# Patient Record
Sex: FEMALE | Race: WHITE | NOT HISPANIC OR LATINO | Employment: OTHER | URBAN - METROPOLITAN AREA
[De-identification: names, ages, dates, MRNs, and addresses within clinical notes are randomized per-mention and may not be internally consistent; named-entity substitution may affect disease eponyms.]

---

## 2017-02-08 ENCOUNTER — HOSPITAL ENCOUNTER (OUTPATIENT)
Dept: SLEEP MEDICINE | Facility: HOSPITAL | Age: 82
Discharge: HOME | End: 2017-02-08
Attending: INTERNAL MEDICINE | Admitting: INTERNAL MEDICINE

## 2017-08-09 ENCOUNTER — OUTSIDE FACILITY SERVICE (OUTPATIENT)
Dept: CARDIAC SURGERY | Facility: CLINIC | Age: 82
End: 2017-08-09

## 2017-08-09 PROCEDURE — OUTSIDEPOS PR OUTSIDE POS PLACEHOLDER: Performed by: THORACIC SURGERY (CARDIOTHORACIC VASCULAR SURGERY)

## 2017-09-19 ENCOUNTER — HOSPITAL ENCOUNTER (OUTPATIENT)
Dept: SLEEP MEDICINE | Facility: HOSPITAL | Age: 82
Discharge: HOME | End: 2017-09-19
Attending: INTERNAL MEDICINE | Admitting: INTERNAL MEDICINE

## 2017-09-21 ENCOUNTER — OFFICE VISIT (OUTPATIENT)
Dept: CARDIAC SURGERY | Facility: CLINIC | Age: 82
End: 2017-09-21

## 2017-09-21 VITALS
HEART RATE: 76 BPM | DIASTOLIC BLOOD PRESSURE: 78 MMHG | SYSTOLIC BLOOD PRESSURE: 122 MMHG | RESPIRATION RATE: 16 BRPM | OXYGEN SATURATION: 95 % | TEMPERATURE: 98.4 F | WEIGHT: 161.4 LBS

## 2017-09-21 DIAGNOSIS — Z95.1 S/P CABG (CORONARY ARTERY BYPASS GRAFT): Primary | ICD-10-CM

## 2017-09-21 PROCEDURE — 99024 POSTOP FOLLOW-UP VISIT: CPT | Performed by: NURSE PRACTITIONER

## 2017-09-21 RX ORDER — HYDROCODONE BITARTRATE AND ACETAMINOPHEN 7.5; 325 MG/1; MG/1
1 TABLET ORAL EVERY 8 HOURS PRN
Refills: 0 | COMMUNITY
Start: 2017-09-07

## 2017-09-21 RX ORDER — WARFARIN SODIUM 2.5 MG/1
TABLET ORAL
COMMUNITY
Start: 2017-09-20 | End: 2020-03-31 | Stop reason: DRUGHIGH

## 2017-09-21 RX ORDER — DILTIAZEM HYDROCHLORIDE 120 MG/1
CAPSULE, EXTENDED RELEASE ORAL
Refills: 5 | COMMUNITY
Start: 2017-09-06 | End: 2017-09-21 | Stop reason: ALTCHOICE

## 2017-09-21 RX ORDER — CEPHALEXIN 500 MG/1
CAPSULE ORAL
Refills: 0 | COMMUNITY
Start: 2017-09-13 | End: 2019-07-31 | Stop reason: ALTCHOICE

## 2017-09-21 RX ORDER — POTASSIUM CHLORIDE 750 MG/1
CAPSULE, EXTENDED RELEASE ORAL
Refills: 2 | COMMUNITY
Start: 2017-06-27 | End: 2020-01-15

## 2017-09-21 RX ORDER — PANTOPRAZOLE SODIUM 40 MG/1
TABLET, DELAYED RELEASE ORAL
Refills: 5 | COMMUNITY
Start: 2017-07-24

## 2017-09-21 RX ORDER — ACETAMINOPHEN 650 MG
TABLET, EXTENDED RELEASE ORAL
Refills: 0 | COMMUNITY
Start: 2017-08-25 | End: 2019-07-31 | Stop reason: ALTCHOICE

## 2017-09-21 RX ORDER — SOTALOL HYDROCHLORIDE 120 MG/1
TABLET ORAL
Refills: 2 | COMMUNITY
Start: 2017-06-19 | End: 2017-09-21 | Stop reason: ALTCHOICE

## 2017-09-21 RX ORDER — POTASSIUM CHLORIDE 20 MEQ/1
TABLET, EXTENDED RELEASE ORAL
Refills: 0 | COMMUNITY
Start: 2017-08-25 | End: 2020-07-15

## 2017-09-21 RX ORDER — TRAMADOL HYDROCHLORIDE 50 MG/1
TABLET ORAL
Refills: 0 | COMMUNITY
Start: 2017-08-25 | End: 2019-07-31 | Stop reason: ALTCHOICE

## 2017-09-21 RX ORDER — LORAZEPAM 0.5 MG/1
TABLET ORAL
Refills: 2 | COMMUNITY
Start: 2017-06-27 | End: 2019-07-31 | Stop reason: ALTCHOICE

## 2017-09-21 RX ORDER — METOPROLOL TARTRATE 50 MG/1
TABLET, FILM COATED ORAL
Refills: 5 | COMMUNITY
Start: 2017-09-07 | End: 2020-03-31 | Stop reason: SDUPTHER

## 2017-09-21 RX ORDER — AMIODARONE HYDROCHLORIDE 200 MG/1
TABLET ORAL
Refills: 0 | COMMUNITY
Start: 2017-08-25 | End: 2019-07-31 | Stop reason: ALTCHOICE

## 2017-09-21 RX ORDER — AMLODIPINE BESYLATE 5 MG/1
TABLET ORAL
Refills: 5 | COMMUNITY
Start: 2017-09-14 | End: 2019-07-31 | Stop reason: ALTCHOICE

## 2017-09-21 RX ORDER — HYDROCODONE BITARTRATE AND ACETAMINOPHEN 5; 325 MG/1; MG/1
TABLET ORAL
Refills: 0 | COMMUNITY
Start: 2017-08-25 | End: 2019-07-31 | Stop reason: ALTCHOICE

## 2017-09-21 RX ORDER — CALCIUM CARB/VITAMIN D3/VIT K1 500-500-40
TABLET,CHEWABLE ORAL
Refills: 0 | COMMUNITY
Start: 2017-08-25 | End: 2020-01-15

## 2017-09-21 RX ORDER — DILTIAZEM HYDROCHLORIDE 180 MG/1
CAPSULE, EXTENDED RELEASE ORAL
Refills: 5 | COMMUNITY
Start: 2017-07-24 | End: 2019-08-09 | Stop reason: SDUPTHER

## 2017-09-21 RX ORDER — ATORVASTATIN CALCIUM 10 MG/1
10 TABLET, FILM COATED ORAL NIGHTLY
COMMUNITY
Start: 2017-09-20

## 2017-09-21 RX ORDER — FUROSEMIDE 20 MG/1
TABLET ORAL
Refills: 5 | COMMUNITY
Start: 2017-06-27 | End: 2017-09-21 | Stop reason: ALTCHOICE

## 2017-09-21 RX ORDER — ASPIRIN 81 MG/1
TABLET, COATED ORAL
Refills: 0 | COMMUNITY
Start: 2017-08-25 | End: 2019-07-31 | Stop reason: ALTCHOICE

## 2017-09-21 RX ORDER — FUROSEMIDE 40 MG/1
TABLET ORAL
Refills: 0 | COMMUNITY
Start: 2017-08-25 | End: 2020-03-31 | Stop reason: SDUPTHER

## 2017-09-21 RX ORDER — DICYCLOMINE HYDROCHLORIDE 10 MG/1
CAPSULE ORAL
Refills: 5 | COMMUNITY
Start: 2017-07-25 | End: 2020-01-15

## 2017-09-21 RX ORDER — ONDANSETRON 4 MG/1
TABLET, FILM COATED ORAL
Refills: 1 | COMMUNITY
Start: 2017-09-06 | End: 2020-07-15

## 2017-09-21 RX ORDER — ALPRAZOLAM 0.5 MG/1
0.5 TABLET ORAL 2 TIMES DAILY PRN
Refills: 5 | COMMUNITY
Start: 2017-09-06

## 2017-09-21 NOTE — PROGRESS NOTES
9/21/2017        Subjective:      Taras Dumont MD    Chief Complaint of No chief complaint on file.           Dear Dr. Taras Dumont MD and Colleagues,    It was nice to see Syl Herrera in follow up today. She is status post CABG ×5, cryo-maze, right lower extremity vein harvest on 8/9/2017 at [facility] with Dr. Brandt.  She reports that she went to rehabilitation postoperatively and is now home and doing physical therapy in the home setting.  She has 2 more visits with home physical therapy and she is due to start rehabilitation soon.  Her daughter is concerned as her mother still has an irregular heart rate with periodic drops in the 40s even with her pacemaker.  I explained I am unable to interrogate her pacemaker but she should make a follow-up appointment with Dr. Martell to discuss her pacemaker settings and atrial fibrillation.  Preoperatively she had a urinary tract infection with postponed surgery, and postoperatively she states that she had a urinary tract infection of which she just finished her latest antibiotic.  She states that she thinks she may still have urinary tract infection as it still feels like she is not emptying her bladder completely.  I encouraged her to make a follow-up appointment with Dr. Dumont for continued urinary symptoms/dysuria in the event that she needs further imaging or a consult with urology.  She states that she is still having periodic issues with anxiety and depression and she has started taking Xanax on a regular basis.  From a surgical standpoint her sternal and leg incisions are well approximated without erythema, edema, or drainage.  Her chest tube insertion sites are still with scabs but no fluctuance is appreciated and no drainage is present.  Her sternum is stable to palpation and she denies any popping or clicking with deep inspiration or coughing    There is no problem list on file for this patient.      Past Medical History:   Diagnosis Date   • Anxiety     • Atrial fibrillation    • GERD (gastroesophageal reflux disease)    • Hypertension        Past Surgical History:   Procedure Laterality Date   • CORONARY ARTERY BYPASS GRAFT  08/09/2017    X5  Dr Brandt       Allergies   Allergen Reactions   • Celebrex [Celecoxib]    • Contrast Dye    • Nsaids    • Sulfa Antibiotics            Current Outpatient Prescriptions:   •  ALPRAZolam (XANAX) 0.5 MG tablet, TK 1/2 T PO QID PRA, Disp: , Rfl: 5  •  amiodarone (PACERONE) 200 MG tablet, TK 1 T PO BID WC, Disp: , Rfl: 0  •  amLODIPine (NORVASC) 5 MG tablet, TK 1 T PO QAM, Disp: , Rfl: 5  •  ASPIRIN LOW DOSE 81 MG EC tablet, TK 1 T PO D, Disp: , Rfl: 0  •  atorvastatin (LIPITOR) 10 MG tablet, , Disp: , Rfl:   •  CALCIUM SOFT CHEWS 500-500-40 MG-UNT-MCG chewable tablet, CSW 1 SOFT CHEW PO QHS, Disp: , Rfl: 0  •  dicyclomine (BENTYL) 10 MG capsule, TK 1 C PO BID, Disp: , Rfl: 5  •  furosemide (LASIX) 40 MG tablet, TK 1 T PO D, Disp: , Rfl: 0  •  HYDROcodone-acetaminophen (NORCO) 5-325 MG per tablet, TK 1 T PO Q 6 H PRN P, Disp: , Rfl: 0  •  magnesium oxide (MAGOX) 400 (241.3 Mg) MG tablet tablet, TK 2 TS PO QHS, Disp: , Rfl: 0  •  metoprolol tartrate (LOPRESSOR) 50 MG tablet, TK 1 T PO BID, Disp: , Rfl: 5  •  pantoprazole (PROTONIX) 40 MG EC tablet, TK 1 T PO QD, Disp: , Rfl: 5  •  potassium chloride (K-DUR,KLOR-CON) 20 MEQ CR tablet, TK 1 T PO D, Disp: , Rfl: 0  •  traMADol (ULTRAM) 50 MG tablet, TK 1 T PO TID PRN, Disp: , Rfl: 0  •  warfarin (COUMADIN) 2.5 MG tablet, , Disp: , Rfl:   •  cephalexin (KEFLEX) 500 MG capsule, TK ONE C PO  BID, Disp: , Rfl: 0  •  DILT- MG 24 hr capsule, TK 1 C PO QD, Disp: , Rfl: 5  •  HYDROcodone-acetaminophen (NORCO) 7.5-325 MG per tablet, TK 1 T PO  Q 8 H, Disp: , Rfl: 0  •  LORazepam (ATIVAN) 0.5 MG tablet, TK 1 T PO  BID, Disp: , Rfl: 2  •  ondansetron (ZOFRAN) 4 MG tablet, TK 1 T PO Q 6 H PRN N, Disp: , Rfl: 1  •  potassium chloride (MICRO-K) 10 MEQ CR capsule, TK 2 CS PO QD, Disp: ,  Rfl: 2  •  povidone-iodine (BETADINE) 10 % external solution, APPLY TOPICALLY BID, Disp: , Rfl: 0    Social History     Social History   • Marital status:      Spouse name: N/A   • Number of children: N/A   • Years of education: N/A     Occupational History   • Not on file.     Social History Main Topics   • Smoking status: Never Smoker   • Smokeless tobacco: Never Used   • Alcohol use No   • Drug use: No   • Sexual activity: Not on file     Other Topics Concern   • Not on file     Social History Narrative   • No narrative on file       No family history on file.        Vital Signs:  Weight: 161 lb 6.4 oz (73.2 kg)   There is no height or weight on file to calculate BMI.  Temp: 98.4 °F (36.9 °C)   Heart Rate: 76   BP: 122/78     Physical Exam   Constitutional: She is oriented to person, place, and time.   Cardiovascular: Exam reveals no gallop and no friction rub.    No murmur heard.  Irregular rhythm   Pulmonary/Chest: Effort normal and breath sounds normal. She has no wheezes. She has no rales.   Musculoskeletal: She exhibits no edema.   Neurological: She is alert and oriented to person, place, and time.   Skin: Skin is warm and dry. No rash noted. No erythema. No pallor.   Psychiatric: She has a normal mood and affect. Her behavior is normal. Judgment and thought content normal.        Recommendation/Plan:     Syl Herrera was seen for post operative follow up. She is doing well from a surgical standpoint. Her incisions are healing well. I have released her to resume normal daily activities without restrictions. We will see her on an as-needed basis although I encouraged her to call for follow-up appointment should she experience any symptomatology for infection.      Thank you for allowing me to participate in her care.    Sincerely,    SHILA Lambert

## 2017-10-19 ENCOUNTER — HOSPITAL ENCOUNTER (OUTPATIENT)
Dept: CARDIOLOGY | Facility: HOSPITAL | Age: 82
Discharge: HOME | End: 2017-10-19
Attending: INTERNAL MEDICINE | Admitting: INTERNAL MEDICINE

## 2017-11-18 ENCOUNTER — HOSPITAL ENCOUNTER (OUTPATIENT)
Dept: LAB | Facility: HOSPITAL | Age: 82
Discharge: HOME | End: 2017-11-18
Attending: OPHTHALMOLOGY | Admitting: OPHTHALMOLOGY

## 2017-11-18 LAB
INR PPP: 2.1 (ref 2–3)
PROTHROMBIN TIME: 21.7 SEC (ref 19.4–28.5)

## 2017-11-29 ENCOUNTER — HOSPITAL ENCOUNTER (OUTPATIENT)
Dept: CARDIOLOGY | Facility: HOSPITAL | Age: 82
Discharge: HOME | End: 2017-11-29
Attending: INTERNAL MEDICINE | Admitting: INTERNAL MEDICINE

## 2017-12-26 ENCOUNTER — HOSPITAL ENCOUNTER (OUTPATIENT)
Dept: RESPIRATORY THERAPY | Facility: HOSPITAL | Age: 82
Discharge: HOME | End: 2017-12-26
Attending: INTERNAL MEDICINE | Admitting: INTERNAL MEDICINE

## 2018-07-12 ENCOUNTER — OFFICE (AMBULATORY)
Dept: URBAN - METROPOLITAN AREA CLINIC 64 | Facility: CLINIC | Age: 83
End: 2018-07-12
Payer: COMMERCIAL

## 2018-07-12 VITALS
HEIGHT: 62 IN | DIASTOLIC BLOOD PRESSURE: 77 MMHG | HEART RATE: 67 BPM | SYSTOLIC BLOOD PRESSURE: 149 MMHG | WEIGHT: 162 LBS

## 2018-07-12 DIAGNOSIS — R13.10 DYSPHAGIA, UNSPECIFIED: ICD-10-CM

## 2018-07-12 DIAGNOSIS — R94.5 ABNORMAL RESULTS OF LIVER FUNCTION STUDIES: ICD-10-CM

## 2018-07-12 DIAGNOSIS — R10.32 LEFT LOWER QUADRANT PAIN: ICD-10-CM

## 2018-07-12 LAB
ACTIN (SMOOTH MUSCLE) ANTIBODY: 12 UNITS (ref 0–19)
ALPHA-1-ANTITRYPSIN PHENOTYP: ALPHA-1-ANTITRYPSIN, SERUM: 143 MG/DL (ref 90–200)
ALPHA-1-ANTITRYPSIN PHENOTYP: PHENOTYPE (PI): (no result)
ANTINUCLEAR ANTIBODIES, IFA: NEGATIVE
CERULOPLASMIN: 35.2 MG/DL (ref 19–39)
COMP. METABOLIC PANEL (14): A/G RATIO: 2 (ref 1.2–2.2)
COMP. METABOLIC PANEL (14): ALBUMIN: 4.3 G/DL (ref 3.5–4.7)
COMP. METABOLIC PANEL (14): ALKALINE PHOSPHATASE: 143 IU/L — HIGH (ref 39–117)
COMP. METABOLIC PANEL (14): ALT (SGPT): 26 IU/L (ref 0–32)
COMP. METABOLIC PANEL (14): AST (SGOT): 34 IU/L (ref 0–40)
COMP. METABOLIC PANEL (14): BILIRUBIN, TOTAL: 0.6 MG/DL (ref 0–1.2)
COMP. METABOLIC PANEL (14): BUN/CREATININE RATIO: 23 (ref 12–28)
COMP. METABOLIC PANEL (14): BUN: 26 MG/DL (ref 8–27)
COMP. METABOLIC PANEL (14): CALCIUM: 9.3 MG/DL (ref 8.7–10.3)
COMP. METABOLIC PANEL (14): CARBON DIOXIDE, TOTAL: 27 MMOL/L (ref 20–29)
COMP. METABOLIC PANEL (14): CHLORIDE: 100 MMOL/L (ref 96–106)
COMP. METABOLIC PANEL (14): CREATININE: 1.11 MG/DL — HIGH (ref 0.57–1)
COMP. METABOLIC PANEL (14): EGFR IF AFRICN AM: 54 ML/MIN/1.73 — LOW (ref 59–?)
COMP. METABOLIC PANEL (14): EGFR IF NONAFRICN AM: 47 ML/MIN/1.73 — LOW (ref 59–?)
COMP. METABOLIC PANEL (14): GLOBULIN, TOTAL: 2.2 G/DL (ref 1.5–4.5)
COMP. METABOLIC PANEL (14): GLUCOSE: 103 MG/DL — HIGH (ref 65–99)
COMP. METABOLIC PANEL (14): POTASSIUM: 4.7 MMOL/L (ref 3.5–5.2)
COMP. METABOLIC PANEL (14): PROTEIN, TOTAL: 6.5 G/DL (ref 6–8.5)
COMP. METABOLIC PANEL (14): SODIUM: 141 MMOL/L (ref 134–144)
FERRITIN, SERUM: 87 NG/ML (ref 15–150)
GGT: 48 IU/L (ref 0–60)
IMMUNOGLOBULINS A/G/M, QN, SER: IMMUNOGLOBULIN A, QN, SERUM: 166 MG/DL (ref 64–422)
IMMUNOGLOBULINS A/G/M, QN, SER: IMMUNOGLOBULIN G, QN, SERUM: 956 MG/DL (ref 700–1600)
IMMUNOGLOBULINS A/G/M, QN, SER: IMMUNOGLOBULIN M, QN, SERUM: 41 MG/DL (ref 26–217)
IRON AND TIBC: IRON BIND.CAP.(TIBC): 343 UG/DL (ref 250–450)
IRON AND TIBC: IRON SATURATION: 18 % (ref 15–55)
IRON AND TIBC: IRON: 61 UG/DL (ref 27–139)
IRON AND TIBC: UIBC: 282 UG/DL (ref 118–369)
MITOCHONDRIAL (M2) ANTIBODY: 29.9 UNITS — HIGH (ref 0–20)

## 2018-07-12 PROCEDURE — 99204 OFFICE O/P NEW MOD 45 MIN: CPT | Performed by: INTERNAL MEDICINE

## 2018-09-18 ENCOUNTER — OFFICE (AMBULATORY)
Dept: URBAN - METROPOLITAN AREA CLINIC 64 | Facility: CLINIC | Age: 83
End: 2018-09-18
Payer: COMMERCIAL

## 2018-09-18 VITALS
HEART RATE: 72 BPM | DIASTOLIC BLOOD PRESSURE: 80 MMHG | HEIGHT: 62 IN | SYSTOLIC BLOOD PRESSURE: 153 MMHG | WEIGHT: 172 LBS

## 2018-09-18 DIAGNOSIS — R13.10 DYSPHAGIA, UNSPECIFIED: ICD-10-CM

## 2018-09-18 DIAGNOSIS — R94.5 ABNORMAL RESULTS OF LIVER FUNCTION STUDIES: ICD-10-CM

## 2018-09-18 DIAGNOSIS — K74.3 PRIMARY BILIARY CIRRHOSIS: ICD-10-CM

## 2018-09-18 PROCEDURE — 99213 OFFICE O/P EST LOW 20 MIN: CPT | Performed by: INTERNAL MEDICINE

## 2019-01-03 ENCOUNTER — OFFICE (AMBULATORY)
Dept: URBAN - METROPOLITAN AREA CLINIC 64 | Facility: CLINIC | Age: 84
End: 2019-01-03
Payer: COMMERCIAL

## 2019-01-03 VITALS
DIASTOLIC BLOOD PRESSURE: 89 MMHG | HEART RATE: 62 BPM | HEIGHT: 62 IN | WEIGHT: 175 LBS | SYSTOLIC BLOOD PRESSURE: 142 MMHG

## 2019-01-03 DIAGNOSIS — R13.10 DYSPHAGIA, UNSPECIFIED: ICD-10-CM

## 2019-01-03 DIAGNOSIS — K74.3 PRIMARY BILIARY CIRRHOSIS: ICD-10-CM

## 2019-01-03 PROCEDURE — 99213 OFFICE O/P EST LOW 20 MIN: CPT | Performed by: INTERNAL MEDICINE

## 2019-06-26 ENCOUNTER — ANTICOAGULATION VISIT (OUTPATIENT)
Dept: CARDIOLOGY | Facility: CLINIC | Age: 84
End: 2019-06-26

## 2019-06-26 VITALS — DIASTOLIC BLOOD PRESSURE: 82 MMHG | SYSTOLIC BLOOD PRESSURE: 141 MMHG | HEART RATE: 61 BPM | WEIGHT: 176.75 LBS

## 2019-06-26 DIAGNOSIS — Z79.01 LONG TERM (CURRENT) USE OF ANTICOAGULANTS: ICD-10-CM

## 2019-06-26 DIAGNOSIS — I48.20 CHRONIC ATRIAL FIBRILLATION (HCC): ICD-10-CM

## 2019-06-26 PROBLEM — I48.91 ATRIAL FIBRILLATION: Status: ACTIVE | Noted: 2019-06-26

## 2019-06-26 LAB — INR PPP: 3 (ref 2–3)

## 2019-06-26 PROCEDURE — 85610 PROTHROMBIN TIME: CPT | Performed by: INTERNAL MEDICINE

## 2019-06-26 PROCEDURE — 36416 COLLJ CAPILLARY BLOOD SPEC: CPT | Performed by: INTERNAL MEDICINE

## 2019-07-31 ENCOUNTER — TELEPHONE (OUTPATIENT)
Dept: CARDIOLOGY | Facility: CLINIC | Age: 84
End: 2019-07-31

## 2019-07-31 ENCOUNTER — ANTICOAGULATION VISIT (OUTPATIENT)
Dept: CARDIOLOGY | Facility: CLINIC | Age: 84
End: 2019-07-31

## 2019-07-31 VITALS — HEART RATE: 60 BPM | DIASTOLIC BLOOD PRESSURE: 71 MMHG | WEIGHT: 174.75 LBS | SYSTOLIC BLOOD PRESSURE: 136 MMHG

## 2019-07-31 DIAGNOSIS — I48.20 CHRONIC ATRIAL FIBRILLATION (HCC): ICD-10-CM

## 2019-07-31 DIAGNOSIS — Z79.01 LONG TERM (CURRENT) USE OF ANTICOAGULANTS: ICD-10-CM

## 2019-07-31 LAB — INR PPP: 3.4 (ref 2–3)

## 2019-07-31 PROCEDURE — 85610 PROTHROMBIN TIME: CPT | Performed by: INTERNAL MEDICINE

## 2019-07-31 PROCEDURE — 36416 COLLJ CAPILLARY BLOOD SPEC: CPT | Performed by: INTERNAL MEDICINE

## 2019-07-31 NOTE — TELEPHONE ENCOUNTER
Dr cosby sent copy of EKG for Dr Martell to review / advise.  Pt presented with updated med list.    Copy of Ekg given to Dr fausto GODOY's

## 2019-08-09 ENCOUNTER — TRANSCRIBE ORDERS (OUTPATIENT)
Dept: CT IMAGING | Facility: HOSPITAL | Age: 84
End: 2019-08-09

## 2019-08-09 ENCOUNTER — OFFICE (AMBULATORY)
Dept: URBAN - METROPOLITAN AREA CLINIC 64 | Facility: CLINIC | Age: 84
End: 2019-08-09
Payer: COMMERCIAL

## 2019-08-09 VITALS
HEART RATE: 60 BPM | DIASTOLIC BLOOD PRESSURE: 93 MMHG | SYSTOLIC BLOOD PRESSURE: 166 MMHG | WEIGHT: 176 LBS | HEIGHT: 62 IN

## 2019-08-09 DIAGNOSIS — R13.10 DYSPHAGIA, UNSPECIFIED: ICD-10-CM

## 2019-08-09 DIAGNOSIS — K74.3 PRIMARY BILIARY CIRRHOSIS: ICD-10-CM

## 2019-08-09 DIAGNOSIS — R10.32 LEFT LOWER QUADRANT PAIN: Primary | ICD-10-CM

## 2019-08-09 DIAGNOSIS — R94.5 ABNORMAL RESULTS OF LIVER FUNCTION STUDIES: ICD-10-CM

## 2019-08-09 DIAGNOSIS — R10.32 LEFT LOWER QUADRANT PAIN: ICD-10-CM

## 2019-08-09 DIAGNOSIS — K57.90 DIVERTICULOSIS OF INTESTINE, PART UNSPECIFIED, WITHOUT PERFO: ICD-10-CM

## 2019-08-09 PROCEDURE — 99214 OFFICE O/P EST MOD 30 MIN: CPT | Performed by: NURSE PRACTITIONER

## 2019-08-09 RX ORDER — DILTIAZEM HYDROCHLORIDE 180 MG/1
180 CAPSULE, EXTENDED RELEASE ORAL DAILY
Qty: 30 CAPSULE | Refills: 0 | Status: SHIPPED | OUTPATIENT
Start: 2019-08-09 | End: 2019-08-14 | Stop reason: SDUPTHER

## 2019-08-13 ENCOUNTER — HOSPITAL ENCOUNTER (OUTPATIENT)
Facility: HOSPITAL | Age: 84
Setting detail: HOSPITAL OUTPATIENT SURGERY
End: 2019-08-13
Attending: INTERNAL MEDICINE | Admitting: INTERNAL MEDICINE

## 2019-08-14 RX ORDER — DILTIAZEM HYDROCHLORIDE 180 MG/1
180 CAPSULE, EXTENDED RELEASE ORAL DAILY
Qty: 30 CAPSULE | Refills: 0 | Status: SHIPPED | OUTPATIENT
Start: 2019-08-14 | End: 2019-08-26 | Stop reason: DRUGHIGH

## 2019-08-15 ENCOUNTER — HOSPITAL ENCOUNTER (OUTPATIENT)
Dept: CT IMAGING | Facility: HOSPITAL | Age: 84
Discharge: HOME | End: 2019-08-15
Admitting: NURSE PRACTITIONER

## 2019-08-15 ENCOUNTER — APPOINTMENT (OUTPATIENT)
Dept: CT IMAGING | Facility: HOSPITAL | Age: 84
End: 2019-08-15

## 2019-08-15 DIAGNOSIS — R10.32 LEFT LOWER QUADRANT PAIN: ICD-10-CM

## 2019-08-15 PROCEDURE — 74176 CT ABD & PELVIS W/O CONTRAST: CPT

## 2019-08-21 ENCOUNTER — CLINICAL SUPPORT NO REQUIREMENTS (OUTPATIENT)
Dept: CARDIOLOGY | Facility: CLINIC | Age: 84
End: 2019-08-21

## 2019-08-21 DIAGNOSIS — Z95.0 PACEMAKER: ICD-10-CM

## 2019-08-21 DIAGNOSIS — R00.1 BRADYCARDIA: Primary | ICD-10-CM

## 2019-08-21 PROCEDURE — 93296 REM INTERROG EVL PM/IDS: CPT | Performed by: INTERNAL MEDICINE

## 2019-08-21 PROCEDURE — 93294 REM INTERROG EVL PM/LDLS PM: CPT | Performed by: INTERNAL MEDICINE

## 2019-08-22 ENCOUNTER — ANTICOAGULATION VISIT (OUTPATIENT)
Dept: CARDIOLOGY | Facility: CLINIC | Age: 84
End: 2019-08-22

## 2019-08-22 VITALS — SYSTOLIC BLOOD PRESSURE: 133 MMHG | DIASTOLIC BLOOD PRESSURE: 79 MMHG | WEIGHT: 174.75 LBS | HEART RATE: 62 BPM

## 2019-08-22 DIAGNOSIS — Z79.01 LONG TERM (CURRENT) USE OF ANTICOAGULANTS: ICD-10-CM

## 2019-08-22 DIAGNOSIS — I48.20 CHRONIC ATRIAL FIBRILLATION (HCC): ICD-10-CM

## 2019-08-22 LAB — INR PPP: 3.1 (ref 2–3)

## 2019-08-22 PROCEDURE — 85610 PROTHROMBIN TIME: CPT | Performed by: INTERNAL MEDICINE

## 2019-08-22 PROCEDURE — 36416 COLLJ CAPILLARY BLOOD SPEC: CPT | Performed by: INTERNAL MEDICINE

## 2019-08-26 ENCOUNTER — TELEPHONE (OUTPATIENT)
Dept: CARDIOLOGY | Facility: CLINIC | Age: 84
End: 2019-08-26

## 2019-08-26 RX ORDER — DILTIAZEM HYDROCHLORIDE 120 MG/1
120 CAPSULE, COATED, EXTENDED RELEASE ORAL DAILY
Qty: 90 CAPSULE | Refills: 3 | Status: SHIPPED | OUTPATIENT
Start: 2019-08-26 | End: 2020-01-15

## 2019-08-26 RX ORDER — DILTIAZEM HYDROCHLORIDE 120 MG/1
120 CAPSULE, COATED, EXTENDED RELEASE ORAL DAILY
COMMUNITY
End: 2019-08-26 | Stop reason: SDUPTHER

## 2019-08-26 NOTE — TELEPHONE ENCOUNTER
WRONG RX CALLED IN,  DILTIAZEM 180MG    INSTEAD OF 120MG? PT ASKING WHY?  [phone]  CALL AFTER 1 TODAY

## 2019-09-26 ENCOUNTER — ANTICOAGULATION VISIT (OUTPATIENT)
Dept: CARDIOLOGY | Facility: CLINIC | Age: 84
End: 2019-09-26

## 2019-09-26 VITALS — HEART RATE: 64 BPM | WEIGHT: 169 LBS | DIASTOLIC BLOOD PRESSURE: 83 MMHG | SYSTOLIC BLOOD PRESSURE: 153 MMHG

## 2019-09-26 DIAGNOSIS — I48.20 CHRONIC ATRIAL FIBRILLATION (HCC): ICD-10-CM

## 2019-09-26 DIAGNOSIS — Z79.01 LONG TERM (CURRENT) USE OF ANTICOAGULANTS: ICD-10-CM

## 2019-09-26 LAB — INR PPP: 3 (ref 0.9–1.1)

## 2019-09-26 PROCEDURE — 36416 COLLJ CAPILLARY BLOOD SPEC: CPT | Performed by: INTERNAL MEDICINE

## 2019-09-26 PROCEDURE — 85610 PROTHROMBIN TIME: CPT | Performed by: INTERNAL MEDICINE

## 2019-10-30 ENCOUNTER — ANTICOAGULATION VISIT (OUTPATIENT)
Dept: CARDIOLOGY | Facility: CLINIC | Age: 84
End: 2019-10-30

## 2019-10-30 VITALS — HEART RATE: 62 BPM | DIASTOLIC BLOOD PRESSURE: 77 MMHG | SYSTOLIC BLOOD PRESSURE: 145 MMHG | WEIGHT: 172.25 LBS

## 2019-10-30 DIAGNOSIS — Z79.01 LONG TERM (CURRENT) USE OF ANTICOAGULANTS: ICD-10-CM

## 2019-10-30 DIAGNOSIS — I48.91 ATRIAL FIBRILLATION, UNSPECIFIED TYPE (HCC): ICD-10-CM

## 2019-10-30 LAB — INR PPP: 3.9 (ref 0.9–1.1)

## 2019-10-30 PROCEDURE — 85610 PROTHROMBIN TIME: CPT | Performed by: INTERNAL MEDICINE

## 2019-10-30 PROCEDURE — 36416 COLLJ CAPILLARY BLOOD SPEC: CPT | Performed by: INTERNAL MEDICINE

## 2019-11-13 ENCOUNTER — ANTICOAGULATION VISIT (OUTPATIENT)
Dept: CARDIOLOGY | Facility: CLINIC | Age: 84
End: 2019-11-13

## 2019-11-13 VITALS — WEIGHT: 169.5 LBS | SYSTOLIC BLOOD PRESSURE: 148 MMHG | HEART RATE: 62 BPM | DIASTOLIC BLOOD PRESSURE: 90 MMHG

## 2019-11-13 DIAGNOSIS — I48.91 ATRIAL FIBRILLATION, UNSPECIFIED TYPE (HCC): ICD-10-CM

## 2019-11-13 DIAGNOSIS — Z79.01 LONG TERM (CURRENT) USE OF ANTICOAGULANTS: ICD-10-CM

## 2019-11-13 LAB — INR PPP: 2.9 (ref 0.9–1.1)

## 2019-11-13 PROCEDURE — 36416 COLLJ CAPILLARY BLOOD SPEC: CPT | Performed by: INTERNAL MEDICINE

## 2019-11-13 PROCEDURE — 85610 PROTHROMBIN TIME: CPT | Performed by: INTERNAL MEDICINE

## 2019-11-20 ENCOUNTER — CLINICAL SUPPORT NO REQUIREMENTS (OUTPATIENT)
Dept: CARDIOLOGY | Facility: CLINIC | Age: 84
End: 2019-11-20

## 2019-11-20 DIAGNOSIS — Z95.0 PACEMAKER: Primary | ICD-10-CM

## 2019-11-20 DIAGNOSIS — I48.91 ATRIAL FIBRILLATION, UNSPECIFIED TYPE (HCC): ICD-10-CM

## 2019-11-20 PROCEDURE — 93296 REM INTERROG EVL PM/IDS: CPT | Performed by: INTERNAL MEDICINE

## 2019-11-20 PROCEDURE — 93294 REM INTERROG EVL PM/LDLS PM: CPT | Performed by: INTERNAL MEDICINE

## 2019-12-11 ENCOUNTER — ANTICOAGULATION VISIT (OUTPATIENT)
Dept: CARDIOLOGY | Facility: CLINIC | Age: 84
End: 2019-12-11

## 2019-12-11 VITALS — DIASTOLIC BLOOD PRESSURE: 71 MMHG | HEART RATE: 62 BPM | WEIGHT: 168.75 LBS | SYSTOLIC BLOOD PRESSURE: 117 MMHG

## 2019-12-11 DIAGNOSIS — Z79.01 LONG TERM (CURRENT) USE OF ANTICOAGULANTS: ICD-10-CM

## 2019-12-11 DIAGNOSIS — I48.91 ATRIAL FIBRILLATION, UNSPECIFIED TYPE (HCC): ICD-10-CM

## 2019-12-11 LAB — INR PPP: 3.6 (ref 0.9–1.1)

## 2019-12-11 PROCEDURE — 36416 COLLJ CAPILLARY BLOOD SPEC: CPT | Performed by: INTERNAL MEDICINE

## 2019-12-11 PROCEDURE — 85610 PROTHROMBIN TIME: CPT | Performed by: INTERNAL MEDICINE

## 2019-12-17 PROBLEM — I25.810 ATHEROSCLEROSIS OF CORONARY ARTERY BYPASS GRAFT: Status: ACTIVE | Noted: 2017-09-27

## 2019-12-17 PROBLEM — I10 HYPERTENSION: Status: ACTIVE | Noted: 2019-12-17

## 2019-12-17 RX ORDER — CLOBETASOL PROPIONATE 0.5 MG/G
EMULSION TOPICAL
Refills: 2 | COMMUNITY
Start: 2019-11-05 | End: 2020-07-15

## 2019-12-17 RX ORDER — DILTIAZEM HYDROCHLORIDE 120 MG/1
CAPSULE, EXTENDED RELEASE ORAL
Refills: 3 | COMMUNITY
Start: 2019-11-17 | End: 2020-08-06

## 2019-12-17 RX ORDER — SPIRONOLACTONE 25 MG/1
25 TABLET ORAL
Refills: 3 | COMMUNITY
Start: 2019-11-17 | End: 2020-05-11

## 2019-12-19 ENCOUNTER — ANTICOAGULATION VISIT (OUTPATIENT)
Dept: CARDIOLOGY | Facility: CLINIC | Age: 84
End: 2019-12-19

## 2019-12-19 VITALS — SYSTOLIC BLOOD PRESSURE: 161 MMHG | DIASTOLIC BLOOD PRESSURE: 93 MMHG | WEIGHT: 169 LBS | HEART RATE: 67 BPM

## 2019-12-19 DIAGNOSIS — Z79.01 LONG TERM (CURRENT) USE OF ANTICOAGULANTS: ICD-10-CM

## 2019-12-19 DIAGNOSIS — I48.91 ATRIAL FIBRILLATION, UNSPECIFIED TYPE (HCC): ICD-10-CM

## 2019-12-19 LAB — INR PPP: 1.9 (ref 0.9–1.1)

## 2019-12-19 PROCEDURE — 85610 PROTHROMBIN TIME: CPT | Performed by: INTERNAL MEDICINE

## 2019-12-19 PROCEDURE — 36416 COLLJ CAPILLARY BLOOD SPEC: CPT | Performed by: INTERNAL MEDICINE

## 2020-01-15 ENCOUNTER — ANTICOAGULATION VISIT (OUTPATIENT)
Dept: CARDIOLOGY | Facility: CLINIC | Age: 85
End: 2020-01-15

## 2020-01-15 ENCOUNTER — CLINICAL SUPPORT NO REQUIREMENTS (OUTPATIENT)
Dept: CARDIOLOGY | Facility: CLINIC | Age: 85
End: 2020-01-15

## 2020-01-15 ENCOUNTER — OFFICE VISIT (OUTPATIENT)
Dept: CARDIOLOGY | Facility: CLINIC | Age: 85
End: 2020-01-15

## 2020-01-15 VITALS — DIASTOLIC BLOOD PRESSURE: 88 MMHG | WEIGHT: 167 LBS | HEART RATE: 63 BPM | SYSTOLIC BLOOD PRESSURE: 160 MMHG

## 2020-01-15 VITALS
WEIGHT: 167.75 LBS | SYSTOLIC BLOOD PRESSURE: 160 MMHG | HEIGHT: 61 IN | DIASTOLIC BLOOD PRESSURE: 88 MMHG | BODY MASS INDEX: 31.67 KG/M2 | HEART RATE: 63 BPM

## 2020-01-15 DIAGNOSIS — I25.708 CORONARY ARTERY DISEASE OF BYPASS GRAFT OF NATIVE HEART WITH STABLE ANGINA PECTORIS (HCC): ICD-10-CM

## 2020-01-15 DIAGNOSIS — Z95.0 PRESENCE OF CARDIAC PACEMAKER: ICD-10-CM

## 2020-01-15 DIAGNOSIS — Z79.01 LONG TERM (CURRENT) USE OF ANTICOAGULANTS: ICD-10-CM

## 2020-01-15 DIAGNOSIS — E78.5 DYSLIPIDEMIA: ICD-10-CM

## 2020-01-15 DIAGNOSIS — I48.19 OTHER PERSISTENT ATRIAL FIBRILLATION (HCC): Primary | ICD-10-CM

## 2020-01-15 DIAGNOSIS — I10 ESSENTIAL HYPERTENSION: ICD-10-CM

## 2020-01-15 DIAGNOSIS — I48.91 ATRIAL FIBRILLATION, UNSPECIFIED TYPE (HCC): ICD-10-CM

## 2020-01-15 DIAGNOSIS — I48.91 ATRIAL FIBRILLATION, UNSPECIFIED TYPE (HCC): Primary | ICD-10-CM

## 2020-01-15 LAB — INR PPP: 3.8 (ref 0.9–1.1)

## 2020-01-15 PROCEDURE — 93000 ELECTROCARDIOGRAM COMPLETE: CPT | Performed by: INTERNAL MEDICINE

## 2020-01-15 PROCEDURE — 99214 OFFICE O/P EST MOD 30 MIN: CPT | Performed by: INTERNAL MEDICINE

## 2020-01-15 PROCEDURE — 36416 COLLJ CAPILLARY BLOOD SPEC: CPT | Performed by: INTERNAL MEDICINE

## 2020-01-15 PROCEDURE — 93280 PM DEVICE PROGR EVAL DUAL: CPT | Performed by: INTERNAL MEDICINE

## 2020-01-15 PROCEDURE — 85610 PROTHROMBIN TIME: CPT | Performed by: INTERNAL MEDICINE

## 2020-01-15 NOTE — PROGRESS NOTES
"    Subjective:     Encounter Date:01/15/2020      Patient ID: Syl Herrera is a 84 y.o. female.    Chief Complaint:  History of Present Illness 84-year-old white female with history of coronary status post carotid bypass surgery history of hypertension hyperlipidemia history of atrial fibrillation with tachybradycardia syndrome status post pacemaker placement presents to my office for follow-up.  Patient is currently stable on exam with no chest pain but has some shortness of breath with exertion.  No complaint of any PND orthopnea.  No palpitation dizziness syncope or swelling of the feet.  Patient has been taking her medicines regularly.  She does not smoke.  She is trying to exercise regularly.  Her pacemaker is working very well.  She is also on warfarin for anticoagulation for atrial fibrillation and is managing it to keep the INR between 2.0-3.0    The following portions of the patient's history were reviewed and updated as appropriate: allergies, current medications, past family history, past medical history, past social history, past surgical history and problem list.  Past Medical History:   Diagnosis Date   • Anxiety    • Arthritis    • Atrial fibrillation (CMS/HCC)    • Coronary artery disease    • Dyslipidemia    • GERD (gastroesophageal reflux disease)    • History of bone density study 04/2015   • Hypertension      Past Surgical History:   Procedure Laterality Date   • BLADDER REPAIR  1990   • BREAST LUMPECTOMY  1974    BENIGN   • CARDIAC CATHETERIZATION  05/25/2011   • CARDIOVASCULAR STRESS TEST  05/04/2015   • CHOLECYSTECTOMY  1990   • CORONARY ARTERY BYPASS GRAFT  08/09/2017    X5  Dr Brandt   • HYSTERECTOMY  1990   • OTHER SURGICAL HISTORY  06/2017    BLOOD CLOT REMOVED FROM LEFT EAR   • PACEMAKER IMPLANTATION  04/18/2016    DUAL CHAMBER [facility]     /88   Pulse 63   Ht 154.9 cm (61\")   Wt 76.1 kg (167 lb 12 oz)   BMI 31.70 kg/m²   Family History   Problem Relation Age of Onset   • " Hypertension Mother    • Heart disease Sister         PACEMAKER       Current Outpatient Medications:   •  ALPRAZolam (XANAX) 0.5 MG tablet, TK 1/2 T PO QID PRA, Disp: , Rfl: 5  •  atorvastatin (LIPITOR) 10 MG tablet, , Disp: , Rfl:   •  CLOBETASOL PROPIONATE E 0.05 % emollient cream, SHELL THIN LAYER EXT AA BID, Disp: , Rfl: 2  •  dilTIAZem (TIAZAC) 120 MG 24 hr capsule, TK 1 C PO D, Disp: , Rfl: 3  •  furosemide (LASIX) 40 MG tablet, TK 1 T PO D, Disp: , Rfl: 0  •  HYDROcodone-acetaminophen (NORCO) 7.5-325 MG per tablet, TK 1 T PO  Q 8 H, Disp: , Rfl: 0  •  magnesium oxide (MAGOX) 400 (241.3 Mg) MG tablet tablet, TK 2 TS PO QHS, Disp: , Rfl: 0  •  metoprolol tartrate (LOPRESSOR) 50 MG tablet, TK 1 T PO BID, Disp: , Rfl: 5  •  Multiple Vitamins-Minerals (VITEYES AREDS FORMULA) capsule, VITEYES AREDS FORMULA CAPS, Disp: , Rfl:   •  ondansetron (ZOFRAN) 4 MG tablet, TK 1 T PO Q 6 H PRN N, Disp: , Rfl: 1  •  pantoprazole (PROTONIX) 40 MG EC tablet, TK 1 T PO QD, Disp: , Rfl: 5  •  potassium chloride (K-DUR,KLOR-CON) 20 MEQ CR tablet, TK 1 T PO D, Disp: , Rfl: 0  •  spironolactone (ALDACTONE) 25 MG tablet, Take 25 mg by mouth., Disp: , Rfl: 3  •  warfarin (COUMADIN) 2.5 MG tablet, , Disp: , Rfl:   Allergies   Allergen Reactions   • Doxycycline Unknown - High Severity   • Celebrex [Celecoxib]    • Contrast Dye    • Iodinated Diagnostic Agents Unknown - High Severity   • Nsaids    • Sulfa Antibiotics      Social History     Socioeconomic History   • Marital status:      Spouse name: Not on file   • Number of children: Not on file   • Years of education: Not on file   • Highest education level: Not on file   Tobacco Use   • Smoking status: Never Smoker   • Smokeless tobacco: Never Used   Substance and Sexual Activity   • Alcohol use: No   • Drug use: No     Review of Systems   Constitution: Negative for fever and malaise/fatigue.   HENT: Negative for ear pain and nosebleeds.    Eyes: Negative for blurred vision and  double vision.   Cardiovascular: Positive for dyspnea on exertion. Negative for chest pain, leg swelling and palpitations.   Respiratory: Negative for cough and shortness of breath.    Skin: Negative for rash.   Musculoskeletal: Negative for joint pain.   Gastrointestinal: Positive for nausea. Negative for abdominal pain and vomiting.   Neurological: Negative for focal weakness, headaches, light-headedness and numbness.   Psychiatric/Behavioral: Negative for depression. The patient is not nervous/anxious.    All other systems reviewed and are negative.             Objective:     Physical Exam   Constitutional: She appears well-developed and well-nourished.   HENT:   Head: Normocephalic and atraumatic.   Eyes: Pupils are equal, round, and reactive to light. Conjunctivae and EOM are normal. No scleral icterus.   Neck: Normal range of motion. Neck supple. No JVD present. Carotid bruit is not present.   Cardiovascular: Normal rate, S1 normal, S2 normal and intact distal pulses. An irregularly irregular rhythm present. PMI is not displaced.   Murmur heard.  Pulmonary/Chest: Effort normal and breath sounds normal. She has no wheezes. She has no rales.   Abdominal: Soft. Bowel sounds are normal.   Musculoskeletal: Normal range of motion.   Neurological: She is alert. She has normal strength.   No focal deficits   Skin: Skin is warm and dry. No rash noted.   Psychiatric: She has a normal mood and affect.       ECG 12 Lead  Date/Time: 1/15/2020 10:36 AM  Performed by: Ivan Martell MD  Authorized by: Ivan Martell MD   Comments: Atrial pacing with ventricular sensing  Abnormal EKG  No new changes from previous EKG            Lab Review:       Assessment:          Diagnosis Plan   1. Other persistent atrial fibrillation     2. 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 Essential hypertension     4. Dyslipidemia     5. Presence of cardiac pacemaker            Plan:        Patient has history of coronary status post carotid bypass surgery x3 vessels with a LIMA to LAD and saphenous graft to the marginal branch into the RCA  Patient has normal LV function and is currently stable on medications  Patient blood pressure and heart rate are stable  Patient's lipid levels are followed by the primary care doctor and she is on a statin  Patient has atrial fibrillation is on anticoagulation  Patient had tachybradycardia syndrome and status post pacemaker placement.  Patient's pacemaker is working very well.  Continue current medicines and follow her in 6 months

## 2020-02-01 ENCOUNTER — APPOINTMENT (OUTPATIENT)
Dept: GENERAL RADIOLOGY | Facility: HOSPITAL | Age: 85
End: 2020-02-01

## 2020-02-01 ENCOUNTER — HOSPITAL ENCOUNTER (EMERGENCY)
Facility: HOSPITAL | Age: 85
Discharge: HOME | End: 2020-02-01
Attending: EMERGENCY MEDICINE | Admitting: EMERGENCY MEDICINE

## 2020-02-01 VITALS
RESPIRATION RATE: 14 BRPM | TEMPERATURE: 97.9 F | HEART RATE: 61 BPM | OXYGEN SATURATION: 99 % | HEIGHT: 62 IN | BODY MASS INDEX: 30.26 KG/M2 | WEIGHT: 164.46 LBS | DIASTOLIC BLOOD PRESSURE: 66 MMHG | SYSTOLIC BLOOD PRESSURE: 126 MMHG

## 2020-02-01 DIAGNOSIS — N39.0 URINARY TRACT INFECTION WITHOUT HEMATURIA, SITE UNSPECIFIED: ICD-10-CM

## 2020-02-01 DIAGNOSIS — R11.0 NAUSEA: ICD-10-CM

## 2020-02-01 DIAGNOSIS — M79.10 MYALGIA: Primary | ICD-10-CM

## 2020-02-01 LAB
ANION GAP SERPL CALCULATED.3IONS-SCNC: 11 MMOL/L (ref 5–15)
BACTERIA UR QL AUTO: ABNORMAL /HPF
BASOPHILS # BLD AUTO: 0 10*3/MM3 (ref 0–0.2)
BASOPHILS NFR BLD AUTO: 0.6 % (ref 0–1.5)
BILIRUB UR QL STRIP: NEGATIVE
BUN BLD-MCNC: 18 MG/DL (ref 8–23)
BUN/CREAT SERPL: 17.1 (ref 7–25)
CALCIUM SPEC-SCNC: 9 MG/DL (ref 8.6–10.5)
CHLORIDE SERPL-SCNC: 98 MMOL/L (ref 98–107)
CLARITY UR: ABNORMAL
CO2 SERPL-SCNC: 25 MMOL/L (ref 22–29)
COLOR UR: YELLOW
CREAT BLD-MCNC: 1.05 MG/DL (ref 0.57–1)
DEPRECATED RDW RBC AUTO: 45.1 FL (ref 37–54)
EOSINOPHIL # BLD AUTO: 0.1 10*3/MM3 (ref 0–0.4)
EOSINOPHIL NFR BLD AUTO: 1 % (ref 0.3–6.2)
ERYTHROCYTE [DISTWIDTH] IN BLOOD BY AUTOMATED COUNT: 14.5 % (ref 12.3–15.4)
FLUAV SUBTYP SPEC NAA+PROBE: NOT DETECTED
FLUBV RNA ISLT QL NAA+PROBE: NOT DETECTED
GFR SERPL CREATININE-BSD FRML MDRD: 50 ML/MIN/1.73
GLUCOSE BLD-MCNC: 114 MG/DL (ref 65–99)
GLUCOSE UR STRIP-MCNC: NEGATIVE MG/DL
HCT VFR BLD AUTO: 40 % (ref 34–46.6)
HGB BLD-MCNC: 13.5 G/DL (ref 12–15.9)
HGB UR QL STRIP.AUTO: NEGATIVE
HYALINE CASTS UR QL AUTO: ABNORMAL /LPF
KETONES UR QL STRIP: NEGATIVE
LEUKOCYTE ESTERASE UR QL STRIP.AUTO: ABNORMAL
LYMPHOCYTES # BLD AUTO: 1.4 10*3/MM3 (ref 0.7–3.1)
LYMPHOCYTES NFR BLD AUTO: 25.2 % (ref 19.6–45.3)
MCH RBC QN AUTO: 29.6 PG (ref 26.6–33)
MCHC RBC AUTO-ENTMCNC: 33.7 G/DL (ref 31.5–35.7)
MCV RBC AUTO: 87.9 FL (ref 79–97)
MONOCYTES # BLD AUTO: 0.8 10*3/MM3 (ref 0.1–0.9)
MONOCYTES NFR BLD AUTO: 15 % (ref 5–12)
NEUTROPHILS # BLD AUTO: 3.3 10*3/MM3 (ref 1.7–7)
NEUTROPHILS NFR BLD AUTO: 58.2 % (ref 42.7–76)
NITRITE UR QL STRIP: NEGATIVE
NRBC BLD AUTO-RTO: 0.1 /100 WBC (ref 0–0.2)
PH UR STRIP.AUTO: 7 [PH] (ref 5–8)
PLATELET # BLD AUTO: 156 10*3/MM3 (ref 140–450)
PMV BLD AUTO: 9.4 FL (ref 6–12)
POTASSIUM BLD-SCNC: 4.4 MMOL/L (ref 3.5–5.2)
PROT UR QL STRIP: ABNORMAL
RBC # BLD AUTO: 4.55 10*6/MM3 (ref 3.77–5.28)
RBC # UR: ABNORMAL /HPF
REF LAB TEST METHOD: ABNORMAL
SODIUM BLD-SCNC: 134 MMOL/L (ref 136–145)
SP GR UR STRIP: 1.02 (ref 1–1.03)
SQUAMOUS #/AREA URNS HPF: ABNORMAL /HPF
UROBILINOGEN UR QL STRIP: ABNORMAL
WBC NRBC COR # BLD: 5.6 10*3/MM3 (ref 3.4–10.8)
WBC UR QL AUTO: ABNORMAL /HPF

## 2020-02-01 PROCEDURE — 87086 URINE CULTURE/COLONY COUNT: CPT | Performed by: EMERGENCY MEDICINE

## 2020-02-01 PROCEDURE — 99284 EMERGENCY DEPT VISIT MOD MDM: CPT

## 2020-02-01 PROCEDURE — 87186 SC STD MICRODIL/AGAR DIL: CPT | Performed by: EMERGENCY MEDICINE

## 2020-02-01 PROCEDURE — 87088 URINE BACTERIA CULTURE: CPT | Performed by: EMERGENCY MEDICINE

## 2020-02-01 PROCEDURE — 81001 URINALYSIS AUTO W/SCOPE: CPT | Performed by: EMERGENCY MEDICINE

## 2020-02-01 PROCEDURE — 85025 COMPLETE CBC W/AUTO DIFF WBC: CPT | Performed by: EMERGENCY MEDICINE

## 2020-02-01 PROCEDURE — 80048 BASIC METABOLIC PNL TOTAL CA: CPT | Performed by: EMERGENCY MEDICINE

## 2020-02-01 PROCEDURE — 87502 INFLUENZA DNA AMP PROBE: CPT | Performed by: EMERGENCY MEDICINE

## 2020-02-01 PROCEDURE — 71045 X-RAY EXAM CHEST 1 VIEW: CPT

## 2020-02-01 RX ORDER — CEFDINIR 300 MG/1
300 CAPSULE ORAL 2 TIMES DAILY
Qty: 10 CAPSULE | Refills: 0 | Status: SHIPPED | OUTPATIENT
Start: 2020-02-01 | End: 2020-07-15

## 2020-02-01 RX ORDER — SODIUM CHLORIDE 0.9 % (FLUSH) 0.9 %
10 SYRINGE (ML) INJECTION AS NEEDED
Status: DISCONTINUED | OUTPATIENT
Start: 2020-02-01 | End: 2020-02-01 | Stop reason: HOSPADM

## 2020-02-01 NOTE — ED PROVIDER NOTES
Subjective   Patient is an 84 female with 2-day history of headache achiness and nausea.  She states her headache and achiness are moderate but constant.  She has had no vomiting diarrhea fever or dysuria.          Review of Systems  For earache sore throat fever chest pain shortness of breath vomiting diarrhea dysuria or other associated complaints  Past Medical History:   Diagnosis Date   • Anxiety    • Arthritis    • Atrial fibrillation (CMS/HCC)    • Coronary artery disease    • Dyslipidemia    • GERD (gastroesophageal reflux disease)    • History of bone density study 04/2015   • Hypertension        Allergies   Allergen Reactions   • Doxycycline Other (See Comments)     Chest pain   • Celebrex [Celecoxib] Other (See Comments)     Chest pain   • Contrast Dye Itching   • Iodinated Diagnostic Agents Itching   • Nsaids Other (See Comments)     convulsion   • Sulfa Antibiotics Nausea Only       Past Surgical History:   Procedure Laterality Date   • BLADDER REPAIR  1990   • BREAST LUMPECTOMY  1974    BENIGN   • CARDIAC CATHETERIZATION  05/25/2011   • CARDIOVASCULAR STRESS TEST  05/04/2015   • CHOLECYSTECTOMY  1990   • CORONARY ARTERY BYPASS GRAFT  08/09/2017    X5  Dr Brandt   • HYSTERECTOMY  1990   • OTHER SURGICAL HISTORY  06/2017    BLOOD CLOT REMOVED FROM LEFT EAR   • PACEMAKER IMPLANTATION  04/18/2016    DUAL CHAMBER [facility]       Family History   Problem Relation Age of Onset   • Hypertension Mother    • Heart disease Sister         PACEMAKER       Social History     Socioeconomic History   • Marital status:      Spouse name: Not on file   • Number of children: Not on file   • Years of education: Not on file   • Highest education level: Not on file   Tobacco Use   • Smoking status: Never Smoker   • Smokeless tobacco: Never Used   Substance and Sexual Activity   • Alcohol use: No   • Drug use: No           Objective   Physical Exam  HEENT exam shows TMs to be clear.  Oropharynx comers but sclerae  nonicteric.  Neck has no adenopathy JVD or bruits.  Lungs are clear.  Heart has regular rate rhythm without murmur rub or gallop.  Chest is nontender.  Abdomen is soft nontender.  Extremity exam is no cyanosis or edema.  Procedures           ED Course            Results for orders placed or performed during the hospital encounter of 02/01/20   Influenza Antigen, Rapid - Swab, Nasopharynx   Result Value Ref Range    Influenza A PCR Not Detected Not Detected    Influenza B PCR Not Detected Not Detected   Basic Metabolic Panel   Result Value Ref Range    Glucose 114 (H) 65 - 99 mg/dL    BUN 18 8 - 23 mg/dL    Creatinine 1.05 (H) 0.57 - 1.00 mg/dL    Sodium 134 (L) 136 - 145 mmol/L    Potassium 4.4 3.5 - 5.2 mmol/L    Chloride 98 98 - 107 mmol/L    CO2 25.0 22.0 - 29.0 mmol/L    Calcium 9.0 8.6 - 10.5 mg/dL    eGFR Non African Amer 50 (L) >60 mL/min/1.73    BUN/Creatinine Ratio 17.1 7.0 - 25.0    Anion Gap 11.0 5.0 - 15.0 mmol/L   Urinalysis With Microscopic If Indicated (No Culture) - Urine, Clean Catch   Result Value Ref Range    Color, UA Yellow Yellow, Straw    Appearance, UA Slightly Cloudy (A) Clear    pH, UA 7.0 5.0 - 8.0    Specific Gravity, UA 1.017 1.005 - 1.030    Glucose, UA Negative Negative    Ketones, UA Negative Negative    Bilirubin, UA Negative Negative    Blood, UA Negative Negative    Protein, UA Trace (A) Negative    Leuk Esterase, UA Large (3+) (A) Negative    Nitrite, UA Negative Negative    Urobilinogen, UA 1.0 E.U./dL 0.2 - 1.0 E.U./dL   CBC Auto Differential   Result Value Ref Range    WBC 5.60 3.40 - 10.80 10*3/mm3    RBC 4.55 3.77 - 5.28 10*6/mm3    Hemoglobin 13.5 12.0 - 15.9 g/dL    Hematocrit 40.0 34.0 - 46.6 %    MCV 87.9 79.0 - 97.0 fL    MCH 29.6 26.6 - 33.0 pg    MCHC 33.7 31.5 - 35.7 g/dL    RDW 14.5 12.3 - 15.4 %    RDW-SD 45.1 37.0 - 54.0 fl    MPV 9.4 6.0 - 12.0 fL    Platelets 156 140 - 450 10*3/mm3    Neutrophil % 58.2 42.7 - 76.0 %    Lymphocyte % 25.2 19.6 - 45.3 %    Monocyte  % 15.0 (H) 5.0 - 12.0 %    Eosinophil % 1.0 0.3 - 6.2 %    Basophil % 0.6 0.0 - 1.5 %    Neutrophils, Absolute 3.30 1.70 - 7.00 10*3/mm3    Lymphocytes, Absolute 1.40 0.70 - 3.10 10*3/mm3    Monocytes, Absolute 0.80 0.10 - 0.90 10*3/mm3    Eosinophils, Absolute 0.10 0.00 - 0.40 10*3/mm3    Basophils, Absolute 0.00 0.00 - 0.20 10*3/mm3    nRBC 0.1 0.0 - 0.2 /100 WBC   Urinalysis, Microscopic Only - Urine, Clean Catch   Result Value Ref Range    RBC, UA 6-12 (A) None Seen /HPF    WBC, UA Too Numerous to Count (A) None Seen /HPF    Bacteria, UA 3+ (A) None Seen /HPF    Squamous Epithelial Cells, UA 0-2 None Seen, 0-2 /HPF    Hyaline Casts, UA 0-2 None Seen /LPF    Methodology Automated Microscopy      Xr Chest 1 View    Result Date: 2/1/2020  Stable cardiomegaly, hiatal hernia and stable pacemaker without acute cardiopulmonary abnormality.  Electronically Signed By-Marbin Bauman On:2/1/2020 11:42 AM This report was finalized on [id number] by  Marbin Bauman, .                                          MDM  Number of Diagnoses or Management Options  Diagnosis management comments: She has findings consistent with UTI.  There is no evidence of pneumonia or other infectious process.  Metabolic panel is normal.  Influenza was negative.  Patient will be discharged and will be placed on Omnicef.  She will follow with MD for recheck if not improving over the next several days.    Risk of Complications, Morbidity, and/or Mortality  Presenting problems: moderate  Diagnostic procedures: moderate  Management options: moderate    Patient Progress  Patient progress: stable      Final diagnoses:   Myalgia   Nausea   Urinary tract infection without hematuria, site unspecified            Ehsan Castano MD  02/01/20 [id number]

## 2020-02-03 LAB — BACTERIA SPEC AEROBE CULT: ABNORMAL

## 2020-02-03 NOTE — PROGRESS NOTES
2/2 urine cx = >100k E.coli; cefdinir (susceptible) given on ED discharge.  No further ED follow-up needed.    Nathan Patel, PharmD

## 2020-02-04 ENCOUNTER — ANTICOAGULATION VISIT (OUTPATIENT)
Dept: CARDIOLOGY | Facility: CLINIC | Age: 85
End: 2020-02-04

## 2020-02-04 VITALS
DIASTOLIC BLOOD PRESSURE: 70 MMHG | HEART RATE: 62 BPM | WEIGHT: 164 LBS | BODY MASS INDEX: 30 KG/M2 | SYSTOLIC BLOOD PRESSURE: 134 MMHG

## 2020-02-04 DIAGNOSIS — I48.19 OTHER PERSISTENT ATRIAL FIBRILLATION (HCC): ICD-10-CM

## 2020-02-04 DIAGNOSIS — Z79.01 LONG TERM (CURRENT) USE OF ANTICOAGULANTS: ICD-10-CM

## 2020-02-04 LAB — INR PPP: 3.2 (ref 0.9–1.1)

## 2020-02-04 PROCEDURE — 36416 COLLJ CAPILLARY BLOOD SPEC: CPT | Performed by: INTERNAL MEDICINE

## 2020-02-04 PROCEDURE — 85610 PROTHROMBIN TIME: CPT | Performed by: INTERNAL MEDICINE

## 2020-02-11 ENCOUNTER — TELEPHONE (OUTPATIENT)
Dept: CARDIOLOGY | Facility: CLINIC | Age: 85
End: 2020-02-11

## 2020-02-11 NOTE — TELEPHONE ENCOUNTER
Rosa called in stating patient is on steroids and cipro and they were told to have her INR checked after 3 days. Wants appt.

## 2020-02-14 ENCOUNTER — ANTICOAGULATION VISIT (OUTPATIENT)
Dept: CARDIOLOGY | Facility: CLINIC | Age: 85
End: 2020-02-14

## 2020-02-14 VITALS
RESPIRATION RATE: 16 BRPM | SYSTOLIC BLOOD PRESSURE: 110 MMHG | DIASTOLIC BLOOD PRESSURE: 68 MMHG | BODY MASS INDEX: 29.63 KG/M2 | WEIGHT: 162 LBS

## 2020-02-14 DIAGNOSIS — I48.19 OTHER PERSISTENT ATRIAL FIBRILLATION (HCC): ICD-10-CM

## 2020-02-14 DIAGNOSIS — Z79.01 LONG TERM (CURRENT) USE OF ANTICOAGULANTS: ICD-10-CM

## 2020-02-14 LAB — INR PPP: 5.6 (ref 0.9–1.1)

## 2020-02-14 PROCEDURE — 85610 PROTHROMBIN TIME: CPT | Performed by: INTERNAL MEDICINE

## 2020-02-14 PROCEDURE — 36416 COLLJ CAPILLARY BLOOD SPEC: CPT | Performed by: INTERNAL MEDICINE

## 2020-02-17 ENCOUNTER — ANTICOAGULATION VISIT (OUTPATIENT)
Dept: CARDIOLOGY | Facility: CLINIC | Age: 85
End: 2020-02-17

## 2020-02-17 VITALS
HEART RATE: 71 BPM | BODY MASS INDEX: 29.9 KG/M2 | DIASTOLIC BLOOD PRESSURE: 75 MMHG | SYSTOLIC BLOOD PRESSURE: 115 MMHG | WEIGHT: 163.5 LBS

## 2020-02-17 DIAGNOSIS — I48.19 OTHER PERSISTENT ATRIAL FIBRILLATION (HCC): ICD-10-CM

## 2020-02-17 DIAGNOSIS — Z79.01 LONG TERM (CURRENT) USE OF ANTICOAGULANTS: ICD-10-CM

## 2020-02-17 LAB — INR PPP: 1.5 (ref 0.9–1.1)

## 2020-02-17 PROCEDURE — 36416 COLLJ CAPILLARY BLOOD SPEC: CPT | Performed by: INTERNAL MEDICINE

## 2020-02-17 PROCEDURE — 85610 PROTHROMBIN TIME: CPT | Performed by: INTERNAL MEDICINE

## 2020-02-25 ENCOUNTER — ANTICOAGULATION VISIT (OUTPATIENT)
Dept: CARDIOLOGY | Facility: CLINIC | Age: 85
End: 2020-02-25

## 2020-02-25 VITALS
HEART RATE: 64 BPM | SYSTOLIC BLOOD PRESSURE: 130 MMHG | WEIGHT: 167 LBS | BODY MASS INDEX: 30.54 KG/M2 | DIASTOLIC BLOOD PRESSURE: 68 MMHG

## 2020-02-25 DIAGNOSIS — Z79.01 LONG TERM (CURRENT) USE OF ANTICOAGULANTS: ICD-10-CM

## 2020-02-25 DIAGNOSIS — I48.19 OTHER PERSISTENT ATRIAL FIBRILLATION (HCC): ICD-10-CM

## 2020-02-25 LAB — INR PPP: 2.8 (ref 0.9–1.1)

## 2020-02-25 PROCEDURE — 85610 PROTHROMBIN TIME: CPT | Performed by: INTERNAL MEDICINE

## 2020-02-25 PROCEDURE — 36416 COLLJ CAPILLARY BLOOD SPEC: CPT | Performed by: INTERNAL MEDICINE

## 2020-03-31 ENCOUNTER — TELEPHONE (OUTPATIENT)
Dept: CARDIOLOGY | Facility: CLINIC | Age: 85
End: 2020-03-31

## 2020-03-31 ENCOUNTER — ANTICOAGULATION VISIT (OUTPATIENT)
Dept: CARDIOLOGY | Facility: CLINIC | Age: 85
End: 2020-03-31

## 2020-03-31 VITALS
SYSTOLIC BLOOD PRESSURE: 128 MMHG | DIASTOLIC BLOOD PRESSURE: 61 MMHG | HEART RATE: 63 BPM | WEIGHT: 163 LBS | BODY MASS INDEX: 29.81 KG/M2

## 2020-03-31 DIAGNOSIS — Z79.01 LONG TERM (CURRENT) USE OF ANTICOAGULANTS: ICD-10-CM

## 2020-03-31 DIAGNOSIS — I48.19 OTHER PERSISTENT ATRIAL FIBRILLATION (HCC): ICD-10-CM

## 2020-03-31 LAB — INR PPP: 2.2 (ref 0.9–1.1)

## 2020-03-31 PROCEDURE — 36416 COLLJ CAPILLARY BLOOD SPEC: CPT | Performed by: INTERNAL MEDICINE

## 2020-03-31 PROCEDURE — 85610 PROTHROMBIN TIME: CPT | Performed by: INTERNAL MEDICINE

## 2020-03-31 RX ORDER — WARFARIN SODIUM 4 MG/1
TABLET ORAL
Qty: 30 TABLET | Refills: 3 | Status: SHIPPED | OUTPATIENT
Start: 2020-03-31 | End: 2020-03-31

## 2020-03-31 RX ORDER — METOPROLOL TARTRATE 50 MG/1
TABLET, FILM COATED ORAL
Qty: 180 TABLET | Refills: 1 | Status: SHIPPED | OUTPATIENT
Start: 2020-03-31 | End: 2020-05-14

## 2020-03-31 RX ORDER — WARFARIN SODIUM 4 MG/1
TABLET ORAL
Qty: 90 TABLET | Refills: 0 | Status: SHIPPED | OUTPATIENT
Start: 2020-03-31 | End: 2020-06-29

## 2020-03-31 RX ORDER — FUROSEMIDE 40 MG/1
40 TABLET ORAL DAILY
Qty: 30 TABLET | Refills: 3 | Status: SHIPPED | OUTPATIENT
Start: 2020-03-31 | End: 2020-03-31

## 2020-03-31 RX ORDER — FUROSEMIDE 40 MG/1
40 TABLET ORAL DAILY
Qty: 90 TABLET | Refills: 1 | Status: SHIPPED | OUTPATIENT
Start: 2020-03-31 | End: 2020-07-15

## 2020-03-31 RX ORDER — METOPROLOL TARTRATE 50 MG/1
50 TABLET, FILM COATED ORAL 2 TIMES DAILY
Qty: 60 TABLET | Refills: 3 | Status: SHIPPED | OUTPATIENT
Start: 2020-03-31 | End: 2020-03-31

## 2020-03-31 NOTE — TELEPHONE ENCOUNTER
Pt needs refills for Metoprolol Tart 50, Warfarin and Furosemide sent in to [facility] [address]. Will send in Rx. cjRN

## 2020-04-16 ENCOUNTER — CLINICAL SUPPORT NO REQUIREMENTS (OUTPATIENT)
Dept: CARDIOLOGY | Facility: CLINIC | Age: 85
End: 2020-04-16

## 2020-04-16 DIAGNOSIS — R00.1 BRADYCARDIA, SINUS: Primary | ICD-10-CM

## 2020-04-16 DIAGNOSIS — Z95.0 PRESENCE OF CARDIAC PACEMAKER: ICD-10-CM

## 2020-04-16 PROCEDURE — 93294 REM INTERROG EVL PM/LDLS PM: CPT | Performed by: INTERNAL MEDICINE

## 2020-04-16 PROCEDURE — 93296 REM INTERROG EVL PM/IDS: CPT | Performed by: INTERNAL MEDICINE

## 2020-05-06 ENCOUNTER — ANTICOAGULATION VISIT (OUTPATIENT)
Dept: CARDIOLOGY | Facility: CLINIC | Age: 85
End: 2020-05-06

## 2020-05-06 VITALS
WEIGHT: 167 LBS | BODY MASS INDEX: 30.54 KG/M2 | SYSTOLIC BLOOD PRESSURE: 124 MMHG | HEART RATE: 64 BPM | DIASTOLIC BLOOD PRESSURE: 67 MMHG

## 2020-05-06 DIAGNOSIS — I48.19 OTHER PERSISTENT ATRIAL FIBRILLATION (HCC): ICD-10-CM

## 2020-05-06 DIAGNOSIS — Z79.01 LONG TERM (CURRENT) USE OF ANTICOAGULANTS: ICD-10-CM

## 2020-05-06 LAB — INR PPP: 4.6 (ref 0.9–1.1)

## 2020-05-06 PROCEDURE — 36416 COLLJ CAPILLARY BLOOD SPEC: CPT | Performed by: INTERNAL MEDICINE

## 2020-05-06 PROCEDURE — 85610 PROTHROMBIN TIME: CPT | Performed by: INTERNAL MEDICINE

## 2020-05-11 RX ORDER — SPIRONOLACTONE 25 MG/1
TABLET ORAL
Qty: 45 TABLET | Refills: 3 | Status: SHIPPED | OUTPATIENT
Start: 2020-05-11 | End: 2021-07-06

## 2020-05-14 ENCOUNTER — TELEPHONE (OUTPATIENT)
Dept: CARDIOLOGY | Facility: CLINIC | Age: 85
End: 2020-05-14

## 2020-05-14 RX ORDER — METOPROLOL TARTRATE 100 MG/1
100 TABLET ORAL 2 TIMES DAILY
COMMUNITY
End: 2020-05-14 | Stop reason: SDUPTHER

## 2020-05-14 RX ORDER — METOPROLOL TARTRATE 100 MG/1
100 TABLET ORAL 2 TIMES DAILY
Qty: 180 TABLET | Refills: 1 | Status: SHIPPED | OUTPATIENT
Start: 2020-05-14 | End: 2020-11-02 | Stop reason: SDUPTHER

## 2020-05-14 NOTE — TELEPHONE ENCOUNTER
Pt called said her metoprolol rx was different this time, she has been taking Metoprolol 100mg bid for a long time and we recently sent in 50mg bid.        Medlist says 50mg,  Pt says her bp has been good, wants 100mg bid.   Called pharm I was told pt's PCP Gigi has been prescribing the 100mg.   DC'd the 50mg, sending in refill of 100mg     Completed

## 2020-06-03 ENCOUNTER — OFFICE (AMBULATORY)
Dept: URBAN - METROPOLITAN AREA CLINIC 64 | Facility: CLINIC | Age: 85
End: 2020-06-03
Payer: COMMERCIAL

## 2020-06-03 VITALS
DIASTOLIC BLOOD PRESSURE: 73 MMHG | HEIGHT: 62 IN | HEART RATE: 65 BPM | WEIGHT: 169 LBS | SYSTOLIC BLOOD PRESSURE: 158 MMHG

## 2020-06-03 DIAGNOSIS — Z79.01 LONG TERM (CURRENT) USE OF ANTICOAGULANTS: ICD-10-CM

## 2020-06-03 DIAGNOSIS — K74.3 PRIMARY BILIARY CIRRHOSIS: ICD-10-CM

## 2020-06-03 DIAGNOSIS — R13.10 DYSPHAGIA, UNSPECIFIED: ICD-10-CM

## 2020-06-03 DIAGNOSIS — I48.20 CHRONIC ATRIAL FIBRILLATION, UNSPECIFIED: ICD-10-CM

## 2020-06-03 PROCEDURE — 99214 OFFICE O/P EST MOD 30 MIN: CPT | Performed by: INTERNAL MEDICINE

## 2020-06-04 ENCOUNTER — TELEPHONE (OUTPATIENT)
Dept: CARDIOLOGY | Facility: CLINIC | Age: 85
End: 2020-06-04

## 2020-06-04 NOTE — TELEPHONE ENCOUNTER
Dr. Quinton Tapia  Phone [phone] Fax [phone]  EGD 6/30/2020  Hold Coumadin 5 days prior  LOV 1/15/2020    Placed in red folder.

## 2020-06-09 ENCOUNTER — ANTICOAGULATION VISIT (OUTPATIENT)
Dept: CARDIOLOGY | Facility: CLINIC | Age: 85
End: 2020-06-09

## 2020-06-09 VITALS
SYSTOLIC BLOOD PRESSURE: 158 MMHG | BODY MASS INDEX: 30.73 KG/M2 | DIASTOLIC BLOOD PRESSURE: 86 MMHG | WEIGHT: 168 LBS | HEART RATE: 62 BPM

## 2020-06-09 DIAGNOSIS — I48.19 OTHER PERSISTENT ATRIAL FIBRILLATION (HCC): ICD-10-CM

## 2020-06-09 DIAGNOSIS — Z79.01 LONG TERM (CURRENT) USE OF ANTICOAGULANTS: ICD-10-CM

## 2020-06-09 LAB — INR PPP: 2.1 (ref 0.9–1.1)

## 2020-06-09 PROCEDURE — 36416 COLLJ CAPILLARY BLOOD SPEC: CPT | Performed by: INTERNAL MEDICINE

## 2020-06-09 PROCEDURE — 85610 PROTHROMBIN TIME: CPT | Performed by: INTERNAL MEDICINE

## 2020-06-27 ENCOUNTER — LAB (OUTPATIENT)
Dept: LAB | Facility: HOSPITAL | Age: 85
End: 2020-06-27

## 2020-06-27 ENCOUNTER — TRANSCRIBE ORDERS (OUTPATIENT)
Dept: ADMINISTRATIVE | Facility: HOSPITAL | Age: 85
End: 2020-06-27

## 2020-06-27 DIAGNOSIS — E87.5 HYPERKALEMIA: ICD-10-CM

## 2020-06-27 DIAGNOSIS — E87.5 HYPERKALEMIA: Primary | ICD-10-CM

## 2020-06-27 LAB
ANION GAP SERPL CALCULATED.3IONS-SCNC: 9.5 MMOL/L (ref 5–15)
BUN BLD-MCNC: 20 MG/DL (ref 8–23)
BUN/CREAT SERPL: 17.9 (ref 7–25)
CALCIUM SPEC-SCNC: 9.1 MG/DL (ref 8.6–10.5)
CHLORIDE SERPL-SCNC: 95 MMOL/L (ref 98–107)
CO2 SERPL-SCNC: 26.5 MMOL/L (ref 22–29)
CREAT BLD-MCNC: 1.12 MG/DL (ref 0.57–1)
GFR SERPL CREATININE-BSD FRML MDRD: 46 ML/MIN/1.73
GLUCOSE BLD-MCNC: 87 MG/DL (ref 65–99)
POTASSIUM BLD-SCNC: 4.8 MMOL/L (ref 3.5–5.2)
SODIUM BLD-SCNC: 131 MMOL/L (ref 136–145)

## 2020-06-27 PROCEDURE — 36415 COLL VENOUS BLD VENIPUNCTURE: CPT

## 2020-06-27 PROCEDURE — 80048 BASIC METABOLIC PNL TOTAL CA: CPT

## 2020-06-27 PROCEDURE — U0002 COVID-19 LAB TEST NON-CDC: HCPCS

## 2020-06-27 PROCEDURE — U0004 COV-19 TEST NON-CDC HGH THRU: HCPCS

## 2020-06-27 PROCEDURE — C9803 HOPD COVID-19 SPEC COLLECT: HCPCS

## 2020-06-29 ENCOUNTER — ANESTHESIA EVENT (OUTPATIENT)
Dept: GASTROENTEROLOGY | Facility: HOSPITAL | Age: 85
End: 2020-06-29

## 2020-06-29 LAB
REF LAB TEST METHOD: NORMAL
SARS-COV-2 RNA RESP QL NAA+PROBE: NOT DETECTED

## 2020-06-29 RX ORDER — WARFARIN SODIUM 4 MG/1
TABLET ORAL
Qty: 90 TABLET | Refills: 0 | Status: SHIPPED | OUTPATIENT
Start: 2020-06-29 | End: 2021-04-24

## 2020-06-30 ENCOUNTER — ANESTHESIA (OUTPATIENT)
Dept: GASTROENTEROLOGY | Facility: HOSPITAL | Age: 85
End: 2020-06-30

## 2020-06-30 ENCOUNTER — HOSPITAL ENCOUNTER (OUTPATIENT)
Facility: HOSPITAL | Age: 85
Setting detail: HOSPITAL OUTPATIENT SURGERY
Discharge: HOME | End: 2020-06-30
Attending: INTERNAL MEDICINE | Admitting: INTERNAL MEDICINE

## 2020-06-30 ENCOUNTER — ON CAMPUS - OUTPATIENT (AMBULATORY)
Dept: URBAN - METROPOLITAN AREA HOSPITAL 85 | Facility: HOSPITAL | Age: 85
End: 2020-06-30
Payer: COMMERCIAL

## 2020-06-30 VITALS
WEIGHT: 168.87 LBS | SYSTOLIC BLOOD PRESSURE: 117 MMHG | DIASTOLIC BLOOD PRESSURE: 62 MMHG | BODY MASS INDEX: 31.08 KG/M2 | OXYGEN SATURATION: 96 % | HEIGHT: 62 IN | RESPIRATION RATE: 20 BRPM | HEART RATE: 64 BPM | TEMPERATURE: 97.9 F

## 2020-06-30 DIAGNOSIS — K22.4 DYSKINESIA OF ESOPHAGUS: ICD-10-CM

## 2020-06-30 DIAGNOSIS — K25.9 GASTRIC ULCER, UNSPECIFIED AS ACUTE OR CHRONIC, WITHOUT HEMO: ICD-10-CM

## 2020-06-30 DIAGNOSIS — K31.89 OTHER DISEASES OF STOMACH AND DUODENUM: ICD-10-CM

## 2020-06-30 DIAGNOSIS — R13.10 DYSPHAGIA: ICD-10-CM

## 2020-06-30 DIAGNOSIS — R13.10 DYSPHAGIA, UNSPECIFIED: ICD-10-CM

## 2020-06-30 DIAGNOSIS — K74.3 PRIMARY BILIARY CHOLANGITIS (HCC): ICD-10-CM

## 2020-06-30 DIAGNOSIS — K74.3 PRIMARY BILIARY CIRRHOSIS: ICD-10-CM

## 2020-06-30 DIAGNOSIS — K44.9 DIAPHRAGMATIC HERNIA WITHOUT OBSTRUCTION OR GANGRENE: ICD-10-CM

## 2020-06-30 LAB
INR PPP: 1.38 (ref 2–3)
PROTHROMBIN TIME: 13.7 SECONDS (ref 19.4–28.5)

## 2020-06-30 PROCEDURE — 88342 IMHCHEM/IMCYTCHM 1ST ANTB: CPT | Performed by: INTERNAL MEDICINE

## 2020-06-30 PROCEDURE — 25010000002 PROPOFOL 10 MG/ML EMULSION: Performed by: ANESTHESIOLOGY

## 2020-06-30 PROCEDURE — C1726 CATH, BAL DIL, NON-VASCULAR: HCPCS | Performed by: INTERNAL MEDICINE

## 2020-06-30 PROCEDURE — 85610 PROTHROMBIN TIME: CPT | Performed by: INTERNAL MEDICINE

## 2020-06-30 PROCEDURE — 43239 EGD BIOPSY SINGLE/MULTIPLE: CPT | Mod: 59 | Performed by: INTERNAL MEDICINE

## 2020-06-30 PROCEDURE — 43249 ESOPH EGD DILATION <30 MM: CPT | Performed by: INTERNAL MEDICINE

## 2020-06-30 PROCEDURE — 88305 TISSUE EXAM BY PATHOLOGIST: CPT | Performed by: INTERNAL MEDICINE

## 2020-06-30 RX ORDER — SODIUM CHLORIDE 0.9 % (FLUSH) 0.9 %
10 SYRINGE (ML) INJECTION AS NEEDED
Status: DISCONTINUED | OUTPATIENT
Start: 2020-06-30 | End: 2020-06-30 | Stop reason: HOSPADM

## 2020-06-30 RX ORDER — SODIUM CHLORIDE 9 MG/ML
9 INJECTION, SOLUTION INTRAVENOUS CONTINUOUS PRN
Status: DISCONTINUED | OUTPATIENT
Start: 2020-06-30 | End: 2020-06-30 | Stop reason: HOSPADM

## 2020-06-30 RX ORDER — LIDOCAINE HYDROCHLORIDE 10 MG/ML
INJECTION, SOLUTION EPIDURAL; INFILTRATION; INTRACAUDAL; PERINEURAL AS NEEDED
Status: DISCONTINUED | OUTPATIENT
Start: 2020-06-30 | End: 2020-06-30 | Stop reason: SURG

## 2020-06-30 RX ORDER — ONDANSETRON 2 MG/ML
4 INJECTION INTRAMUSCULAR; INTRAVENOUS ONCE AS NEEDED
Status: DISCONTINUED | OUTPATIENT
Start: 2020-06-30 | End: 2020-06-30 | Stop reason: HOSPADM

## 2020-06-30 RX ORDER — SODIUM CHLORIDE 0.9 % (FLUSH) 0.9 %
3 SYRINGE (ML) INJECTION EVERY 12 HOURS SCHEDULED
Status: DISCONTINUED | OUTPATIENT
Start: 2020-06-30 | End: 2020-06-30 | Stop reason: HOSPADM

## 2020-06-30 RX ORDER — SODIUM CHLORIDE 0.9 % (FLUSH) 0.9 %
10 SYRINGE (ML) INJECTION EVERY 12 HOURS SCHEDULED
Status: DISCONTINUED | OUTPATIENT
Start: 2020-06-30 | End: 2020-06-30 | Stop reason: HOSPADM

## 2020-06-30 RX ORDER — ONDANSETRON 4 MG/1
4 TABLET, FILM COATED ORAL EVERY 6 HOURS PRN
Status: DISCONTINUED | OUTPATIENT
Start: 2020-06-30 | End: 2020-06-30 | Stop reason: HOSPADM

## 2020-06-30 RX ORDER — ONDANSETRON 2 MG/ML
4 INJECTION INTRAMUSCULAR; INTRAVENOUS EVERY 6 HOURS PRN
Status: DISCONTINUED | OUTPATIENT
Start: 2020-06-30 | End: 2020-06-30 | Stop reason: HOSPADM

## 2020-06-30 RX ORDER — PROPOFOL 10 MG/ML
VIAL (ML) INTRAVENOUS AS NEEDED
Status: DISCONTINUED | OUTPATIENT
Start: 2020-06-30 | End: 2020-06-30 | Stop reason: SURG

## 2020-06-30 RX ADMIN — PROPOFOL 70 MG: 10 INJECTION, EMULSION INTRAVENOUS at 10:30

## 2020-06-30 RX ADMIN — SODIUM CHLORIDE 9 ML/HR: 900 INJECTION, SOLUTION INTRAVENOUS at 09:31

## 2020-06-30 RX ADMIN — LIDOCAINE HYDROCHLORIDE 30 MG: 10 INJECTION, SOLUTION EPIDURAL; INFILTRATION; INTRACAUDAL; PERINEURAL at 10:30

## 2020-06-30 RX ADMIN — PROPOFOL 20 MG: 10 INJECTION, EMULSION INTRAVENOUS at 10:34

## 2020-06-30 RX ADMIN — PROPOFOL 20 MG: 10 INJECTION, EMULSION INTRAVENOUS at 10:32

## 2020-06-30 NOTE — ANESTHESIA POSTPROCEDURE EVALUATION
Patient: Syl Herrera    Procedure Summary     Date:  06/30/20 Room / Location:  [facility] ENDOSCOPY 1 / [facility] ENDOSCOPY    Anesthesia Start:  1026 Anesthesia Stop:  1040    Procedure:  ESOPHAGOGASTRODUODENOSCOPY with biopsy x 1 area and dilatation (15-18mm balloon) up to 18mm (N/A ) Diagnosis:       Dysphagia      Primary biliary cholangitis (CMS/HCC)      (Dysphagia [R13.10])      (Primary biliary cholangitis (CMS/HCC) [K74.3])    Surgeon:  Quinton Tapia MD Provider:  Jay Thomas MD    Anesthesia Type:  MAC ASA Status:  3          Anesthesia Type: MAC    Vitals  Vitals Value Taken Time   /61 6/30/2020 10:52 AM   Temp     Pulse 63 6/30/2020 10:52 AM   Resp 21 6/30/2020 10:52 AM   SpO2 96 % 6/30/2020 10:52 AM           Post Anesthesia Care and Evaluation    Patient location during evaluation: PACU  Patient participation: complete - patient participated  Level of consciousness: awake  Pain scale: See nurse's notes for pain score.  Pain management: adequate  Airway patency: patent  Anesthetic complications: No anesthetic complications  PONV Status: none  Cardiovascular status: acceptable  Respiratory status: acceptable  Hydration status: acceptable    Comments: Patient seen and examined postoperatively; vital signs stable; SpO2 greater than or equal to 90%; cardiopulmonary status stable; nausea/vomiting adequately controlled; pain adequately controlled; no apparent anesthesia complications; patient discharged from anesthesia care when discharge criteria were met

## 2020-07-01 LAB
LAB AP CASE REPORT: NORMAL
PATH REPORT.FINAL DX SPEC: NORMAL
PATH REPORT.GROSS SPEC: NORMAL

## 2020-07-15 ENCOUNTER — OFFICE VISIT (OUTPATIENT)
Dept: CARDIOLOGY | Facility: CLINIC | Age: 85
End: 2020-07-15

## 2020-07-15 ENCOUNTER — CLINICAL SUPPORT NO REQUIREMENTS (OUTPATIENT)
Dept: CARDIOLOGY | Facility: CLINIC | Age: 85
End: 2020-07-15

## 2020-07-15 ENCOUNTER — ANTICOAGULATION VISIT (OUTPATIENT)
Dept: CARDIOLOGY | Facility: CLINIC | Age: 85
End: 2020-07-15

## 2020-07-15 VITALS
HEART RATE: 62 BPM | WEIGHT: 168 LBS | SYSTOLIC BLOOD PRESSURE: 145 MMHG | DIASTOLIC BLOOD PRESSURE: 78 MMHG | BODY MASS INDEX: 30.73 KG/M2

## 2020-07-15 VITALS
DIASTOLIC BLOOD PRESSURE: 78 MMHG | BODY MASS INDEX: 30.91 KG/M2 | HEART RATE: 62 BPM | SYSTOLIC BLOOD PRESSURE: 145 MMHG | HEIGHT: 62 IN | OXYGEN SATURATION: 96 % | WEIGHT: 168 LBS

## 2020-07-15 DIAGNOSIS — I10 ESSENTIAL HYPERTENSION: ICD-10-CM

## 2020-07-15 DIAGNOSIS — R00.1 BRADYCARDIA, SINUS: ICD-10-CM

## 2020-07-15 DIAGNOSIS — I48.19 OTHER PERSISTENT ATRIAL FIBRILLATION (HCC): Primary | ICD-10-CM

## 2020-07-15 DIAGNOSIS — Z79.01 LONG TERM (CURRENT) USE OF ANTICOAGULANTS: ICD-10-CM

## 2020-07-15 DIAGNOSIS — Z95.0 PRESENCE OF CARDIAC PACEMAKER: ICD-10-CM

## 2020-07-15 DIAGNOSIS — I48.19 OTHER PERSISTENT ATRIAL FIBRILLATION (HCC): ICD-10-CM

## 2020-07-15 DIAGNOSIS — E78.00 PURE HYPERCHOLESTEROLEMIA: ICD-10-CM

## 2020-07-15 DIAGNOSIS — I25.810 CORONARY ARTERY DISEASE INVOLVING CORONARY BYPASS GRAFT OF NATIVE HEART WITHOUT ANGINA PECTORIS: Primary | ICD-10-CM

## 2020-07-15 LAB — INR PPP: 2.2 (ref 0.9–1.1)

## 2020-07-15 PROCEDURE — 36416 COLLJ CAPILLARY BLOOD SPEC: CPT | Performed by: INTERNAL MEDICINE

## 2020-07-15 PROCEDURE — 85610 PROTHROMBIN TIME: CPT | Performed by: INTERNAL MEDICINE

## 2020-07-15 PROCEDURE — 93280 PM DEVICE PROGR EVAL DUAL: CPT | Performed by: INTERNAL MEDICINE

## 2020-07-15 PROCEDURE — 99214 OFFICE O/P EST MOD 30 MIN: CPT | Performed by: INTERNAL MEDICINE

## 2020-07-15 RX ORDER — PHENOL 1.4 %
600 AEROSOL, SPRAY (ML) MUCOUS MEMBRANE DAILY
COMMUNITY
End: 2021-04-18

## 2020-07-15 NOTE — PROGRESS NOTES
Subjective:     Encounter Date:07/15/2020      Patient ID: Syl Herrera is a 85 y.o. female.    Chief Complaint:  History of Present Illness 85-year-old white female with history of coronary status post coronary artery bypass surgery history of persistent atrial fibrillation with tachybradycardia syndrome status post pacemaker placement hypertension hyperlipidemia presents to my office for follow-up.  Patient is currently stable with absence of chest pain but some shortness of breath with exertion.  No complains any PND orthopnea.  No palpitation dizziness syncope.  She has occasional swelling of the feet but she is taking her medicines regularly.  She does not smoke.  She is ambulating well    The following portions of the patient's history were reviewed and updated as appropriate: allergies, current medications, past family history, past medical history, past social history, past surgical history and problem list.  Past Medical History:   Diagnosis Date   • Anxiety    • Arthritis    • Atrial fibrillation (CMS/HCC)    • Coronary artery disease    • Dyslipidemia    • GERD (gastroesophageal reflux disease)    • History of bone density study 04/2015   • Hypertension    • Sleep apnea    • Wears dentures      Past Surgical History:   Procedure Laterality Date   • BLADDER REPAIR  1990   • BREAST LUMPECTOMY  1974    BENIGN   • CARDIAC CATHETERIZATION  05/25/2011   • CARDIOVASCULAR STRESS TEST  05/04/2015   • CHOLECYSTECTOMY  1990   • CORONARY ARTERY BYPASS GRAFT  08/09/2017    X5  Dr Brandt   • ENDOSCOPY N/A 6/30/2020    Procedure: ESOPHAGOGASTRODUODENOSCOPY with biopsy x 1 area and dilatation (15-18mm balloon) up to 18mm;  Surgeon: Quinton Tapia MD;  Location: [facility];  Service: Gastroenterology;  Laterality: N/A;  post op: gastritis, large hiatal hernia, esophageal dysmotility   • HYSTERECTOMY  1990   • JOINT REPLACEMENT Right     knee    • OTHER SURGICAL HISTORY  06/2017    BLOOD CLOT REMOVED FROM LEFT  "EAR   • PACEMAKER IMPLANTATION  04/18/2016    DUAL CHAMBER ST ALESSIO     /78 (BP Location: Left arm, Patient Position: Sitting)   Pulse 62   Ht 157.5 cm (62\")   Wt 76.2 kg (168 lb)   SpO2 96%   BMI 30.73 kg/m²   Family History   Problem Relation Age of Onset   • Hypertension Mother    • Heart disease Sister         PACEMAKER       Current Outpatient Medications:   •  ALPRAZolam (XANAX) 0.5 MG tablet, Take 0.25 mg by mouth 3 (Three) Times a Day As Needed., Disp: , Rfl: 5  •  atorvastatin (LIPITOR) 10 MG tablet, , Disp: , Rfl:   •  calcium carbonate (OS-CHRIS) 600 MG tablet, Take 600 mg by mouth Daily., Disp: , Rfl:   •  dilTIAZem (TIAZAC) 120 MG 24 hr capsule, TK 1 C PO D, Disp: , Rfl: 3  •  diphenhydrAMINE HCl (BENADRYL ALLERGY PO), Take  by mouth As Needed., Disp: , Rfl:   •  HYDROcodone-acetaminophen (NORCO) 7.5-325 MG per tablet, TK 1 T PO  Q 8 H, Disp: , Rfl: 0  •  magnesium oxide (MAGOX) 400 (241.3 Mg) MG tablet tablet, Take 400 mg by mouth Daily., Disp: , Rfl: 0  •  metoprolol tartrate (LOPRESSOR) 100 MG tablet, Take 1 tablet by mouth 2 (Two) Times a Day., Disp: 180 tablet, Rfl: 1  •  Multiple Vitamins-Minerals (VITEYES AREDS FORMULA) capsule, VITEYES AREDS FORMULA CAPS, Disp: , Rfl:   •  pantoprazole (PROTONIX) 40 MG EC tablet, TK 1 T PO QD, Disp: , Rfl: 5  •  Phenylephrine-DM-GG (TUSSIN CF COUGH & COLD PO), Take  by mouth As Needed., Disp: , Rfl:   •  spironolactone (ALDACTONE) 25 MG tablet, TAKE ONE-HALF TABLET BY MOUTH DAILY (Patient taking differently: Take 12.5 mg by mouth Daily.), Disp: 45 tablet, Rfl: 3  •  warfarin (COUMADIN) 4 MG tablet, TAKE 1 TABLET BY MOUTH DAILY EXCEPT 1/2 TABLET ON WEDNESDAY OR AS DIRECTED, Disp: 90 tablet, Rfl: 0  Allergies   Allergen Reactions   • Doxycycline Other (See Comments)     Chest pain   • Celebrex [Celecoxib] Other (See Comments)     Chest pain   • Contrast Dye Itching   • Iodinated Diagnostic Agents Itching   • Nsaids Other (See Comments)     convulsion   • " Other Itching     Pt states some steroids make her itch and hallucinate- she does not know the names    • Sulfa Antibiotics Nausea Only     Social History     Socioeconomic History   • Marital status:      Spouse name: Not on file   • Number of children: Not on file   • Years of education: Not on file   • Highest education level: Not on file   Tobacco Use   • Smoking status: Never Smoker   • Smokeless tobacco: Never Used   Substance and Sexual Activity   • Alcohol use: No   • Drug use: No   • Sexual activity: Defer     Review of Systems   Constitution: Negative for fever and malaise/fatigue.   HENT: Negative for ear pain and nosebleeds.    Eyes: Negative for blurred vision and double vision.   Cardiovascular: Positive for leg swelling. Negative for chest pain, dyspnea on exertion and palpitations.   Respiratory: Positive for shortness of breath. Negative for cough.    Skin: Negative for rash.   Musculoskeletal: Negative for joint pain.   Gastrointestinal: Positive for nausea. Negative for abdominal pain and vomiting.   Neurological: Negative for dizziness, focal weakness, headaches and light-headedness.   Psychiatric/Behavioral: Negative for depression. The patient is not nervous/anxious.    All other systems reviewed and are negative.             Objective:     Physical Exam   Constitutional: She appears well-developed and well-nourished.   HENT:   Head: Normocephalic and atraumatic.   Eyes: Pupils are equal, round, and reactive to light. Conjunctivae and EOM are normal. No scleral icterus.   Neck: Normal range of motion. Neck supple. No JVD present. Carotid bruit is not present.   Cardiovascular: Normal rate, regular rhythm, S1 normal, S2 normal and intact distal pulses. PMI is not displaced.   Murmur heard.  Pulmonary/Chest: Effort normal and breath sounds normal. She has no wheezes. She has no rales.   Abdominal: Soft. Bowel sounds are normal.   Musculoskeletal: Normal range of motion.   Neurological: She  is alert. She has normal strength.   No focal deficits   Skin: Skin is warm and dry. No rash noted.   Psychiatric: She has a normal mood and affect.     Procedures    Lab Review:       Assessment:          Diagnosis Plan   1. Coronary artery disease involving coronary bypass graft of native heart without angina pectoris     2. Other persistent atrial fibrillation (CMS/HCC)     3. Essential hypertension     4. Pure hypercholesterolemia     5. Presence of cardiac pacemaker     6. Bradycardia, sinus            Plan:     Patient has history of coronary artery status post carotid bypass surgery x4 vessels with a LIMA to LAD and saphenous graft to the dynamize marginal branch and RCA  Patient has normal LV function  Patient has atrial fibrillation and is currently on warfarin keeps INR between 2.0-3.0  Patient also has tachybradycardia syndrome and is status post pacemaker placement  Patient's pacemaker is working very well  Patient blood pressure and heart rate stable  Patient's lipid levels are well within normal limits and she is on a statin  Continue current medicines and follow her in 6

## 2020-07-21 ENCOUNTER — OFFICE (AMBULATORY)
Dept: URBAN - METROPOLITAN AREA CLINIC 64 | Facility: CLINIC | Age: 85
End: 2020-07-21
Payer: COMMERCIAL

## 2020-07-21 VITALS
HEIGHT: 62 IN | HEART RATE: 67 BPM | SYSTOLIC BLOOD PRESSURE: 138 MMHG | WEIGHT: 168 LBS | DIASTOLIC BLOOD PRESSURE: 72 MMHG

## 2020-07-21 DIAGNOSIS — R11.0 NAUSEA: ICD-10-CM

## 2020-07-21 DIAGNOSIS — K74.3 PRIMARY BILIARY CIRRHOSIS: ICD-10-CM

## 2020-07-21 DIAGNOSIS — R10.32 LEFT LOWER QUADRANT PAIN: ICD-10-CM

## 2020-07-21 DIAGNOSIS — R13.10 DYSPHAGIA, UNSPECIFIED: ICD-10-CM

## 2020-07-21 PROCEDURE — 99214 OFFICE O/P EST MOD 30 MIN: CPT | Performed by: INTERNAL MEDICINE

## 2020-07-21 RX ORDER — PANTOPRAZOLE SODIUM 20 MG/1
20 TABLET, DELAYED RELEASE ORAL
Qty: 90 | Refills: 3 | Status: COMPLETED
Start: 2020-07-21 | End: 2021-02-02

## 2020-07-21 RX ORDER — PEPPERMINT OIL 90 MG
180 CAPSULE, DELAYED, AND EXTENDED RELEASE ORAL
Qty: 48 | Refills: 1 | Status: COMPLETED
Start: 2020-07-21 | End: 2022-05-04

## 2020-08-06 RX ORDER — DILTIAZEM HYDROCHLORIDE 120 MG/1
CAPSULE, EXTENDED RELEASE ORAL
Qty: 90 CAPSULE | Refills: 1 | Status: SHIPPED | OUTPATIENT
Start: 2020-08-06 | End: 2021-02-02 | Stop reason: SDUPTHER

## 2020-08-18 ENCOUNTER — ANTICOAGULATION VISIT (OUTPATIENT)
Dept: CARDIOLOGY | Facility: CLINIC | Age: 85
End: 2020-08-18

## 2020-08-18 VITALS
HEART RATE: 61 BPM | BODY MASS INDEX: 30.54 KG/M2 | SYSTOLIC BLOOD PRESSURE: 142 MMHG | DIASTOLIC BLOOD PRESSURE: 80 MMHG | WEIGHT: 167 LBS

## 2020-08-18 DIAGNOSIS — I48.19 OTHER PERSISTENT ATRIAL FIBRILLATION (HCC): ICD-10-CM

## 2020-08-18 DIAGNOSIS — Z79.01 LONG TERM (CURRENT) USE OF ANTICOAGULANTS: ICD-10-CM

## 2020-08-18 LAB — INR PPP: 2.8 (ref 0.9–1.1)

## 2020-08-18 PROCEDURE — 85610 PROTHROMBIN TIME: CPT | Performed by: INTERNAL MEDICINE

## 2020-08-18 PROCEDURE — 36416 COLLJ CAPILLARY BLOOD SPEC: CPT | Performed by: INTERNAL MEDICINE

## 2020-09-21 ENCOUNTER — ANTICOAGULATION VISIT (OUTPATIENT)
Dept: CARDIOLOGY | Facility: CLINIC | Age: 85
End: 2020-09-21

## 2020-09-21 VITALS
SYSTOLIC BLOOD PRESSURE: 109 MMHG | HEART RATE: 61 BPM | DIASTOLIC BLOOD PRESSURE: 59 MMHG | WEIGHT: 168 LBS | BODY MASS INDEX: 30.73 KG/M2

## 2020-09-21 DIAGNOSIS — I48.19 OTHER PERSISTENT ATRIAL FIBRILLATION (HCC): ICD-10-CM

## 2020-09-21 DIAGNOSIS — Z79.01 LONG TERM (CURRENT) USE OF ANTICOAGULANTS: ICD-10-CM

## 2020-09-21 LAB — INR PPP: 2.6 (ref 0.9–1.1)

## 2020-09-21 PROCEDURE — 85610 PROTHROMBIN TIME: CPT | Performed by: INTERNAL MEDICINE

## 2020-09-21 PROCEDURE — 36416 COLLJ CAPILLARY BLOOD SPEC: CPT | Performed by: INTERNAL MEDICINE

## 2020-10-26 ENCOUNTER — ANTICOAGULATION VISIT (OUTPATIENT)
Dept: CARDIOLOGY | Facility: CLINIC | Age: 85
End: 2020-10-26

## 2020-10-26 VITALS
HEART RATE: 59 BPM | SYSTOLIC BLOOD PRESSURE: 148 MMHG | BODY MASS INDEX: 30.91 KG/M2 | DIASTOLIC BLOOD PRESSURE: 80 MMHG | WEIGHT: 169 LBS

## 2020-10-26 DIAGNOSIS — Z79.01 LONG TERM (CURRENT) USE OF ANTICOAGULANTS: ICD-10-CM

## 2020-10-26 DIAGNOSIS — I48.91 ATRIAL FIBRILLATION, UNSPECIFIED TYPE (HCC): ICD-10-CM

## 2020-10-26 LAB — INR PPP: 2.8 (ref 0.9–1.1)

## 2020-10-26 PROCEDURE — 36416 COLLJ CAPILLARY BLOOD SPEC: CPT | Performed by: INTERNAL MEDICINE

## 2020-10-26 PROCEDURE — 85610 PROTHROMBIN TIME: CPT | Performed by: INTERNAL MEDICINE

## 2020-11-02 RX ORDER — METOPROLOL TARTRATE 100 MG/1
100 TABLET ORAL 2 TIMES DAILY
Qty: 180 TABLET | Refills: 1 | Status: ON HOLD | OUTPATIENT
Start: 2020-11-02 | End: 2021-04-30

## 2020-11-30 ENCOUNTER — ANTICOAGULATION VISIT (OUTPATIENT)
Dept: CARDIOLOGY | Facility: CLINIC | Age: 85
End: 2020-11-30

## 2020-11-30 VITALS
DIASTOLIC BLOOD PRESSURE: 82 MMHG | WEIGHT: 168 LBS | BODY MASS INDEX: 30.73 KG/M2 | TEMPERATURE: 97.1 F | HEART RATE: 64 BPM | SYSTOLIC BLOOD PRESSURE: 168 MMHG

## 2020-11-30 DIAGNOSIS — Z79.01 LONG TERM (CURRENT) USE OF ANTICOAGULANTS: ICD-10-CM

## 2020-11-30 DIAGNOSIS — I48.91 ATRIAL FIBRILLATION, UNSPECIFIED TYPE (HCC): ICD-10-CM

## 2020-11-30 LAB — INR PPP: 2.4 (ref 0.9–1.1)

## 2020-11-30 PROCEDURE — 85610 PROTHROMBIN TIME: CPT | Performed by: INTERNAL MEDICINE

## 2020-11-30 PROCEDURE — 36416 COLLJ CAPILLARY BLOOD SPEC: CPT | Performed by: INTERNAL MEDICINE

## 2020-12-28 ENCOUNTER — ANTICOAGULATION VISIT (OUTPATIENT)
Dept: CARDIOLOGY | Facility: CLINIC | Age: 85
End: 2020-12-28

## 2020-12-28 VITALS — HEART RATE: 63 BPM | DIASTOLIC BLOOD PRESSURE: 72 MMHG | TEMPERATURE: 96.9 F | SYSTOLIC BLOOD PRESSURE: 147 MMHG

## 2020-12-28 DIAGNOSIS — Z79.01 LONG TERM (CURRENT) USE OF ANTICOAGULANTS: ICD-10-CM

## 2020-12-28 DIAGNOSIS — I48.91 ATRIAL FIBRILLATION, UNSPECIFIED TYPE (HCC): ICD-10-CM

## 2020-12-28 LAB — INR PPP: 2.9 (ref 0.9–1.1)

## 2020-12-28 PROCEDURE — 85610 PROTHROMBIN TIME: CPT | Performed by: INTERNAL MEDICINE

## 2020-12-28 PROCEDURE — 36416 COLLJ CAPILLARY BLOOD SPEC: CPT | Performed by: INTERNAL MEDICINE

## 2021-01-25 ENCOUNTER — ANTICOAGULATION VISIT (OUTPATIENT)
Dept: CARDIOLOGY | Facility: CLINIC | Age: 86
End: 2021-01-25

## 2021-01-25 VITALS
SYSTOLIC BLOOD PRESSURE: 155 MMHG | BODY MASS INDEX: 31.28 KG/M2 | TEMPERATURE: 97.3 F | HEART RATE: 61 BPM | DIASTOLIC BLOOD PRESSURE: 72 MMHG | WEIGHT: 171 LBS

## 2021-01-25 DIAGNOSIS — I48.91 ATRIAL FIBRILLATION, UNSPECIFIED TYPE (HCC): ICD-10-CM

## 2021-01-25 DIAGNOSIS — Z79.01 LONG TERM (CURRENT) USE OF ANTICOAGULANTS: ICD-10-CM

## 2021-01-25 LAB — INR PPP: 2.6 (ref 0.9–1.1)

## 2021-01-25 PROCEDURE — 36416 COLLJ CAPILLARY BLOOD SPEC: CPT | Performed by: INTERNAL MEDICINE

## 2021-01-25 PROCEDURE — 85610 PROTHROMBIN TIME: CPT | Performed by: INTERNAL MEDICINE

## 2021-02-02 ENCOUNTER — OFFICE (AMBULATORY)
Dept: URBAN - METROPOLITAN AREA CLINIC 64 | Facility: CLINIC | Age: 86
End: 2021-02-02

## 2021-02-02 VITALS
WEIGHT: 173 LBS | HEART RATE: 60 BPM | HEIGHT: 62 IN | DIASTOLIC BLOOD PRESSURE: 90 MMHG | SYSTOLIC BLOOD PRESSURE: 156 MMHG

## 2021-02-02 DIAGNOSIS — K74.3 PRIMARY BILIARY CIRRHOSIS: ICD-10-CM

## 2021-02-02 DIAGNOSIS — R11.0 NAUSEA: ICD-10-CM

## 2021-02-02 DIAGNOSIS — R13.10 DYSPHAGIA, UNSPECIFIED: ICD-10-CM

## 2021-02-02 PROCEDURE — 99214 OFFICE O/P EST MOD 30 MIN: CPT | Performed by: INTERNAL MEDICINE

## 2021-02-02 RX ORDER — DILTIAZEM HYDROCHLORIDE 120 MG/1
120 CAPSULE, EXTENDED RELEASE ORAL DAILY
Qty: 90 CAPSULE | Refills: 1 | Status: SHIPPED | OUTPATIENT
Start: 2021-02-02 | End: 2021-08-02

## 2021-02-02 RX ORDER — FAMOTIDINE 40 MG/1
TABLET, FILM COATED ORAL
Qty: 60 | Refills: 10 | Status: ACTIVE
Start: 2021-02-02

## 2021-03-01 ENCOUNTER — ANTICOAGULATION VISIT (OUTPATIENT)
Dept: CARDIOLOGY | Facility: CLINIC | Age: 86
End: 2021-03-01

## 2021-03-01 VITALS
WEIGHT: 171 LBS | HEART RATE: 62 BPM | SYSTOLIC BLOOD PRESSURE: 167 MMHG | DIASTOLIC BLOOD PRESSURE: 76 MMHG | BODY MASS INDEX: 31.28 KG/M2

## 2021-03-01 DIAGNOSIS — Z79.01 LONG TERM (CURRENT) USE OF ANTICOAGULANTS: ICD-10-CM

## 2021-03-01 DIAGNOSIS — I48.91 ATRIAL FIBRILLATION, UNSPECIFIED TYPE (HCC): ICD-10-CM

## 2021-03-01 LAB — INR PPP: 3.6 (ref 0.9–1.1)

## 2021-03-01 PROCEDURE — 36416 COLLJ CAPILLARY BLOOD SPEC: CPT | Performed by: INTERNAL MEDICINE

## 2021-03-01 PROCEDURE — 85610 PROTHROMBIN TIME: CPT | Performed by: INTERNAL MEDICINE

## 2021-03-15 ENCOUNTER — ANTICOAGULATION VISIT (OUTPATIENT)
Dept: CARDIOLOGY | Facility: CLINIC | Age: 86
End: 2021-03-15

## 2021-03-15 VITALS
HEART RATE: 61 BPM | TEMPERATURE: 97.5 F | DIASTOLIC BLOOD PRESSURE: 73 MMHG | SYSTOLIC BLOOD PRESSURE: 151 MMHG | WEIGHT: 172 LBS | BODY MASS INDEX: 31.46 KG/M2

## 2021-03-15 DIAGNOSIS — Z79.01 LONG TERM (CURRENT) USE OF ANTICOAGULANTS: ICD-10-CM

## 2021-03-15 DIAGNOSIS — I48.91 ATRIAL FIBRILLATION, UNSPECIFIED TYPE (HCC): ICD-10-CM

## 2021-03-15 LAB — INR PPP: 1.9 (ref 0.9–1.1)

## 2021-03-15 PROCEDURE — 85610 PROTHROMBIN TIME: CPT | Performed by: INTERNAL MEDICINE

## 2021-03-15 PROCEDURE — 36416 COLLJ CAPILLARY BLOOD SPEC: CPT | Performed by: INTERNAL MEDICINE

## 2021-04-15 ENCOUNTER — ANTICOAGULATION VISIT (OUTPATIENT)
Dept: CARDIOLOGY | Facility: CLINIC | Age: 86
End: 2021-04-15

## 2021-04-15 VITALS
WEIGHT: 177 LBS | DIASTOLIC BLOOD PRESSURE: 68 MMHG | SYSTOLIC BLOOD PRESSURE: 143 MMHG | HEART RATE: 63 BPM | BODY MASS INDEX: 32.37 KG/M2

## 2021-04-15 DIAGNOSIS — Z79.01 LONG TERM (CURRENT) USE OF ANTICOAGULANTS: Primary | ICD-10-CM

## 2021-04-15 DIAGNOSIS — I48.91 ATRIAL FIBRILLATION, UNSPECIFIED TYPE (HCC): ICD-10-CM

## 2021-04-15 LAB — INR PPP: 1.4 (ref 0.9–1.1)

## 2021-04-15 PROCEDURE — 85610 PROTHROMBIN TIME: CPT | Performed by: INTERNAL MEDICINE

## 2021-04-15 PROCEDURE — 36416 COLLJ CAPILLARY BLOOD SPEC: CPT | Performed by: INTERNAL MEDICINE

## 2021-04-18 ENCOUNTER — APPOINTMENT (OUTPATIENT)
Dept: GENERAL RADIOLOGY | Facility: HOSPITAL | Age: 86
End: 2021-04-18

## 2021-04-18 ENCOUNTER — HOSPITAL ENCOUNTER (INPATIENT)
Facility: HOSPITAL | Age: 86
LOS: 2 days | Discharge: HOME | End: 2021-04-22
Attending: HOSPITALIST | Admitting: HOSPITALIST

## 2021-04-18 DIAGNOSIS — I20.8 ANGINA AT REST (HCC): ICD-10-CM

## 2021-04-18 DIAGNOSIS — R07.9 CHEST PAIN, UNSPECIFIED TYPE: Primary | ICD-10-CM

## 2021-04-18 DIAGNOSIS — R94.39 ABNORMAL NUCLEAR STRESS TEST: ICD-10-CM

## 2021-04-18 PROBLEM — N18.30 CKD (CHRONIC KIDNEY DISEASE) STAGE 3, GFR 30-59 ML/MIN: Chronic | Status: ACTIVE | Noted: 2021-04-18

## 2021-04-18 PROBLEM — F41.8 ANXIETY ASSOCIATED WITH DEPRESSION: Chronic | Status: ACTIVE | Noted: 2021-04-18

## 2021-04-18 PROBLEM — K21.9 GERD WITHOUT ESOPHAGITIS: Chronic | Status: ACTIVE | Noted: 2021-04-18

## 2021-04-18 PROBLEM — G89.29 CHRONIC PAIN: Status: ACTIVE | Noted: 2021-04-18

## 2021-04-18 PROBLEM — J30.2 SEASONAL ALLERGIES: Chronic | Status: ACTIVE | Noted: 2021-04-18

## 2021-04-18 PROBLEM — E66.9 OBESITY (BMI 30-39.9): Chronic | Status: ACTIVE | Noted: 2021-04-18

## 2021-04-18 LAB
ALBUMIN SERPL-MCNC: 4.2 G/DL (ref 3.5–5.2)
ALBUMIN/GLOB SERPL: 1.4 G/DL
ALP SERPL-CCNC: 126 U/L (ref 39–117)
ALT SERPL W P-5'-P-CCNC: 21 U/L (ref 1–33)
ANION GAP SERPL CALCULATED.3IONS-SCNC: 13 MMOL/L (ref 5–15)
AST SERPL-CCNC: 30 U/L (ref 1–32)
BASOPHILS # BLD AUTO: 0 10*3/MM3 (ref 0–0.2)
BASOPHILS NFR BLD AUTO: 0.3 % (ref 0–1.5)
BILIRUB SERPL-MCNC: 1.4 MG/DL (ref 0–1.2)
BUN SERPL-MCNC: 17 MG/DL (ref 8–23)
BUN/CREAT SERPL: 16.7 (ref 7–25)
CALCIUM SPEC-SCNC: 8.8 MG/DL (ref 8.6–10.5)
CHLORIDE SERPL-SCNC: 98 MMOL/L (ref 98–107)
CO2 SERPL-SCNC: 24 MMOL/L (ref 22–29)
CREAT SERPL-MCNC: 1.02 MG/DL (ref 0.57–1)
DEPRECATED RDW RBC AUTO: 45.5 FL (ref 37–54)
EOSINOPHIL # BLD AUTO: 0.1 10*3/MM3 (ref 0–0.4)
EOSINOPHIL NFR BLD AUTO: 2.5 % (ref 0.3–6.2)
ERYTHROCYTE [DISTWIDTH] IN BLOOD BY AUTOMATED COUNT: 14.5 % (ref 12.3–15.4)
GFR SERPL CREATININE-BSD FRML MDRD: 52 ML/MIN/1.73
GLOBULIN UR ELPH-MCNC: 3 GM/DL
GLUCOSE SERPL-MCNC: 118 MG/DL (ref 65–99)
HCT VFR BLD AUTO: 39.4 % (ref 34–46.6)
HGB BLD-MCNC: 13.3 G/DL (ref 12–15.9)
HOLD SPECIMEN: NORMAL
INR PPP: 1.5 (ref 2–3)
LYMPHOCYTES # BLD AUTO: 1.5 10*3/MM3 (ref 0.7–3.1)
LYMPHOCYTES NFR BLD AUTO: 26.7 % (ref 19.6–45.3)
MCH RBC QN AUTO: 30.1 PG (ref 26.6–33)
MCHC RBC AUTO-ENTMCNC: 33.6 G/DL (ref 31.5–35.7)
MCV RBC AUTO: 89.4 FL (ref 79–97)
MONOCYTES # BLD AUTO: 0.4 10*3/MM3 (ref 0.1–0.9)
MONOCYTES NFR BLD AUTO: 7.8 % (ref 5–12)
NEUTROPHILS NFR BLD AUTO: 3.6 10*3/MM3 (ref 1.7–7)
NEUTROPHILS NFR BLD AUTO: 62.7 % (ref 42.7–76)
NRBC BLD AUTO-RTO: 0.1 /100 WBC (ref 0–0.2)
NT-PROBNP SERPL-MCNC: 1593 PG/ML (ref 0–1800)
PLATELET # BLD AUTO: 142 10*3/MM3 (ref 140–450)
PMV BLD AUTO: 9.2 FL (ref 6–12)
POTASSIUM SERPL-SCNC: 4.3 MMOL/L (ref 3.5–5.2)
POTASSIUM SERPL-SCNC: 4.4 MMOL/L (ref 3.5–5.2)
PROT SERPL-MCNC: 7.2 G/DL (ref 6–8.5)
PROTHROMBIN TIME: 16.2 SECONDS (ref 19.4–28.5)
RBC # BLD AUTO: 4.41 10*6/MM3 (ref 3.77–5.28)
SODIUM SERPL-SCNC: 135 MMOL/L (ref 136–145)
TROPONIN T SERPL-MCNC: <0.01 NG/ML (ref 0–0.03)
TROPONIN T SERPL-MCNC: <0.01 NG/ML (ref 0–0.03)
WBC # BLD AUTO: 5.7 10*3/MM3 (ref 3.4–10.8)

## 2021-04-18 PROCEDURE — 83880 ASSAY OF NATRIURETIC PEPTIDE: CPT | Performed by: NURSE PRACTITIONER

## 2021-04-18 PROCEDURE — G0378 HOSPITAL OBSERVATION PER HR: HCPCS

## 2021-04-18 PROCEDURE — 80053 COMPREHEN METABOLIC PANEL: CPT | Performed by: NURSE PRACTITIONER

## 2021-04-18 PROCEDURE — 84132 ASSAY OF SERUM POTASSIUM: CPT | Performed by: PHYSICIAN ASSISTANT

## 2021-04-18 PROCEDURE — 71045 X-RAY EXAM CHEST 1 VIEW: CPT

## 2021-04-18 PROCEDURE — 85610 PROTHROMBIN TIME: CPT | Performed by: NURSE PRACTITIONER

## 2021-04-18 PROCEDURE — U0004 COV-19 TEST NON-CDC HGH THRU: HCPCS | Performed by: HOSPITALIST

## 2021-04-18 PROCEDURE — 93005 ELECTROCARDIOGRAM TRACING: CPT | Performed by: HOSPITALIST

## 2021-04-18 PROCEDURE — 84484 ASSAY OF TROPONIN QUANT: CPT | Performed by: NURSE PRACTITIONER

## 2021-04-18 PROCEDURE — 93005 ELECTROCARDIOGRAM TRACING: CPT

## 2021-04-18 PROCEDURE — 85025 COMPLETE CBC W/AUTO DIFF WBC: CPT | Performed by: NURSE PRACTITIONER

## 2021-04-18 PROCEDURE — 99284 EMERGENCY DEPT VISIT MOD MDM: CPT

## 2021-04-18 PROCEDURE — U0005 INFEC AGEN DETEC AMPLI PROBE: HCPCS | Performed by: HOSPITALIST

## 2021-04-18 PROCEDURE — 84484 ASSAY OF TROPONIN QUANT: CPT | Performed by: PHYSICIAN ASSISTANT

## 2021-04-18 PROCEDURE — 99219 PR INITIAL OBSERVATION CARE/DAY 50 MINUTES: CPT | Performed by: PHYSICIAN ASSISTANT

## 2021-04-18 RX ORDER — SODIUM CHLORIDE 0.9 % (FLUSH) 0.9 %
10 SYRINGE (ML) INJECTION AS NEEDED
Status: DISCONTINUED | OUTPATIENT
Start: 2021-04-18 | End: 2021-04-22 | Stop reason: HOSPADM

## 2021-04-18 RX ORDER — ONDANSETRON 2 MG/ML
4 INJECTION INTRAMUSCULAR; INTRAVENOUS EVERY 6 HOURS PRN
Status: DISCONTINUED | OUTPATIENT
Start: 2021-04-18 | End: 2021-04-22 | Stop reason: HOSPADM

## 2021-04-18 RX ORDER — PANTOPRAZOLE SODIUM 40 MG/1
40 TABLET, DELAYED RELEASE ORAL
Status: DISCONTINUED | OUTPATIENT
Start: 2021-04-19 | End: 2021-04-22 | Stop reason: HOSPADM

## 2021-04-18 RX ORDER — MAGNESIUM SULFATE HEPTAHYDRATE 40 MG/ML
2 INJECTION, SOLUTION INTRAVENOUS AS NEEDED
Status: DISCONTINUED | OUTPATIENT
Start: 2021-04-18 | End: 2021-04-22 | Stop reason: HOSPADM

## 2021-04-18 RX ORDER — WARFARIN SODIUM 2 MG/1
2 TABLET ORAL
Status: DISCONTINUED | OUTPATIENT
Start: 2021-04-19 | End: 2021-04-19

## 2021-04-18 RX ORDER — CETIRIZINE HYDROCHLORIDE 10 MG/1
10 TABLET ORAL DAILY
Status: DISCONTINUED | OUTPATIENT
Start: 2021-04-19 | End: 2021-04-22 | Stop reason: HOSPADM

## 2021-04-18 RX ORDER — MAGNESIUM SULFATE HEPTAHYDRATE 40 MG/ML
4 INJECTION, SOLUTION INTRAVENOUS AS NEEDED
Status: DISCONTINUED | OUTPATIENT
Start: 2021-04-18 | End: 2021-04-22 | Stop reason: HOSPADM

## 2021-04-18 RX ORDER — ACETAMINOPHEN 325 MG/1
650 TABLET ORAL EVERY 4 HOURS PRN
Status: DISCONTINUED | OUTPATIENT
Start: 2021-04-18 | End: 2021-04-22 | Stop reason: HOSPADM

## 2021-04-18 RX ORDER — CARBOXYMETHYLCELLULOSE SODIUM 5 MG/ML
1 SOLUTION/ DROPS OPHTHALMIC 3 TIMES DAILY PRN
COMMUNITY

## 2021-04-18 RX ORDER — POTASSIUM CHLORIDE 20 MEQ/1
40 TABLET, EXTENDED RELEASE ORAL AS NEEDED
Status: DISCONTINUED | OUTPATIENT
Start: 2021-04-18 | End: 2021-04-22 | Stop reason: HOSPADM

## 2021-04-18 RX ORDER — ACETAMINOPHEN 160 MG/5ML
650 SOLUTION ORAL EVERY 4 HOURS PRN
Status: DISCONTINUED | OUTPATIENT
Start: 2021-04-18 | End: 2021-04-22 | Stop reason: HOSPADM

## 2021-04-18 RX ORDER — CALCIUM GLUCONATE 20 MG/ML
2 INJECTION, SOLUTION INTRAVENOUS AS NEEDED
Status: DISCONTINUED | OUTPATIENT
Start: 2021-04-18 | End: 2021-04-22 | Stop reason: HOSPADM

## 2021-04-18 RX ORDER — CETIRIZINE HYDROCHLORIDE 10 MG/1
10 TABLET ORAL DAILY
COMMUNITY
End: 2021-05-01 | Stop reason: HOSPADM

## 2021-04-18 RX ORDER — WARFARIN SODIUM 6 MG/1
6 TABLET ORAL
Status: COMPLETED | OUTPATIENT
Start: 2021-04-18 | End: 2021-04-18

## 2021-04-18 RX ORDER — ONDANSETRON 4 MG/1
4 TABLET, FILM COATED ORAL DAILY PRN
COMMUNITY

## 2021-04-18 RX ORDER — CALCIUM GLUCONATE 20 MG/ML
1 INJECTION, SOLUTION INTRAVENOUS AS NEEDED
Status: DISCONTINUED | OUTPATIENT
Start: 2021-04-18 | End: 2021-04-22 | Stop reason: HOSPADM

## 2021-04-18 RX ORDER — ACETAMINOPHEN 650 MG/1
650 SUPPOSITORY RECTAL EVERY 4 HOURS PRN
Status: DISCONTINUED | OUTPATIENT
Start: 2021-04-18 | End: 2021-04-22 | Stop reason: HOSPADM

## 2021-04-18 RX ORDER — ONDANSETRON 4 MG/1
4 TABLET, FILM COATED ORAL EVERY 6 HOURS PRN
Status: DISCONTINUED | OUTPATIENT
Start: 2021-04-18 | End: 2021-04-22 | Stop reason: HOSPADM

## 2021-04-18 RX ORDER — SODIUM CHLORIDE 0.9 % (FLUSH) 0.9 %
10 SYRINGE (ML) INJECTION EVERY 12 HOURS SCHEDULED
Status: DISCONTINUED | OUTPATIENT
Start: 2021-04-18 | End: 2021-04-22 | Stop reason: HOSPADM

## 2021-04-18 RX ORDER — ALPRAZOLAM 0.5 MG/1
0.5 TABLET ORAL 2 TIMES DAILY PRN
Status: DISCONTINUED | OUTPATIENT
Start: 2021-04-18 | End: 2021-04-22 | Stop reason: HOSPADM

## 2021-04-18 RX ORDER — ATORVASTATIN CALCIUM 10 MG/1
10 TABLET, FILM COATED ORAL NIGHTLY
Status: DISCONTINUED | OUTPATIENT
Start: 2021-04-18 | End: 2021-04-22 | Stop reason: HOSPADM

## 2021-04-18 RX ORDER — CHOLECALCIFEROL (VITAMIN D3) 125 MCG
5 CAPSULE ORAL NIGHTLY PRN
Status: DISCONTINUED | OUTPATIENT
Start: 2021-04-18 | End: 2021-04-22 | Stop reason: HOSPADM

## 2021-04-18 RX ORDER — SPIRONOLACTONE 25 MG/1
12.5 TABLET ORAL EVERY EVENING
Status: DISCONTINUED | OUTPATIENT
Start: 2021-04-18 | End: 2021-04-22

## 2021-04-18 RX ORDER — DILTIAZEM HYDROCHLORIDE 120 MG/1
120 CAPSULE, COATED, EXTENDED RELEASE ORAL
Refills: 1 | Status: DISCONTINUED | OUTPATIENT
Start: 2021-04-19 | End: 2021-04-22 | Stop reason: HOSPADM

## 2021-04-18 RX ORDER — WARFARIN SODIUM 4 MG/1
4 TABLET ORAL
Status: DISCONTINUED | OUTPATIENT
Start: 2021-04-20 | End: 2021-04-19

## 2021-04-18 RX ORDER — WARFARIN SODIUM 2 MG/1
2 TABLET ORAL 3 TIMES WEEKLY
COMMUNITY
End: 2021-04-24

## 2021-04-18 RX ORDER — POTASSIUM CHLORIDE 1.5 G/1.77G
40 POWDER, FOR SOLUTION ORAL AS NEEDED
Status: DISCONTINUED | OUTPATIENT
Start: 2021-04-18 | End: 2021-04-22 | Stop reason: HOSPADM

## 2021-04-18 RX ORDER — MAGNESIUM OXIDE 400 MG/1
400 TABLET ORAL DAILY
COMMUNITY

## 2021-04-18 RX ORDER — HYDROCODONE BITARTRATE AND ACETAMINOPHEN 7.5; 325 MG/1; MG/1
1 TABLET ORAL EVERY 8 HOURS PRN
Status: DISCONTINUED | OUTPATIENT
Start: 2021-04-18 | End: 2021-04-22 | Stop reason: HOSPADM

## 2021-04-18 RX ORDER — NITROGLYCERIN 0.4 MG/1
0.4 TABLET SUBLINGUAL
Status: DISCONTINUED | OUTPATIENT
Start: 2021-04-18 | End: 2021-04-22 | Stop reason: HOSPADM

## 2021-04-18 RX ADMIN — HYDROCODONE BITARTRATE AND ACETAMINOPHEN 1 TABLET: 7.5; 325 TABLET ORAL at 20:29

## 2021-04-18 RX ADMIN — Medication 10 ML: at 20:26

## 2021-04-18 RX ADMIN — WARFARIN 6 MG: 6 TABLET ORAL at 20:25

## 2021-04-18 RX ADMIN — ATORVASTATIN CALCIUM 10 MG: 10 TABLET, FILM COATED ORAL at 20:26

## 2021-04-18 RX ADMIN — SPIRONOLACTONE 12.5 MG: 25 TABLET ORAL at 20:26

## 2021-04-18 NOTE — H&P
[facility]      Patient Name: Syl Herrera  : 1935  MRN: [id number]  Primary Care Physician: Nava Yu MD  Date of admission: 2021    Patient Care Team:  Nava Yu MD as PCP - Ivan Durand MD as Consulting Physician (Cardiology)          Subjective   History Present Illness     Chief Complaint:   Chief Complaint   Patient presents with   • Chest Pain         Ms. Herrera is a 85 y.o. female past medical history of seasonal allergies, A. fib, obesity, hypertension, GERD, hyperlipidemia, CAD and CKD who presents to [facility] complaining of chest pressure.  Patient reports she was woken from sleep at approximately 0400 hrs. on 2021 with a pressure across the anterior portion of her chest.  Pain remained intermittent throughout the day until approximately 1300 hrs. when it became constant and worsening rated at 6/10 at its worst without obvious provoking or palliative factors.  Some mild dyspnea as well as palpitations have also been reported.  She denies any diaphoresis, nausea or vomiting, fever, syncope or near syncope.  Patient does not smoke or drink alcohol and confirms compliance with all of her outpatient medical therapies.    In the ED patient did have lab significant for troponin of less than 0.010, proBNP: 1593.0, creatinine: 1.02 with a BUN of 17 and a GFR 52, remainder of CMP and CBC was generally unremarkable.  INR: 1.50, PT: 16.2.  Chest x-ray shows cardiomegaly with possible hiatal hernia.  EKG shows an atrial paced rhythm with 1 PVC but no obvious acute ST changes and a QTC of 459 ms.    History of Present Illness    Review of Systems   Constitutional: Negative.   HENT: Negative.    Eyes: Negative.    Cardiovascular: Positive for chest pain and palpitations. Negative for dyspnea on exertion, irregular heartbeat, leg swelling, near-syncope and syncope.   Respiratory: Positive for shortness of breath. Negative  for cough and sputum production.    Endocrine: Negative.    Skin: Negative.    Musculoskeletal: Negative.    Gastrointestinal: Negative.    Genitourinary: Negative.    Neurological: Negative.    Psychiatric/Behavioral: Negative.            Personal History     Past Medical History:   Past Medical History:   Diagnosis Date   • Anxiety    • Arthritis    • Atrial fibrillation (CMS/HCC)    • Coronary artery disease    • Dyslipidemia    • GERD (gastroesophageal reflux disease)    • History of bone density study 04/2015   • Hypertension    • Sleep apnea    • Wears dentures        Surgical History:      Past Surgical History:   Procedure Laterality Date   • BLADDER REPAIR  1990   • BREAST LUMPECTOMY  1974    BENIGN   • CARDIAC CATHETERIZATION  05/25/2011   • CARDIOVASCULAR STRESS TEST  05/04/2015   • CHOLECYSTECTOMY  1990   • CORONARY ARTERY BYPASS GRAFT  08/09/2017    X5  Dr Brandt   • ENDOSCOPY N/A 6/30/2020    Procedure: ESOPHAGOGASTRODUODENOSCOPY with biopsy x 1 area and dilatation (15-18mm balloon) up to 18mm;  Surgeon: Quinton Tapia MD;  Location: [facility] ENDOSCOPY;  Service: Gastroenterology;  Laterality: N/A;  post op: gastritis, large hiatal hernia, esophageal dysmotility   • HYSTERECTOMY  1990   • JOINT REPLACEMENT Right     knee    • OTHER SURGICAL HISTORY  06/2017    BLOOD CLOT REMOVED FROM LEFT EAR   • PACEMAKER IMPLANTATION  04/18/2016    DUAL CHAMBER ST ALESSIO           Family History: family history includes Heart disease in her sister; Hypertension in her mother. Otherwise pertinent FHx was reviewed and unremarkable.     Social History:  reports that she has never smoked. She has never used smokeless tobacco. She reports that she does not drink alcohol and does not use drugs.      Medications:  Prior to Admission medications    Medication Sig Start Date End Date Taking? Authorizing Provider   ALPRAZolam (XANAX) 0.5 MG tablet Take 0.5 mg by mouth 2 (Two) Times a Day As Needed. 9/6/17  Yes Provider,  MD Dayne   atorvastatin (LIPITOR) 10 MG tablet Take 10 mg by mouth Every Night. 9/20/17  Yes Dayne Mensah MD   carboxymethylcellulose (REFRESH PLUS) 0.5 % solution Administer 1 drop to both eyes 3 (Three) Times a Day As Needed for Dry Eyes.   Yes Dayne Mensah MD   cetirizine (zyrTEC) 10 MG tablet Take 10 mg by mouth Daily.   Yes Dayne Mensah MD   dilTIAZem (TIAZAC) 120 MG 24 hr capsule Take 1 capsule by mouth Daily. 2/2/21  Yes Ivan Martell MD   diphenhydrAMINE HCl (BENADRYL ALLERGY PO) Take  by mouth As Needed.   Yes Dayne Mensah MD   HYDROcodone-acetaminophen (NORCO) 7.5-325 MG per tablet Take 1 tablet by mouth Every 8 (Eight) Hours As Needed. 9/7/17  Yes Dayne Mensah MD   magnesium oxide (MAG-OX) 400 MG tablet Take 400 mg by mouth Daily.   Yes Dayne Mensah MD   metoprolol tartrate (LOPRESSOR) 100 MG tablet Take 1 tablet by mouth 2 (Two) Times a Day. 11/2/20  Yes Ivan Martell MD   Multiple Vitamins-Minerals (VITEYES AREDS FORMULA) capsule Take 1 capsule by mouth 2 (two) times a day. 3/31/14  Yes Dayne Mensah MD   ondansetron (ZOFRAN) 4 MG tablet Take 4 mg by mouth Daily As Needed for Nausea or Vomiting.   Yes Dayne Mensah MD   pantoprazole (PROTONIX) 40 MG EC tablet TK 1 T PO QD 7/24/17  Yes Dayne Mensah MD   Phenylephrine-DM-GG (TUSSIN CF COUGH & COLD PO) Take  by mouth As Needed.   Yes Dayne Mensah MD   spironolactone (ALDACTONE) 25 MG tablet TAKE ONE-HALF TABLET BY MOUTH DAILY  Patient taking differently: Take 12.5 mg by mouth Every Evening. 5/11/20  Yes Ivan Martell MD   warfarin (COUMADIN) 2 MG tablet Take 2 mg by mouth 3 (Three) Times a Week. Mon, Wed, Fri   Yes Dayne Mensah MD   warfarin (COUMADIN) 4 MG tablet TAKE 1 TABLET BY MOUTH DAILY EXCEPT 1/2 TABLET ON WEDNESDAY OR AS DIRECTED  Patient taking differently: Take 4 mg by mouth 4 (Four) Times a Week. Tues, Thur, Sat, Sun 6/29/20  Yes Jayshree,  MD Ivan   calcium carbonate (OS-CHRIS) 600 MG tablet Take 600 mg by mouth Daily.  4/18/21  ProviderDayne MD   magnesium oxide (MAGOX) 400 (241.3 Mg) MG tablet tablet Take 400 mg by mouth Daily. 8/25/17 4/18/21  ProviderDayne MD       Allergies:    Allergies   Allergen Reactions   • Doxycycline Other (See Comments)     Chest pain   • Celebrex [Celecoxib] Other (See Comments)     Chest pain   • Contrast Dye Itching   • Iodinated Diagnostic Agents Itching   • Nsaids Other (See Comments)     convulsion   • Other Itching     Pt states some steroids make her itch and hallucinate- she does not know the names    • Sulfa Antibiotics Nausea Only       Objective   Objective     Vital Signs  Temp:  [97.7 °F (36.5 °C)] 97.7 °F (36.5 °C)  Heart Rate:  [63-73] 63  Resp:  [16] 16  BP: (168-178)/() 178/84  SpO2:  [96 %-97 %] 97 %  on   ;   Device (Oxygen Therapy): room air  Body mass index is 33.95 kg/m².    Physical Exam  Vitals reviewed.   Constitutional:       General: She is not in acute distress.     Appearance: Normal appearance. She is obese. She is not ill-appearing or toxic-appearing.   HENT:      Head: Normocephalic and atraumatic.      Right Ear: External ear normal.      Left Ear: External ear normal.      Nose: Nose normal.      Mouth/Throat:      Mouth: Mucous membranes are moist.   Eyes:      Extraocular Movements: Extraocular movements intact.   Cardiovascular:      Rate and Rhythm: Normal rate. Rhythm irregular.      Pulses: Normal pulses.      Heart sounds: Normal heart sounds.   Pulmonary:      Effort: Pulmonary effort is normal. No respiratory distress.      Breath sounds: Normal breath sounds.   Abdominal:      General: Bowel sounds are normal. There is no distension.      Tenderness: There is no abdominal tenderness.   Musculoskeletal:         General: Normal range of motion.      Cervical back: Normal range of motion.      Right lower leg: No edema.      Left lower leg: No edema.    Skin:     General: Skin is warm and dry.      Capillary Refill: Capillary refill takes less than 2 seconds.   Neurological:      General: No focal deficit present.      Mental Status: She is alert and oriented to person, place, and time.   Psychiatric:         Mood and Affect: Mood normal.         Behavior: Behavior normal.         Thought Content: Thought content normal.         Judgment: Judgment normal.           Results Review:  I have personally reviewed most recent cardiac tracings, lab results and radiology images and interpretations and agree with findings, most notably: Troponin, proBNP, CBC, CMP, INR/PT, chest x-ray and EKG.    Results from last 7 days   Lab Units 04/18/21  1655   WBC 10*3/mm3 5.70   HEMOGLOBIN g/dL 13.3   HEMATOCRIT % 39.4   PLATELETS 10*3/mm3 142   INR  1.50*     Results from last 7 days   Lab Units 04/18/21  1655   SODIUM mmol/L 135*   POTASSIUM mmol/L 4.4   CHLORIDE mmol/L 98   CO2 mmol/L 24.0   BUN mg/dL 17   CREATININE mg/dL 1.02*   GLUCOSE mg/dL 118*   CALCIUM mg/dL 8.8   ALT (SGPT) U/L 21   AST (SGOT) U/L 30   TROPONIN T ng/mL <0.010   PROBNP pg/mL 1,593.0     Estimated Creatinine Clearance: 40.4 mL/min (A) (by C-G formula based on SCr of 1.02 mg/dL (H)).  Brief Urine Lab Results     None          Microbiology Results (last 10 days)     ** No results found for the last 240 hours. **          ECG/EMG Results (most recent)     Procedure Component Value Units Date/Time    ECG 12 Lead [[id number]] Collected: 04/18/21 1636     Updated: 04/18/21 1639     QT Interval 430 ms     Narrative:      HEART RATE= 68  bpm  RR Interval= 877  ms  AL Interval= 220  ms  P Horizontal Axis= -59  deg  P Front Axis=   deg  QRSD Interval= 94  ms  QT Interval= 430  ms  QRS Axis= -15  deg  T Wave Axis= 86  deg  - ABNORMAL ECG -  Atrial-paced complexes  Multiple ventricular premature complexes  Prolonged AL interval  Electronically Signed By:   Date and Time of Study: 2021-04-18 16:36:42                  XR  Chest 1 View    Result Date: 4/18/2021  1.Cardiomegaly 2.Hiatal hernia suggested.  Electronically Signed By-Arie Miller MD On:4/18/2021 5:08 PM This report was finalized on [id number] by  Arie Miller MD.        Estimated Creatinine Clearance: 40.4 mL/min (A) (by C-G formula based on SCr of 1.02 mg/dL (H)).    Assessment/Plan   Assessment/Plan       Active Hospital Problems    Diagnosis  POA   • **Chest pain [R07.9]  Yes     Priority: High   • CKD (chronic kidney disease) stage 3, GFR 30-59 ml/min (CMS/HCC) [N18.30]  Yes     Priority: Medium   • Hypertension [I10]  Yes     Priority: Medium   • Atrial fibrillation (CMS/HCC) [I48.91] [I48.91]  Yes     Priority: Medium   • Presence of cardiac pacemaker [Z95.0]  Yes     Priority: Medium   • Coronary artery disease [I25.10]  Yes     Priority: Medium   • Anxiety associated with depression [F41.8]  Unknown     Priority: Low   • Obesity (BMI 30-39.9) [E66.9]  Yes     Priority: Low   • Seasonal allergies [J30.2]  Yes     Priority: Low   • GERD without esophagitis [K21.9]  Yes     Priority: Low   • Dyslipidemia [E78.5]  Yes     Priority: Low      Resolved Hospital Problems   No resolved problems to display.     Chest pain  -Troponin: Less than 0.010, trend  -Chest x-ray shows cardiomegaly with possible hiatal hernia.    -EKG shows an atrial paced rhythm with 1 PVC but no obvious acute ST changes and a QTC of 459 ms.  -Hold metoprolol temporarily secondary to stress test  -Check lipid panel  -N.p.o. after midnight  -Stress test ordered    CAD  -Continue statin, and warfarin warfarin and cardiac monitoring  -Hold beta-blocker temporarily secondary to planned stress test    Atrial fibrillation status post pacemaker placement  -EKG shows an atrial paced rhythm with 1 PVC but no obvious acute ST changes and a QTC of 459 ms.  -Continue diltiazem, metoprolol and warfarin with pharmacy to dose    Hypertension  -poorly controlled with a blood pressure on admission of 168/110  -  Continue diltiazem  -Hold metoprolol temporarily secondary to stress test  - Monitor while admitted    CKD  - Creatinine: 1.02, BUN: 17, eGFR: 52  - Avoid nephrotoxic medication and IV dye unless urgently needed  - Monitor BMP and I's and O's    Hyperlipidemia  -Check lipid panel  -Continue statin    Seasonal allergies  -Zyrtec    GERD  -PPI    Anxiety  -Xanax (inspect verified)    Chronic pain  -Norco (inspect verified)    Obesity (BMI: 33.95)  -Encourage diet lifestyle modifications            VTE Prophylaxis -warfarin with pharmacy to dose  Mechanical Order History:     None      Pharmalogical Order History:     None          CODE STATUS: Full  There are no questions and answers to display.         I discussed the patient's findings and my recommendations with patient and nursing staff.      Signature:Electronically signed by Leon Hodge PA-C, 04/18/21, 8:41 PM EDT.    [facility] Hospitalist Team

## 2021-04-18 NOTE — PROGRESS NOTES
"Pharmacy dosing service  Anticoagulant  Warfarin     Subjective:    Syl Herrera is a 85 y.o.female being continued on warfarin for atrial fibrillation.    INR Goal: 2 - 3  Home medication?: Yes, warfarin 2 mg PO on Mon/Wed/Fri and warfarin 4 mg PO on Sun/Tu/Th/Sat (last night's dose, ? mg)  Bridge Therapy Present?:  No  Interacting Medications Evaluation (New/Present/Discontinued): none  Additional Contributing Factors: none      Assessment/Plan:    INR subtherapeutic upon admission. Will give patient 6 mg mini load instead of normally scheduled 4 mg today. Plan to resume home regimen tomorrow.     Continue to monitor and adjust based on INR.         Date 4/18           INR 1.5           Dose 5 mg               Objective:  [Ht: 154.9 cm (61\"); Wt: 80.6 kg (177 lb 11.1 oz); BMI: Body mass index is 33.57 kg/m².]    Lab Results   Component Value Date    ALBUMIN 4.20 04/18/2021     Lab Results   Component Value Date    INR 1.50 (L) 04/18/2021    INR 1.40 (A) 04/15/2021    INR 1.90 (A) 03/15/2021    PROTIME 16.2 (L) 04/18/2021    PROTIME 13.7 (L) 06/30/2020    PROTIME 16.3 (L) 02/11/2018     Lab Results   Component Value Date    HGB 13.3 04/18/2021    HGB 13.5 02/01/2020    HGB 12.8 02/10/2018     Lab Results   Component Value Date    HCT 39.4 04/18/2021    HCT 40.0 02/01/2020    HCT 39.8 02/10/2018       Hellen Max, PharmD  04/18/21 19:20 EDT     "

## 2021-04-18 NOTE — ED PROVIDER NOTES
"Subjective   Patient is an 85-year-old female with history of coronary artery disease, pacemaker, presents emergency department the complaint of chest discomfort onset 4 AM.  She reports this woke her from her sleep, has been intermittent in nature throughout the day, she reports that she is mostly been resting today, but when she would get up and walk the pain seemed to return.  At time of exam patient is pain-free.  She denies any shortness of breath.  No fever or chills.  No normal leg pain or swelling.  No episodes of dizziness, lightheadedness, diaphoresis or syncope          Review of Systems   Constitutional: Positive for fatigue. Negative for chills, diaphoresis and fever.   Respiratory: Negative for cough, chest tightness and shortness of breath.    Cardiovascular: Positive for chest pain (\"Discomfort\"). Negative for palpitations and leg swelling.   Gastrointestinal: Negative for abdominal pain, diarrhea, nausea and vomiting.   Musculoskeletal: Negative for back pain and neck pain.   Skin: Negative for color change, rash and wound.   Neurological: Negative for dizziness, syncope and light-headedness.       Past Medical History:   Diagnosis Date   • Anxiety    • Arthritis    • Atrial fibrillation (CMS/HCC)    • Coronary artery disease    • Dyslipidemia    • GERD (gastroesophageal reflux disease)    • History of bone density study 04/2015   • Hypertension    • Sleep apnea    • Wears dentures        Allergies   Allergen Reactions   • Doxycycline Other (See Comments)     Chest pain   • Celebrex [Celecoxib] Other (See Comments)     Chest pain   • Contrast Dye Itching   • Iodinated Diagnostic Agents Itching   • Nsaids Other (See Comments)     convulsion   • Other Itching     Pt states some steroids make her itch and hallucinate- she does not know the names   She said she is allergic to all arthritis medicine   • Sulfa Antibiotics Nausea Only       Past Surgical History:   Procedure Laterality Date   • BLADDER " REPAIR  1990   • BREAST LUMPECTOMY  1974    BENIGN   • CARDIAC CATHETERIZATION  05/25/2011   • CARDIOVASCULAR STRESS TEST  05/04/2015   • CHOLECYSTECTOMY  1990   • CORONARY ARTERY BYPASS GRAFT  08/09/2017    X5  Dr Brandt   • ENDOSCOPY N/A 6/30/2020    Procedure: ESOPHAGOGASTRODUODENOSCOPY with biopsy x 1 area and dilatation (15-18mm balloon) up to 18mm;  Surgeon: Quinton Tapia MD;  Location: [facility] ENDOSCOPY;  Service: Gastroenterology;  Laterality: N/A;  post op: gastritis, large hiatal hernia, esophageal dysmotility   • HYSTERECTOMY  1990   • JOINT REPLACEMENT Right     knee    • OTHER SURGICAL HISTORY  06/2017    BLOOD CLOT REMOVED FROM LEFT EAR   • PACEMAKER IMPLANTATION  04/18/2016    DUAL CHAMBER ST [facility]ESSIO       Family History   Problem Relation Age of Onset   • Hypertension Mother    • Heart disease Sister         PACEMAKER       Social History     Socioeconomic History   • Marital status:      Spouse name: Not on file   • Number of children: Not on file   • Years of education: Not on file   • Highest education level: Not on file   Tobacco Use   • Smoking status: Never Smoker   • Smokeless tobacco: Never Used   Substance and Sexual Activity   • Alcohol use: No   • Drug use: No   • Sexual activity: Defer           Objective   Physical Exam  Vitals and nursing note reviewed.   Constitutional:       General: She is not in acute distress.     Appearance: She is well-developed. She is not ill-appearing, toxic-appearing or diaphoretic.   HENT:      Head: Normocephalic and atraumatic.   Eyes:      Extraocular Movements: Extraocular movements intact.      Pupils: Pupils are equal, round, and reactive to light.   Cardiovascular:      Rate and Rhythm: Normal rate and regular rhythm.      Pulses:           Radial pulses are 2+ on the right side and 2+ on the left side.        Posterior tibial pulses are 2+ on the right side and 2+ on the left side.      Heart sounds: Normal heart sounds. No murmur heard.   No  "friction rub. No gallop.    Pulmonary:      Effort: Pulmonary effort is normal.      Breath sounds: Normal breath sounds.   Abdominal:      General: Bowel sounds are normal.      Palpations: Abdomen is soft.   Musculoskeletal:      Cervical back: Normal range of motion and neck supple.      Right lower leg: No tenderness. No edema.      Left lower leg: No tenderness. No edema.   Skin:     General: Skin is warm and dry.      Capillary Refill: Capillary refill takes less than 2 seconds.   Neurological:      General: No focal deficit present.      Mental Status: She is alert and oriented to person, place, and time.   Psychiatric:         Mood and Affect: Mood normal.         Behavior: Behavior normal.         Procedures           ED Course  /90 (BP Location: Left arm, Patient Position: Lying)   Pulse 64   Temp 97.3 °F (36.3 °C) (Oral)   Resp 16   Ht 154.9 cm (61\")   Wt 80.6 kg (177 lb 11.1 oz)   SpO2 99%   BMI 33.57 kg/m²   Labs Reviewed   COMPREHENSIVE METABOLIC PANEL - Abnormal; Notable for the following components:       Result Value    Glucose 118 (*)     Creatinine 1.02 (*)     Sodium 135 (*)     Alkaline Phosphatase 126 (*)     Total Bilirubin 1.4 (*)     eGFR Non  Amer 52 (*)     All other components within normal limits    Narrative:     GFR Normal >60  Chronic Kidney Disease <60  Kidney Failure <15     PROTIME-INR - Abnormal; Notable for the following components:    Protime 16.2 (*)     INR 1.50 (*)     All other components within normal limits   BNP (IN-HOUSE) - Normal    Narrative:     Among patients with dyspnea, NT-proBNP is highly sensitive for the detection of acute congestive heart failure. In addition NT-proBNP of <300 pg/ml effectively rules out acute congestive heart failure with 99% negative predictive value.    Results may be falsely decreased if patient taking Biotin.     TROPONIN (IN-HOUSE) - Normal    Narrative:     Troponin T Reference Range:  <= 0.03 ng/mL-   Negative for " AMI  >0.03 ng/mL-     Abnormal for myocardial necrosis.  Clinicians would have to utilize clinical acumen, EKG, Troponin and serial changes to determine if it is an Acute Myocardial Infarction or myocardial injury due to an underlying chronic condition.       Results may be falsely decreased if patient taking Biotin.     CBC WITH AUTO DIFFERENTIAL - Normal   POTASSIUM - Normal   TROPONIN (IN-HOUSE) - Normal    Narrative:     Troponin T Reference Range:  <= 0.03 ng/mL-   Negative for AMI  >0.03 ng/mL-     Abnormal for myocardial necrosis.  Clinicians would have to utilize clinical acumen, EKG, Troponin and serial changes to determine if it is an Acute Myocardial Infarction or myocardial injury due to an underlying chronic condition.       Results may be falsely decreased if patient taking Biotin.     COVID PRE-OP / PRE-PROCEDURE SCREENING ORDER (NO ISOLATION)    Narrative:     The following orders were created for panel order COVID PRE-OP / PRE-PROCEDURE SCREENING ORDER (NO ISOLATION) - Swab, Nasopharynx.  Procedure                               Abnormality         Status                     ---------                               -----------         ------                     COVID-19,APTIMA PANTHER,...[[id number]]                      In process                   Please view results for these tests on the individual orders.   COVID-19,APTIMA PANTHER,GERRY IN-HOUSE,NP/OP SWAB IN UTM/VTM/SALINE TRANSPORT MEDIA,24 HR TAT   PROTIME-INR   LIPID PANEL   BASIC METABOLIC PANEL   MAGNESIUM   CBC WITH AUTO DIFFERENTIAL   CBC AND DIFFERENTIAL    Narrative:     The following orders were created for panel order CBC & Differential.  Procedure                               Abnormality         Status                     ---------                               -----------         ------                     CBC Auto Differential[[id number]]        Normal              Final result                 Please view results for these tests on  the individual orders.   EXTRA TUBES    Narrative:     The following orders were created for panel order Extra Tubes.  Procedure                               Abnormality         Status                     ---------                               -----------         ------                     Gold Top - SST[[id number]]                                   Final result                 Please view results for these tests on the individual orders.   GOLD TOP - SST   CBC AND DIFFERENTIAL    Narrative:     The following orders were created for panel order CBC & Differential.  Procedure                               Abnormality         Status                     ---------                               -----------         ------                     CBC Auto Differential[[id number]]                                                         Please view results for these tests on the individual orders.     Medications   sodium chloride 0.9 % flush 10 mL (has no administration in time range)   ALPRAZolam (XANAX) tablet 0.5 mg (has no administration in time range)   atorvastatin (LIPITOR) tablet 10 mg (10 mg Oral Given 4/18/21 2026)   cetirizine (zyrTEC) tablet 10 mg (has no administration in time range)   dilTIAZem CD (CARDIZEM CD) 24 hr capsule 120 mg (has no administration in time range)   HYDROcodone-acetaminophen (NORCO) 7.5-325 MG per tablet 1 tablet (1 tablet Oral Given 4/18/21 2029)   pantoprazole (PROTONIX) EC tablet 40 mg (has no administration in time range)   spironolactone (ALDACTONE) tablet 12.5 mg (12.5 mg Oral Given 4/18/21 2026)   Pharmacy to dose warfarin (has no administration in time range)   nitroglycerin (NITROSTAT) SL tablet 0.4 mg (has no administration in time range)   sodium chloride 0.9 % flush 10 mL (10 mL Intravenous Given 4/18/21 2026)   sodium chloride 0.9 % flush 10 mL (has no administration in time range)   ondansetron (ZOFRAN) tablet 4 mg (has no administration in time range)     Or   ondansetron  (ZOFRAN) injection 4 mg (has no administration in time range)   melatonin tablet 5 mg (has no administration in time range)   potassium chloride (K-DUR,KLOR-CON) CR tablet 40 mEq (has no administration in time range)   potassium chloride (KLOR-CON) packet 40 mEq (has no administration in time range)   Magnesium Sulfate 2 gram Bolus, followed by 8 gram infusion (total Mg dose 10 grams)- Mg less than or equal to 1mg/dL (has no administration in time range)     Or   Magnesium Sulfate 2 gram / 50mL Infusion (GIVE X 3 BAGS TO EQUAL 6GM TOTAL DOSE) - Mg 1.1 - 1.5 mg/dl (has no administration in time range)     Or   Magnesium Sulfate 4 gram infusion- Mg 1.6-1.9 mg/dL (has no administration in time range)   potassium & sodium phosphates (PHOS-NAK) 280-160-250 MG packet - for Phosphorus less than 1.25 mg/dL (has no administration in time range)     Or   potassium & sodium phosphates (PHOS-NAK) 280-160-250 MG packet - for Phosphorus 1.25 - 2.5 mg/dL (has no administration in time range)   calcium gluconate 1g/50ml 0.675% NaCl IV SOLN (has no administration in time range)     And   calcium gluconate 2-0.675 GM/100ML NACL IVPB (has no administration in time range)   acetaminophen (TYLENOL) tablet 650 mg (has no administration in time range)     Or   acetaminophen (TYLENOL) 160 MG/5ML solution 650 mg (has no administration in time range)     Or   acetaminophen (TYLENOL) suppository 650 mg (has no administration in time range)   warfarin (COUMADIN) tablet 2 mg (has no administration in time range)   warfarin (COUMADIN) tablet 4 mg (has no administration in time range)   warfarin (COUMADIN) tablet 6 mg (6 mg Oral Given 4/18/21 2025)     XR Chest 1 View    Result Date: 4/18/2021  1.Cardiomegaly 2.Hiatal hernia suggested.  Electronically Signed By-Arie Miller MD On:4/18/2021 5:08 PM This report was finalized on [id number] by  Arie Miller MD.      ED Course as of Apr 19 0020   Sun Apr 18, 2021   2246 Spoke with Dr. Lorenzo,  patient to be admitted.    [LB]      ED Course User Index  [LB] Karla Obregon, APRN      Appropriate PPE was worn during the duration of the care for this patient while in the emergency department per [facility] guidelines     EKG independently viewed by me, Interpreted by ED adenike haskins.    Rate: 68  Rhythm:a paced                                  MDM  Number of Diagnoses or Management Options  Chest pain, unspecified type  Diagnosis management comments: ° Differentials: Angina, STEMI, NSTEMI, congestive heart failure   This list is not all inclusive and does not constitute the entireity of considered causes.     ° Labs reviewed by me and significant for the following: Abnormal Labs Reviewed  COMPREHENSIVE METABOLIC PANEL - Abnormal; Notable for the following components:     Glucose                       118 (*)                Creatinine                    1.02 (*)               Sodium                        135 (*)                Alkaline Phosphatase          126 (*)                Total Bilirubin               1.4 (*)                eGFR Non  Amer         52 (*)              All other components within normal limits         Narrative: GFR Normal >60                  Chronic Kidney Disease <60                  Kidney Failure <15                    PROTIME-INR - Abnormal; Notable for the following components:     Protime                       16.2 (*)               INR                           1.50 (*)            All other components within normal limits      ° Imaging, Interpreted per radiologist, independently viewed by myself: XR Chest 1 View    Result Date: 4/18/2021  1.Cardiomegaly 2.Hiatal hernia suggested.  Electronically Signed By-Arie Miller MD On:4/18/2021 5:08 PM This report was finalized on [id number] by  Arie Miller MD.        ° Patient was brought back to the emergency department room for evaluation and  placed on appropriate monitoring.   IV was established, blood  work obtained.  Vital signs have  been reviewed. Patient is afebrile.  She underwent the above exam and work-up.  Patient's remained pain-free while here in the ED, but given her significant cardiac history I feel she would benefit from further evaluation.  She will be admitted the hospitalist service for further evaluation and management.    ° Plan and Disposition: I discussed with the patient their test results, work-up here in the emergency department, and need for admission and further evaluation.  Patient is agreeable to the plan of care.  Opportunity was provided for questions at the bedside, all questions and concerns were addressed.                         Amount and/or Complexity of Data Reviewed  Clinical lab tests: reviewed  Tests in the radiology section of CPT®: reviewed  Tests in the medicine section of CPT®: reviewed    Patient Progress  Patient progress: stable      Final diagnoses:   Chest pain, unspecified type       ED Disposition  ED Disposition     ED Disposition Condition Comment    Decision to Admit  Level of Care: Telemetry [5]   Diagnosis: Chest pain, unspecified type [[id number]]   Admitting Physician: DAVINA BRUSH [[id number]]   Attending Physician: DAVINA BRUSH [[id number]]            No follow-up provider specified.       Medication List      No changes were made to your prescriptions during this visit.          Karla Obregon, APRN  04/19/21 0020

## 2021-04-19 ENCOUNTER — APPOINTMENT (OUTPATIENT)
Dept: NUCLEAR MEDICINE | Facility: HOSPITAL | Age: 86
End: 2021-04-19

## 2021-04-19 LAB
ANION GAP SERPL CALCULATED.3IONS-SCNC: 11 MMOL/L (ref 5–15)
BASOPHILS # BLD AUTO: 0 10*3/MM3 (ref 0–0.2)
BASOPHILS NFR BLD AUTO: 0.3 % (ref 0–1.5)
BH CV NUCLEAR PRIOR STUDY: 3
BH CV REST NUCLEAR ISOTOPE DOSE: 7 MCI
BH CV STRESS BP STAGE 1: NORMAL
BH CV STRESS BP STAGE 2: NORMAL
BH CV STRESS COMMENTS STAGE 1: NORMAL
BH CV STRESS COMMENTS STAGE 2: NORMAL
BH CV STRESS DOSE REGADENOSON STAGE 1: 0.4
BH CV STRESS DURATION MIN STAGE 1: 0
BH CV STRESS DURATION MIN STAGE 2: 4
BH CV STRESS DURATION SEC STAGE 1: 10
BH CV STRESS DURATION SEC STAGE 2: 0
BH CV STRESS HR STAGE 1: 70
BH CV STRESS HR STAGE 2: 72
BH CV STRESS NUCLEAR ISOTOPE DOSE: 21.9 MCI
BH CV STRESS PROTOCOL 1: NORMAL
BH CV STRESS RECOVERY BP: NORMAL MMHG
BH CV STRESS RECOVERY HR: 67 BPM
BH CV STRESS STAGE 1: 1
BH CV STRESS STAGE 2: 2
BUN SERPL-MCNC: 13 MG/DL (ref 8–23)
BUN/CREAT SERPL: 13.8 (ref 7–25)
CALCIUM SPEC-SCNC: 9.4 MG/DL (ref 8.6–10.5)
CHLORIDE SERPL-SCNC: 99 MMOL/L (ref 98–107)
CHOLEST SERPL-MCNC: 88 MG/DL (ref 0–200)
CO2 SERPL-SCNC: 26 MMOL/L (ref 22–29)
CREAT SERPL-MCNC: 0.94 MG/DL (ref 0.57–1)
DEPRECATED RDW RBC AUTO: 45.1 FL (ref 37–54)
EOSINOPHIL # BLD AUTO: 0.1 10*3/MM3 (ref 0–0.4)
EOSINOPHIL NFR BLD AUTO: 2.3 % (ref 0.3–6.2)
ERYTHROCYTE [DISTWIDTH] IN BLOOD BY AUTOMATED COUNT: 14.3 % (ref 12.3–15.4)
GFR SERPL CREATININE-BSD FRML MDRD: 57 ML/MIN/1.73
GLUCOSE SERPL-MCNC: 97 MG/DL (ref 65–99)
HCT VFR BLD AUTO: 37.4 % (ref 34–46.6)
HDLC SERPL-MCNC: 46 MG/DL (ref 40–60)
HGB BLD-MCNC: 12.3 G/DL (ref 12–15.9)
INR PPP: 1.67 (ref 2–3)
LDLC SERPL CALC-MCNC: 29 MG/DL (ref 0–100)
LDLC/HDLC SERPL: 0.67 {RATIO}
LV EF NUC BP: 66 %
LYMPHOCYTES # BLD AUTO: 1.9 10*3/MM3 (ref 0.7–3.1)
LYMPHOCYTES NFR BLD AUTO: 31.3 % (ref 19.6–45.3)
MAGNESIUM SERPL-MCNC: 2 MG/DL (ref 1.6–2.4)
MAXIMAL PREDICTED HEART RATE: 137 BPM
MCH RBC QN AUTO: 29.7 PG (ref 26.6–33)
MCHC RBC AUTO-ENTMCNC: 32.8 G/DL (ref 31.5–35.7)
MCV RBC AUTO: 90.6 FL (ref 79–97)
MONOCYTES # BLD AUTO: 0.5 10*3/MM3 (ref 0.1–0.9)
MONOCYTES NFR BLD AUTO: 8 % (ref 5–12)
NEUTROPHILS NFR BLD AUTO: 3.6 10*3/MM3 (ref 1.7–7)
NEUTROPHILS NFR BLD AUTO: 58.1 % (ref 42.7–76)
NRBC BLD AUTO-RTO: 0.1 /100 WBC (ref 0–0.2)
PERCENT MAX PREDICTED HR: 54.01 %
PLATELET # BLD AUTO: 130 10*3/MM3 (ref 140–450)
PMV BLD AUTO: 9.6 FL (ref 6–12)
POTASSIUM SERPL-SCNC: 4 MMOL/L (ref 3.5–5.2)
PROTHROMBIN TIME: 17.9 SECONDS (ref 19.4–28.5)
RBC # BLD AUTO: 4.13 10*6/MM3 (ref 3.77–5.28)
SARS-COV-2 ORF1AB RESP QL NAA+PROBE: NOT DETECTED
SODIUM SERPL-SCNC: 136 MMOL/L (ref 136–145)
STRESS BASELINE BP: NORMAL MMHG
STRESS BASELINE HR: 70 BPM
STRESS PERCENT HR: 64 %
STRESS POST PEAK BP: NORMAL MMHG
STRESS POST PEAK HR: 74 BPM
STRESS TARGET HR: 116 BPM
TRIGL SERPL-MCNC: 55 MG/DL (ref 0–150)
VLDLC SERPL-MCNC: 13 MG/DL (ref 5–40)
WBC # BLD AUTO: 6.2 10*3/MM3 (ref 3.4–10.8)

## 2021-04-19 PROCEDURE — G0378 HOSPITAL OBSERVATION PER HR: HCPCS

## 2021-04-19 PROCEDURE — 78452 HT MUSCLE IMAGE SPECT MULT: CPT | Performed by: INTERNAL MEDICINE

## 2021-04-19 PROCEDURE — 99222 1ST HOSP IP/OBS MODERATE 55: CPT | Performed by: INTERNAL MEDICINE

## 2021-04-19 PROCEDURE — 93018 CV STRESS TEST I&R ONLY: CPT | Performed by: INTERNAL MEDICINE

## 2021-04-19 PROCEDURE — 93016 CV STRESS TEST SUPVJ ONLY: CPT | Performed by: INTERNAL MEDICINE

## 2021-04-19 PROCEDURE — 85610 PROTHROMBIN TIME: CPT | Performed by: PHYSICIAN ASSISTANT

## 2021-04-19 PROCEDURE — 85025 COMPLETE CBC W/AUTO DIFF WBC: CPT | Performed by: PHYSICIAN ASSISTANT

## 2021-04-19 PROCEDURE — 80061 LIPID PANEL: CPT | Performed by: PHYSICIAN ASSISTANT

## 2021-04-19 PROCEDURE — 93017 CV STRESS TEST TRACING ONLY: CPT

## 2021-04-19 PROCEDURE — 80048 BASIC METABOLIC PNL TOTAL CA: CPT | Performed by: PHYSICIAN ASSISTANT

## 2021-04-19 PROCEDURE — A9500 TC99M SESTAMIBI: HCPCS | Performed by: HOSPITALIST

## 2021-04-19 PROCEDURE — 83735 ASSAY OF MAGNESIUM: CPT | Performed by: PHYSICIAN ASSISTANT

## 2021-04-19 PROCEDURE — 25010000002 REGADENOSON 0.4 MG/5ML SOLUTION: Performed by: HOSPITALIST

## 2021-04-19 PROCEDURE — 0 TECHNETIUM SESTAMIBI: Performed by: HOSPITALIST

## 2021-04-19 PROCEDURE — 99225 PR SBSQ OBSERVATION CARE/DAY 25 MINUTES: CPT | Performed by: HOSPITALIST

## 2021-04-19 PROCEDURE — 78452 HT MUSCLE IMAGE SPECT MULT: CPT

## 2021-04-19 RX ADMIN — WARFARIN 2 MG: 2 TABLET ORAL at 17:29

## 2021-04-19 RX ADMIN — DILTIAZEM HYDROCHLORIDE 120 MG: 120 CAPSULE, EXTENDED RELEASE ORAL at 10:26

## 2021-04-19 RX ADMIN — Medication 10 ML: at 10:27

## 2021-04-19 RX ADMIN — PANTOPRAZOLE SODIUM 40 MG: 40 TABLET, DELAYED RELEASE ORAL at 10:26

## 2021-04-19 RX ADMIN — TECHNETIUM TC 99M SESTAMIBI 1 DOSE: 1 INJECTION INTRAVENOUS at 09:20

## 2021-04-19 RX ADMIN — Medication 10 ML: at 22:50

## 2021-04-19 RX ADMIN — HYDROCODONE BITARTRATE AND ACETAMINOPHEN 1 TABLET: 7.5; 325 TABLET ORAL at 22:50

## 2021-04-19 RX ADMIN — ALPRAZOLAM 0.5 MG: 0.5 TABLET ORAL at 03:41

## 2021-04-19 RX ADMIN — REGADENOSON 0.4 MG: 0.08 INJECTION, SOLUTION INTRAVENOUS at 09:20

## 2021-04-19 RX ADMIN — TECHNETIUM TC 99M SESTAMIBI 1 DOSE: 1 INJECTION INTRAVENOUS at 08:20

## 2021-04-19 RX ADMIN — SPIRONOLACTONE 12.5 MG: 25 TABLET ORAL at 17:29

## 2021-04-19 RX ADMIN — ATORVASTATIN CALCIUM 10 MG: 10 TABLET, FILM COATED ORAL at 22:50

## 2021-04-19 RX ADMIN — ALPRAZOLAM 0.5 MG: 0.5 TABLET ORAL at 22:50

## 2021-04-19 NOTE — PROGRESS NOTES
[facility] Daily Progress Note      Hospitalist Team  LOS 0 days      Patient Care Team:  Nava Yu MD as PCP - Ivan Durand MD as Consulting Physician (Cardiology)    Patient Location: 228/1      Subjective   Subjective   Denies for any chest pain, no nausea or vomiting.  Chief Complaint / Subjective  Chief Complaint   Patient presents with   • Chest Pain         Brief Synopsis of Hospital Course/HPI    Ms. Herrera is a 85 y.o. female past medical history of seasonal allergies, A. fib, obesity, hypertension, GERD, hyperlipidemia, CAD and CKD who presents to [facility] complaining of chest pressure.  Patient reports she was woken from sleep at approximately 0400 hrs. on 04/18/2021 with a pressure across the anterior portion of her chest.  Pain remained intermittent throughout the day until approximately 1300 hrs. when it became constant and worsening rated at 6/10 at its worst without obvious provoking or palliative factors.  Some mild dyspnea as well as palpitations have also been reported.  She denies any diaphoresis, nausea or vomiting, fever, syncope or near syncope.  Patient does not smoke or drink alcohol and confirms compliance with all of her outpatient medical therapies.     In the ED patient did have lab significant for troponin of less than 0.010, proBNP: 1593.0, creatinine: 1.02 with a BUN of 17 and a GFR 52, remainder of CMP and CBC was generally unremarkable.  INR: 1.50, PT: 16.2.  Chest x-ray shows cardiomegaly with possible hiatal hernia.  EKG shows an atrial paced rhythm with 1 PVC but no obvious acute ST changes and a QTC of 459 ms.      Date::          ROS      Objective   Objective      Vital Signs  Temp:  [97.3 °F (36.3 °C)-98.6 °F (37 °C)] 98.6 °F (37 °C)  Heart Rate:  [61-64] 63  Resp:  [16-18] 18  BP: (136-162)/(74-90) 136/76  Oxygen Therapy  SpO2: 97 %  Pulse Oximetry Type: Intermittent  Device (Oxygen Therapy): room  "air  Flowsheet Rows      First Filed Value   Admission Height  154.9 cm (61\") Documented at 04/18/2021 1622   Admission Weight  81.5 kg (179 lb 10.8 oz) Documented at 04/18/2021 1622        Intake & Output (last 3 days)       04/16 0701 - 04/17 0700 04/17 0701 - 04/18 0700 04/18 0701 - 04/19 0700 04/19 0701 - 04/20 0700    P.O.    600    Total Intake(mL/kg)    600 (7.4)    Net    +600            Urine Unmeasured Occurrence    3 x        Lines, Drains & Airways    Active LDAs     None                  Physical Exam:    Physical Exam  Vitals and nursing note reviewed.   Constitutional:       General: She is not in acute distress.     Appearance: Normal appearance. She is well-developed. She is not ill-appearing, toxic-appearing or diaphoretic.   HENT:      Head: Normocephalic and atraumatic.      Right Ear: Ear canal and external ear normal.      Left Ear: Ear canal and external ear normal.      Nose: Nose normal. No congestion or rhinorrhea.      Mouth/Throat:      Mouth: Mucous membranes are moist.      Pharynx: No oropharyngeal exudate.   Eyes:      General: No scleral icterus.        Right eye: No discharge.         Left eye: No discharge.      Extraocular Movements: Extraocular movements intact.      Conjunctiva/sclera: Conjunctivae normal.      Pupils: Pupils are equal, round, and reactive to light.   Neck:      Thyroid: No thyromegaly.      Vascular: No carotid bruit or JVD.      Trachea: No tracheal deviation.   Cardiovascular:      Rate and Rhythm: Normal rate and regular rhythm.      Pulses: Normal pulses.      Heart sounds: Normal heart sounds. No murmur heard.   No friction rub. No gallop.    Pulmonary:      Effort: Pulmonary effort is normal. No respiratory distress.      Breath sounds: Normal breath sounds. No stridor. No wheezing, rhonchi or rales.   Chest:      Chest wall: No tenderness.   Abdominal:      General: Bowel sounds are normal. There is no distension.      Palpations: Abdomen is soft. There " is no mass.      Tenderness: There is no abdominal tenderness. There is no guarding or rebound.      Hernia: No hernia is present.   Musculoskeletal:         General: No swelling, tenderness, deformity or signs of injury. Normal range of motion.      Cervical back: Normal range of motion and neck supple. No rigidity. No muscular tenderness.      Right lower leg: No edema.      Left lower leg: No edema.   Lymphadenopathy:      Cervical: No cervical adenopathy.   Skin:     General: Skin is warm and dry.      Coloration: Skin is not jaundiced or pale.      Findings: No bruising, erythema or rash.   Neurological:      General: No focal deficit present.      Mental Status: She is alert and oriented to person, place, and time. Mental status is at baseline.      Cranial Nerves: No cranial nerve deficit.      Sensory: No sensory deficit.      Motor: No weakness or abnormal muscle tone.      Coordination: Coordination normal.   Psychiatric:         Mood and Affect: Mood normal.         Behavior: Behavior normal.         Thought Content: Thought content normal.         Judgment: Judgment normal.               Procedures:              Results Review:     I reviewed the patient's new clinical results.      Lab Results (last 24 hours)     Procedure Component Value Units Date/Time    COVID PRE-OP / PRE-PROCEDURE SCREENING ORDER (NO ISOLATION) - Swab, Nasopharynx [[id number]]  (Normal) Collected: 04/18/21 1757    Specimen: Swab from Nasopharynx Updated: 04/19/21 9909    Narrative:      The following orders were created for panel order COVID PRE-OP / PRE-PROCEDURE SCREENING ORDER (NO ISOLATION) - Swab, Nasopharynx.  Procedure                               Abnormality         Status                     ---------                               -----------         ------                     COVID-19,APTIMA PANTHER,...[[id number]]  Normal              Final result                 Please view results for these tests on the individual  orders.    COVID-19,APTIMA PANTHER,[facility] IN-HOUSE, NP/OP SWAB IN UTM/VTM/SALINE TRANSPORT MEDIA,24 HR TAT - Swab, Nasopharynx [[id number]]  (Normal) Collected: 04/18/21 1757    Specimen: Swab from Nasopharynx Updated: 04/19/21 1439     COVID19 Not Detected    Narrative:      Fact sheet for providers: https://www.fda.gov/media/851279/download     Fact sheet for patients: https://www.fda.gov/media/185906/download    Test performed by RT PCR.    Basic Metabolic Panel [[id number]]  (Abnormal) Collected: 04/19/21 0345    Specimen: Blood Updated: 04/19/21 0442     Glucose 97 mg/dL      BUN 13 mg/dL      Creatinine 0.94 mg/dL      Sodium 136 mmol/L      Potassium 4.0 mmol/L      Chloride 99 mmol/L      CO2 26.0 mmol/L      Calcium 9.4 mg/dL      eGFR Non African Amer 57 mL/min/1.73      BUN/Creatinine Ratio 13.8     Anion Gap 11.0 mmol/L     Narrative:      GFR Normal >60  Chronic Kidney Disease <60  Kidney Failure <15      Lipid Panel [[id number]] Collected: 04/19/21 0345    Specimen: Blood Updated: 04/19/21 0442     Total Cholesterol 88 mg/dL      Triglycerides 55 mg/dL      HDL Cholesterol 46 mg/dL      LDL Cholesterol  29 mg/dL      VLDL Cholesterol 13 mg/dL      LDL/HDL Ratio 0.67    Narrative:      Cholesterol Reference Ranges  (U.S. Department of Health and Human Services ATP III Classifications)    Desirable          <200 mg/dL  Borderline High    200-239 mg/dL  High Risk          >240 mg/dL      Triglyceride Reference Ranges  (U.S. Department of Health and Human Services ATP III Classifications)    Normal           <150 mg/dL  Borderline High  150-199 mg/dL  High             200-499 mg/dL  Very High        >500 mg/dL    HDL Reference Ranges  (U.S. Department of Health and Human Services ATP III Classifcations)    Low     <40 mg/dl (major risk factor for CHD)  High    >60 mg/dl ('negative' risk factor for CHD)        LDL Reference Ranges  (U.S. Department of Health and Human Services ATP III Classifcations)    Optimal           <100 mg/dL  Near Optimal     100-129 mg/dL  Borderline High  130-159 mg/dL  High             160-189 mg/dL  Very High        >189 mg/dL    Magnesium [[id number]]  (Normal) Collected: 04/19/21 0345    Specimen: Blood Updated: 04/19/21 0442     Magnesium 2.0 mg/dL     Protime-INR [[id number]]  (Abnormal) Collected: 04/19/21 0345    Specimen: Blood Updated: 04/19/21 0414     Protime 17.9 Seconds      INR 1.67    CBC & Differential [[id number]]  (Abnormal) Collected: 04/19/21 0345    Specimen: Blood Updated: 04/19/21 0405    Narrative:      The following orders were created for panel order CBC & Differential.  Procedure                               Abnormality         Status                     ---------                               -----------         ------                     CBC Auto Differential[[id number]]        Abnormal            Final result                 Please view results for these tests on the individual orders.    CBC Auto Differential [[id number]]  (Abnormal) Collected: 04/19/21 0345    Specimen: Blood Updated: 04/19/21 0405     WBC 6.20 10*3/mm3      RBC 4.13 10*6/mm3      Hemoglobin 12.3 g/dL      Hematocrit 37.4 %      MCV 90.6 fL      MCH 29.7 pg      MCHC 32.8 g/dL      RDW 14.3 %      RDW-SD 45.1 fl      MPV 9.6 fL      Platelets 130 10*3/mm3      Neutrophil % 58.1 %      Lymphocyte % 31.3 %      Monocyte % 8.0 %      Eosinophil % 2.3 %      Basophil % 0.3 %      Neutrophils, Absolute 3.60 10*3/mm3      Lymphocytes, Absolute 1.90 10*3/mm3      Monocytes, Absolute 0.50 10*3/mm3      Eosinophils, Absolute 0.10 10*3/mm3      Basophils, Absolute 0.00 10*3/mm3      nRBC 0.1 /100 WBC         No results found for: HGBA1C  Results from last 7 days   Lab Units 04/19/21  0345 04/18/21  1655 04/15/21  1401   INR  1.67* 1.50* 1.40*           No results found for: LIPASE  Lab Results   Component Value Date    CHOL 88 04/19/2021    TRIG 55 04/19/2021    HDL 46 04/19/2021    LDL 29 04/19/2021       Lab  Results   Lab Value Date/Time    FINALDX  06/30/2020 1035     Stomach, biopsy:    Reactive gastropathy with congestion and focal minimal superficial erosion    Negative for Helicobacter pylori on immunohistochemistry stain (control reacts appropriately)       RAY/sms          Microbiology Results (last 10 days)     Procedure Component Value - Date/Time    COVID PRE-OP / PRE-PROCEDURE SCREENING ORDER (NO ISOLATION) - Swab, Nasopharynx [[id number]]  (Normal) Collected: 04/18/21 1757    Lab Status: Final result Specimen: Swab from Nasopharynx Updated: 04/19/21 1439    Narrative:      The following orders were created for panel order COVID PRE-OP / PRE-PROCEDURE SCREENING ORDER (NO ISOLATION) - Swab, Nasopharynx.  Procedure                               Abnormality         Status                     ---------                               -----------         ------                     COVID-19,APTIMA PANTHER,...[[id number]]  Normal              Final result                 Please view results for these tests on the individual orders.    COVID-19,APTIMA PANTHER,[facility] IN-[facility]SE, NP/OP SWAB IN UTM/VTM/SALINE TRANSPORT MEDIA,24 HR TAT - Swab, Nasopharynx [[id number]]  (Normal) Collected: 04/18/21 1757    Lab Status: Final result Specimen: Swab from Nasopharynx Updated: 04/19/21 1439     COVID19 Not Detected    Narrative:      Fact sheet for providers: https://www.fda.gov/media/803049/download     Fact sheet for patients: https://www.fda.gov/media/134779/download    Test performed by RT PCR.          ECG/EMG Results (most recent)     Procedure Component Value Units Date/Time    ECG 12 Lead [[id number]] Collected: 04/18/21 1636     Updated: 04/18/21 1639     QT Interval 430 ms     Narrative:      HEART RATE= 68  bpm  RR Interval= 877  ms  VT Interval= 220  ms  P Horizontal Axis= -59  deg  P Front Axis=   deg  QRSD Interval= 94  ms  QT Interval= 430  ms  QRS Axis= -15  deg  T Wave Axis= 86  deg  - ABNORMAL ECG -  Atrial-paced  complexes  Multiple ventricular premature complexes  Prolonged MT interval  Electronically Signed By:   Date and Time of Study: 2021-04-18 16:36:42                  XR Chest 1 View    Result Date: 4/18/2021  1.Cardiomegaly 2.Hiatal hernia suggested.  Electronically Signed By-Arie Miller MD On:4/18/2021 5:08 PM This report was finalized on [id number] by  Arie Miller MD.          Xrays, labs reviewed personally by physician.    Medication Review:   I have reviewed the patient's current medication list      Scheduled Meds  atorvastatin, 10 mg, Oral, Nightly  cetirizine, 10 mg, Oral, Daily  dilTIAZem CD, 120 mg, Oral, Q24H  pantoprazole, 40 mg, Oral, QAM AC  sodium chloride, 10 mL, Intravenous, Q12H  spironolactone, 12.5 mg, Oral, Q PM  warfarin, 2 mg, Oral, Once per day on Mon Wed Fri  [START ON 4/20/2021] warfarin, 4 mg, Oral, Once per day on Sun Tue Thu Sat        Meds Infusions  Pharmacy to dose warfarin,         Meds PRN  •  acetaminophen **OR** acetaminophen **OR** acetaminophen  •  ALPRAZolam  •  Calcium Gluconate-NaCl **AND** calcium gluconate **AND** Calcium, Ionized  •  HYDROcodone-acetaminophen  •  magnesium sulfate **OR** magnesium sulfate **OR** magnesium sulfate  •  melatonin  •  nitroglycerin  •  ondansetron **OR** ondansetron  •  Pharmacy to dose warfarin  •  potassium & sodium phosphates **OR** potassium & sodium phosphates  •  potassium chloride  •  potassium chloride  •  [COMPLETED] Insert peripheral IV **AND** sodium chloride  •  sodium chloride        Assessment/Plan   Assessment/Plan     Active Hospital Problems    Diagnosis  POA   • **Chest pain [R07.9]  Yes   • Anxiety associated with depression [F41.8]  Unknown   • Obesity (BMI 30-39.9) [E66.9]  Yes   • Seasonal allergies [J30.2]  Yes   • GERD without esophagitis [K21.9]  Yes   • CKD (chronic kidney disease) stage 3, GFR 30-59 ml/min (CMS/HCC) [N18.30]  Yes   • Chronic pain [G89.29]  Yes   • Hypertension [I10]  Yes   • Atrial fibrillation  (CMS/HCC) [I48.91] [I48.91]  Yes   • Presence of cardiac pacemaker [Z95.0]  Yes   • Dyslipidemia [E78.5]  Yes   • Coronary artery disease [I25.10]  Yes      Resolved Hospital Problems   No resolved problems to display.       MEDICAL DECISION MAKING COMPLEXITY BY PROBLEM:     Chest pain   -Troponin: Less than 0.010, trend  -Chest x-ray shows cardiomegaly with possible hiatal hernia.    -EKG shows an atrial paced rhythm with 1 PVC but no obvious acute ST changes and a QTC of 459 ms.  -Hold metoprolol temporarily secondary to stress test  -Check lipid panel  -N.p.o. after midnight  -Stress test abnormal ... cardiology service consulted.      CAD  -Continue statin, and warfarin warfarin and cardiac monitoring  -Hold beta-blocker temporarily secondary to planned stress test     Atrial fibrillation status post pacemaker placement  -EKG shows an atrial paced rhythm with 1 PVC but no obvious acute ST changes and a QTC of 459 ms.  -Continue diltiazem, metoprolol and warfarin with pharmacy to dose     Hypertension  -poorly controlled with a blood pressure on admission of 168/110  - Continue diltiazem  -Hold metoprolol temporarily secondary to stress test  - Monitor while admitted     CKD  - Creatinine: 1.02, BUN: 17, eGFR: 52  - Avoid nephrotoxic medication and IV dye unless urgently needed  - Monitor BMP and I's and O's     Hyperlipidemia  -Check lipid panel  -Continue statin     Seasonal allergies  -Zyrtec     GERD  -PPI     Anxiety  -Xanax (inspect verified)     Chronic pain  -Norco (inspect verified)     Obesity (BMI: 33.95)  -Encourage diet lifestyle modifications    VTE Prophylaxis -   Mechanical Order History:     None      Pharmalogical Order History:      Ordered     Dose Route Frequency Stop    04/18/21 1928  warfarin (COUMADIN) tablet 4 mg     Question:  Target INR  Answer:  2 - 3    4 mg PO Once per day on Sun Tue Thu Sat --    04/18/21 1928  warfarin (COUMADIN) tablet 2 mg     Question:  Target INR  Answer:  2 -  3    2 mg PO Once per day on Mon Wed Fri --    04/18/21 1927  warfarin (COUMADIN) tablet 6 mg     Question:  Target INR  Answer:  2 - 3    6 mg PO Once (Warfarin) 04/18/21 2025 04/18/21 1907  Pharmacy to dose warfarin     Question:  Target INR  Answer:  2 - 3    -- XX Continuous PRN --    04/18/21 1907  Pharmacy to dose warfarin  Status:  Discontinued     Question:  Target INR  Answer:  2 - 3    -- XX Continuous PRN 04/18/21 1910                  Code Status -   Code Status and Medical Interventions:   Ordered at: 04/18/21 1907     Code Status:    CPR     Medical Interventions (Level of Support Prior to Arrest):    Full       This patient has been examined wearing appropriate Personal Protective Equipment and discussed with hospital infection control department. 04/19/21        Discharge Planning          Electronically signed by Benjamin Galindo MD, 04/19/21, 16:35 EDT.  [facility] Hospitalist Team

## 2021-04-19 NOTE — CONSULTS
Referring Provider: Hospitalist  Reason for Consultation: Chest pain and abnormal Myoview    Patient Care Team:  Nava Yu MD as PCP - General  Ivan Martell MD as Consulting Physician (Cardiology)    Chief complaint chest pain   Subjective .     History of present illness:  Syl Herrera is a 85 y.o. female with history of coronary artery disease status post coronary artery bypass surgery history of atrial fibrillation hypertension hyperlipidemia and sleep apnea presented to the hospital with complaints of chest pain and shortness of breath of increased severity for the last 2 days.  Chest pain is mostly substernal without any radiation but associate shortness of breath.  No complains any PND orthopnea.  No palpitation dizziness syncope or swelling of the feet.  Patient has been taking all the medicines regularly.  Patient does not smoke.  She is trying to exercise.  She was then admitted to the hospital ruled out for MI by get enzymes.  She had a stress partially which showed significant ischemia and hence a cardiology consult was called.       Review of Systems   Constitutional: Negative for fever and malaise/fatigue.   HENT: Negative for ear pain and nosebleeds.    Eyes: Negative for blurred vision and double vision.   Cardiovascular: Positive for chest pain. Negative for dyspnea on exertion and palpitations.   Respiratory: Positive for shortness of breath. Negative for cough.    Skin: Negative for rash.   Musculoskeletal: Negative for joint pain.   Gastrointestinal: Negative for abdominal pain, nausea and vomiting.   Neurological: Negative for focal weakness and headaches.   Psychiatric/Behavioral: Negative for depression. The patient is not nervous/anxious.    All other systems reviewed and are negative.      History  Past Medical History:   Diagnosis Date   • Anxiety    • Arthritis    • Atrial fibrillation (CMS/HCC)    • Coronary artery disease    • Dyslipidemia    • GERD (gastroesophageal reflux  disease)    • History of bone density study 04/2015   • Hypertension    • Sleep apnea    • Wears dentures      Past Surgical History:   Procedure Laterality Date   • BLADDER REPAIR  1990   • BREAST LUMPECTOMY  1974    BENIGN   • CARDIAC CATHETERIZATION  05/25/2011   • CARDIOVASCULAR STRESS TEST  05/04/2015   • CHOLECYSTECTOMY  1990   • CORONARY ARTERY BYPASS GRAFT  08/09/2017    X5  Dr Brandt   • ENDOSCOPY N/A 6/30/2020    Procedure: ESOPHAGOGASTRODUODENOSCOPY with biopsy x 1 area and dilatation (15-18mm balloon) up to 18mm;  Surgeon: Quinton Tapia MD;  Location: [facility] ENDOSCOPY;  Service: Gastroenterology;  Laterality: N/A;  post op: gastritis, large hiatal hernia, esophageal dysmotility   • HYSTERECTOMY  1990   • JOINT REPLACEMENT Right     knee    • OTHER SURGICAL HISTORY  06/2017    BLOOD CLOT REMOVED FROM LEFT EAR   • PACEMAKER IMPLANTATION  04/18/2016    DUAL CHAMBER ST ALESSIO       Family History   Problem Relation Age of Onset   • Hypertension Mother    • Heart disease Sister         PACEMAKER       Social History     Tobacco Use   • Smoking status: Never Smoker   • Smokeless tobacco: Never Used   Substance Use Topics   • Alcohol use: No   • Drug use: No        Medications Prior to Admission   Medication Sig Dispense Refill Last Dose   • ALPRAZolam (XANAX) 0.5 MG tablet Take 0.5 mg by mouth 2 (Two) Times a Day As Needed.  5    • atorvastatin (LIPITOR) 10 MG tablet Take 10 mg by mouth Every Night.   4/17/2021 at Unknown time   • carboxymethylcellulose (REFRESH PLUS) 0.5 % solution Administer 1 drop to both eyes 3 (Three) Times a Day As Needed for Dry Eyes.      • cetirizine (zyrTEC) 10 MG tablet Take 10 mg by mouth Daily.      • dilTIAZem (TIAZAC) 120 MG 24 hr capsule Take 1 capsule by mouth Daily. 90 capsule 1 4/18/2021 at 0800   • diphenhydrAMINE HCl (BENADRYL ALLERGY PO) Take  by mouth As Needed.      • HYDROcodone-acetaminophen (NORCO) 7.5-325 MG per tablet Take 1 tablet by mouth Every 8 (Eight) Hours As  Needed.  0    • magnesium oxide (MAG-OX) 400 MG tablet Take 400 mg by mouth Daily.   4/17/2021 at Unknown time   • metoprolol tartrate (LOPRESSOR) 100 MG tablet Take 1 tablet by mouth 2 (Two) Times a Day. 180 tablet 1 4/18/2021 at 0800   • Multiple Vitamins-Minerals (VITEYES AREDS FORMULA) capsule Take 1 capsule by mouth 2 (two) times a day.   4/18/2021 at 0800   • ondansetron (ZOFRAN) 4 MG tablet Take 4 mg by mouth Daily As Needed for Nausea or Vomiting.      • pantoprazole (PROTONIX) 40 MG EC tablet TK 1 T PO QD  5 4/18/2021 at 0800   • Phenylephrine-DM-GG (TUSSIN CF COUGH & COLD PO) Take  by mouth As Needed.      • spironolactone (ALDACTONE) 25 MG tablet TAKE ONE-HALF TABLET BY MOUTH DAILY (Patient taking differently: Take 12.5 mg by mouth Every Evening.) 45 tablet 3 4/17/2021 at Unknown time   • warfarin (COUMADIN) 2 MG tablet Take 2 mg by mouth 3 (Three) Times a Week. Mon, Wed, Fri   Past Week at Unknown time   • warfarin (COUMADIN) 4 MG tablet TAKE 1 TABLET BY MOUTH DAILY EXCEPT 1/2 TABLET ON WEDNESDAY OR AS DIRECTED (Patient taking differently: Take 4 mg by mouth 4 (Four) Times a Week. Tues, Thur, Sat, Sun) 90 tablet 0 4/17/2021 at Unknown time         Doxycycline, Celebrex [celecoxib], Contrast dye, Iodinated diagnostic agents, Nsaids, Other, and Sulfa antibiotics    Scheduled Meds:atorvastatin, 10 mg, Oral, Nightly  cetirizine, 10 mg, Oral, Daily  dilTIAZem CD, 120 mg, Oral, Q24H  pantoprazole, 40 mg, Oral, QAM AC  sodium chloride, 10 mL, Intravenous, Q12H  spironolactone, 12.5 mg, Oral, Q PM  warfarin, 2 mg, Oral, Once per day on Mon Wed Fri  [START ON 4/20/2021] warfarin, 4 mg, Oral, Once per day on Sun Tue Thu Sat      Continuous Infusions:Pharmacy to dose warfarin,       PRN Meds:.•  acetaminophen **OR** acetaminophen **OR** acetaminophen  •  ALPRAZolam  •  Calcium Gluconate-NaCl **AND** calcium gluconate **AND** Calcium, Ionized  •  HYDROcodone-acetaminophen  •  magnesium sulfate **OR** magnesium  "sulfate **OR** magnesium sulfate  •  melatonin  •  nitroglycerin  •  ondansetron **OR** ondansetron  •  Pharmacy to dose warfarin  •  potassium & sodium phosphates **OR** potassium & sodium phosphates  •  potassium chloride  •  potassium chloride  •  [COMPLETED] Insert peripheral IV **AND** sodium chloride  •  sodium chloride    Objective     VITAL SIGNS  Vitals:    04/18/21 1740 04/18/21 1910 04/19/21 0335 04/19/21 1300   BP:  162/90 147/74 136/76   BP Location:  Left arm Right arm Right arm   Patient Position:  Lying Lying Sitting   Pulse: 63 64 61 63   Resp:  16 17 18   Temp:  97.3 °F (36.3 °C) 97.9 °F (36.6 °C) 98.6 °F (37 °C)   TempSrc:  Oral Oral Oral   SpO2: 97% 99% 96% 97%   Weight:  80.6 kg (177 lb 11.1 oz) 80.9 kg (178 lb 5.6 oz)    Height:  154.9 cm (61\")         Flowsheet Rows      First Filed Value   Admission Height  154.9 cm (61\") Documented at 04/18/2021 1622   Admission Weight  81.5 kg (179 lb 10.8 oz) Documented at 04/18/2021 1622           TELEMETRY: Sinus rhythm with nonspecific ST segment abnormality    Physical Exam:  Constitutional:       Appearance: Well-developed.   Eyes:      General: No scleral icterus.     Conjunctiva/sclera: Conjunctivae normal.      Pupils: Pupils are equal, round, and reactive to light.   HENT:      Head: Normocephalic and atraumatic.   Neck:      Vascular: No carotid bruit or JVD.   Pulmonary:      Effort: Pulmonary effort is normal.      Breath sounds: Normal breath sounds. No wheezing. No rales.   Cardiovascular:      Normal rate. Regular rhythm.      Murmurs: There is a systolic murmur.   Pulses:     Intact distal pulses.   Abdominal:      General: Bowel sounds are normal.      Palpations: Abdomen is soft.   Musculoskeletal: Normal range of motion.      Cervical back: Normal range of motion and neck supple. Skin:     General: Skin is warm and dry.      Findings: No rash.   Neurological:      Mental Status: Alert.      Comments: No focal deficits          Results " Review:   I reviewed the patient's new clinical results.  Lab Results (last 24 hours)     Procedure Component Value Units Date/Time    COVID PRE-OP / PRE-PROCEDURE SCREENING ORDER (NO ISOLATION) - Swab, Nasopharynx [[id number]]  (Normal) Collected: 04/18/21 1757    Specimen: Swab from Nasopharynx Updated: 04/19/21 1439    Narrative:      The following orders were created for panel order COVID PRE-OP / PRE-PROCEDURE SCREENING ORDER (NO ISOLATION) - Swab, Nasopharynx.  Procedure                               Abnormality         Status                     ---------                               -----------         ------                     COVID-19,APTIMA PANTHER,...[[id number]]  Normal              Final result                 Please view results for these tests on the individual orders.    COVID-19,APTIMA PANTHER,[facility] IN-HOUSE, NP/OP SWAB IN UTM/VTM/[facility]INE TRANSPORT MEDIA,24 HR TAT - Swab, Nasopharynx [[id number]]  (Normal) Collected: 04/18/21 1757    Specimen: Swab from Nasopharynx Updated: 04/19/21 1439     COVID19 Not Detected    Narrative:      Fact sheet for providers: https://www.fda.gov/media/818574/download     Fact sheet for patients: https://www.fda.gov/media/090498/download    Test performed by RT PCR.    Basic Metabolic Panel [[id number]]  (Abnormal) Collected: 04/19/21 0345    Specimen: Blood Updated: 04/19/21 0442     Glucose 97 mg/dL      BUN 13 mg/dL      Creatinine 0.94 mg/dL      Sodium 136 mmol/L      Potassium 4.0 mmol/L      Chloride 99 mmol/L      CO2 26.0 mmol/L      Calcium 9.4 mg/dL      eGFR Non African Amer 57 mL/min/1.73      BUN/Creatinine Ratio 13.8     Anion Gap 11.0 mmol/L     Narrative:      GFR Normal >60  Chronic Kidney Disease <60  Kidney Failure <15      Lipid Panel [[id number]] Collected: 04/19/21 0345    Specimen: Blood Updated: 04/19/21 0442     Total Cholesterol 88 mg/dL      Triglycerides 55 mg/dL      HDL Cholesterol 46 mg/dL      LDL Cholesterol  29 mg/dL      VLDL Cholesterol  13 mg/dL      LDL/HDL Ratio 0.67    Narrative:      Cholesterol Reference Ranges  (U.S. Department of Health and Human Services ATP III Classifications)    Desirable          <200 mg/dL  Borderline High    200-239 mg/dL  High Risk          >240 mg/dL      Triglyceride Reference Ranges  (U.S. Department of Health and Human Services ATP III Classifications)    Normal           <150 mg/dL  Borderline High  150-199 mg/dL  High             200-499 mg/dL  Very High        >500 mg/dL    HDL Reference Ranges  (U.S. Department of Health and Human Services ATP III Classifcations)    Low     <40 mg/dl (major risk factor for CHD)  High    >60 mg/dl ('negative' risk factor for CHD)        LDL Reference Ranges  (U.S. Department of Health and Human Services ATP III Classifcations)    Optimal          <100 mg/dL  Near Optimal     100-129 mg/dL  Borderline High  130-159 mg/dL  High             160-189 mg/dL  Very High        >189 mg/dL    Magnesium [[id number]]  (Normal) Collected: 04/19/21 0345    Specimen: Blood Updated: 04/19/21 0442     Magnesium 2.0 mg/dL     Protime-INR [[id number]]  (Abnormal) Collected: 04/19/21 0345    Specimen: Blood Updated: 04/19/21 0414     Protime 17.9 Seconds      INR 1.67    CBC & Differential [[id number]]  (Abnormal) Collected: 04/19/21 0345    Specimen: Blood Updated: 04/19/21 0405    Narrative:      The following orders were created for panel order CBC & Differential.  Procedure                               Abnormality         Status                     ---------                               -----------         ------                     CBC Auto Differential[[id number]]        Abnormal            Final result                 Please view results for these tests on the individual orders.    CBC Auto Differential [[id number]]  (Abnormal) Collected: 04/19/21 0345    Specimen: Blood Updated: 04/19/21 0405     WBC 6.20 10*3/mm3      RBC 4.13 10*6/mm3      Hemoglobin 12.3 g/dL      Hematocrit 37.4 %       MCV 90.6 fL      MCH 29.7 pg      MCHC 32.8 g/dL      RDW 14.3 %      RDW-SD 45.1 fl      MPV 9.6 fL      Platelets 130 10*3/mm3      Neutrophil % 58.1 %      Lymphocyte % 31.3 %      Monocyte % 8.0 %      Eosinophil % 2.3 %      Basophil % 0.3 %      Neutrophils, Absolute 3.60 10*3/mm3      Lymphocytes, Absolute 1.90 10*3/mm3      Monocytes, Absolute 0.50 10*3/mm3      Eosinophils, Absolute 0.10 10*3/mm3      Basophils, Absolute 0.00 10*3/mm3      nRBC 0.1 /100 WBC           Imaging Results (Last 24 Hours)     ** No results found for the last 24 hours. **          EKG      I personally viewed and interpreted the patient's EKG/Telemetry data:    ECHOCARDIOGRAM:      STRESS MYOVIEW:    CARDIAC CATHETERIZATION:    OTHER:         Assessment/Plan     Principal Problem:    Chest pain  Active Problems:    Atrial fibrillation (CMS/[facility]) [I48.91]    Coronary artery disease    Dyslipidemia    Hypertension    Presence of cardiac pacemaker    Anxiety associated with depression    Obesity (BMI 30-39.9)    Seasonal allergies    GERD without esophagitis    CKD (chronic kidney disease) stage 3, GFR 30-59 ml/min (CMS/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  We will hold her Coumadin for cardiac catheterization.    I discussed the patients findings and my recommendations with patient and nurse    Ivan Martell MD  04/19/21  18:44 EDT

## 2021-04-19 NOTE — PROGRESS NOTES
"Pharmacy dosing service  Anticoagulant  Warfarin     Subjective:    Syl Herrera is a 85 y.o.female being continued on warfarin for atrial fibrillation.    INR Goal: 2 - 3  Home medication?:  Yes, warfarin 4 mg daily except 2 mg on Monday, Wednesday, and Friday   Bridge Therapy Present?:  No  Interacting Medications Evaluation (New/Present/Discontinued): spironolactone (home med; may decrease INR)  Additional Contributing Factors: none      Assessment/Plan:    INR subtherapeutic on admission and bolus dose of 6 mg given yesterday. Will resume home regimen today (2 mg)    Continue to monitor and adjust based on INR.         Date 4/18 4/19          INR 1.5 1.67          Dose 6 mg 2 mg              Objective:  [Ht: 154.9 cm (61\"); Wt: 80.9 kg (178 lb 5.6 oz); BMI: Body mass index is 33.7 kg/m².]    Lab Results   Component Value Date    ALBUMIN 4.20 04/18/2021     Lab Results   Component Value Date    INR 1.67 (L) 04/19/2021    INR 1.50 (L) 04/18/2021    INR 1.40 (A) 04/15/2021    PROTIME 17.9 (L) 04/19/2021    PROTIME 16.2 (L) 04/18/2021    PROTIME 13.7 (L) 06/30/2020     Lab Results   Component Value Date    HGB 12.3 04/19/2021    HGB 13.3 04/18/2021    HGB 13.5 02/01/2020     Lab Results   Component Value Date    HCT 37.4 04/19/2021    HCT 39.4 04/18/2021    HCT 40.0 02/01/2020       Brandy Hernandez, Deborah  04/19/21 07:53 EDT       "

## 2021-04-19 NOTE — CASE MANAGEMENT/SOCIAL WORK
Discharge Planning Assessment   Daniele     Patient Name: Syl Herrera  MRN: [id number]  Today's Date: 4/19/2021    Admit Date: 4/18/2021    Discharge Needs Assessment     Row Name 04/19/21 1725       Living Environment    Lives With  child(angela), adult    Name(s) of Who Lives With Patient  At least one of three daughters stay with pt every night. Janny present in room.    Current Living Arrangements  home/apartment/condo    Primary Care Provided by  self    Provides Primary Care For  no one    Family Caregiver if Needed  child(angela), adult    Family Caregiver Names  daughters    Able to Return to Prior Arrangements  yes       Transition Planning    Patient/Family Anticipates Transition to  home with family    Patient/Family Anticipated Services at Transition  none    Transportation Anticipated  family or friend will provide       Discharge Needs Assessment    Readmission Within the Last 30 Days  no previous admission in last 30 days    Equipment Currently Used at Home  walker, rolling    Concerns to be Addressed  denies needs/concerns at this time    Anticipated Changes Related to Illness  none    Equipment Needed After Discharge  none    Discharge Coordination/Progress  DC Plan: Return home w/daughter.        Discharge Plan     Row Name 04/19/21 7508       Plan    Plan  DC Plan: Return home w/daughter.    Plan Comments  Pharmacy [address]        Continued Care and Services - Admitted Since 4/18/2021    Coordination has not been started for this encounter.       Expected Discharge Date and Time     Expected Discharge Date Expected Discharge Time    Apr 19, 2021         Demographic Summary     Row Name 04/19/21 172       General Information    Admission Type  observation    Arrived From  emergency department    Required Notices Provided  Observation Status Notice    Referral Source  admission list    Reason for Consult  discharge planning    Preferred Language  English     Used During This  Interaction  no    General Information Comments  Spoke with pt in room.        Functional Status     Row Name 04/19/21 1724       Functional Status    Usual Activity Tolerance  good    Current Activity Tolerance  moderate       Functional Status, IADL    Medications  independent    Meal Preparation  independent    Housekeeping  independent    Laundry  independent    Shopping  independent       Mental Status    General Appearance WDL  WDL       Mental Status Summary    Recent Changes in Mental Status/Cognitive Functioning  no changes        Met with patient in room wearing PPE: mask, goggles.      Maintained distance greater than six feet and spent less than 15 minutes in the room.        Patient Forms     Row Name 04/19/21 1729       Patient Forms    Patient Observation Letter  Delivered 4/18/21 registration            Katty Torres RN

## 2021-04-19 NOTE — PLAN OF CARE
Goal Outcome Evaluation:  Plan of Care Reviewed With: patient  Progress: improving     Patient rested well during the night.  No complaints of chest pain or shortness of air.   Awaiting stress myoview today.

## 2021-04-20 PROBLEM — I20.8 ANGINA AT REST: Status: ACTIVE | Noted: 2021-04-18

## 2021-04-20 PROBLEM — R94.39 ABNORMAL NUCLEAR STRESS TEST: Status: ACTIVE | Noted: 2021-04-18

## 2021-04-20 PROBLEM — I20.89 ANGINA AT REST: Status: ACTIVE | Noted: 2021-04-18

## 2021-04-20 LAB
ANION GAP SERPL CALCULATED.3IONS-SCNC: 10 MMOL/L (ref 5–15)
ANION GAP SERPL CALCULATED.3IONS-SCNC: 9 MMOL/L (ref 5–15)
BASOPHILS # BLD AUTO: 0 10*3/MM3 (ref 0–0.2)
BASOPHILS # BLD AUTO: 0 10*3/MM3 (ref 0–0.2)
BASOPHILS NFR BLD AUTO: 0.3 % (ref 0–1.5)
BASOPHILS NFR BLD AUTO: 0.3 % (ref 0–1.5)
BUN SERPL-MCNC: 13 MG/DL (ref 8–23)
BUN SERPL-MCNC: 13 MG/DL (ref 8–23)
BUN/CREAT SERPL: 13.3 (ref 7–25)
BUN/CREAT SERPL: 14.1 (ref 7–25)
CALCIUM SPEC-SCNC: 9.1 MG/DL (ref 8.6–10.5)
CALCIUM SPEC-SCNC: 9.4 MG/DL (ref 8.6–10.5)
CHLORIDE SERPL-SCNC: 97 MMOL/L (ref 98–107)
CHLORIDE SERPL-SCNC: 98 MMOL/L (ref 98–107)
CO2 SERPL-SCNC: 27 MMOL/L (ref 22–29)
CO2 SERPL-SCNC: 28 MMOL/L (ref 22–29)
CREAT SERPL-MCNC: 0.92 MG/DL (ref 0.57–1)
CREAT SERPL-MCNC: 0.98 MG/DL (ref 0.57–1)
DEPRECATED RDW RBC AUTO: 44.6 FL (ref 37–54)
DEPRECATED RDW RBC AUTO: 44.6 FL (ref 37–54)
EOSINOPHIL # BLD AUTO: 0.1 10*3/MM3 (ref 0–0.4)
EOSINOPHIL # BLD AUTO: 0.2 10*3/MM3 (ref 0–0.4)
EOSINOPHIL NFR BLD AUTO: 1.3 % (ref 0.3–6.2)
EOSINOPHIL NFR BLD AUTO: 2.3 % (ref 0.3–6.2)
ERYTHROCYTE [DISTWIDTH] IN BLOOD BY AUTOMATED COUNT: 14.2 % (ref 12.3–15.4)
ERYTHROCYTE [DISTWIDTH] IN BLOOD BY AUTOMATED COUNT: 14.4 % (ref 12.3–15.4)
GFR SERPL CREATININE-BSD FRML MDRD: 54 ML/MIN/1.73
GFR SERPL CREATININE-BSD FRML MDRD: 58 ML/MIN/1.73
GLUCOSE SERPL-MCNC: 145 MG/DL (ref 65–99)
GLUCOSE SERPL-MCNC: 93 MG/DL (ref 65–99)
HCT VFR BLD AUTO: 36.7 % (ref 34–46.6)
HCT VFR BLD AUTO: 39.1 % (ref 34–46.6)
HGB BLD-MCNC: 12.5 G/DL (ref 12–15.9)
HGB BLD-MCNC: 13 G/DL (ref 12–15.9)
INR PPP: 2.06 (ref 2–3)
LYMPHOCYTES # BLD AUTO: 1.6 10*3/MM3 (ref 0.7–3.1)
LYMPHOCYTES # BLD AUTO: 1.9 10*3/MM3 (ref 0.7–3.1)
LYMPHOCYTES NFR BLD AUTO: 25.2 % (ref 19.6–45.3)
LYMPHOCYTES NFR BLD AUTO: 27.1 % (ref 19.6–45.3)
MAGNESIUM SERPL-MCNC: 1.9 MG/DL (ref 1.6–2.4)
MCH RBC QN AUTO: 30.2 PG (ref 26.6–33)
MCH RBC QN AUTO: 30.5 PG (ref 26.6–33)
MCHC RBC AUTO-ENTMCNC: 33.2 G/DL (ref 31.5–35.7)
MCHC RBC AUTO-ENTMCNC: 34.1 G/DL (ref 31.5–35.7)
MCV RBC AUTO: 89.3 FL (ref 79–97)
MCV RBC AUTO: 90.9 FL (ref 79–97)
MONOCYTES # BLD AUTO: 0.6 10*3/MM3 (ref 0.1–0.9)
MONOCYTES # BLD AUTO: 0.6 10*3/MM3 (ref 0.1–0.9)
MONOCYTES NFR BLD AUTO: 9 % (ref 5–12)
MONOCYTES NFR BLD AUTO: 9.3 % (ref 5–12)
NEUTROPHILS NFR BLD AUTO: 4.1 10*3/MM3 (ref 1.7–7)
NEUTROPHILS NFR BLD AUTO: 4.3 10*3/MM3 (ref 1.7–7)
NEUTROPHILS NFR BLD AUTO: 61.3 % (ref 42.7–76)
NEUTROPHILS NFR BLD AUTO: 63.9 % (ref 42.7–76)
NRBC BLD AUTO-RTO: 0 /100 WBC (ref 0–0.2)
NRBC BLD AUTO-RTO: 0 /100 WBC (ref 0–0.2)
PLATELET # BLD AUTO: 128 10*3/MM3 (ref 140–450)
PLATELET # BLD AUTO: 135 10*3/MM3 (ref 140–450)
PMV BLD AUTO: 9.4 FL (ref 6–12)
PMV BLD AUTO: 9.7 FL (ref 6–12)
POTASSIUM SERPL-SCNC: 3.6 MMOL/L (ref 3.5–5.2)
POTASSIUM SERPL-SCNC: 3.6 MMOL/L (ref 3.5–5.2)
PROTHROMBIN TIME: 21.9 SECONDS (ref 19.4–28.5)
QT INTERVAL: 430 MS
RBC # BLD AUTO: 4.11 10*6/MM3 (ref 3.77–5.28)
RBC # BLD AUTO: 4.31 10*6/MM3 (ref 3.77–5.28)
SODIUM SERPL-SCNC: 134 MMOL/L (ref 136–145)
SODIUM SERPL-SCNC: 135 MMOL/L (ref 136–145)
WBC # BLD AUTO: 6.5 10*3/MM3 (ref 3.4–10.8)
WBC # BLD AUTO: 7 10*3/MM3 (ref 3.4–10.8)

## 2021-04-20 PROCEDURE — 99233 SBSQ HOSP IP/OBS HIGH 50: CPT | Performed by: INTERNAL MEDICINE

## 2021-04-20 PROCEDURE — 85610 PROTHROMBIN TIME: CPT | Performed by: PHYSICIAN ASSISTANT

## 2021-04-20 PROCEDURE — 80048 BASIC METABOLIC PNL TOTAL CA: CPT | Performed by: INTERNAL MEDICINE

## 2021-04-20 PROCEDURE — 99232 SBSQ HOSP IP/OBS MODERATE 35: CPT | Performed by: HOSPITALIST

## 2021-04-20 PROCEDURE — 83735 ASSAY OF MAGNESIUM: CPT | Performed by: PHYSICIAN ASSISTANT

## 2021-04-20 PROCEDURE — 85025 COMPLETE CBC W/AUTO DIFF WBC: CPT | Performed by: PHYSICIAN ASSISTANT

## 2021-04-20 PROCEDURE — 80048 BASIC METABOLIC PNL TOTAL CA: CPT | Performed by: PHYSICIAN ASSISTANT

## 2021-04-20 PROCEDURE — 85025 COMPLETE CBC W/AUTO DIFF WBC: CPT | Performed by: INTERNAL MEDICINE

## 2021-04-20 RX ORDER — SODIUM CHLORIDE 9 MG/ML
1-3 INJECTION, SOLUTION INTRAVENOUS CONTINUOUS
Status: DISCONTINUED | OUTPATIENT
Start: 2021-04-20 | End: 2021-04-22 | Stop reason: HOSPADM

## 2021-04-20 RX ORDER — SODIUM CHLORIDE 9 MG/ML
75 INJECTION, SOLUTION INTRAVENOUS CONTINUOUS
Status: DISCONTINUED | OUTPATIENT
Start: 2021-04-20 | End: 2021-04-20

## 2021-04-20 RX ORDER — MIDAZOLAM HYDROCHLORIDE 1 MG/ML
1 INJECTION INTRAMUSCULAR; INTRAVENOUS ONCE
Status: CANCELLED | OUTPATIENT
Start: 2021-04-20 | End: 2021-04-20

## 2021-04-20 RX ORDER — FENTANYL CITRATE 50 UG/ML
25 INJECTION, SOLUTION INTRAMUSCULAR; INTRAVENOUS ONCE
Status: CANCELLED | OUTPATIENT
Start: 2021-04-20 | End: 2021-04-20

## 2021-04-20 RX ORDER — SODIUM CHLORIDE 9 MG/ML
75 INJECTION, SOLUTION INTRAVENOUS CONTINUOUS
Status: DISPENSED | OUTPATIENT
Start: 2021-04-21 | End: 2021-04-21

## 2021-04-20 RX ADMIN — ACETAMINOPHEN 650 MG: 325 TABLET ORAL at 06:08

## 2021-04-20 RX ADMIN — SODIUM CHLORIDE 75 ML/HR: 9 INJECTION, SOLUTION INTRAVENOUS at 23:30

## 2021-04-20 RX ADMIN — ATORVASTATIN CALCIUM 10 MG: 10 TABLET, FILM COATED ORAL at 20:31

## 2021-04-20 RX ADMIN — Medication 10 ML: at 08:57

## 2021-04-20 RX ADMIN — SPIRONOLACTONE 12.5 MG: 25 TABLET ORAL at 17:23

## 2021-04-20 RX ADMIN — DILTIAZEM HYDROCHLORIDE 120 MG: 120 CAPSULE, EXTENDED RELEASE ORAL at 09:36

## 2021-04-20 RX ADMIN — HYDROCODONE BITARTRATE AND ACETAMINOPHEN 1 TABLET: 7.5; 325 TABLET ORAL at 20:31

## 2021-04-20 RX ADMIN — Medication 10 ML: at 20:31

## 2021-04-20 RX ADMIN — ALPRAZOLAM 0.5 MG: 0.5 TABLET ORAL at 18:24

## 2021-04-20 NOTE — NURSING NOTE
Dr. Charles spoke with me this morning re: the nephrology consult. He stated to give NS 75ml/hr when going for heart cath, not before.

## 2021-04-20 NOTE — CONSULTS
Referring Provider: Benjamin Galindo MD   Reason for Consultation: Renny    Subjective     Chief complaint   Chief Complaint   Patient presents with   • Chest Pain       History of present illness:        84 Y/O with PMH of HTN, CAD S/P CABG , A-Fib and hyperlipidemia who presented to the hospital complaining of chest pain or shortness of air.  She denies any orthopnea or PND.  She denies dysuria urgency or frequency.  Patient was seen by cardiology and her last stress test was positive so there is a plan for cardiac cath at tomorrow.  We were comfortable management of kidney dysfunction and prevent contrast-induced nephropathy    Past Medical History:   Diagnosis Date   • Anxiety    • Arthritis    • Atrial fibrillation (CMS/HCC)    • Coronary artery disease    • Dyslipidemia    • GERD (gastroesophageal reflux disease)    • History of bone density study 04/2015   • Hypertension    • Sleep apnea    • Wears dentures      Past Surgical History:   Procedure Laterality Date   • BLADDER REPAIR  1990   • BREAST LUMPECTOMY  1974    BENIGN   • CARDIAC CATHETERIZATION  05/25/2011   • CARDIOVASCULAR STRESS TEST  05/04/2015   • CHOLECYSTECTOMY  1990   • CORONARY ARTERY BYPASS GRAFT  08/09/2017    X5  Dr Brandt   • ENDOSCOPY N/A 6/30/2020    Procedure: ESOPHAGOGASTRODUODENOSCOPY with biopsy x 1 area and dilatation (15-18mm balloon) up to 18mm;  Surgeon: Quinton Tapia MD;  Location: [facility] ENDOSCOPY;  Service: Gastroenterology;  Laterality: N/A;  post op: gastritis, large hiatal hernia, esophageal dysmotility   • HYSTERECTOMY  1990   • JOINT REPLACEMENT Right     knee    • OTHER SURGICAL HISTORY  06/2017    BLOOD CLOT REMOVED FROM LEFT EAR   • PACEMAKER IMPLANTATION  04/18/2016    DUAL CHAMBER ST [facility]     Family History   Problem Relation Age of Onset   • Hypertension Mother    • Heart disease Sister         PACEMAKER     Social History     Tobacco Use   • Smoking status: Never Smoker   • Smokeless tobacco: Never Used    Substance Use Topics   • Alcohol use: No   • Drug use: No     Medications Prior to Admission   Medication Sig Dispense Refill Last Dose   • ALPRAZolam (XANAX) 0.5 MG tablet Take 0.5 mg by mouth 2 (Two) Times a Day As Needed.  5    • atorvastatin (LIPITOR) 10 MG tablet Take 10 mg by mouth Every Night.   4/17/2021 at Unknown time   • carboxymethylcellulose (REFRESH PLUS) 0.5 % solution Administer 1 drop to both eyes 3 (Three) Times a Day As Needed for Dry Eyes.      • cetirizine (zyrTEC) 10 MG tablet Take 10 mg by mouth Daily.      • dilTIAZem (TIAZAC) 120 MG 24 hr capsule Take 1 capsule by mouth Daily. 90 capsule 1 4/18/2021 at 0800   • diphenhydrAMINE HCl (BENADRYL ALLERGY PO) Take  by mouth As Needed.      • HYDROcodone-acetaminophen (NORCO) 7.5-325 MG per tablet Take 1 tablet by mouth Every 8 (Eight) Hours As Needed.  0    • magnesium oxide (MAG-OX) 400 MG tablet Take 400 mg by mouth Daily.   4/17/2021 at Unknown time   • metoprolol tartrate (LOPRESSOR) 100 MG tablet Take 1 tablet by mouth 2 (Two) Times a Day. 180 tablet 1 4/18/2021 at 0800   • Multiple Vitamins-Minerals (VITEYES AREDS FORMULA) capsule Take 1 capsule by mouth 2 (two) times a day.   4/18/2021 at 0800   • ondansetron (ZOFRAN) 4 MG tablet Take 4 mg by mouth Daily As Needed for Nausea or Vomiting.      • pantoprazole (PROTONIX) 40 MG EC tablet TK 1 T PO QD  5 4/18/2021 at 0800   • Phenylephrine-DM-GG (TUSSIN CF COUGH & COLD PO) Take  by mouth As Needed.      • spironolactone (ALDACTONE) 25 MG tablet TAKE ONE-HALF TABLET BY MOUTH DAILY (Patient taking differently: Take 12.5 mg by mouth Every Evening.) 45 tablet 3 4/17/2021 at Unknown time   • warfarin (COUMADIN) 2 MG tablet Take 2 mg by mouth 3 (Three) Times a Week. Mon, Wed, Fri   Past Week at Unknown time   • warfarin (COUMADIN) 4 MG tablet TAKE 1 TABLET BY MOUTH DAILY EXCEPT 1/2 TABLET ON WEDNESDAY OR AS DIRECTED (Patient taking differently: Take 4 mg by mouth 4 (Four) Times a Week. Lavon De Jesus,  "Sat, Sun) 90 tablet 0 4/17/2021 at Unknown time     Allergies:  Doxycycline, Celebrex [celecoxib], Contrast dye, Iodinated diagnostic agents, Nsaids, Other, and Sulfa antibiotics    Review of Systems  Pertinent items are noted in HPI.    Objective     Vital Signs  Temp:  [97.7 °F (36.5 °C)-98 °F (36.7 °C)] 97.8 °F (36.6 °C)  Heart Rate:  [65-91] 87  Resp:  [16-18] 18  BP: (125-149)/(80-88) 125/80    Flowsheet Rows      First Filed Value   Admission Height  154.9 cm (61\") Documented at 04/18/2021 1622   Admission Weight  81.5 kg (179 lb 10.8 oz) Documented at 04/18/2021 1622           I/O this shift:  In: 360 [P.O.:360]  Out: -   I/O last 3 completed shifts:  In: 960 [P.O.:960]  Out: -     Intake/Output Summary (Last 24 hours) at 4/20/2021 1403  Last data filed at 4/20/2021 1243  Gross per 24 hour   Intake 720 ml   Output --   Net 720 ml       Physical Exam:     General Appearance:    Alert, cooperative, in no acute distress   Head:    Normocephalic, without obvious abnormality, atraumatic   Eyes:            Lids and lashes normal, conjunctivae and sclerae normal, no   icterus, no pallor, corneas clear, PERRLA   Ears:    Ears appear intact with no abnormalities noted   Throat:   No oral lesions, no thrush, oral mucosa moist   Neck:   No adenopathy, supple, trachea midline, no thyromegaly, no     carotid bruit, no JVD   Back:     No kyphosis present, no scoliosis present, no skin lesions,       erythema or scars, no tenderness to percussion or                   palpation,   range of motion normal   Lungs:     Clear to auscultation,respirations regular, even and                   unlabored    Heart:    Regular rhythm and normal rate, normal S1 and S2, no            murmur, no gallop, no rub, no click   Breast Exam:    Deferred   Abdomen:     Normal bowel sounds, no masses, no organomegaly, soft        non-tender, non-distended, no guarding, no rebound                 tenderness   Genitalia:    Deferred   Extremities:   " Moves all extremities well, no edema, no cyanosis, no              redness   Pulses:   Pulses palpable and equal bilaterally   Skin:   No bleeding, bruising or rash   Lymph nodes:   No palpable adenopathy   Neurologic:   Cranial nerves 2 - 12 grossly intact, sensation intact, DTR        present and equal bilaterally       Results Review:  Results from last 7 days   Lab Units 04/20/21  0342 04/19/21 0345 04/18/21  1924 04/18/21  1655   SODIUM mmol/L 135* 136  --  135*   POTASSIUM mmol/L 3.6 4.0 4.3 4.4   CHLORIDE mmol/L 98 99  --  98   CO2 mmol/L 28.0 26.0  --  24.0   BUN mg/dL 13 13  --  17   CREATININE mg/dL 0.98 0.94  --  1.02*   CALCIUM mg/dL 9.4 9.4  --  8.8   BILIRUBIN mg/dL  --   --   --  1.4*   ALK PHOS U/L  --   --   --  126*   ALT (SGPT) U/L  --   --   --  21   AST (SGOT) U/L  --   --   --  30   GLUCOSE mg/dL 93 97  --  118*       Estimated Creatinine Clearance: 42.1 mL/min (by C-G formula based on SCr of 0.98 mg/dL).    Results from last 7 days   Lab Units 04/20/21  0342 04/19/21  0345   MAGNESIUM mg/dL 1.9 2.0       Results from last 7 days   Lab Units 04/20/21  0342 04/19/21  0345 04/18/21  1655   WBC 10*3/mm3 7.00 6.20 5.70   HEMOGLOBIN g/dL 12.5 12.3 13.3   PLATELETS 10*3/mm3 128* 130* 142       Results from last 7 days   Lab Units 04/20/21  0343 04/19/21  0345 04/18/21  1655 04/15/21  1401   INR  2.06 1.67* 1.50* 1.40*       Active Medications  atorvastatin, 10 mg, Oral, Nightly  cetirizine, 10 mg, Oral, Daily  dilTIAZem CD, 120 mg, Oral, Q24H  pantoprazole, 40 mg, Oral, QAM AC  sodium chloride, 10 mL, Intravenous, Q12H  spironolactone, 12.5 mg, Oral, Q PM      Pharmacy to dose warfarin,   sodium chloride, 75 mL/hr        Assessment/Plan       Chest pain    Atrial fibrillation (CMS/HCC) [I48.91]    Coronary artery disease    Dyslipidemia    Hypertension    Presence of cardiac pacemaker    Anxiety associated with depression    Obesity (BMI 30-39.9)    Seasonal allergies    GERD without esophagitis     CKD (chronic kidney disease) stage 3, GFR 30-59 ml/min (CMS/[facility])    Chronic pain        - CKD III III: Risk of contrast-induced vomiting is low.  Agree with proceeding with cardiac catheter.  Plan to start IVF at AM     -Chest pain: Positive stress test.  Plan for the catheter tomorrow  -History of cardiac disease  -Hypertension: Blood pressure much better since admission.  We'll continue to monitor  - A-Fib  -Dyslipidemia              David Charles MD  04/20/21  14:03 EDT

## 2021-04-20 NOTE — PLAN OF CARE
Goal Outcome Evaluation:     Progress: improving   Pt has been resting comfortably overnight. Pt had abnormal stress test yesterday and will need heart cath. Warfarin on hold for heart cath. Will continue to monitor...

## 2021-04-20 NOTE — PROGRESS NOTES
"Pharmacy dosing service  Anticoagulant  Warfarin     Subjective:    Syl Herrera is a 85 y.o.female being continued on warfarin for atrial fibrillation.    INR Goal: 2 - 3  Home medication?:  Yes, warfarin 4 mg daily except 2 mg on Monday, Wednesday, and Friday   Bridge Therapy Present?:  No  Interacting Medications Evaluation (New/Present/Discontinued): spironolactone (home med; may decrease INR)  Additional Contributing Factors: none      Assessment/Plan:    Warfarin d/c'd by Dr. Martell late yesterday evening in anticipation of upcoming cath. Confirmed with RN, cath scheduled for tomorrow AM. Will monitor for warfarin resume after cath.         Date 4/18 4/19 4/20         INR 1.5 1.67 2.06         Dose 6 mg 2 mg hold             Objective:  [Ht: 154.9 cm (61\"); Wt: 81.5 kg (179 lb 10.8 oz); BMI: Body mass index is 33.95 kg/m².]    Lab Results   Component Value Date    ALBUMIN 4.20 04/18/2021     Lab Results   Component Value Date    INR 2.06 04/20/2021    INR 1.67 (L) 04/19/2021    INR 1.50 (L) 04/18/2021    PROTIME 21.9 04/20/2021    PROTIME 17.9 (L) 04/19/2021    PROTIME 16.2 (L) 04/18/2021     Lab Results   Component Value Date    HGB 12.5 04/20/2021    HGB 12.3 04/19/2021    HGB 13.3 04/18/2021     Lab Results   Component Value Date    HCT 36.7 04/20/2021    HCT 37.4 04/19/2021    HCT 39.4 04/18/2021       Brandy Hernandez, PharmD  04/20/21 13:15 EDT         "

## 2021-04-20 NOTE — PROGRESS NOTES
[facility] Medicine Services Daily Progress Note      Hospitalist Team  LOS 0 days      Patient Care Team:  Nava Yu MD as PCP - Ivan Durand MD as Consulting Physician (Cardiology)    Patient Location: 228/1      Subjective   Subjective   Denies for any chest pain, no nausea or vomiting. No chest pain.  Chief Complaint / Subjective  Chief Complaint   Patient presents with   • Chest Pain         Brief Synopsis of Hospital Course/HPI    Ms. Herrera is a 85 y.o. female past medical history of seasonal allergies, A. fib, obesity, hypertension, GERD, hyperlipidemia, CAD and CKD who presents to [facility] complaining of chest pressure.  Patient reports she was woken from sleep at approximately 0400 hrs. on 04/18/2021 with a pressure across the anterior portion of her chest.  Pain remained intermittent throughout the day until approximately 1300 hrs. when it became constant and worsening rated at 6/10 at its worst without obvious provoking or palliative factors.  Some mild dyspnea as well as palpitations have also been reported.  She denies any diaphoresis, nausea or vomiting, fever, syncope or near syncope.  Patient does not smoke or drink alcohol and confirms compliance with all of her outpatient medical therapies.     In the ED patient did have lab significant for troponin of less than 0.010, proBNP: 1593.0, creatinine: 1.02 with a BUN of 17 and a GFR 52, remainder of CMP and CBC was generally unremarkable.  INR: 1.50, PT: 16.2.  Chest x-ray shows cardiomegaly with possible hiatal hernia.  EKG shows an atrial paced rhythm with 1 PVC but no obvious acute ST changes and a QTC of 459 ms.      Date::          ROS      Objective   Objective      Vital Signs  Temp:  [97.7 °F (36.5 °C)-98.6 °F (37 °C)] 98 °F (36.7 °C)  Heart Rate:  [63-77] 65  Resp:  [16-18] 18  BP: (133-149)/(76-88) 133/88  Oxygen Therapy  SpO2: 97 %  Pulse Oximetry Type: Intermittent  Device (Oxygen  "Therapy): room air  Flowsheet Rows      First Filed Value   Admission Height  154.9 cm (61\") Documented at 04/18/2021 1622   Admission Weight  81.5 kg (179 lb 10.8 oz) Documented at 04/18/2021 1622        Intake & Output (last 3 days)       04/17 0701 - 04/18 0700 04/18 0701 - 04/19 0700 04/19 0701 - 04/20 0700 04/20 0701 - 04/21 0700    P.O.   960     Total Intake(mL/kg)   960 (11.8)     Net   +960             Urine Unmeasured Occurrence   3 x 1 x        Lines, Drains & Airways    Active LDAs     None                  Physical Exam:    Physical Exam  Vitals and nursing note reviewed.   Constitutional:       General: She is not in acute distress.     Appearance: Normal appearance. She is well-developed. She is not ill-appearing, toxic-appearing or diaphoretic.   HENT:      Head: Normocephalic and atraumatic.      Right Ear: Ear canal and external ear normal.      Left Ear: Ear canal and external ear normal.      Nose: Nose normal. No congestion or rhinorrhea.      Mouth/Throat:      Mouth: Mucous membranes are moist.      Pharynx: No oropharyngeal exudate.   Eyes:      General: No scleral icterus.        Right eye: No discharge.         Left eye: No discharge.      Extraocular Movements: Extraocular movements intact.      Conjunctiva/sclera: Conjunctivae normal.      Pupils: Pupils are equal, round, and reactive to light.   Neck:      Thyroid: No thyromegaly.      Vascular: No carotid bruit or JVD.      Trachea: No tracheal deviation.   Cardiovascular:      Rate and Rhythm: Normal rate and regular rhythm.      Pulses: Normal pulses.      Heart sounds: Normal heart sounds. No murmur heard.   No friction rub. No gallop.    Pulmonary:      Effort: Pulmonary effort is normal. No respiratory distress.      Breath sounds: Normal breath sounds. No stridor. No wheezing, rhonchi or rales.   Chest:      Chest wall: No tenderness.   Abdominal:      General: Bowel sounds are normal. There is no distension.      Palpations: " Abdomen is soft. There is no mass.      Tenderness: There is no abdominal tenderness. There is no guarding or rebound.      Hernia: No hernia is present.   Musculoskeletal:         General: No swelling, tenderness, deformity or signs of injury. Normal range of motion.      Cervical back: Normal range of motion and neck supple. No rigidity. No muscular tenderness.      Right lower leg: No edema.      Left lower leg: No edema.   Lymphadenopathy:      Cervical: No cervical adenopathy.   Skin:     General: Skin is warm and dry.      Coloration: Skin is not jaundiced or pale.      Findings: No bruising, erythema or rash.   Neurological:      General: No focal deficit present.      Mental Status: She is alert and oriented to person, place, and time. Mental status is at baseline.      Cranial Nerves: No cranial nerve deficit.      Sensory: No sensory deficit.      Motor: No weakness or abnormal muscle tone.      Coordination: Coordination normal.   Psychiatric:         Mood and Affect: Mood normal.         Behavior: Behavior normal.         Thought Content: Thought content normal.         Judgment: Judgment normal.               Procedures:              Results Review:     I reviewed the patient's new clinical results.      Lab Results (last 24 hours)     Procedure Component Value Units Date/Time    Basic Metabolic Panel [[id number]]  (Abnormal) Collected: 04/20/21 0342    Specimen: Blood Updated: 04/20/21 0512     Glucose 93 mg/dL      BUN 13 mg/dL      Creatinine 0.98 mg/dL      Sodium 135 mmol/L      Potassium 3.6 mmol/L      Chloride 98 mmol/L      CO2 28.0 mmol/L      Calcium 9.4 mg/dL      eGFR Non African Amer 54 mL/min/1.73      BUN/Creatinine Ratio 13.3     Anion Gap 9.0 mmol/L     Narrative:      GFR Normal >60  Chronic Kidney Disease <60  Kidney Failure <15      Magnesium [[id number]]  (Normal) Collected: 04/20/21 0342    Specimen: Blood Updated: 04/20/21 0512     Magnesium 1.9 mg/dL     Protime-INR [[id number]]   (Normal) Collected: 04/20/21 0343    Specimen: Blood Updated: 04/20/21 0506     Protime 21.9 Seconds      INR 2.06    CBC & Differential [[id number]]  (Abnormal) Collected: 04/20/21 0342    Specimen: Blood Updated: 04/20/21 0450    Narrative:      The following orders were created for panel order CBC & Differential.  Procedure                               Abnormality         Status                     ---------                               -----------         ------                     CBC Auto Differential[[id number]]        Abnormal            Final result                 Please view results for these tests on the individual orders.    CBC Auto Differential [[id number]]  (Abnormal) Collected: 04/20/21 0342    Specimen: Blood Updated: 04/20/21 0450     WBC 7.00 10*3/mm3      RBC 4.11 10*6/mm3      Hemoglobin 12.5 g/dL      Hematocrit 36.7 %      MCV 89.3 fL      MCH 30.5 pg      MCHC 34.1 g/dL      RDW 14.2 %      RDW-SD 44.6 fl      MPV 9.4 fL      Platelets 128 10*3/mm3      Neutrophil % 61.3 %      Lymphocyte % 27.1 %      Monocyte % 9.0 %      Eosinophil % 2.3 %      Basophil % 0.3 %      Neutrophils, Absolute 4.30 10*3/mm3      Lymphocytes, Absolute 1.90 10*3/mm3      Monocytes, Absolute 0.60 10*3/mm3      Eosinophils, Absolute 0.20 10*3/mm3      Basophils, Absolute 0.00 10*3/mm3      nRBC 0.0 /100 WBC     COVID PRE-OP / PRE-PROCEDURE SCREENING ORDER (NO ISOLATION) - Swab, Nasopharynx [[id number]]  (Normal) Collected: 04/18/21 1757    Specimen: Swab from Nasopharynx Updated: 04/19/21 1439    Narrative:      The following orders were created for panel order COVID PRE-OP / PRE-PROCEDURE SCREENING ORDER (NO ISOLATION) - Swab, Nasopharynx.  Procedure                               Abnormality         Status                     ---------                               -----------         ------                     COVID-19,APTIMA PANTHER,...[[id number]]  Normal              Final result                 Please view  results for these tests on the individual orders.    COVID-19,APTIMA PANTHER,[facility] IN-HOUSE, NP/OP SWAB IN UTM/VTM/SALINE TRANSPORT MEDIA,24 HR TAT - Swab, Nasopharynx [[id number]]  (Normal) Collected: 04/18/21 1757    Specimen: Swab from Nasopharynx Updated: 04/19/21 1439     COVID19 Not Detected    Narrative:      Fact sheet for providers: https://www.fda.gov/media/341596/download     Fact sheet for patients: https://www.fda.gov/media/135270/download    Test performed by RT PCR.        No results found for: HGBA1C  Results from last 7 days   Lab Units 04/20/21  0343 04/19/21  0345 04/18/21  1655   INR  2.06 1.67* 1.50*           No results found for: LIPASE  Lab Results   Component Value Date    CHOL 88 04/19/2021    TRIG 55 04/19/2021    HDL 46 04/19/2021    LDL 29 04/19/2021       Lab Results   Lab Value Date/Time    FINALDX  06/30/2020 1035     Stomach, biopsy:    Reactive gastropathy with congestion and focal minimal superficial erosion    Negative for Helicobacter pylori on immunohistochemistry stain (control reacts appropriately)       RAY/sms          Microbiology Results (last 10 days)     Procedure Component Value - Date/Time    COVID PRE-OP / PRE-PROCEDURE SCREENING ORDER (NO ISOLATION) - Swab, Nasopharynx [[id number]]  (Normal) Collected: 04/18/21 1757    Lab Status: Final result Specimen: Swab from Nasopharynx Updated: 04/19/21 1439    Narrative:      The following orders were created for panel order COVID PRE-OP / PRE-PROCEDURE SCREENING ORDER (NO ISOLATION) - Swab, Nasopharynx.  Procedure                               Abnormality         Status                     ---------                               -----------         ------                     COVID-19,APTIMA PANTHER,...[[id number]]  Normal              Final result                 Please view results for these tests on the individual orders.    COVID-19,APTIMA PANTHER,[facility] IN-HOUSE, NP/OP SWAB IN UTM/VTM/SALINE TRANSPORT MEDIA,24 HR TAT - Swab,  Nasopharynx [[id number]]  (Normal) Collected: 04/18/21 1757    Lab Status: Final result Specimen: Swab from Nasopharynx Updated: 04/19/21 1439     COVID19 Not Detected    Narrative:      Fact sheet for providers: https://www.fda.gov/media/030571/download     Fact sheet for patients: https://www.fda.gov/media/090304/download    Test performed by RT PCR.          ECG/EMG Results (most recent)     Procedure Component Value Units Date/Time    ECG 12 Lead [[id number]] Collected: 04/18/21 1636     Updated: 04/18/21 1639     QT Interval 430 ms     Narrative:      HEART RATE= 68  bpm  RR Interval= 877  ms  DC Interval= 220  ms  P Horizontal Axis= -59  deg  P Front Axis=   deg  QRSD Interval= 94  ms  QT Interval= 430  ms  QRS Axis= -15  deg  T Wave Axis= 86  deg  - ABNORMAL ECG -  Atrial-paced complexes  Multiple ventricular premature complexes  Prolonged DC interval  Electronically Signed By:   Date and Time of Study: 2021-04-18 16:36:42                  XR Chest 1 View    Result Date: 4/18/2021  1.Cardiomegaly 2.Hiatal hernia suggested.  Electronically Signed By-Arie Miller MD On:4/18/2021 5:08 PM This report was finalized on [id number] by  Arie Miller MD.          Xrays, labs reviewed personally by physician.    Medication Review:   I have reviewed the patient's current medication list      Scheduled Meds  atorvastatin, 10 mg, Oral, Nightly  cetirizine, 10 mg, Oral, Daily  dilTIAZem CD, 120 mg, Oral, Q24H  pantoprazole, 40 mg, Oral, QAM AC  sodium chloride, 10 mL, Intravenous, Q12H  spironolactone, 12.5 mg, Oral, Q PM        Meds Infusions  Pharmacy to dose warfarin,         Meds PRN  •  acetaminophen **OR** acetaminophen **OR** acetaminophen  •  ALPRAZolam  •  Calcium Gluconate-NaCl **AND** calcium gluconate **AND** Calcium, Ionized  •  HYDROcodone-acetaminophen  •  magnesium sulfate **OR** magnesium sulfate **OR** magnesium sulfate  •  melatonin  •  nitroglycerin  •  ondansetron **OR** ondansetron  •  Pharmacy to  dose warfarin  •  potassium & sodium phosphates **OR** potassium & sodium phosphates  •  potassium chloride  •  potassium chloride  •  [COMPLETED] Insert peripheral IV **AND** sodium chloride  •  sodium chloride        Assessment/Plan   Assessment/Plan     Active Hospital Problems    Diagnosis  POA   • **Chest pain [R07.9]  Yes   • Anxiety associated with depression [F41.8]  Unknown   • Obesity (BMI 30-39.9) [E66.9]  Yes   • Seasonal allergies [J30.2]  Yes   • GERD without esophagitis [K21.9]  Yes   • CKD (chronic kidney disease) stage 3, GFR 30-59 ml/min (CMS/HCC) [N18.30]  Yes   • Chronic pain [G89.29]  Yes   • Hypertension [I10]  Yes   • Atrial fibrillation (CMS/HCC) [I48.91] [I48.91]  Yes   • Presence of cardiac pacemaker [Z95.0]  Yes   • Dyslipidemia [E78.5]  Yes   • Coronary artery disease [I25.10]  Yes      Resolved Hospital Problems   No resolved problems to display.       MEDICAL DECISION MAKING COMPLEXITY BY PROBLEM:     Chest pain   -Troponin: Less than 0.010, trend  -Chest x-ray shows cardiomegaly with possible hiatal hernia.    -EKG shows an atrial paced rhythm with 1 PVC but no obvious acute ST changes and a QTC of 459 ms.  -Hold metoprolol temporarily secondary to stress test  - Lipid profile reviewed ... appear normal.  -Stress test abnormal ... cardiology service consulted.      CAD  -Continue statin, and warfarin warfarin and cardiac monitoring  -Hold beta-blocker temporarily secondary to planned stress test     Atrial fibrillation status post pacemaker placement  -EKG shows an atrial paced rhythm with 1 PVC but no obvious acute ST changes and a QTC of 459 ms.  -Continue diltiazem, metoprolol and warfarin with pharmacy to dose ..... INR therapeutic ... coumadin on hold in anticipation to cardiac cath.     Hypertension  -poorly controlled with a blood pressure on admission of 168/110  - Continue diltiazem  -Hold metoprolol temporarily secondary to stress test  - Monitor while admitted     CKD  -  Creatinine: 1.02, BUN: 17, eGFR: 52  - Avoid nephrotoxic medication and IV dye unless urgently needed  - Monitor BMP and I's and O's  - Consult Nephrology service.      Hyperlipidemia  -Check lipid panel  -Continue statin     Seasonal allergies  -Zyrtec     GERD  -PPI     Anxiety  -Xanax (inspect verified)     Chronic pain  -Norco (inspect verified)     Obesity (BMI: 33.95)  -Encourage diet lifestyle modifications    VTE Prophylaxis -   Mechanical Order History:     None      Pharmalogical Order History:      Ordered     Dose Route Frequency Stop    04/18/21 1928  warfarin (COUMADIN) tablet 4 mg     Question:  Target INR  Answer:  2 - 3    4 mg PO Once per day on Sun Tue Thu Sat --    04/18/21 1928  warfarin (COUMADIN) tablet 2 mg     Question:  Target INR  Answer:  2 - 3    2 mg PO Once per day on Mon Wed Fri --    04/18/21 1927  warfarin (COUMADIN) tablet 6 mg     Question:  Target INR  Answer:  2 - 3    6 mg PO Once (Warfarin) 04/18/21 2025 04/18/21 1907  Pharmacy to dose warfarin     Question:  Target INR  Answer:  2 - 3    -- XX Continuous PRN --    04/18/21 1907  Pharmacy to dose warfarin  Status:  Discontinued     Question:  Target INR  Answer:  2 - 3    -- XX Continuous PRN 04/18/21 1910                  Code Status -   Code Status and Medical Interventions:   Ordered at: 04/18/21 1907     Code Status:    CPR     Medical Interventions (Level of Support Prior to Arrest):    Full       This patient has been examined wearing appropriate Personal Protective Equipment and discussed with hospital infection control department. 04/20/21        Discharge Planning          Electronically signed by Benjamin Galindo MD, 04/20/21, 09:31 EDT.  [facility] Hospitalist Team

## 2021-04-20 NOTE — PLAN OF CARE
Goal Outcome Evaluation:  Plan of Care Reviewed With: patient, family     Outcome Summary: tPt up in chair with visiting family. Having heart cath tomorrow at 8am. No complaints of pain. NPO after mindnight. will continue to monitor

## 2021-04-20 NOTE — PROGRESS NOTES
Referring Provider: Hospitalist    Reason for follow-up: Chest pain and abnormal Myoview     Patient Care Team:  Nava Yu MD as PCP - General  Ivan Martell MD as Consulting Physician (Cardiology)    Subjective .  Well without any symptoms    Objective  Being in bed comfortably     Review of Systems   Constitutional: Negative for fever and malaise/fatigue.   HENT: Negative for ear pain and nosebleeds.    Eyes: Negative for blurred vision and double vision.   Cardiovascular: Negative for chest pain, dyspnea on exertion and palpitations.   Respiratory: Negative for cough and shortness of breath.    Skin: Negative for rash.   Musculoskeletal: Negative for joint pain.   Gastrointestinal: Negative for abdominal pain, nausea and vomiting.   Neurological: Negative for focal weakness and headaches.   Psychiatric/Behavioral: Negative for depression. The patient is not nervous/anxious.    All other systems reviewed and are negative.      Doxycycline, Celebrex [celecoxib], Contrast dye, Iodinated diagnostic agents, Nsaids, Other, and Sulfa antibiotics    Scheduled Meds:atorvastatin, 10 mg, Oral, Nightly  cetirizine, 10 mg, Oral, Daily  dilTIAZem CD, 120 mg, Oral, Q24H  pantoprazole, 40 mg, Oral, QAM AC  sodium chloride, 10 mL, Intravenous, Q12H  spironolactone, 12.5 mg, Oral, Q PM      Continuous Infusions:Pharmacy to dose warfarin,   sodium chloride, 75 mL/hr      PRN Meds:.•  acetaminophen **OR** acetaminophen **OR** acetaminophen  •  ALPRAZolam  •  Calcium Gluconate-NaCl **AND** calcium gluconate **AND** Calcium, Ionized  •  HYDROcodone-acetaminophen  •  magnesium sulfate **OR** magnesium sulfate **OR** magnesium sulfate  •  melatonin  •  nitroglycerin  •  ondansetron **OR** ondansetron  •  Pharmacy to dose warfarin  •  potassium & sodium phosphates **OR** potassium & sodium phosphates  •  potassium chloride  •  potassium chloride  •  [COMPLETED] Insert peripheral IV **AND** sodium chloride  •  sodium  "chloride        VITAL SIGNS  Vitals:    04/19/21 1300 04/19/21 1900 04/20/21 0329 04/20/21 0936   BP: 136/76 149/88 133/88 143/84   BP Location: Right arm Left arm Right arm    Patient Position: Sitting Sitting Lying    Pulse: 63 77 65 91   Resp: 18 16 18    Temp: 98.6 °F (37 °C) 97.7 °F (36.5 °C) 98 °F (36.7 °C)    TempSrc: Oral Oral Oral    SpO2: 97%  97%    Weight:   81.5 kg (179 lb 10.8 oz)    Height:           Flowsheet Rows      First Filed Value   Admission Height  154.9 cm (61\") Documented at 04/18/2021 1622   Admission Weight  81.5 kg (179 lb 10.8 oz) Documented at 04/18/2021 1622           TELEMETRY: Atrial fibrillation with controlled ventricular response    Physical Exam:  Constitutional:       Appearance: Well-developed.   Eyes:      General: No scleral icterus.     Conjunctiva/sclera: Conjunctivae normal.      Pupils: Pupils are equal, round, and reactive to light.   HENT:      Head: Normocephalic and atraumatic.   Neck:      Vascular: No carotid bruit or JVD.   Pulmonary:      Effort: Pulmonary effort is normal.      Breath sounds: Normal breath sounds. No wheezing. No rales.   Cardiovascular:      Normal rate. Irregularly irregular rhythm.      Murmurs: There is a systolic murmur.   Pulses:     Intact distal pulses.   Abdominal:      General: Bowel sounds are normal.      Palpations: Abdomen is soft.   Musculoskeletal: Normal range of motion.      Cervical back: Normal range of motion and neck supple. Skin:     General: Skin is warm and dry.      Findings: No rash.   Neurological:      Mental Status: Alert.      Comments: No focal deficits          Results Review:   I reviewed the patient's new clinical results.  Lab Results (last 24 hours)     Procedure Component Value Units Date/Time    Basic Metabolic Panel [[id number]]  (Abnormal) Collected: 04/20/21 0342    Specimen: Blood Updated: 04/20/21 0512     Glucose 93 mg/dL      BUN 13 mg/dL      Creatinine 0.98 mg/dL      Sodium 135 mmol/L      " Potassium 3.6 mmol/L      Chloride 98 mmol/L      CO2 28.0 mmol/L      Calcium 9.4 mg/dL      eGFR Non African Amer 54 mL/min/1.73      BUN/Creatinine Ratio 13.3     Anion Gap 9.0 mmol/L     Narrative:      GFR Normal >60  Chronic Kidney Disease <60  Kidney Failure <15      Magnesium [[id number]]  (Normal) Collected: 04/20/21 0342    Specimen: Blood Updated: 04/20/21 0512     Magnesium 1.9 mg/dL     Protime-INR [[id number]]  (Normal) Collected: 04/20/21 0343    Specimen: Blood Updated: 04/20/21 0506     Protime 21.9 Seconds      INR 2.06    CBC & Differential [[id number]]  (Abnormal) Collected: 04/20/21 0342    Specimen: Blood Updated: 04/20/21 0450    Narrative:      The following orders were created for panel order CBC & Differential.  Procedure                               Abnormality         Status                     ---------                               -----------         ------                     CBC Auto Differential[[id number]]        Abnormal            Final result                 Please view results for these tests on the individual orders.    CBC Auto Differential [[id number]]  (Abnormal) Collected: 04/20/21 0342    Specimen: Blood Updated: 04/20/21 0450     WBC 7.00 10*3/mm3      RBC 4.11 10*6/mm3      Hemoglobin 12.5 g/dL      Hematocrit 36.7 %      MCV 89.3 fL      MCH 30.5 pg      MCHC 34.1 g/dL      RDW 14.2 %      RDW-SD 44.6 fl      MPV 9.4 fL      Platelets 128 10*3/mm3      Neutrophil % 61.3 %      Lymphocyte % 27.1 %      Monocyte % 9.0 %      Eosinophil % 2.3 %      Basophil % 0.3 %      Neutrophils, Absolute 4.30 10*3/mm3      Lymphocytes, Absolute 1.90 10*3/mm3      Monocytes, Absolute 0.60 10*3/mm3      Eosinophils, Absolute 0.20 10*3/mm3      Basophils, Absolute 0.00 10*3/mm3      nRBC 0.0 /100 WBC           Imaging Results (Last 24 Hours)     ** No results found for the last 24 hours. **          EKG      I personally viewed and interpreted the patient's EKG/Telemetry  data:    ECHOCARDIOGRAM:    STRESS MYOVIEW:    CARDIAC CATHETERIZATION:    OTHER:         Assessment/Plan     Principal Problem:    Chest pain  Active Problems:    Atrial fibrillation (CMS/[facility]) [I48.91]    Coronary artery disease    Dyslipidemia    Hypertension    Presence of cardiac pacemaker    Anxiety associated with depression    Obesity (BMI 30-39.9)    Seasonal allergies    GERD without esophagitis    CKD (chronic kidney disease) stage 3, GFR 30-59 ml/min (CMS/HCC)    Chronic pain  Coronary artery disease status post coronary artery bypass surgery    Patient presented with chest pain and is ruled out for MI by EKG and enzymes  Patient underwent a stress Myoview which is abnormal with ischemia  Patient will have a cardiac catheterization performed.  I discussed with her about procedure risks and benefits  Patient has history of atrial fibrillation was on Coumadin and hence it will be held for the cardiac catheterization  Blood pressure and heart rate are stable  Patient also had a pacemaker for tachybradycardia syndrome and is working very well  Patient's lipid levels are followed by the primary care doctor.    I discussed the patients findings and my recommendations with patient and nurse    Ivan Martell MD  04/20/21  12:26 EDT

## 2021-04-21 ENCOUNTER — APPOINTMENT (OUTPATIENT)
Dept: CARDIOLOGY | Facility: HOSPITAL | Age: 86
End: 2021-04-21

## 2021-04-21 LAB
ANION GAP SERPL CALCULATED.3IONS-SCNC: 11 MMOL/L (ref 5–15)
BASOPHILS # BLD AUTO: 0 10*3/MM3 (ref 0–0.2)
BASOPHILS NFR BLD AUTO: 0.8 % (ref 0–1.5)
BH CV ECHO MEAS - ACS: 1.8 CM
BH CV ECHO MEAS - AO MAX PG (FULL): 8.9 MMHG
BH CV ECHO MEAS - AO MAX PG: 11.7 MMHG
BH CV ECHO MEAS - AO MEAN PG (FULL): 4.2 MMHG
BH CV ECHO MEAS - AO MEAN PG: 5.3 MMHG
BH CV ECHO MEAS - AO ROOT AREA (BSA CORRECTED): 1.8
BH CV ECHO MEAS - AO ROOT AREA: 8 CM^2
BH CV ECHO MEAS - AO ROOT DIAM: 3.2 CM
BH CV ECHO MEAS - AO V2 MAX: 171 CM/SEC
BH CV ECHO MEAS - AO V2 MEAN: 103.8 CM/SEC
BH CV ECHO MEAS - AO V2 VTI: 31.6 CM
BH CV ECHO MEAS - ASC AORTA: 3.6 CM
BH CV ECHO MEAS - AVA(I,A): 1.5 CM^2
BH CV ECHO MEAS - AVA(I,D): 1.5 CM^2
BH CV ECHO MEAS - AVA(V,A): 1.5 CM^2
BH CV ECHO MEAS - AVA(V,D): 1.5 CM^2
BH CV ECHO MEAS - BSA(HAYCOCK): 1.8 M^2
BH CV ECHO MEAS - BSA: 1.8 M^2
BH CV ECHO MEAS - BZI_BMI: 31.9 KILOGRAMS/M^2
BH CV ECHO MEAS - BZI_METRIC_HEIGHT: 154.9 CM
BH CV ECHO MEAS - BZI_METRIC_WEIGHT: 76.7 KG
BH CV ECHO MEAS - EDV(CUBED): 79.7 ML
BH CV ECHO MEAS - EDV(MOD-SP4): 81.4 ML
BH CV ECHO MEAS - EDV(TEICH): 83.2 ML
BH CV ECHO MEAS - EF(CUBED): 67.5 %
BH CV ECHO MEAS - EF(MOD-SP4): 76.8 %
BH CV ECHO MEAS - EF(TEICH): 59.4 %
BH CV ECHO MEAS - ESV(CUBED): 25.9 ML
BH CV ECHO MEAS - ESV(MOD-SP4): 18.9 ML
BH CV ECHO MEAS - ESV(TEICH): 33.8 ML
BH CV ECHO MEAS - FS: 31.3 %
BH CV ECHO MEAS - IVS/LVPW: 0.93
BH CV ECHO MEAS - IVSD: 1.1 CM
BH CV ECHO MEAS - LA DIMENSION(2D): 4.1 CM
BH CV ECHO MEAS - LV DIASTOLIC VOL/BSA (35-75): 46.3 ML/M^2
BH CV ECHO MEAS - LV MASS(C)D: 183.5 GRAMS
BH CV ECHO MEAS - LV MASS(C)DI: 104.3 GRAMS/M^2
BH CV ECHO MEAS - LV MAX PG: 2.8 MMHG
BH CV ECHO MEAS - LV MEAN PG: 1.2 MMHG
BH CV ECHO MEAS - LV SYSTOLIC VOL/BSA (12-30): 10.8 ML/M^2
BH CV ECHO MEAS - LV V1 MAX: 82.9 CM/SEC
BH CV ECHO MEAS - LV V1 MEAN: 49.1 CM/SEC
BH CV ECHO MEAS - LV V1 VTI: 15.9 CM
BH CV ECHO MEAS - LVIDD: 4.3 CM
BH CV ECHO MEAS - LVIDS: 3 CM
BH CV ECHO MEAS - LVOT AREA: 3 CM^2
BH CV ECHO MEAS - LVOT DIAM: 2 CM
BH CV ECHO MEAS - LVPWD: 1.2 CM
BH CV ECHO MEAS - MV A MAX VEL: 60.5 CM/SEC
BH CV ECHO MEAS - MV DEC SLOPE: 1319 CM/SEC^2
BH CV ECHO MEAS - MV DEC TIME: 0.08 SEC
BH CV ECHO MEAS - MV E MAX VEL: 102 CM/SEC
BH CV ECHO MEAS - MV E/A: 1.7
BH CV ECHO MEAS - MV MAX PG: 5.9 MMHG
BH CV ECHO MEAS - MV MEAN PG: 2.8 MMHG
BH CV ECHO MEAS - MV V2 MAX: 121.1 CM/SEC
BH CV ECHO MEAS - MV V2 MEAN: 79.9 CM/SEC
BH CV ECHO MEAS - MV V2 VTI: 20.5 CM
BH CV ECHO MEAS - MVA(VTI): 2.4 CM^2
BH CV ECHO MEAS - PA ACC TIME: 0.06 SEC
BH CV ECHO MEAS - PA MAX PG (FULL): 0.73 MMHG
BH CV ECHO MEAS - PA MAX PG: 3.6 MMHG
BH CV ECHO MEAS - PA MEAN PG (FULL): 0.1 MMHG
BH CV ECHO MEAS - PA MEAN PG: 1.5 MMHG
BH CV ECHO MEAS - PA PR(ACCEL): 49.9 MMHG
BH CV ECHO MEAS - PA V2 MAX: 94.7 CM/SEC
BH CV ECHO MEAS - PA V2 MEAN: 57.2 CM/SEC
BH CV ECHO MEAS - PA V2 VTI: 15.6 CM
BH CV ECHO MEAS - PVA(I,A): 4.6 CM^2
BH CV ECHO MEAS - PVA(I,D): 4.6 CM^2
BH CV ECHO MEAS - PVA(V,A): 4.2 CM^2
BH CV ECHO MEAS - PVA(V,D): 4.2 CM^2
BH CV ECHO MEAS - QP/QS: 1.5
BH CV ECHO MEAS - RAP SYSTOLE: 3 MMHG
BH CV ECHO MEAS - RV MAX PG: 2.9 MMHG
BH CV ECHO MEAS - RV MEAN PG: 1.4 MMHG
BH CV ECHO MEAS - RV V1 MAX: 84.5 CM/SEC
BH CV ECHO MEAS - RV V1 MEAN: 55.1 CM/SEC
BH CV ECHO MEAS - RV V1 VTI: 15 CM
BH CV ECHO MEAS - RVDD: 2.2 CM
BH CV ECHO MEAS - RVOT AREA: 4.7 CM^2
BH CV ECHO MEAS - RVOT DIAM: 2.5 CM
BH CV ECHO MEAS - RVSP: 35.6 MMHG
BH CV ECHO MEAS - SI(AO): 143 ML/M^2
BH CV ECHO MEAS - SI(CUBED): 30.6 ML/M^2
BH CV ECHO MEAS - SI(LVOT): 27.4 ML/M^2
BH CV ECHO MEAS - SI(MOD-SP4): 35.5 ML/M^2
BH CV ECHO MEAS - SI(TEICH): 28.1 ML/M^2
BH CV ECHO MEAS - SV(AO): 251.5 ML
BH CV ECHO MEAS - SV(CUBED): 53.8 ML
BH CV ECHO MEAS - SV(LVOT): 48.1 ML
BH CV ECHO MEAS - SV(MOD-SP4): 62.5 ML
BH CV ECHO MEAS - SV(RVOT): 71.1 ML
BH CV ECHO MEAS - SV(TEICH): 49.4 ML
BH CV ECHO MEAS - TR MAX VEL: 285.5 CM/SEC
BH CV XLRA MEAS - DIST GSV CALF DIST LEFT: 0.13 CM
BH CV XLRA MEAS - DIST GSV CALF DIST RIGHT: 0.28 CM
BH CV XLRA MEAS - DIST GSV THIGH DIST LEFT: 0.22 CM
BH CV XLRA MEAS - MID GSV CALF LEFT: 0.12 CM
BH CV XLRA MEAS - MID GSV THIGH  LEFT: 0.24 CM
BH CV XLRA MEAS - PROX GSV CALF DIST LEFT: 0.21 CM
BH CV XLRA MEAS - PROX GSV THIGH  LEFT: 0.38 CM
BH CV XLRA MEAS - PROX GSV THIGH  RIGHT: 0.3 CM
BH CV XLRA MEAS LEFT CCA RATIO VEL: 72.7 CM/SEC
BH CV XLRA MEAS LEFT DIST CCA EDV: 15.5 CM/SEC
BH CV XLRA MEAS LEFT DIST CCA PSV: 63.4 CM/SEC
BH CV XLRA MEAS LEFT DIST ICA EDV: -14.9 CM/SEC
BH CV XLRA MEAS LEFT DIST ICA PSV: -64 CM/SEC
BH CV XLRA MEAS LEFT ICA RATIO VEL: 89.5 CM/SEC
BH CV XLRA MEAS LEFT ICA/CCA RATIO: 1.2
BH CV XLRA MEAS LEFT PROX CCA EDV: 12.4 CM/SEC
BH CV XLRA MEAS LEFT PROX CCA PSV: 72.7 CM/SEC
BH CV XLRA MEAS LEFT PROX ECA PSV: -149 CM/SEC
BH CV XLRA MEAS LEFT PROX ICA EDV: 15.5 CM/SEC
BH CV XLRA MEAS LEFT PROX ICA PSV: 89.5 CM/SEC
BH CV XLRA MEAS LEFT PROX SCLA PSV: 178 CM/SEC
BH CV XLRA MEAS LEFT VERTEBRAL A EDV: 10.6 CM/SEC
BH CV XLRA MEAS LEFT VERTEBRAL A PSV: 39.8 CM/SEC
BH CV XLRA MEAS RIGHT CCA RATIO VEL: 67.7 CM/SEC
BH CV XLRA MEAS RIGHT DIST CCA EDV: 11.8 CM/SEC
BH CV XLRA MEAS RIGHT DIST CCA PSV: 67.7 CM/SEC
BH CV XLRA MEAS RIGHT DIST ICA EDV: -13.7 CM/SEC
BH CV XLRA MEAS RIGHT DIST ICA PSV: -71.3 CM/SEC
BH CV XLRA MEAS RIGHT ICA RATIO VEL: -71.3 CM/SEC
BH CV XLRA MEAS RIGHT ICA/CCA RATIO: -1.1
BH CV XLRA MEAS RIGHT PROX CCA EDV: -7.4 CM/SEC
BH CV XLRA MEAS RIGHT PROX CCA PSV: -64.1 CM/SEC
BH CV XLRA MEAS RIGHT PROX ECA PSV: -121 CM/SEC
BH CV XLRA MEAS RIGHT PROX ICA EDV: -10.6 CM/SEC
BH CV XLRA MEAS RIGHT PROX ICA PSV: -64.3 CM/SEC
BH CV XLRA MEAS RIGHT PROX SCLA PSV: 111 CM/SEC
BH CV XLRA MEAS RIGHT VERTEBRAL A EDV: 8.6 CM/SEC
BH CV XLRA MEAS RIGHT VERTEBRAL A PSV: 43.5 CM/SEC
BUN SERPL-MCNC: 9 MG/DL (ref 8–23)
BUN/CREAT SERPL: 12 (ref 7–25)
CALCIUM SPEC-SCNC: 9.1 MG/DL (ref 8.6–10.5)
CHLORIDE SERPL-SCNC: 100 MMOL/L (ref 98–107)
CO2 SERPL-SCNC: 26 MMOL/L (ref 22–29)
CREAT SERPL-MCNC: 0.75 MG/DL (ref 0.57–1)
DEPRECATED RDW RBC AUTO: 44.2 FL (ref 37–54)
EOSINOPHIL # BLD AUTO: 0.2 10*3/MM3 (ref 0–0.4)
EOSINOPHIL NFR BLD AUTO: 2.6 % (ref 0.3–6.2)
ERYTHROCYTE [DISTWIDTH] IN BLOOD BY AUTOMATED COUNT: 14.2 % (ref 12.3–15.4)
GFR SERPL CREATININE-BSD FRML MDRD: 74 ML/MIN/1.73
GLUCOSE SERPL-MCNC: 91 MG/DL (ref 65–99)
HCT VFR BLD AUTO: 38.4 % (ref 34–46.6)
HGB BLD-MCNC: 13.1 G/DL (ref 12–15.9)
INR PPP: 1.73 (ref 2–3)
LEFT ARM BP: NORMAL MMHG
LYMPHOCYTES # BLD AUTO: 2 10*3/MM3 (ref 0.7–3.1)
LYMPHOCYTES NFR BLD AUTO: 33.4 % (ref 19.6–45.3)
MAGNESIUM SERPL-MCNC: 1.9 MG/DL (ref 1.6–2.4)
MCH RBC QN AUTO: 30.4 PG (ref 26.6–33)
MCHC RBC AUTO-ENTMCNC: 34.2 G/DL (ref 31.5–35.7)
MCV RBC AUTO: 88.9 FL (ref 79–97)
MONOCYTES # BLD AUTO: 0.5 10*3/MM3 (ref 0.1–0.9)
MONOCYTES NFR BLD AUTO: 8.7 % (ref 5–12)
NEUTROPHILS NFR BLD AUTO: 3.2 10*3/MM3 (ref 1.7–7)
NEUTROPHILS NFR BLD AUTO: 54.5 % (ref 42.7–76)
NRBC BLD AUTO-RTO: 0.2 /100 WBC (ref 0–0.2)
PLATELET # BLD AUTO: 138 10*3/MM3 (ref 140–450)
PMV BLD AUTO: 9.6 FL (ref 6–12)
POTASSIUM SERPL-SCNC: 3.6 MMOL/L (ref 3.5–5.2)
PROTHROMBIN TIME: 18.5 SECONDS (ref 19.4–28.5)
RBC # BLD AUTO: 4.32 10*6/MM3 (ref 3.77–5.28)
RIGHT ARM BP: NORMAL MMHG
SODIUM SERPL-SCNC: 137 MMOL/L (ref 136–145)
WBC # BLD AUTO: 5.9 10*3/MM3 (ref 3.4–10.8)

## 2021-04-21 PROCEDURE — 99152 MOD SED SAME PHYS/QHP 5/>YRS: CPT | Performed by: INTERNAL MEDICINE

## 2021-04-21 PROCEDURE — 99233 SBSQ HOSP IP/OBS HIGH 50: CPT | Performed by: INTERNAL MEDICINE

## 2021-04-21 PROCEDURE — C1894 INTRO/SHEATH, NON-LASER: HCPCS | Performed by: INTERNAL MEDICINE

## 2021-04-21 PROCEDURE — 25010000002 MIDAZOLAM PER 1 MG: Performed by: INTERNAL MEDICINE

## 2021-04-21 PROCEDURE — 85025 COMPLETE CBC W/AUTO DIFF WBC: CPT | Performed by: PHYSICIAN ASSISTANT

## 2021-04-21 PROCEDURE — 25010000002 DIPHENHYDRAMINE PER 50 MG: Performed by: INTERNAL MEDICINE

## 2021-04-21 PROCEDURE — 99222 1ST HOSP IP/OBS MODERATE 55: CPT | Performed by: THORACIC SURGERY (CARDIOTHORACIC VASCULAR SURGERY)

## 2021-04-21 PROCEDURE — 93306 TTE W/DOPPLER COMPLETE: CPT | Performed by: INTERNAL MEDICINE

## 2021-04-21 PROCEDURE — 93306 TTE W/DOPPLER COMPLETE: CPT

## 2021-04-21 PROCEDURE — 4A023N8 MEASUREMENT OF CARDIAC SAMPLING AND PRESSURE, BILATERAL, PERCUTANEOUS APPROACH: ICD-10-PCS | Performed by: INTERNAL MEDICINE

## 2021-04-21 PROCEDURE — 85610 PROTHROMBIN TIME: CPT | Performed by: PHYSICIAN ASSISTANT

## 2021-04-21 PROCEDURE — 93459 L HRT ART/GRFT ANGIO: CPT | Performed by: INTERNAL MEDICINE

## 2021-04-21 PROCEDURE — 83735 ASSAY OF MAGNESIUM: CPT | Performed by: PHYSICIAN ASSISTANT

## 2021-04-21 PROCEDURE — 0 IOPAMIDOL PER 1 ML: Performed by: INTERNAL MEDICINE

## 2021-04-21 PROCEDURE — 25010000002 FENTANYL CITRATE (PF) 100 MCG/2ML SOLUTION: Performed by: INTERNAL MEDICINE

## 2021-04-21 PROCEDURE — 99232 SBSQ HOSP IP/OBS MODERATE 35: CPT | Performed by: HOSPITALIST

## 2021-04-21 PROCEDURE — 93880 EXTRACRANIAL BILAT STUDY: CPT

## 2021-04-21 PROCEDURE — 99153 MOD SED SAME PHYS/QHP EA: CPT | Performed by: INTERNAL MEDICINE

## 2021-04-21 PROCEDURE — 93970 EXTREMITY STUDY: CPT

## 2021-04-21 PROCEDURE — B2151ZZ FLUOROSCOPY OF LEFT HEART USING LOW OSMOLAR CONTRAST: ICD-10-PCS | Performed by: INTERNAL MEDICINE

## 2021-04-21 PROCEDURE — B2111ZZ FLUOROSCOPY OF MULTIPLE CORONARY ARTERIES USING LOW OSMOLAR CONTRAST: ICD-10-PCS | Performed by: INTERNAL MEDICINE

## 2021-04-21 PROCEDURE — 80048 BASIC METABOLIC PNL TOTAL CA: CPT | Performed by: PHYSICIAN ASSISTANT

## 2021-04-21 PROCEDURE — C1769 GUIDE WIRE: HCPCS | Performed by: INTERNAL MEDICINE

## 2021-04-21 PROCEDURE — 25010000002 METHYLPREDNISOLONE PER 125 MG: Performed by: INTERNAL MEDICINE

## 2021-04-21 RX ORDER — ACETAMINOPHEN 325 MG/1
650 TABLET ORAL EVERY 4 HOURS PRN
Status: DISCONTINUED | OUTPATIENT
Start: 2021-04-21 | End: 2021-04-22 | Stop reason: HOSPADM

## 2021-04-21 RX ORDER — SODIUM CHLORIDE 9 MG/ML
250 INJECTION, SOLUTION INTRAVENOUS ONCE AS NEEDED
Status: DISCONTINUED | OUTPATIENT
Start: 2021-04-21 | End: 2021-04-22 | Stop reason: HOSPADM

## 2021-04-21 RX ORDER — FENTANYL CITRATE 50 UG/ML
INJECTION, SOLUTION INTRAMUSCULAR; INTRAVENOUS AS NEEDED
Status: DISCONTINUED | OUTPATIENT
Start: 2021-04-21 | End: 2021-04-21 | Stop reason: HOSPADM

## 2021-04-21 RX ORDER — SODIUM CHLORIDE 9 MG/ML
75 INJECTION, SOLUTION INTRAVENOUS CONTINUOUS
Status: DISCONTINUED | OUTPATIENT
Start: 2021-04-21 | End: 2021-04-22

## 2021-04-21 RX ORDER — LIDOCAINE HYDROCHLORIDE 20 MG/ML
INJECTION, SOLUTION INFILTRATION; PERINEURAL AS NEEDED
Status: DISCONTINUED | OUTPATIENT
Start: 2021-04-21 | End: 2021-04-21 | Stop reason: HOSPADM

## 2021-04-21 RX ORDER — FENTANYL CITRATE 50 UG/ML
25 INJECTION, SOLUTION INTRAMUSCULAR; INTRAVENOUS ONCE
Status: COMPLETED | OUTPATIENT
Start: 2021-04-21 | End: 2021-04-21

## 2021-04-21 RX ORDER — METHYLPREDNISOLONE SODIUM SUCCINATE 125 MG/2ML
INJECTION, POWDER, LYOPHILIZED, FOR SOLUTION INTRAMUSCULAR; INTRAVENOUS
Status: DISPENSED
Start: 2021-04-21 | End: 2021-04-21

## 2021-04-21 RX ORDER — MIDAZOLAM HYDROCHLORIDE 1 MG/ML
INJECTION INTRAMUSCULAR; INTRAVENOUS AS NEEDED
Status: DISCONTINUED | OUTPATIENT
Start: 2021-04-21 | End: 2021-04-21 | Stop reason: HOSPADM

## 2021-04-21 RX ORDER — NITROGLYCERIN 20 MG/100ML
10-50 INJECTION INTRAVENOUS
Status: DISCONTINUED | OUTPATIENT
Start: 2021-04-21 | End: 2021-04-22

## 2021-04-21 RX ORDER — DIPHENHYDRAMINE HYDROCHLORIDE 50 MG/ML
INJECTION INTRAMUSCULAR; INTRAVENOUS
Status: DISPENSED
Start: 2021-04-21 | End: 2021-04-21

## 2021-04-21 RX ORDER — SODIUM CHLORIDE 9 MG/ML
INJECTION, SOLUTION INTRAVENOUS CONTINUOUS PRN
Status: COMPLETED | OUTPATIENT
Start: 2021-04-21 | End: 2021-04-21

## 2021-04-21 RX ORDER — METHYLPREDNISOLONE SODIUM SUCCINATE 125 MG/2ML
125 INJECTION, POWDER, LYOPHILIZED, FOR SOLUTION INTRAMUSCULAR; INTRAVENOUS ONCE
Status: COMPLETED | OUTPATIENT
Start: 2021-04-21 | End: 2021-04-21

## 2021-04-21 RX ORDER — DIPHENHYDRAMINE HYDROCHLORIDE 50 MG/ML
50 INJECTION INTRAMUSCULAR; INTRAVENOUS ONCE
Status: COMPLETED | OUTPATIENT
Start: 2021-04-21 | End: 2021-04-21

## 2021-04-21 RX ADMIN — FENTANYL CITRATE 25 MCG: 50 INJECTION, SOLUTION INTRAMUSCULAR; INTRAVENOUS at 09:45

## 2021-04-21 RX ADMIN — Medication 10 ML: at 21:18

## 2021-04-21 RX ADMIN — METHYLPREDNISOLONE SODIUM SUCCINATE 125 MG: 125 INJECTION, POWDER, FOR SOLUTION INTRAMUSCULAR; INTRAVENOUS at 08:12

## 2021-04-21 RX ADMIN — CETIRIZINE HYDROCHLORIDE 10 MG: 10 TABLET, FILM COATED ORAL at 07:52

## 2021-04-21 RX ADMIN — Medication 10 ML: at 07:52

## 2021-04-21 RX ADMIN — DILTIAZEM HYDROCHLORIDE 120 MG: 120 CAPSULE, EXTENDED RELEASE ORAL at 07:52

## 2021-04-21 RX ADMIN — SPIRONOLACTONE 12.5 MG: 25 TABLET ORAL at 16:20

## 2021-04-21 RX ADMIN — HYDROCODONE BITARTRATE AND ACETAMINOPHEN 1 TABLET: 7.5; 325 TABLET ORAL at 22:05

## 2021-04-21 RX ADMIN — HYDROCODONE BITARTRATE AND ACETAMINOPHEN 1 TABLET: 7.5; 325 TABLET ORAL at 14:16

## 2021-04-21 RX ADMIN — ALPRAZOLAM 0.5 MG: 0.5 TABLET ORAL at 21:26

## 2021-04-21 RX ADMIN — HYDROCODONE BITARTRATE AND ACETAMINOPHEN 1 TABLET: 7.5; 325 TABLET ORAL at 03:32

## 2021-04-21 RX ADMIN — ATORVASTATIN CALCIUM 10 MG: 10 TABLET, FILM COATED ORAL at 21:18

## 2021-04-21 RX ADMIN — SODIUM CHLORIDE 75 ML/HR: 9 INJECTION, SOLUTION INTRAVENOUS at 16:20

## 2021-04-21 RX ADMIN — DIPHENHYDRAMINE HYDROCHLORIDE 50 MG: 50 INJECTION, SOLUTION INTRAMUSCULAR; INTRAVENOUS at 08:11

## 2021-04-21 NOTE — CONSULTS
Patient Care Team:  Nava Yu MD as PCP - Ivan Durand MD as Consulting Physician (Cardiology)  Referring Provider:  Dr. Martlel  Reason for consultation:  CAD, s/p CABG    Chief complaint:  Chest pressure & fullness    Subjective     History of Present Illness:  84 y/o woman presented to [facility] ED with c/o chest pressure & fullness that awoke her from sleeping.  She stayed at home until it became constant.  In hindsight, she reports intermittent pressure & fullness for a couple weeks.  Associated symptoms included mild SOA, palpitations, & fatigue.  Rest had been alleviating factor but that was no longer working.  She ruled out for MI.  In Aug 2017, she underwent CABG x5 and maze procedure by Dr. Brandt.  PMHx includes:  Atrial fibrillation, chronic anticoagulation with warfarin, anxiety, HLD, HTN, esophageal stricture--s/p dilatation, and ENRIQUE.  Dr. Martell was consulted after high risk stress test.  Cardiac cath today revealed severe CAD, 3 of 4 grafts occluded and normal LV function (60%).  Dr. Brandt was asked to evaluate her for re-do CABG.    Review of Systems   Constitutional: Positive for fatigue. Negative for fever.   HENT: Negative for postnasal drip and rhinorrhea.    Respiratory: Positive for shortness of breath. Negative for cough.    Cardiovascular: Positive for chest pain and palpitations. Negative for leg swelling.   Gastrointestinal: Negative for abdominal pain.   Musculoskeletal: Positive for arthralgias.   Skin: Negative for rash and wound.   Allergic/Immunologic: Negative for immunocompromised state.   Neurological: Negative for dizziness, seizures, speech difficulty, weakness, light-headedness, numbness and headaches.   Hematological: Bruises/bleeds easily.   Psychiatric/Behavioral: Positive for sleep disturbance. The patient is not nervous/anxious.         Past Medical History:   Diagnosis Date    Anxiety     Arthritis     Atrial fibrillation (CMS/HCC)     Coronary artery disease      Dyslipidemia     GERD (gastroesophageal reflux disease)     History of bone density study 04/2015    Hypertension     Sleep apnea     Wears dentures      Past Surgical History:   Procedure Laterality Date    BLADDER REPAIR  1990    BREAST LUMPECTOMY  1974    BENIGN    CARDIAC CATHETERIZATION  05/25/2011    CARDIOVASCULAR STRESS TEST  05/04/2015    CHOLECYSTECTOMY  1990    CORONARY ARTERY BYPASS GRAFT  08/09/2017    X5  Dr Brandt    ENDOSCOPY N/A 6/30/2020    Procedure: ESOPHAGOGASTRODUODENOSCOPY with biopsy x 1 area and dilatation (15-18mm balloon) up to 18mm;  Surgeon: Quinton Tapia MD;  Location: [facility] ENDOSCOPY;  Service: Gastroenterology;  Laterality: N/A;  post op: gastritis, large hiatal hernia, esophageal dysmotility    HYSTERECTOMY  1990    JOINT REPLACEMENT Right     knee     OTHER SURGICAL HISTORY  06/2017    BLOOD CLOT REMOVED FROM LEFT EAR    PACEMAKER IMPLANTATION  04/18/2016    DUAL CHAMBER [facility]     Family History   Problem Relation Age of Onset    Hypertension Mother     Heart disease Sister         PACEMAKER     Social History     Tobacco Use    Smoking status: Never Smoker    Smokeless tobacco: Never Used   Substance Use Topics    Alcohol use: No    Drug use: No     Medications Prior to Admission   Medication Sig Dispense Refill Last Dose    ALPRAZolam (XANAX) 0.5 MG tablet Take 0.5 mg by mouth 2 (Two) Times a Day As Needed.  5     atorvastatin (LIPITOR) 10 MG tablet Take 10 mg by mouth Every Night.   4/17/2021 at Unknown time    carboxymethylcellulose (REFRESH PLUS) 0.5 % solution Administer 1 drop to both eyes 3 (Three) Times a Day As Needed for Dry Eyes.       cetirizine (zyrTEC) 10 MG tablet Take 10 mg by mouth Daily.       dilTIAZem (TIAZAC) 120 MG 24 hr capsule Take 1 capsule by mouth Daily. 90 capsule 1 4/18/2021 at 0800    diphenhydrAMINE HCl (BENADRYL ALLERGY PO) Take  by mouth As Needed.       HYDROcodone-acetaminophen (NORCO) 7.5-325 MG per tablet Take 1 tablet by mouth Every 8  "(Eight) Hours As Needed.  0     magnesium oxide (MAG-OX) 400 MG tablet Take 400 mg by mouth Daily.   4/17/2021 at Unknown time    metoprolol tartrate (LOPRESSOR) 100 MG tablet Take 1 tablet by mouth 2 (Two) Times a Day. 180 tablet 1 4/18/2021 at 0800    Multiple Vitamins-Minerals (VITEYES AREDS FORMULA) capsule Take 1 capsule by mouth 2 (two) times a day.   4/18/2021 at 0800    ondansetron (ZOFRAN) 4 MG tablet Take 4 mg by mouth Daily As Needed for Nausea or Vomiting.       pantoprazole (PROTONIX) 40 MG EC tablet TK 1 T PO QD  5 4/18/2021 at 0800    Phenylephrine-DM-GG (TUSSIN CF COUGH & COLD PO) Take  by mouth As Needed.       spironolactone (ALDACTONE) 25 MG tablet TAKE ONE-HALF TABLET BY MOUTH DAILY (Patient taking differently: Take 12.5 mg by mouth Every Evening.) 45 tablet 3 4/17/2021 at Unknown time    warfarin (COUMADIN) 2 MG tablet Take 2 mg by mouth 3 (Three) Times a Week. Mon, Wed, Fri   Past Week at Unknown time    warfarin (COUMADIN) 4 MG tablet TAKE 1 TABLET BY MOUTH DAILY EXCEPT 1/2 TABLET ON WEDNESDAY OR AS DIRECTED (Patient taking differently: Take 4 mg by mouth 4 (Four) Times a Week. Tues, Thur, Sat, Sun) 90 tablet 0 4/17/2021 at Unknown time     atorvastatin, 10 mg, Oral, Nightly  cetirizine, 10 mg, Oral, Daily  dilTIAZem CD, 120 mg, Oral, Q24H  diphenhydrAMINE, , ,   methylPREDNISolone sodium succinate, , ,   pantoprazole, 40 mg, Oral, QAM AC  sodium chloride, 10 mL, Intravenous, Q12H  spironolactone, 12.5 mg, Oral, Q PM      Allergies:  Doxycycline, Celebrex [celecoxib], Contrast dye, Iodinated diagnostic agents, Nsaids, Other, and Sulfa antibiotics    Objective      Vital Signs  Temp:  [98.1 °F (36.7 °C)-98.2 °F (36.8 °C)] 98.1 °F (36.7 °C)  Heart Rate:  [] 94  Resp:  [16-18] 16  BP: (104-185)/() 144/80    Flowsheet Rows        First Filed Value   Admission Height  154.9 cm (61\") Documented at 04/18/2021 1622   Admission Weight  81.5 kg (179 lb 10.8 oz) Documented at 04/18/2021 1622 " "         154.9 cm (61\")    Physical Exam  Vitals and nursing note reviewed.   Constitutional:       General: She is awake.      Appearance: Normal appearance. She is well-developed and well-groomed. She is obese.      Comments: Seen in [facility] with Dr. Brandt and nurse   HENT:      Head: Normocephalic and atraumatic.      Nose: Nose normal.      Mouth/Throat:      Lips: Pink.      Mouth: Mucous membranes are moist.      Pharynx: Uvula midline.   Eyes:      General: Lids are normal. No scleral icterus.     Extraocular Movements: Extraocular movements intact.      Conjunctiva/sclera: Conjunctivae normal.      Pupils: Pupils are equal, round, and reactive to light.   Neck:      Thyroid: No thyroid mass or thyromegaly.      Vascular: Normal carotid pulses. No carotid bruit, hepatojugular reflux or JVD.      Trachea: Trachea normal.   Cardiovascular:      Rate and Rhythm: Normal rate and regular rhythm.      Pulses:           Carotid pulses are 1+ on the right side and 1+ on the left side.       Radial pulses are 1+ on the right side and 1+ on the left side.        Femoral pulses are 1+ on the right side and 1+ on the left side.       Popliteal pulses are 1+ on the right side and 1+ on the left side.        Dorsalis pedis pulses are 1+ on the right side and 1+ on the left side.        Posterior tibial pulses are 1+ on the right side and 1+ on the left side.      Heart sounds: Normal heart sounds. No murmur heard.        Comments: Right femoral cath site  Pulmonary:      Effort: Pulmonary effort is normal.      Breath sounds: Normal breath sounds.   Abdominal:      General: Abdomen is protuberant. Bowel sounds are normal. There is no distension or abdominal bruit.      Palpations: Abdomen is soft.      Tenderness: There is no abdominal tenderness.   Musculoskeletal:      Cervical back: Neck supple.   Lymphadenopathy:      Cervical: No cervical adenopathy.      Upper Body:      Right upper body: No supraclavicular adenopathy. "      Left upper body: No supraclavicular adenopathy.   Skin:     General: Skin is warm and dry.      Capillary Refill: Capillary refill takes less than 2 seconds.      Findings: No erythema or rash.      Nails: There is no clubbing.             Comments: Sternotomy & SVHS well healed.   Neurological:      Mental Status: She is alert and oriented to person, place, and time.      GCS: GCS eye subscore is 4. GCS verbal subscore is 5. GCS motor subscore is 6.   Psychiatric:         Attention and Perception: Attention and perception normal.         Mood and Affect: Mood and affect normal.         Speech: Speech normal.         Behavior: Behavior normal. Behavior is cooperative.         Thought Content: Thought content normal.         Cognition and Memory: Cognition and memory normal.         Judgment: Judgment normal.         Results Review:   Lab Results (last 24 hours)       Procedure Component Value Units Date/Time    Basic Metabolic Panel [[id number]]  (Normal) Collected: 04/21/21 0521    Specimen: Blood Updated: 04/21/21 0711     Glucose 91 mg/dL      BUN 9 mg/dL      Creatinine 0.75 mg/dL      Sodium 137 mmol/L      Potassium 3.6 mmol/L      Chloride 100 mmol/L      CO2 26.0 mmol/L      Calcium 9.1 mg/dL      eGFR Non African Amer 74 mL/min/1.73      BUN/Creatinine Ratio 12.0     Anion Gap 11.0 mmol/L     Narrative:      GFR Normal >60  Chronic Kidney Disease <60  Kidney Failure <15      Magnesium [[id number]]  (Normal) Collected: 04/21/21 0521    Specimen: Blood Updated: 04/21/21 0711     Magnesium 1.9 mg/dL     Protime-INR [[id number]]  (Abnormal) Collected: 04/21/21 0521    Specimen: Blood Updated: 04/21/21 0700     Protime 18.5 Seconds      INR 1.73    CBC & Differential [[id number]]  (Abnormal) Collected: 04/21/21 0521    Specimen: Blood Updated: 04/21/21 0646    Narrative:      The following orders were created for panel order CBC & Differential.  Procedure                               Abnormality         Status                      ---------                               -----------         ------                     CBC Auto Differential[[id number]]        Abnormal            Final result                 Please view results for these tests on the individual orders.    CBC Auto Differential [[id number]]  (Abnormal) Collected: 04/21/21 0521    Specimen: Blood Updated: 04/21/21 0646     WBC 5.90 10*3/mm3      RBC 4.32 10*6/mm3      Hemoglobin 13.1 g/dL      Hematocrit 38.4 %      MCV 88.9 fL      MCH 30.4 pg      MCHC 34.2 g/dL      RDW 14.2 %      RDW-SD 44.2 fl      MPV 9.6 fL      Platelets 138 10*3/mm3      Neutrophil % 54.5 %      Lymphocyte % 33.4 %      Monocyte % 8.7 %      Eosinophil % 2.6 %      Basophil % 0.8 %      Neutrophils, Absolute 3.20 10*3/mm3      Lymphocytes, Absolute 2.00 10*3/mm3      Monocytes, Absolute 0.50 10*3/mm3      Eosinophils, Absolute 0.20 10*3/mm3      Basophils, Absolute 0.00 10*3/mm3      nRBC 0.2 /100 WBC                 Assessment/Plan       Chest pain    Atrial fibrillation (CMS/HCC) [I48.91]    Coronary artery disease    Dyslipidemia    Hypertension    Presence of cardiac pacemaker    Anxiety associated with depression    Obesity (BMI 30-39.9)    Seasonal allergies    GERD without esophagitis    CKD (chronic kidney disease) stage 3, GFR 30-59 ml/min (CMS/HCC)    Chronic pain    Angina at rest (CMS/HCC)    Abnormal nuclear stress test      Assessment & Plan    Progressive CAD, s/p CABG x5 (2017)  Hx atrial fib, s/p Maze procedure with ORACIO ligation (2017)  Chronic anticoagulation d/t atrial fib--warfarin  HTN  HLD  S/P PPM, 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 Brandt reviewed films and op note with Dr. Martell.  Check vein study to see if she has usable conduit for reop CABG.  Carotid duplex ordered as well.  Full recs to follow when vascular studies are returned.  Dr. Brandt did recommend that she did not need warfarin d/t being in sinus rhythm  and having left atrial appendage ligated at time of CABG.    Thank you for allowing us to participate in the care of this patient.      Zaida BENNETTPatria SatrosalindaDaysi, APRN  04/21/21  13:41 EDT    **all problems new to this examiner  **EKG and CXR independently reviewed and interpreted    Addendum  Patient was seen and examined by me, I reviewed studies myself and discussed the findings with cardiology and the patient.  I agree with above note.  Patient is a status CABG and Maze procedure by me in the past and she comes back with chest pain, dyspnea and recurrent disease.  I think she is an acceptable risk for a surgical intervention however my concern is the quality of vein available and whether the right internal mammary artery could reach a low left lateral wall target.  She is in sinus rhythm and I will favor discontinuation of the Coumadin.  Will obtain preoperative studies and a vein mapping to address conduit availability.  If the conduit aches of good quality dense reoperative CABG will be my recommendation.  But if the quality of the vein is poor because there is no vein available, then I will favor multiple vessel PCI.  Michael Brandt MD

## 2021-04-21 NOTE — PROGRESS NOTES
NEPHROLOGY PROGRESS NOTE    PATIENT IDENTIFICATION:   Name:  Syl Herrera      MRN:  [id number]     85 y.o.  female             Reason for visit: CKD     SUBJECTIVE:   Seen and examined.  Feeling better.  No shortness of air or chest pain.  OBJECTIVE:  Vitals:    04/20/21 1243 04/20/21 1723 04/20/21 2110 04/21/21 0319   BP: 125/80 134/79 104/69 133/78   BP Location:   Left arm Left arm   Patient Position:   Lying Lying   Pulse: 87 70 91 99   Resp: 18  18 16   Temp: 97.8 °F (36.6 °C)  98.2 °F (36.8 °C) 98.1 °F (36.7 °C)   TempSrc: Oral  Oral Oral   SpO2: 97%  92% 94%   Weight:    77.1 kg (169 lb 14.4 oz)   Height:               Body mass index is 32.1 kg/m².    Intake/Output Summary (Last 24 hours) at 4/21/2021 0752  Last data filed at 4/20/2021 1243  Gross per 24 hour   Intake 360 ml   Output --   Net 360 ml         Exam:  GEN:  No distress, appears stated age  EYES:   Anicteric sclera  ENT:    External ears/nose normal, MM are moist  NECK:  No adenopathy, JVP none  LUNGS: Normal chest on inspection; not labored  CV:  Normal S1S2, without murmur  ABD:  Non-tender, non-distended, no hepatosplenomegaly, +BS  EXT:  No edema; no cyanosis; clubbing    Scheduled meds:  atorvastatin, 10 mg, Oral, Nightly  cetirizine, 10 mg, Oral, Daily  dilTIAZem CD, 120 mg, Oral, Q24H  pantoprazole, 40 mg, Oral, QAM AC  sodium chloride, 10 mL, Intravenous, Q12H  spironolactone, 12.5 mg, Oral, Q PM      IV meds:                      Pharmacy to dose warfarin,   sodium chloride, 1-3 mL/kg/hr, Last Rate: 3 mL/kg/hr (04/21/21 0537)  sodium chloride, 75 mL/hr, Last Rate: 75 mL/hr (04/20/21 2330)        Data Review:    Results from last 7 days   Lab Units 04/21/21  0521 04/20/21  1518 04/20/21  0342 04/18/21  1924 04/18/21  1655   SODIUM mmol/L 137 134* 135*   < > 135*   POTASSIUM mmol/L 3.6 3.6 3.6  --  4.4   CHLORIDE mmol/L 100 97* 98   < > 98   CO2 mmol/L 26.0 27.0 28.0   < > 24.0   BUN mg/dL 9 13 13   < > 17   CREATININE mg/dL 0.75 0.92  0.98   < > 1.02*   CALCIUM mg/dL 9.1 9.1 9.4   < > 8.8   BILIRUBIN mg/dL  --   --   --   --  1.4*   ALK PHOS U/L  --   --   --   --  126*   ALT (SGPT) U/L  --   --   --   --  21   AST (SGOT) U/L  --   --   --   --  30   GLUCOSE mg/dL 91 145* 93   < > 118*    < > = values in this interval not displayed.       Estimated Creatinine Clearance: 50 mL/min (by C-G formula based on SCr of 0.75 mg/dL).    Results from last 7 days   Lab Units 04/21/21  0521 04/20/21  0342 04/19/21  0345   MAGNESIUM mg/dL 1.9 1.9 2.0       Results from last 7 days   Lab Units 04/21/21  0521 04/20/21  1518 04/20/21  0342 04/19/21  0345 04/18/21  1655   WBC 10*3/mm3 5.90 6.50 7.00 6.20 5.70   HEMOGLOBIN g/dL 13.1 13.0 12.5 12.3 13.3   PLATELETS 10*3/mm3 138* 135* 128* 130* 142       Results from last 7 days   Lab Units 04/21/21  0521 04/20/21  0343 04/19/21  0345 04/18/21  1655 04/15/21  1401   INR  1.73* 2.06 1.67* 1.50* 1.40*             ASSESSMENT:     Chest pain    Atrial fibrillation (CMS/HCC) [I48.91]    Coronary artery disease    Dyslipidemia    Hypertension    Presence of cardiac pacemaker    Anxiety associated with depression    Obesity (BMI 30-39.9)    Seasonal allergies    GERD without esophagitis    CKD (chronic kidney disease) stage 3, GFR 30-59 ml/min (CMS/HCC)    Chronic pain    Angina at rest (CMS/HCC)    Abnormal nuclear stress test        - CKD III III: Risk of contrast-induced vomiting is low.  Agree with proceeding with cardiac catheter.    Start IV fluids today before her cardiac cath     -Chest pain: Positive stress test.  Plan for the catheter today  -History of coronary artery disease  -Hypertension: Blood pressure much better since admission.  We'll continue to monitor  - A-Fib  -Dyslipidemia      David Charles MD  4/21/2021    07:52 EDT

## 2021-04-21 NOTE — PROGRESS NOTES
[facility] Medicine Services Daily Progress Note      Hospitalist Team  LOS 1 days      Patient Care Team:  Nava Yu MD as PCP - General  Ivan Martell MD as Consulting Physician (Cardiology)    Patient Location: 2202/1      Subjective   Subjective   Patient denies for any chest pain, no nausea or vomiting, no chest pain..  Chief Complaint / Subjective  Chief Complaint   Patient presents with   • Chest Pain         Brief Synopsis of Hospital Course/HPI    Ms. Herrera is a 85 y.o. female past medical history of seasonal allergies, A. fib, obesity, hypertension, GERD, hyperlipidemia, CAD and CKD who presents to [facility] complaining of chest pressure.  Patient reports she was woken from sleep at approximately 0400 hrs. on 04/18/2021 with a pressure across the anterior portion of her chest.  Pain remained intermittent throughout the day until approximately 1300 hrs. when it became constant and worsening rated at 6/10 at its worst without obvious provoking or palliative factors.  Some mild dyspnea as well as palpitations have also been reported.  She denies any diaphoresis, nausea or vomiting, fever, syncope or near syncope.  Patient does not smoke or drink alcohol and confirms compliance with all of her outpatient medical therapies.     In the ED patient did have lab significant for troponin of less than 0.010, proBNP: 1593.0, creatinine: 1.02 with a BUN of 17 and a GFR 52, remainder of CMP and CBC was generally unremarkable.  INR: 1.50, PT: 16.2.  Chest x-ray shows cardiomegaly with possible hiatal hernia.  EKG shows an atrial paced rhythm with 1 PVC but no obvious acute ST changes and a QTC of 459 ms.      Date::          ROS      Objective   Objective      Vital Signs  Temp:  [97.6 °F (36.4 °C)-98.2 °F (36.8 °C)] 97.6 °F (36.4 °C)  Heart Rate:  [] 89  Resp:  [16-18] 16  BP: (104-185)/() 135/66  Oxygen Therapy  SpO2: 97 %  Pulse Oximetry Type:  "Intermittent  Device (Oxygen Therapy): room air  Flowsheet Rows      First Filed Value   Admission Height  154.9 cm (61\") Documented at 04/18/2021 1622   Admission Weight  81.5 kg (179 lb 10.8 oz) Documented at 04/18/2021 1622        Intake & Output (last 3 days)       04/18 0701 - 04/19 0700 04/19 0701 - 04/20 0700 04/20 0701 - 04/21 0700 04/21 0701 - 04/22 0700    P.O.  960 360     Total Intake(mL/kg)  960 (11.8) 360 (4.7)     Net  +960 +360             Urine Unmeasured Occurrence  3 x 3 x 1 x        Lines, Drains & Airways    Active LDAs     None                  Physical Exam:    Physical Exam  Vitals and nursing note reviewed.   Constitutional:       General: She is not in acute distress.     Appearance: Normal appearance. She is well-developed. She is not ill-appearing, toxic-appearing or diaphoretic.   HENT:      Head: Normocephalic and atraumatic.      Right Ear: Ear canal and external ear normal.      Left Ear: Ear canal and external ear normal.      Nose: Nose normal. No congestion or rhinorrhea.      Mouth/Throat:      Mouth: Mucous membranes are moist.      Pharynx: No oropharyngeal exudate.   Eyes:      General: No scleral icterus.        Right eye: No discharge.         Left eye: No discharge.      Extraocular Movements: Extraocular movements intact.      Conjunctiva/sclera: Conjunctivae normal.      Pupils: Pupils are equal, round, and reactive to light.   Neck:      Thyroid: No thyromegaly.      Vascular: No carotid bruit or JVD.      Trachea: No tracheal deviation.   Cardiovascular:      Rate and Rhythm: Normal rate and regular rhythm.      Pulses: Normal pulses.      Heart sounds: Normal heart sounds. No murmur heard.   No friction rub. No gallop.    Pulmonary:      Effort: Pulmonary effort is normal. No respiratory distress.      Breath sounds: Normal breath sounds. No stridor. No wheezing, rhonchi or rales.   Chest:      Chest wall: No tenderness.   Abdominal:      General: Bowel sounds are " normal. There is no distension.      Palpations: Abdomen is soft. There is no mass.      Tenderness: There is no abdominal tenderness. There is no guarding or rebound.      Hernia: No hernia is present.   Musculoskeletal:         General: No swelling, tenderness, deformity or signs of injury. Normal range of motion.      Cervical back: Normal range of motion and neck supple. No rigidity. No muscular tenderness.      Right lower leg: No edema.      Left lower leg: No edema.   Lymphadenopathy:      Cervical: No cervical adenopathy.   Skin:     General: Skin is warm and dry.      Coloration: Skin is not jaundiced or pale.      Findings: No bruising, erythema or rash.   Neurological:      General: No focal deficit present.      Mental Status: She is alert and oriented to person, place, and time. Mental status is at baseline.      Cranial Nerves: No cranial nerve deficit.      Sensory: No sensory deficit.      Motor: No weakness or abnormal muscle tone.      Coordination: Coordination normal.   Psychiatric:         Mood and Affect: Mood normal.         Behavior: Behavior normal.         Thought Content: Thought content normal.         Judgment: Judgment normal.               Procedures:              Results Review:     I reviewed the patient's new clinical results.      Lab Results (last 24 hours)     Procedure Component Value Units Date/Time    Basic Metabolic Panel [[id number]]  (Normal) Collected: 04/21/21 0521    Specimen: Blood Updated: 04/21/21 0711     Glucose 91 mg/dL      BUN 9 mg/dL      Creatinine 0.75 mg/dL      Sodium 137 mmol/L      Potassium 3.6 mmol/L      Chloride 100 mmol/L      CO2 26.0 mmol/L      Calcium 9.1 mg/dL      eGFR Non African Amer 74 mL/min/1.73      BUN/Creatinine Ratio 12.0     Anion Gap 11.0 mmol/L     Narrative:      GFR Normal >60  Chronic Kidney Disease <60  Kidney Failure <15      Magnesium [[id number]]  (Normal) Collected: 04/21/21 0521    Specimen: Blood Updated: 04/21/21 0711      Magnesium 1.9 mg/dL     Protime-INR [[id number]]  (Abnormal) Collected: 04/21/21 0521    Specimen: Blood Updated: 04/21/21 0700     Protime 18.5 Seconds      INR 1.73    CBC & Differential [[id number]]  (Abnormal) Collected: 04/21/21 0521    Specimen: Blood Updated: 04/21/21 0646    Narrative:      The following orders were created for panel order CBC & Differential.  Procedure                               Abnormality         Status                     ---------                               -----------         ------                     CBC Auto Differential[[id number]]        Abnormal            Final result                 Please view results for these tests on the individual orders.    CBC Auto Differential [[id number]]  (Abnormal) Collected: 04/21/21 0521    Specimen: Blood Updated: 04/21/21 0646     WBC 5.90 10*3/mm3      RBC 4.32 10*6/mm3      Hemoglobin 13.1 g/dL      Hematocrit 38.4 %      MCV 88.9 fL      MCH 30.4 pg      MCHC 34.2 g/dL      RDW 14.2 %      RDW-SD 44.2 fl      MPV 9.6 fL      Platelets 138 10*3/mm3      Neutrophil % 54.5 %      Lymphocyte % 33.4 %      Monocyte % 8.7 %      Eosinophil % 2.6 %      Basophil % 0.8 %      Neutrophils, Absolute 3.20 10*3/mm3      Lymphocytes, Absolute 2.00 10*3/mm3      Monocytes, Absolute 0.50 10*3/mm3      Eosinophils, Absolute 0.20 10*3/mm3      Basophils, Absolute 0.00 10*3/mm3      nRBC 0.2 /100 WBC         No results found for: HGBA1C  Results from last 7 days   Lab Units 04/21/21  0521 04/20/21  0343 04/19/21  0345   INR  1.73* 2.06 1.67*           No results found for: LIPASE  Lab Results   Component Value Date    CHOL 88 04/19/2021    TRIG 55 04/19/2021    HDL 46 04/19/2021    LDL 29 04/19/2021       Lab Results   Lab Value Date/Time    FINALDX  06/30/2020 1035     Stomach, biopsy:    Reactive gastropathy with congestion and focal minimal superficial erosion    Negative for Helicobacter pylori on immunohistochemistry stain (control reacts appropriately)        RAY/sms          Microbiology Results (last 10 days)     Procedure Component Value - Date/Time    COVID PRE-OP / PRE-PROCEDURE SCREENING ORDER (NO ISOLATION) - Swab, Nasopharynx [[id number]]  (Normal) Collected: 04/18/21 1757    Lab Status: Final result Specimen: Swab from Nasopharynx Updated: 04/19/21 1439    Narrative:      The following orders were created for panel order COVID PRE-OP / PRE-PROCEDURE SCREENING ORDER (NO ISOLATION) - Swab, Nasopharynx.  Procedure                               Abnormality         Status                     ---------                               -----------         ------                     COVID-19,APTIMA PANTHER,...[[id number]]  Normal              Final result                 Please view results for these tests on the individual orders.    COVID-19,APTIMA PANTHER,[facility] IN-HOUSE, NP/OP SWAB IN UTM/VTM/SALINE TRANSPORT MEDIA,24 HR TAT - Swab, Nasopharynx [[id number]]  (Normal) Collected: 04/18/21 1757    Lab Status: Final result Specimen: Swab from Nasopharynx Updated: 04/19/21 1439     COVID19 Not Detected    Narrative:      Fact sheet for providers: https://www.fda.gov/media/197255/download     Fact sheet for patients: https://www.fda.gov/media/981196/download    Test performed by RT PCR.          ECG/EMG Results (most recent)     Procedure Component Value Units Date/Time    ECG 12 Lead [[id number]] Collected: 04/18/21 1636     Updated: 04/20/21 1736     QT Interval 430 ms     Narrative:      HEART RATE= 68  bpm  RR Interval= 877  ms  NM Interval= 220  ms  P Horizontal Axis= -59  deg  P Front Axis=   deg  QRSD Interval= 94  ms  QT Interval= 430  ms  QRS Axis= -15  deg  T Wave Axis= 86  deg  - ABNORMAL ECG -  Atrial-paced complexes  Multiple ventricular premature complexes  Prolonged NM interval  When compared with ECG of 10-Feb-2018 21:27:37,  Electronically Signed By: Jorge Phillips (BENNY) 20-Apr-2021 17:35:49  Date and Time of Study: 2021-04-18 16:36:42    Adult Transthoracic  Echo Complete W/ Cont if Necessary Per Protocol [[id number]] Collected: 04/21/21 1108     Updated: 04/21/21 1211     BSA 1.8 m^2      RVIDd 2.2 cm      IVSd 1.1 cm      LVIDd 4.3 cm      LVIDs 3.0 cm      LVPWd 1.2 cm      IVS/LVPW 0.93     FS 31.3 %      EDV(Teich) 83.2 ml      ESV(Teich) 33.8 ml      EF(Teich) 59.4 %      EDV(cubed) 79.7 ml      ESV(cubed) 25.9 ml      EF(cubed) 67.5 %      LV mass(C)d 183.5 grams      LV mass(C)dI 104.3 grams/m^2      SV(Teich) 49.4 ml      SI(Teich) 28.1 ml/m^2      SV(cubed) 53.8 ml      SI(cubed) 30.6 ml/m^2      Ao root diam 3.2 cm      Ao root area 8.0 cm^2      ACS 1.8 cm      asc Aorta Diam 3.6 cm      LVOT diam 2.0 cm      LVOT area 3.0 cm^2      RVOT diam 2.5 cm      RVOT area 4.7 cm^2      EDV(MOD-sp4) 81.4 ml      ESV(MOD-sp4) 18.9 ml      EF(MOD-sp4) 76.8 %      SV(MOD-sp4) 62.5 ml      SI(MOD-sp4) 35.5 ml/m^2      Ao root area (BSA corrected) 1.8     LV Blue Vol (BSA corrected) 46.3 ml/m^2      LV Sys Vol (BSA corrected) 10.8 ml/m^2      MV E max jonatan 102.0 cm/sec      MV A max jonatan 60.5 cm/sec      MV E/A 1.7     MV V2 max 121.1 cm/sec      MV max PG 5.9 mmHg      MV V2 mean 79.9 cm/sec      MV mean PG 2.8 mmHg      MV V2 VTI 20.5 cm      MVA(VTI) 2.4 cm^2      MV dec slope 1,319 cm/sec^2      MV dec time 0.08 sec      Ao pk jonatan 171.0 cm/sec      Ao max PG 11.7 mmHg      Ao max PG (full) 8.9 mmHg      Ao V2 mean 103.8 cm/sec      Ao mean PG 5.3 mmHg      Ao mean PG (full) 4.2 mmHg      Ao V2 VTI 31.6 cm      JESUS MANUEL(I,A) 1.5 cm^2      EJSUS MANUEL(I,D) 1.5 cm^2      JESUS MANUEL(V,A) 1.5 cm^2      JESUS MANUEL(V,D) 1.5 cm^2      LV V1 max PG 2.8 mmHg      LV V1 mean PG 1.2 mmHg      LV V1 max 82.9 cm/sec      LV V1 mean 49.1 cm/sec      LV V1 VTI 15.9 cm      SV(Ao) 251.5 ml      SI(Ao) 143.0 ml/m^2      SV(LVOT) 48.1 ml      SV(RVOT) 71.1 ml      SI(LVOT) 27.4 ml/m^2      PA V2 max 94.7 cm/sec      PA max PG 3.6 mmHg      PA max PG (full) 0.73 mmHg      PA V2 mean 57.2 cm/sec      PA mean PG 1.5  mmHg      PA mean PG (full) 0.1 mmHg      PA V2 VTI 15.6 cm      PVA(I,A) 4.6 cm^2       CV ECHO BOONE - PVA(I,D) 4.6 cm^2      BH CV ECHO BOONE - PVA(V,A) 4.2 cm^2       CV ECHO BOONE - PVA(V,D) 4.2 cm^2      PA acc time 0.06 sec      RV V1 max PG 2.9 mmHg      RV V1 mean PG 1.4 mmHg      RV V1 max 84.5 cm/sec      RV V1 mean 55.1 cm/sec      RV V1 VTI 15.0 cm      TR max jonatan 285.5 cm/sec      RVSP(TR) 35.6 mmHg      RAP systole 3.0 mmHg      PA pr(Accel) 49.9 mmHg      Qp/Qs 1.5      CV ECHO BOONE - BZI_BMI 31.9 kilograms/m^2       CV ECHO BOONE - BSA(HAYCOCK) 1.8 m^2       CV ECHO BOONE - BZI_METRIC_WEIGHT 76.7 kg       CV ECHO BOONE - BZI_METRIC_HEIGHT 154.9 cm      EF(MOD-bp) 77.0 %      LA dimension(2D) 4.1 cm           Results for orders placed during the hospital encounter of 04/18/21    Duplex Carotid Ultrasound CAR    Interpretation Summary  · Proximal right internal carotid artery plaque without significant stenosis.  · Proximal left internal carotid artery plaque without significant stenosis.          XR Chest 1 View    Result Date: 4/18/2021  1.Cardiomegaly 2.Hiatal hernia suggested.  Electronically Signed By-Arie Miller MD On:4/18/2021 5:08 PM This report was finalized on [id number] by  Arie Miller MD.          Xrays, labs reviewed personally by physician.    Medication Review:   I have reviewed the patient's current medication list      Scheduled Meds  atorvastatin, 10 mg, Oral, Nightly  cetirizine, 10 mg, Oral, Daily  dilTIAZem CD, 120 mg, Oral, Q24H  diphenhydrAMINE, , ,   methylPREDNISolone sodium succinate, , ,   pantoprazole, 40 mg, Oral, QAM AC  sodium chloride, 10 mL, Intravenous, Q12H  spironolactone, 12.5 mg, Oral, Q PM        Meds Infusions  nitroglycerin, 10-50 mcg/min  sodium chloride, 1-3 mL/kg/hr, Last Rate: 3 mL/kg/hr (04/21/21 6537)  sodium chloride, 75 mL/hr, Last Rate: 75 mL/hr (04/21/21 3120)        Meds PRN  •  acetaminophen **OR** acetaminophen **OR** acetaminophen  •   acetaminophen  •  ALPRAZolam  •  atropine  •  Calcium Gluconate-NaCl **AND** calcium gluconate **AND** Calcium, Ionized  •  HYDROcodone-acetaminophen  •  magnesium sulfate **OR** magnesium sulfate **OR** magnesium sulfate  •  melatonin  •  nitroglycerin  •  ondansetron **OR** ondansetron  •  potassium & sodium phosphates **OR** potassium & sodium phosphates  •  potassium chloride  •  potassium chloride  •  [COMPLETED] Insert peripheral IV **AND** sodium chloride  •  sodium chloride  •  sodium chloride        Assessment/Plan   Assessment/Plan     Active Hospital Problems    Diagnosis  POA   • **Chest pain [R07.9]  Yes   • Anxiety associated with depression [F41.8]  Unknown   • Obesity (BMI 30-39.9) [E66.9]  Yes   • Seasonal allergies [J30.2]  Yes   • GERD without esophagitis [K21.9]  Yes   • CKD (chronic kidney disease) stage 3, GFR 30-59 ml/min (CMS/HCC) [N18.30]  Yes   • Chronic pain [G89.29]  Yes   • Angina at rest (CMS/HCC) [I20.8]  Unknown   • Abnormal nuclear stress test [R94.39]  Unknown   • Hypertension [I10]  Yes   • Atrial fibrillation (CMS/HCC) [I48.91] [I48.91]  Yes   • Presence of cardiac pacemaker [Z95.0]  Yes   • Dyslipidemia [E78.5]  Yes   • Coronary artery disease [I25.10]  Yes      Resolved Hospital Problems   No resolved problems to display.       MEDICAL DECISION MAKING COMPLEXITY BY PROBLEM:     Chest pain   -Troponin: Less than 0.010, trend  -Chest x-ray shows cardiomegaly with possible hiatal hernia.    -EKG shows an atrial paced rhythm with 1 PVC but no obvious acute ST changes and a QTC of 459 ms.  -Hold metoprolol temporarily secondary to stress test  - Lipid profile reviewed ... appear normal.  -Stress test abnormal ... status post cardiac cath revealing three vessel disease, cardiothoracic surgery consult for Redo CABG.     CAD  -Continue statin, and warfarin warfarin and cardiac monitoring  -Hold beta-blocker temporarily secondary to planned stress test     Atrial fibrillation status post  pacemaker placement  -EKG shows an atrial paced rhythm with 1 PVC but no obvious acute ST changes and a QTC of 459 ms.  -Continue diltiazem, metoprolol and warfarin with pharmacy to dose ..... INR therapeutic ... coumadin on hold in anticipation to cardiac cath.     Hypertension  -poorly controlled with a blood pressure on admission of 168/110  - Continue diltiazem  -Hold metoprolol temporarily secondary to stress test  - Monitor while admitted     CKD  - Creatinine: 1.02, BUN: 17, eGFR: 52  - Avoid nephrotoxic medication and IV dye unless urgently needed  - Monitor BMP and I's and O's  - Consult Nephrology service.      Hyperlipidemia  -Check lipid panel  -Continue statin     Seasonal allergies  -Zyrtec     GERD  -PPI     Anxiety  -Xanax (inspect verified)     Chronic pain  -Norco (inspect verified)     Obesity (BMI: 33.95)  -Encourage diet lifestyle modifications    VTE Prophylaxis -   Mechanical Order History:     None      Pharmalogical Order History:      Ordered     Dose Route Frequency Stop    04/18/21 1928  warfarin (COUMADIN) tablet 4 mg     Question:  Target INR  Answer:  2 - 3    4 mg PO Once per day on Sun Tue Thu Sat --    04/18/21 1928  warfarin (COUMADIN) tablet 2 mg     Question:  Target INR  Answer:  2 - 3    2 mg PO Once per day on Mon Wed Fri --    04/18/21 1927  warfarin (COUMADIN) tablet 6 mg     Question:  Target INR  Answer:  2 - 3    6 mg PO Once (Warfarin) 04/18/21 2025 04/18/21 1907  Pharmacy to dose warfarin     Question:  Target INR  Answer:  2 - 3    -- XX Continuous PRN --    04/18/21 1907  Pharmacy to dose warfarin  Status:  Discontinued     Question:  Target INR  Answer:  2 - 3    -- XX Continuous PRN 04/18/21 1910                  Code Status -   Code Status and Medical Interventions:   Ordered at: 04/18/21 1907     Code Status:    CPR     Medical Interventions (Level of Support Prior to Arrest):    Full       This patient has been examined wearing appropriate Personal Protective  Equipment and discussed with hospital infection control department. 04/21/21        Discharge Planning          Electronically signed by Benjamin Galindo MD, 04/21/21, 16:33 EDT.  [facility] Hospitalist Team

## 2021-04-21 NOTE — PLAN OF CARE
Goal Outcome Evaluation:  Plan of Care Reviewed With: patient  Progress: improving   Pt c/o chronic BLE pain. Pt has been resting with no other complaints. NPO for heart cath this morning. Will continue to monitor...

## 2021-04-21 NOTE — PLAN OF CARE
Goal Outcome Evaluation:  Plan of Care Reviewed With: patient  Progress: no change  Outcome Summary: Pt reports no chest pain at this time.

## 2021-04-21 NOTE — PROGRESS NOTES
Referring Provider: Hospitalist    Reason for follow-up: Chest pain and abnormal Myoview     Patient Care Team:  Nava Yu MD as PCP - General  Ivan Martell MD as Consulting Physician (Cardiology)    Subjective .  Well without any symptoms    Objective  Being in bed comfortably     Review of Systems   Constitutional: Negative for fever and malaise/fatigue.   HENT: Negative for ear pain and nosebleeds.    Eyes: Negative for blurred vision and double vision.   Cardiovascular: Negative for chest pain, dyspnea on exertion and palpitations.   Respiratory: Negative for cough and shortness of breath.    Skin: Negative for rash.   Musculoskeletal: Negative for joint pain.   Gastrointestinal: Negative for abdominal pain, nausea and vomiting.   Neurological: Negative for focal weakness and headaches.   Psychiatric/Behavioral: Negative for depression. The patient is not nervous/anxious.    All other systems reviewed and are negative.      Doxycycline, Celebrex [celecoxib], Contrast dye, Iodinated diagnostic agents, Nsaids, Other, and Sulfa antibiotics    Scheduled Meds:[MAR Hold] atorvastatin, 10 mg, Oral, Nightly  [MAR Hold] cetirizine, 10 mg, Oral, Daily  dilTIAZem CD, 120 mg, Oral, Q24H  diphenhydrAMINE, , ,   methylPREDNISolone sodium succinate, , ,   [MAR Hold] pantoprazole, 40 mg, Oral, QAM AC  [MAR Hold] sodium chloride, 10 mL, Intravenous, Q12H  [MAR Hold] spironolactone, 12.5 mg, Oral, Q PM      Continuous Infusions:nitroglycerin, 10-50 mcg/min  Pharmacy to dose warfarin,   sodium chloride, 1-3 mL/kg/hr, Last Rate: 3 mL/kg/hr (04/21/21 0523)  sodium chloride, 75 mL/hr, Last Rate: 75 mL/hr (04/20/21 8394)      PRN Meds:.•  [MAR Hold] acetaminophen **OR** [MAR Hold] acetaminophen **OR** [MAR Hold] acetaminophen  •  [MAR Hold] ALPRAZolam  •  [MAR Hold] Calcium Gluconate-NaCl **AND** [MAR Hold] calcium gluconate **AND** Calcium, Ionized  •  [MAR Hold] HYDROcodone-acetaminophen  •  [MAR Hold] magnesium sulfate  "**OR** [MAR Hold] magnesium sulfate **OR** [MAR Hold] magnesium sulfate  •  [MAR Hold] melatonin  •  [MAR Hold] nitroglycerin  •  [MAR Hold] ondansetron **OR** [MAR Hold] ondansetron  •  Pharmacy to dose warfarin  •  [MAR Hold] potassium & sodium phosphates **OR** [MAR Hold] potassium & sodium phosphates  •  [MAR Hold] potassium chloride  •  potassium chloride  •  [COMPLETED] Insert peripheral IV **AND** [MAR Hold] sodium chloride  •  [MAR Hold] sodium chloride        VITAL SIGNS  Vitals:    04/21/21 0319 04/21/21 0752 04/21/21 0826 04/21/21 0856   BP: 133/78 141/89 (!) 185/90 151/93   BP Location: Left arm      Patient Position: Lying      Pulse: 99 90     Resp: 16      Temp: 98.1 °F (36.7 °C)      TempSrc: Oral      SpO2: 94%      Weight: 77.1 kg (169 lb 14.4 oz)      Height:           Flowsheet Rows      First Filed Value   Admission Height  154.9 cm (61\") Documented at 04/18/2021 1622   Admission Weight  81.5 kg (179 lb 10.8 oz) Documented at 04/18/2021 1622           TELEMETRY: Sinus rhythm with PVCs    Physical Exam:  Constitutional:       Appearance: Well-developed.   Eyes:      General: No scleral icterus.     Conjunctiva/sclera: Conjunctivae normal.      Pupils: Pupils are equal, round, and reactive to light.   HENT:      Head: Normocephalic and atraumatic.   Neck:      Vascular: No carotid bruit or JVD.   Pulmonary:      Effort: Pulmonary effort is normal.      Breath sounds: Normal breath sounds. No wheezing. No rales.   Cardiovascular:      Normal rate. Irregularly irregular rhythm.      Murmurs: There is a systolic murmur.   Pulses:     Intact distal pulses.   Abdominal:      General: Bowel sounds are normal.      Palpations: Abdomen is soft.   Musculoskeletal: Normal range of motion.      Cervical back: Normal range of motion and neck supple. Skin:     General: Skin is warm and dry.      Findings: No rash.   Neurological:      Mental Status: Alert.      Comments: No focal deficits          Results " Review:   I reviewed the patient's new clinical results.  Lab Results (last 24 hours)     Procedure Component Value Units Date/Time    Basic Metabolic Panel [[id number]]  (Normal) Collected: 04/21/21 0521    Specimen: Blood Updated: 04/21/21 0711     Glucose 91 mg/dL      BUN 9 mg/dL      Creatinine 0.75 mg/dL      Sodium 137 mmol/L      Potassium 3.6 mmol/L      Chloride 100 mmol/L      CO2 26.0 mmol/L      Calcium 9.1 mg/dL      eGFR Non African Amer 74 mL/min/1.73      BUN/Creatinine Ratio 12.0     Anion Gap 11.0 mmol/L     Narrative:      GFR Normal >60  Chronic Kidney Disease <60  Kidney Failure <15      Magnesium [[id number]]  (Normal) Collected: 04/21/21 0521    Specimen: Blood Updated: 04/21/21 0711     Magnesium 1.9 mg/dL     Protime-INR [[id number]]  (Abnormal) Collected: 04/21/21 0521    Specimen: Blood Updated: 04/21/21 0700     Protime 18.5 Seconds      INR 1.73    CBC & Differential [[id number]]  (Abnormal) Collected: 04/21/21 0521    Specimen: Blood Updated: 04/21/21 0646    Narrative:      The following orders were created for panel order CBC & Differential.  Procedure                               Abnormality         Status                     ---------                               -----------         ------                     CBC Auto Differential[[id number]]        Abnormal            Final result                 Please view results for these tests on the individual orders.    CBC Auto Differential [[id number]]  (Abnormal) Collected: 04/21/21 0521    Specimen: Blood Updated: 04/21/21 0646     WBC 5.90 10*3/mm3      RBC 4.32 10*6/mm3      Hemoglobin 13.1 g/dL      Hematocrit 38.4 %      MCV 88.9 fL      MCH 30.4 pg      MCHC 34.2 g/dL      RDW 14.2 %      RDW-SD 44.2 fl      MPV 9.6 fL      Platelets 138 10*3/mm3      Neutrophil % 54.5 %      Lymphocyte % 33.4 %      Monocyte % 8.7 %      Eosinophil % 2.6 %      Basophil % 0.8 %      Neutrophils, Absolute 3.20 10*3/mm3      Lymphocytes, Absolute 2.00  10*3/mm3      Monocytes, Absolute 0.50 10*3/mm3      Eosinophils, Absolute 0.20 10*3/mm3      Basophils, Absolute 0.00 10*3/mm3      nRBC 0.2 /100 WBC     Basic Metabolic Panel [[id number]]  (Abnormal) Collected: 04/20/21 1518    Specimen: Blood Updated: 04/20/21 1614     Glucose 145 mg/dL      BUN 13 mg/dL      Creatinine 0.92 mg/dL      Sodium 134 mmol/L      Potassium 3.6 mmol/L      Comment: Slight hemolysis detected by analyzer. Results may be affected.        Chloride 97 mmol/L      CO2 27.0 mmol/L      Calcium 9.1 mg/dL      eGFR Non African Amer 58 mL/min/1.73      BUN/Creatinine Ratio 14.1     Anion Gap 10.0 mmol/L     Narrative:      GFR Normal >60  Chronic Kidney Disease <60  Kidney Failure <15      CBC & Differential [[id number]]  (Abnormal) Collected: 04/20/21 1518    Specimen: Blood Updated: 04/20/21 1547    Narrative:      The following orders were created for panel order CBC & Differential.  Procedure                               Abnormality         Status                     ---------                               -----------         ------                     CBC Auto Differential[[id number]]        Abnormal            Final result                 Please view results for these tests on the individual orders.    CBC Auto Differential [[id number]]  (Abnormal) Collected: 04/20/21 1518    Specimen: Blood Updated: 04/20/21 1547     WBC 6.50 10*3/mm3      RBC 4.31 10*6/mm3      Hemoglobin 13.0 g/dL      Hematocrit 39.1 %      MCV 90.9 fL      MCH 30.2 pg      MCHC 33.2 g/dL      RDW 14.4 %      RDW-SD 44.6 fl      MPV 9.7 fL      Platelets 135 10*3/mm3      Neutrophil % 63.9 %      Lymphocyte % 25.2 %      Monocyte % 9.3 %      Eosinophil % 1.3 %      Basophil % 0.3 %      Neutrophils, Absolute 4.10 10*3/mm3      Lymphocytes, Absolute 1.60 10*3/mm3      Monocytes, Absolute 0.60 10*3/mm3      Eosinophils, Absolute 0.10 10*3/mm3      Basophils, Absolute 0.00 10*3/mm3      nRBC 0.0 /100 WBC           Imaging  Results (Last 24 Hours)     ** No results found for the last 24 hours. **          EKG      I personally viewed and interpreted the patient's EKG/Telemetry data:    ECHOCARDIOGRAM:    STRESS MYOVIEW:    CARDIAC CATHETERIZATION:    OTHER:         Assessment/Plan     Principal Problem:    Chest pain  Active Problems:    Angina at rest (CMS/HCC)    Abnormal nuclear stress test    Atrial fibrillation (CMS/HCC) [I48.91]    Coronary artery disease    Dyslipidemia    Hypertension    Presence of cardiac pacemaker    Anxiety associated with depression    Obesity (BMI 30-39.9)    Seasonal allergies    GERD without esophagitis    CKD (chronic kidney disease) stage 3, GFR 30-59 ml/min (CMS/HCC)    Chronic pain  Coronary artery disease status post coronary artery bypass surgery    Patient presented with chest pain and is ruled out for MI by EKG and enzymes  Patient underwent a stress Myoview which is abnormal with ischemia  Patient had a cardiac catheterization which showed severe three-vessel coronary disease  Patient had a LIMA to the LAD which is patent but the graft to the diagonal branch to marginal branches and the RCA are occluded  Patient has normal LV function  Patient is being referred for redo coronary bypass surgery and surgeons will see the films and talk to the patient also.  Patient also had a cryomaze procedure along with left atrial appendage ligation  Patient was on warfarin which will be stopped for now  Blood pressure and heart rate are stable  Patient also had a pacemaker for tachybradycardia syndrome and is working very well  Patient's lipid levels are followed by the primary care doctor.    I discussed the patients findings and my recommendations with patient and nurse    Ivan Martell MD  04/21/21  10:20 EDT

## 2021-04-22 VITALS
HEIGHT: 64 IN | WEIGHT: 174.82 LBS | SYSTOLIC BLOOD PRESSURE: 126 MMHG | TEMPERATURE: 98.2 F | RESPIRATION RATE: 16 BRPM | BODY MASS INDEX: 29.85 KG/M2 | OXYGEN SATURATION: 98 % | HEART RATE: 99 BPM | DIASTOLIC BLOOD PRESSURE: 82 MMHG

## 2021-04-22 LAB
ANION GAP SERPL CALCULATED.3IONS-SCNC: 8 MMOL/L (ref 5–15)
BASOPHILS # BLD AUTO: 0 10*3/MM3 (ref 0–0.2)
BASOPHILS NFR BLD AUTO: 0 % (ref 0–1.5)
BUN SERPL-MCNC: 13 MG/DL (ref 8–23)
BUN/CREAT SERPL: 17.8 (ref 7–25)
CALCIUM SPEC-SCNC: 9.4 MG/DL (ref 8.6–10.5)
CHLORIDE SERPL-SCNC: 100 MMOL/L (ref 98–107)
CO2 SERPL-SCNC: 26 MMOL/L (ref 22–29)
CREAT SERPL-MCNC: 0.73 MG/DL (ref 0.57–1)
DEPRECATED RDW RBC AUTO: 45.9 FL (ref 37–54)
EOSINOPHIL # BLD AUTO: 0 10*3/MM3 (ref 0–0.4)
EOSINOPHIL NFR BLD AUTO: 0 % (ref 0.3–6.2)
ERYTHROCYTE [DISTWIDTH] IN BLOOD BY AUTOMATED COUNT: 14.6 % (ref 12.3–15.4)
GFR SERPL CREATININE-BSD FRML MDRD: 76 ML/MIN/1.73
GLUCOSE SERPL-MCNC: 187 MG/DL (ref 65–99)
HCT VFR BLD AUTO: 38.2 % (ref 34–46.6)
HGB BLD-MCNC: 12.8 G/DL (ref 12–15.9)
LYMPHOCYTES # BLD AUTO: 1 10*3/MM3 (ref 0.7–3.1)
LYMPHOCYTES NFR BLD AUTO: 11.8 % (ref 19.6–45.3)
MAGNESIUM SERPL-MCNC: 2 MG/DL (ref 1.6–2.4)
MCH RBC QN AUTO: 30 PG (ref 26.6–33)
MCHC RBC AUTO-ENTMCNC: 33.6 G/DL (ref 31.5–35.7)
MCV RBC AUTO: 89.3 FL (ref 79–97)
MONOCYTES # BLD AUTO: 0.3 10*3/MM3 (ref 0.1–0.9)
MONOCYTES NFR BLD AUTO: 3.1 % (ref 5–12)
NEUTROPHILS NFR BLD AUTO: 7.5 10*3/MM3 (ref 1.7–7)
NEUTROPHILS NFR BLD AUTO: 85.1 % (ref 42.7–76)
NRBC BLD AUTO-RTO: 0.1 /100 WBC (ref 0–0.2)
PLATELET # BLD AUTO: 142 10*3/MM3 (ref 140–450)
PMV BLD AUTO: 9.5 FL (ref 6–12)
POTASSIUM SERPL-SCNC: 4.4 MMOL/L (ref 3.5–5.2)
RBC # BLD AUTO: 4.28 10*6/MM3 (ref 3.77–5.28)
SODIUM SERPL-SCNC: 134 MMOL/L (ref 136–145)
WBC # BLD AUTO: 8.8 10*3/MM3 (ref 3.4–10.8)

## 2021-04-22 PROCEDURE — 99239 HOSP IP/OBS DSCHRG MGMT >30: CPT | Performed by: HOSPITALIST

## 2021-04-22 PROCEDURE — 83735 ASSAY OF MAGNESIUM: CPT | Performed by: INTERNAL MEDICINE

## 2021-04-22 PROCEDURE — 99232 SBSQ HOSP IP/OBS MODERATE 35: CPT | Performed by: INTERNAL MEDICINE

## 2021-04-22 PROCEDURE — 85025 COMPLETE CBC W/AUTO DIFF WBC: CPT | Performed by: INTERNAL MEDICINE

## 2021-04-22 PROCEDURE — 80048 BASIC METABOLIC PNL TOTAL CA: CPT | Performed by: INTERNAL MEDICINE

## 2021-04-22 PROCEDURE — 99232 SBSQ HOSP IP/OBS MODERATE 35: CPT | Performed by: THORACIC SURGERY (CARDIOTHORACIC VASCULAR SURGERY)

## 2021-04-22 RX ORDER — SODIUM CHLORIDE 9 MG/ML
1-3 INJECTION, SOLUTION INTRAVENOUS CONTINUOUS
Status: CANCELLED | OUTPATIENT
Start: 2021-04-22

## 2021-04-22 RX ORDER — ISOSORBIDE MONONITRATE 30 MG/1
30 TABLET, EXTENDED RELEASE ORAL
Status: DISCONTINUED | OUTPATIENT
Start: 2021-04-22 | End: 2021-04-22 | Stop reason: HOSPADM

## 2021-04-22 RX ORDER — SPIRONOLACTONE 25 MG/1
50 TABLET ORAL EVERY EVENING
Status: DISCONTINUED | OUTPATIENT
Start: 2021-04-22 | End: 2021-04-22 | Stop reason: HOSPADM

## 2021-04-22 RX ORDER — MIDAZOLAM HYDROCHLORIDE 1 MG/ML
1 INJECTION INTRAMUSCULAR; INTRAVENOUS ONCE
Status: CANCELLED | OUTPATIENT
Start: 2021-04-22 | End: 2021-04-22

## 2021-04-22 RX ORDER — FENTANYL CITRATE 50 UG/ML
25 INJECTION, SOLUTION INTRAMUSCULAR; INTRAVENOUS ONCE
Status: CANCELLED | OUTPATIENT
Start: 2021-04-22 | End: 2021-04-22

## 2021-04-22 RX ORDER — CLOPIDOGREL BISULFATE 75 MG/1
75 TABLET ORAL DAILY
Qty: 30 TABLET | Refills: 0 | Status: SHIPPED | OUTPATIENT
Start: 2021-04-23 | End: 2021-05-21 | Stop reason: SDUPTHER

## 2021-04-22 RX ORDER — ISOSORBIDE MONONITRATE 30 MG/1
30 TABLET, EXTENDED RELEASE ORAL
Qty: 30 TABLET | Refills: 1 | Status: SHIPPED | OUTPATIENT
Start: 2021-04-23 | End: 2021-05-07

## 2021-04-22 RX ORDER — CLOPIDOGREL BISULFATE 75 MG/1
75 TABLET ORAL DAILY
Status: DISCONTINUED | OUTPATIENT
Start: 2021-04-22 | End: 2021-04-22 | Stop reason: HOSPADM

## 2021-04-22 RX ADMIN — ISOSORBIDE MONONITRATE 30 MG: 30 TABLET, EXTENDED RELEASE ORAL at 09:58

## 2021-04-22 RX ADMIN — Medication 10 ML: at 09:53

## 2021-04-22 RX ADMIN — ACETAMINOPHEN 650 MG: 325 TABLET, FILM COATED ORAL at 09:56

## 2021-04-22 RX ADMIN — ACETAMINOPHEN 650 MG: 325 TABLET ORAL at 10:33

## 2021-04-22 RX ADMIN — PANTOPRAZOLE SODIUM 40 MG: 40 TABLET, DELAYED RELEASE ORAL at 09:39

## 2021-04-22 RX ADMIN — SODIUM CHLORIDE 75 ML/HR: 9 INJECTION, SOLUTION INTRAVENOUS at 06:01

## 2021-04-22 RX ADMIN — CLOPIDOGREL BISULFATE 75 MG: 75 TABLET ORAL at 09:57

## 2021-04-22 RX ADMIN — NITROGLYCERIN 10 MCG/MIN: 20 INJECTION INTRAVENOUS at 01:18

## 2021-04-22 RX ADMIN — CETIRIZINE HYDROCHLORIDE 10 MG: 10 TABLET, FILM COATED ORAL at 09:39

## 2021-04-22 RX ADMIN — DILTIAZEM HYDROCHLORIDE 120 MG: 120 CAPSULE, EXTENDED RELEASE ORAL at 09:39

## 2021-04-22 NOTE — PLAN OF CARE
Goal Outcome Evaluation:        Outcome Summary: pt had no complaints of CP during the night, pt BP increased through out the night and nitro was started at 0118 at 10mcg, currently is a one assist to the restroom, pt is an OH consult, vss, will continue to monitor.

## 2021-04-22 NOTE — PROGRESS NOTES
Referring Provider: Hospitalist    Reason for follow-up: Chest pain and abnormal Myoview     Patient Care Team:  Nava Yu MD as PCP - General  Ivan Martell MD as Consulting Physician (Cardiology)    Subjective .  Well without any symptoms    Objective  Being in bed comfortably     Review of Systems   Constitutional: Negative for fever and malaise/fatigue.   HENT: Negative for ear pain and nosebleeds.    Eyes: Negative for blurred vision and double vision.   Cardiovascular: Negative for chest pain, dyspnea on exertion and palpitations.   Respiratory: Negative for cough and shortness of breath.    Skin: Negative for rash.   Musculoskeletal: Negative for joint pain.   Gastrointestinal: Negative for abdominal pain, nausea and vomiting.   Neurological: Negative for focal weakness and headaches.   Psychiatric/Behavioral: Negative for depression. The patient is not nervous/anxious.    All other systems reviewed and are negative.      Doxycycline, Celebrex [celecoxib], Contrast dye, Iodinated diagnostic agents, Nsaids, Other, and Sulfa antibiotics    Scheduled Meds:atorvastatin, 10 mg, Oral, Nightly  cetirizine, 10 mg, Oral, Daily  clopidogrel, 75 mg, Oral, Daily  dilTIAZem CD, 120 mg, Oral, Q24H  isosorbide mononitrate, 30 mg, Oral, Q24H  pantoprazole, 40 mg, Oral, QAM AC  sodium chloride, 10 mL, Intravenous, Q12H  spironolactone, 50 mg, Oral, Q PM      Continuous Infusions:sodium chloride, 1-3 mL/kg/hr, Last Rate: 3 mL/kg/hr (04/21/21 0537)      PRN Meds:.•  acetaminophen **OR** acetaminophen **OR** acetaminophen  •  acetaminophen  •  ALPRAZolam  •  atropine  •  Calcium Gluconate-NaCl **AND** calcium gluconate **AND** Calcium, Ionized  •  HYDROcodone-acetaminophen  •  magnesium sulfate **OR** magnesium sulfate **OR** magnesium sulfate  •  melatonin  •  nitroglycerin  •  ondansetron **OR** ondansetron  •  potassium & sodium phosphates **OR** potassium & sodium phosphates  •  potassium chloride  •  potassium  "chloride  •  [COMPLETED] Insert peripheral IV **AND** sodium chloride  •  sodium chloride  •  sodium chloride        VITAL SIGNS  Vitals:    04/22/21 0835 04/22/21 0940 04/22/21 1010 04/22/21 1128   BP: 141/60 128/67 130/65    Pulse: 97 91 91    Resp:       Temp:    98.2 °F (36.8 °C)   TempSrc:    Oral   SpO2: 94% 95% 99%    Weight:       Height:           Flowsheet Rows      First Filed Value   Admission Height  154.9 cm (61\") Documented at 04/18/2021 1622   Admission Weight  81.5 kg (179 lb 10.8 oz) Documented at 04/18/2021 1622           TELEMETRY: Sinus rhythm with PVCs    Physical Exam:  Constitutional:       Appearance: Well-developed.   Eyes:      General: No scleral icterus.     Conjunctiva/sclera: Conjunctivae normal.      Pupils: Pupils are equal, round, and reactive to light.   HENT:      Head: Normocephalic and atraumatic.   Neck:      Vascular: No carotid bruit or JVD.   Pulmonary:      Effort: Pulmonary effort is normal.      Breath sounds: Normal breath sounds. No wheezing. No rales.   Cardiovascular:      Normal rate. Irregularly irregular rhythm.      Murmurs: There is a systolic murmur.   Pulses:     Intact distal pulses.   Abdominal:      General: Bowel sounds are normal.      Palpations: Abdomen is soft.   Musculoskeletal: Normal range of motion.      Cervical back: Normal range of motion and neck supple. Skin:     General: Skin is warm and dry.      Findings: No rash.   Neurological:      Mental Status: Alert.      Comments: No focal deficits          Results Review:   I reviewed the patient's new clinical results.  Lab Results (last 24 hours)     Procedure Component Value Units Date/Time    Basic Metabolic Panel [[id number]]  (Abnormal) Collected: 04/22/21 0351    Specimen: Blood Updated: 04/22/21 0423     Glucose 187 mg/dL      BUN 13 mg/dL      Creatinine 0.73 mg/dL      Sodium 134 mmol/L      Potassium 4.4 mmol/L      Chloride 100 mmol/L      CO2 26.0 mmol/L      Calcium 9.4 mg/dL      eGFR " Non  Amer 76 mL/min/1.73      BUN/Creatinine Ratio 17.8     Anion Gap 8.0 mmol/L     Narrative:      GFR Normal >60  Chronic Kidney Disease <60  Kidney Failure <15      Magnesium [[id number]]  (Normal) Collected: 04/22/21 0351    Specimen: Blood Updated: 04/22/21 0423     Magnesium 2.0 mg/dL     CBC & Differential [[id number]]  (Abnormal) Collected: 04/22/21 0351    Specimen: Blood Updated: 04/22/21 0409    Narrative:      The following orders were created for panel order CBC & Differential.  Procedure                               Abnormality         Status                     ---------                               -----------         ------                     CBC Auto Differential[[id number]]        Abnormal            Final result                 Please view results for these tests on the individual orders.    CBC Auto Differential [[id number]]  (Abnormal) Collected: 04/22/21 0351    Specimen: Blood Updated: 04/22/21 0409     WBC 8.80 10*3/mm3      RBC 4.28 10*6/mm3      Hemoglobin 12.8 g/dL      Hematocrit 38.2 %      MCV 89.3 fL      MCH 30.0 pg      MCHC 33.6 g/dL      RDW 14.6 %      RDW-SD 45.9 fl      MPV 9.5 fL      Platelets 142 10*3/mm3      Neutrophil % 85.1 %      Lymphocyte % 11.8 %      Monocyte % 3.1 %      Eosinophil % 0.0 %      Basophil % 0.0 %      Neutrophils, Absolute 7.50 10*3/mm3      Lymphocytes, Absolute 1.00 10*3/mm3      Monocytes, Absolute 0.30 10*3/mm3      Eosinophils, Absolute 0.00 10*3/mm3      Basophils, Absolute 0.00 10*3/mm3      nRBC 0.1 /100 WBC           Imaging Results (Last 24 Hours)     ** No results found for the last 24 hours. **          EKG      I personally viewed and interpreted the patient's EKG/Telemetry data:    ECHOCARDIOGRAM:    STRESS MYOVIEW:    CARDIAC CATHETERIZATION:    OTHER:         Assessment/Plan     Principal Problem:    Chest pain  Active Problems:    Angina at rest (CMS/HCC)    Abnormal nuclear stress test    Atrial fibrillation (CMS/HCC)  [I48.91]    Coronary artery disease    Dyslipidemia    Hypertension    Presence of cardiac pacemaker    Anxiety associated with depression    Obesity (BMI 30-39.9)    Seasonal allergies    GERD without esophagitis    CKD (chronic kidney disease) stage 3, GFR 30-59 ml/min (CMS/[facility])    Chronic pain  Coronary artery disease status post coronary artery bypass surgery    Patient presented with chest pain and is ruled out for MI by EKG and enzymes  Patient underwent a stress Myoview which is abnormal with ischemia  Patient had a cardiac catheterization which showed severe three-vessel coronary disease  Patient had a LIMA to the LAD which is patent but the graft to the diagonal branch to marginal branches and the RCA are occluded  Patient has normal LV function  Patient is being referred for redo coronary bypass surgery and surgeons will see the films and talk to the patient also.  Patient has had work-up done and is not felt to be a candidate at this time for surgery because of poor veins  Patient will have stent placements as an outpatient procedure and can go home today and come back as an outpatient next week.  Patient also had a cryomaze procedure along with left atrial appendage ligation  Patient was on warfarin which will be stopped for now  Blood pressure and heart rate are stable  Patient also had a pacemaker for tachybradycardia syndrome and is working very well  Patient's lipid levels are followed by the primary care doctor.    I discussed the patients findings and my recommendations with patient and nurse    Ivan Martell MD  04/22/21  12:11 EDT

## 2021-04-22 NOTE — CASE MANAGEMENT/SOCIAL WORK
Discharge Planning Assessment  NEISHA Sanabria     Patient Name: Syl Herrera  MRN: [id number]  Today's Date: 4/22/2021    Admit Date: 4/18/2021        Discharge Plan     Row Name 04/22/21 1151       Plan    Plan  D/C Plan: Home with daughter.    Patient/Family in Agreement with Plan  yes    Plan Comments   spoke to patient at bedside wearing mask and goggles and keeping distance greater than 6 feet and spent less than 15 minutes in room. [facility] letter reviewed with patient, verbal consent obtained and copy left at bedside. Patient and daughter deny any d/c needs at this time and daughter can provide transport at d/c.          Expected Discharge Date and Time     Expected Discharge Date Expected Discharge Time    Apr 22, 2021           Patient Forms     Row Name 04/22/21 1152       Patient Forms    Important Message from Medicare (IMM)  Delivered 4/22/21    Delivered to  Patient    Method of delivery  In person            Jo Gilmore

## 2021-04-22 NOTE — CONSULTS
Cardiac Rehab evaluation. Patient not a candidate for open heart surgery. Will go home then come back for PCI. Went ahead and discussed cardiac rehab. Brochure, Coronary Intervention Guide, antiplatelet sheet given. Verified phone number. Will follow for when returns for intervention.

## 2021-04-22 NOTE — PROGRESS NOTES
CC:  Chest pressure & fullness  EF 60-65% (echo)    F/U:  Progressive CAD, s/p CABG (2017)    Subjective:  Pt reports she is coming back next week for stents    No events overnight--ben at bedside  Drip:  NTG 10    PREOP studies:  Echo:  EF 60-65%, mild MR, mild-mod MR  Carotids:  Normal  Vein jeevan:  Left GSV adequate      Intake/Output Summary (Last 24 hours) at 4/22/2021 0920  Last data filed at 4/22/2021 0838  Gross per 24 hour   Intake 2957 ml   Output 1675 ml   Net 1282 ml     Temp:  [97.4 °F (36.3 °C)-97.6 °F (36.4 °C)] 97.5 °F (36.4 °C)  Heart Rate:  [] 76  Resp:  [16] 16  BP: (117-175)/() 138/69      Results from last 7 days   Lab Units 04/22/21  0351 04/21/21  0521 04/20/21  0343 04/19/21  0345   WBC 10*3/mm3 8.80 5.90  --  6.20   HEMOGLOBIN g/dL 12.8 13.1  --  12.3   HEMATOCRIT % 38.2 38.4  --  37.4   PLATELETS 10*3/mm3 142 138*  --  130*   INR   --  1.73* 2.06 1.67*     Results from last 7 days   Lab Units 04/22/21  0351   CREATININE mg/dL 0.73   POTASSIUM mmol/L 4.4   SODIUM mmol/L 134*   MAGNESIUM mg/dL 2.0         Physical Exam:  Neuro intact, nad, resting in bed  Tele:  SR with PVCs  Diminished bases, on room air    Assessment/Plan:  Principal Problem:    Chest pain  Active Problems:    Atrial fibrillation (CMS/[facility]) [I48.91]    Coronary artery disease    Dyslipidemia    Hypertension    Presence of cardiac pacemaker    Anxiety associated with depression    Obesity (BMI 30-39.9)    Seasonal allergies    GERD without esophagitis    CKD (chronic kidney disease) stage 3, GFR 30-59 ml/min (CMS/HCC)    Chronic pain    Angina at rest (CMS/HCC)    Abnormal nuclear stress test    Progressive CAD, s/p CABG x5 (2017)  Hx atrial fib, s/p Maze procedure with ORACIO ligation (2017)  Chronic anticoagulation d/t atrial fib--warfarin  HTN  HLD  S/P PPM, 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 Martell.      Zaida Pereira,  APRN  4/22/2021  09:20 EDT    Addendum  Patient was seen and examined by me, I reviewed studies myself and agree with findings.  He had a prior CABG by me and now he has recurrent disease.  I will favor PCI of the right and circumflex regions due to the lack of vein conduit of good quality.  Discussed the plan with Dr. Thomas and the patient  Michael Brandt MD

## 2021-04-22 NOTE — PROGRESS NOTES
NEPHROLOGY PROGRESS NOTE    PATIENT IDENTIFICATION:   Name:  Syl Herrera      MRN:  [id number]     85 y.o.  female             Reason for visit: CKD     SUBJECTIVE:   Seen and examined.  Patient was started on nitro drip due to elevated blood pressure. no shortness of air or chest pain.  She is status post cardiac catheter revealing three-vessel disease and cardiothoracic has been consulted  OBJECTIVE:  Vitals:    04/22/21 0430 04/22/21 0435 04/22/21 0552 04/22/21 0734   BP:  138/69     Pulse: 80 76     Resp:       Temp:    97.5 °F (36.4 °C)   TempSrc:    Oral   SpO2: 93% 92%     Weight:   79.3 kg (174 lb 13.2 oz)    Height:               Body mass index is 30.01 kg/m².    Intake/Output Summary (Last 24 hours) at 4/22/2021 0754  Last data filed at 4/22/2021 0700  Gross per 24 hour   Intake 2477 ml   Output 1675 ml   Net 802 ml         Exam:  GEN:  No distress, appears stated age  EYES:   Anicteric sclera  ENT:    External ears/nose normal, MM are moist  NECK:  No adenopathy, JVP none  LUNGS: Normal chest on inspection; not labored  CV:  Normal S1S2, without murmur  ABD:  Non-tender, non-distended, no hepatosplenomegaly, +BS  EXT:  No edema; no cyanosis; clubbing    Scheduled meds:  atorvastatin, 10 mg, Oral, Nightly  cetirizine, 10 mg, Oral, Daily  dilTIAZem CD, 120 mg, Oral, Q24H  pantoprazole, 40 mg, Oral, QAM AC  sodium chloride, 10 mL, Intravenous, Q12H  spironolactone, 12.5 mg, Oral, Q PM      IV meds:                      nitroglycerin, 10-50 mcg/min, Last Rate: 10 mcg/min (04/22/21 0118)  sodium chloride, 1-3 mL/kg/hr, Last Rate: 3 mL/kg/hr (04/21/21 0537)  sodium chloride, 75 mL/hr, Last Rate: 75 mL/hr (04/22/21 0601)        Data Review:    Results from last 7 days   Lab Units 04/22/21  0351 04/21/21  0521 04/20/21  1518 04/18/21  1924 04/18/21  1655   SODIUM mmol/L 134* 137 134*   < > 135*   POTASSIUM mmol/L 4.4 3.6 3.6  --  4.4   CHLORIDE mmol/L 100 100 97*   < > 98   CO2 mmol/L 26.0 26.0 27.0   < >  24.0   BUN mg/dL 13 9 13   < > 17   CREATININE mg/dL 0.73 0.75 0.92   < > 1.02*   CALCIUM mg/dL 9.4 9.1 9.1   < > 8.8   BILIRUBIN mg/dL  --   --   --   --  1.4*   ALK PHOS U/L  --   --   --   --  126*   ALT (SGPT) U/L  --   --   --   --  21   AST (SGOT) U/L  --   --   --   --  30   GLUCOSE mg/dL 187* 91 145*   < > 118*    < > = values in this interval not displayed.       Estimated Creatinine Clearance: 54.3 mL/min (by C-G formula based on SCr of 0.73 mg/dL).    Results from last 7 days   Lab Units 04/22/21  0351 04/21/21  0521 04/20/21  0342   MAGNESIUM mg/dL 2.0 1.9 1.9       Results from last 7 days   Lab Units 04/22/21  0351 04/21/21  0521 04/20/21  1518 04/20/21  0342 04/19/21  0345   WBC 10*3/mm3 8.80 5.90 6.50 7.00 6.20   HEMOGLOBIN g/dL 12.8 13.1 13.0 12.5 12.3   PLATELETS 10*3/mm3 142 138* 135* 128* 130*       Results from last 7 days   Lab Units 04/21/21  0521 04/20/21  0343 04/19/21  0345 04/18/21  1655 04/15/21  1401   INR  1.73* 2.06 1.67* 1.50* 1.40*             ASSESSMENT:     Chest pain    Atrial fibrillation (CMS/[facility]) [I48.91]    Coronary artery disease    Dyslipidemia    Hypertension    Presence of cardiac pacemaker    Anxiety associated with depression    Obesity (BMI 30-39.9)    Seasonal allergies    GERD without esophagitis    CKD (chronic kidney disease) stage 3, GFR 30-59 ml/min (CMS/[facility])    Chronic pain    Angina at rest (CMS/[facility])    Abnormal nuclear stress test        - CKD III III:  Kidney function is a stable post cardiac catheter.  Cardiothoracic has been consulted .     -Chest pain: Positive stress test.  Cardiac catheter showed three-vessel disease.  Cardiothoracic surgery has been consulted  -History of coronary artery disease  -Hypertension: Patient currently on nitroglycerin due to poorly controlled blood pressure   - A-Fib  -Dyslipidemia    Plan:    We will discontinue IV fluid  Increase Aldactone  Titrate off nitroglycerin drip  Cardiothoracic surgery has been consulted  And  surveillance labs    David Charles MD  4/22/2021    07:54 EDT

## 2021-04-22 NOTE — DISCHARGE SUMMARY
[facility] Medicine Services  DISCHARGE SUMMARY        Prepared For PCP:  Nava Yu MD    Patient Name: Syl Herrera  : 1935  MRN: [id number]      Date of Admission:   2021    Date of Discharge:  2021    Length of stay:  LOS: 2 days     Hospital Course     Presenting Problem:   Chest pain, unspecified type [R07.9]      Active Hospital Problems    Diagnosis  POA   • **Chest pain [R07.9]  Yes   • Anxiety associated with depression [F41.8]  Unknown   • Obesity (BMI 30-39.9) [E66.9]  Yes   • Seasonal allergies [J30.2]  Yes   • GERD without esophagitis [K21.9]  Yes   • CKD (chronic kidney disease) stage 3, GFR 30-59 ml/min (CMS/HCC) [N18.30]  Yes   • Chronic pain [G89.29]  Yes   • Angina at rest (CMS/HCC) [I20.8]  Unknown   • Abnormal nuclear stress test [R94.39]  Unknown   • Hypertension [I10]  Yes   • Atrial fibrillation (CMS/HCC) [I48.91] [I48.91]  Yes   • Presence of cardiac pacemaker [Z95.0]  Yes   • Dyslipidemia [E78.5]  Yes   • Coronary artery disease [I25.10]  Yes      Resolved Hospital Problems   No resolved problems to display.           Hospital Course by problem list.    Chest pain   -Troponin: Less than 0.010, trend  -Chest x-ray shows cardiomegaly with possible hiatal hernia.    -EKG shows an atrial paced rhythm with 1 PVC but no obvious acute ST changes and a QTC of 459 ms.  -Hold metoprolol temporarily secondary to stress test  - Lipid profile reviewed ... appear normal.  -Stress test abnormal ... status post cardiac cath revealing three vessel disease, cardiothoracic surgery consult for Redo CABG initially but now decided to go with stenting, Cardiology service ok to discharge on plavix and imdur and will do outpatient stenting.     CAD  -Continue statin, and warfarin warfarin and cardiac monitoring  -     Atrial fibrillation status post pacemaker placement  -EKG shows an atrial paced rhythm with 1 PVC but no obvious acute ST changes and a QTC of 459  ms.  -Continue diltiazem, metoprolol and warfarin with pharmacy to dose ..... INR therapeutic ... coumadin on hold in anticipation to cardiac cath.     Hypertension  -poorly controlled with a blood pressure on admission of 168/110  - Continue diltiazem  -Hold metoprolol temporarily secondary to stress test  - Monitor while admitted     CKD  - Creatinine: 1.02, BUN: 17, eGFR: 52  - Avoid nephrotoxic medication and IV dye unless urgently needed  - Monitor BMP and I's and O's  - Consult Nephrology service.      Hyperlipidemia  -Check lipid panel  -Continue statin     Seasonal allergies  -Zyrtec     GERD  -PPI     Anxiety  -Xanax (inspect verified)     Chronic pain  -Norco (inspect verified)     Obesity (BMI: 33.95)  -Encourage diet lifestyle modifications          Recommendation for Outpatient Providers:     Follow up with family physician in a week time.  Follow up with cardiology service Dr. Martell in a week         Reasons For Change In Medications and Indications for New Medications:        Day of Discharge     HPI:       Vital Signs:   Temp:  [97.4 °F (36.3 °C)-98.2 °F (36.8 °C)] 98.2 °F (36.8 °C)  Heart Rate:  [] 99  Resp:  [16] 16  BP: (117-167)/(56-93) 130/65     Physical Exam:  Physical Exam  Vitals and nursing note reviewed.   Constitutional:       General: She is not in acute distress.     Appearance: Normal appearance. She is well-developed. She is not ill-appearing, toxic-appearing or diaphoretic.   HENT:      Head: Normocephalic and atraumatic.      Right Ear: Ear canal and external ear normal.      Left Ear: Ear canal and external ear normal.      Nose: Nose normal. No congestion or rhinorrhea.      Mouth/Throat:      Mouth: Mucous membranes are moist.      Pharynx: No oropharyngeal exudate.   Eyes:      General: No scleral icterus.        Right eye: No discharge.         Left eye: No discharge.      Extraocular Movements: Extraocular movements intact.      Conjunctiva/sclera: Conjunctivae normal.       Pupils: Pupils are equal, round, and reactive to light.   Neck:      Thyroid: No thyromegaly.      Vascular: No carotid bruit or JVD.      Trachea: No tracheal deviation.   Cardiovascular:      Rate and Rhythm: Normal rate and regular rhythm.      Pulses: Normal pulses.      Heart sounds: Normal heart sounds. No murmur heard.   No friction rub. No gallop.    Pulmonary:      Effort: Pulmonary effort is normal. No respiratory distress.      Breath sounds: Normal breath sounds. No stridor. No wheezing, rhonchi or rales.   Chest:      Chest wall: No tenderness.   Abdominal:      General: Bowel sounds are normal. There is no distension.      Palpations: Abdomen is soft. There is no mass.      Tenderness: There is no abdominal tenderness. There is no guarding or rebound.      Hernia: No hernia is present.   Musculoskeletal:         General: No swelling, tenderness, deformity or signs of injury. Normal range of motion.      Cervical back: Normal range of motion and neck supple. No rigidity. No muscular tenderness.      Right lower leg: No edema.      Left lower leg: No edema.   Lymphadenopathy:      Cervical: No cervical adenopathy.   Skin:     General: Skin is warm and dry.      Coloration: Skin is not jaundiced or pale.      Findings: No bruising, erythema or rash.   Neurological:      General: No focal deficit present.      Mental Status: She is alert and oriented to person, place, and time. Mental status is at baseline.      Cranial Nerves: No cranial nerve deficit.      Sensory: No sensory deficit.      Motor: No weakness or abnormal muscle tone.      Coordination: Coordination normal.   Psychiatric:         Mood and Affect: Mood normal.         Behavior: Behavior normal.         Thought Content: Thought content normal.         Judgment: Judgment normal.         Pertinent  and/or Most Recent Results     Results from last 7 days   Lab Units 04/22/21  0351 04/21/21  0521 04/20/21  1518 04/20/21  0342 04/19/21  0345  04/18/21 1924 04/18/21  1655   WBC 10*3/mm3 8.80 5.90 6.50 7.00 6.20  --  5.70   HEMOGLOBIN g/dL 12.8 13.1 13.0 12.5 12.3  --  13.3   HEMATOCRIT % 38.2 38.4 39.1 36.7 37.4  --  39.4   PLATELETS 10*3/mm3 142 138* 135* 128* 130*  --  142   SODIUM mmol/L 134* 137 134* 135* 136  --  135*   POTASSIUM mmol/L 4.4 3.6 3.6 3.6 4.0 4.3 4.4   CHLORIDE mmol/L 100 100 97* 98 99  --  98   CO2 mmol/L 26.0 26.0 27.0 28.0 26.0  --  24.0   BUN mg/dL 13 9 13 13 13  --  17   CREATININE mg/dL 0.73 0.75 0.92 0.98 0.94  --  1.02*   GLUCOSE mg/dL 187* 91 145* 93 97  --  118*   CALCIUM mg/dL 9.4 9.1 9.1 9.4 9.4  --  8.8     Results from last 7 days   Lab Units 04/21/21  0521 04/20/21  0343 04/19/21  0345 04/18/21  1655   BILIRUBIN mg/dL  --   --   --  1.4*   ALK PHOS U/L  --   --   --  126*   ALT (SGPT) U/L  --   --   --  21   AST (SGOT) U/L  --   --   --  30   PROTIME Seconds 18.5* 21.9 17.9* 16.2*   INR  1.73* 2.06 1.67* 1.50*     Results from last 7 days   Lab Units 04/19/21  0345   CHOLESTEROL mg/dL 88   TRIGLYCERIDES mg/dL 55   HDL CHOL mg/dL 46     Results from last 7 days   Lab Units 04/18/21 1924 04/18/21  1655   PROBNP pg/mL  --  1,593.0   TROPONIN T ng/mL <0.010 <0.010       Brief Urine Lab Results     None          Microbiology Results Abnormal     Procedure Component Value - Date/Time    COVID PRE-OP / PRE-PROCEDURE SCREENING ORDER (NO ISOLATION) - Swab, Nasopharynx [[id number]]  (Normal) Collected: 04/18/21 5499    Lab Status: Final result Specimen: Swab from Nasopharynx Updated: 04/19/21 1820    Narrative:      The following orders were created for panel order COVID PRE-OP / PRE-PROCEDURE SCREENING ORDER (NO ISOLATION) - Swab, Nasopharynx.  Procedure                               Abnormality         Status                     ---------                               -----------         ------                     COVID-19,APTIMA PANTHER,...[[id number]]  Normal              Final result                 Please view results for  these tests on the individual orders.    COVID-19,APTIMA PANTHER,[facility] IN-HOUSE, NP/OP SWAB IN UTM/VTM/[facility] TRANSPORT MEDIA,24 HR TAT - Swab, Nasopharynx [[id number]]  (Normal) Collected: 04/18/21 1757    Lab Status: Final result Specimen: Swab from Nasopharynx Updated: 04/19/21 1439     COVID19 Not Detected    Narrative:      Fact sheet for providers: https://www.fda.gov/media/545088/download     Fact sheet for patients: https://www.fda.gov/media/905343/download    Test performed by RT PCR.          XR Chest 1 View    Result Date: 4/18/2021  Impression: 1.Cardiomegaly 2.Hiatal hernia suggested.  Electronically Signed By-Arie Miller MD On:4/18/2021 5:08 PM This report was finalized on [id number] by  Arie Miller MD.      Results for orders placed during the hospital encounter of 04/18/21    Duplex Carotid Ultrasound CAR    Interpretation Summary  · Proximal right internal carotid artery plaque without significant stenosis.  · Proximal left internal carotid artery plaque without significant stenosis.      Results for orders placed during the hospital encounter of 04/18/21    Duplex Carotid Ultrasound CAR    Interpretation Summary  · Proximal right internal carotid artery plaque without significant stenosis.  · Proximal left internal carotid artery plaque without significant stenosis.      Results for orders placed during the hospital encounter of 04/18/21    Adult Transthoracic Echo Complete W/ Cont if Necessary Per Protocol    Interpretation Summary  · Left ventricular ejection fraction appears to be 61 - 65%.  · No pericardial effusion noted              Test Results Pending at Discharge        Procedures Performed  Procedure(s):  Left Heart Cath and coronary angiogram  Left ventriculography  Saphenous Vein Graft         Consults:   Consults     Date and Time Order Name Status Description    4/21/2021  1:39 PM Inpatient Cardiothoracic Surgery Consult Completed     4/20/2021  9:32 AM Inpatient Nephrology Consult       4/19/2021  4:35 PM Inpatient Cardiology Consult Completed     4/18/2021  5:32 PM Hospitalist (on-call MD unless specified) Completed             Discharge Details        Discharge Medications      New Medications      Instructions Start Date   clopidogrel 75 MG tablet  Commonly known as: PLAVIX   75 mg, Oral, Daily   Start Date: April 23, 2021     isosorbide mononitrate 30 MG 24 hr tablet  Commonly known as: IMDUR   30 mg, Oral, Every 24 Hours Scheduled   Start Date: April 23, 2021        Changes to Medications      Instructions Start Date   spironolactone 25 MG tablet  Commonly known as: ALDACTONE  What changed: when to take this   TAKE ONE-HALF TABLET BY MOUTH DAILY      warfarin 2 MG tablet  Commonly known as: COUMADIN  What changed: Another medication with the same name was changed. Make sure you understand how and when to take each.   2 mg, Oral, 3 Times Weekly, Mon, Wed, Fri       warfarin 4 MG tablet  Commonly known as: COUMADIN  What changed: See the new instructions.   TAKE 1 TABLET BY MOUTH DAILY EXCEPT 1/2 TABLET ON WEDNESDAY OR AS DIRECTED         Continue These Medications      Instructions Start Date   ALPRAZolam 0.5 MG tablet  Commonly known as: XANAX   0.5 mg, Oral, 2 Times Daily PRN      atorvastatin 10 MG tablet  Commonly known as: LIPITOR   10 mg, Oral, Nightly      BENADRYL ALLERGY PO   Oral, As Needed      carboxymethylcellulose 0.5 % solution  Commonly known as: REFRESH PLUS   1 drop, Both Eyes, 3 Times Daily PRN      cetirizine 10 MG tablet  Commonly known as: zyrTEC   10 mg, Oral, Daily      dilTIAZem 120 MG 24 hr capsule  Commonly known as: TIAZAC   120 mg, Oral, Daily      HYDROcodone-acetaminophen 7.5-325 MG per tablet  Commonly known as: NORCO   1 tablet, Oral, Every 8 Hours PRN      magnesium oxide 400 MG tablet  Commonly known as: MAG-OX   400 mg, Oral, Daily      metoprolol tartrate 100 MG tablet  Commonly known as: LOPRESSOR   100 mg, Oral, 2 Times Daily      ondansetron 4 MG  tablet  Commonly known as: ZOFRAN   4 mg, Oral, Daily PRN      pantoprazole 40 MG EC tablet  Commonly known as: PROTONIX   TK 1 T PO QD      TUSSIN CF COUGH & COLD PO   Oral, As Needed      Viteyes AREDS Formula capsule   1 capsule, Oral, 2 times daily             Allergies   Allergen Reactions   • Doxycycline Other (See Comments)     Chest pain   • Celebrex [Celecoxib] Other (See Comments)     Chest pain   • Contrast Dye Itching   • Iodinated Diagnostic Agents Itching   • Nsaids Other (See Comments)     convulsion   • Other Itching     Pt states some steroids make her itch and hallucinate- she does not know the names   She said she is allergic to all arthritis medicine   • Sulfa Antibiotics Nausea Only         Discharge Disposition:  Home or Self Care    Diet:  Hospital:  Diet Order   Procedures   • Diet Cardiac; Healthy Heart         Discharge Activity:   Activity Instructions     As tolerated              CODE STATUS:    Code Status and Medical Interventions:   Ordered at: 04/18/21 1907     Code Status:    CPR     Medical Interventions (Level of Support Prior to Arrest):    Full         Follow-up Appointments  Future Appointments   Date Time Provider Department Center   5/5/2021  3:15 PM [facility]   [facility]  [facility]   5/5/2021  3:50 PM Ivan Martell MD [facility]             Condition on Discharge:      Stable      This patient has been examined wearing appropriate Personal Protective Equipment and discussed with hospital infection control department. 04/22/21      Electronically signed by Benjamin Galindo MD, 04/22/21, 2:01 PM EDT.      Time: I spent  33  minutes on this discharge activity which included face-to-face encounter with the patient/reviewing the data in the system/coordination of the care with the nursing staff as well as consultants/documentation/entering orders.

## 2021-04-22 NOTE — PLAN OF CARE
Goal Outcome Evaluation:         Patient is not a surgical candidate for CABG. Dr Martell will attempt to place stents in the upcoming week. Will discharge home later today. Will continue to monitor.

## 2021-04-23 ENCOUNTER — READMISSION MANAGEMENT (OUTPATIENT)
Dept: CALL CENTER | Facility: HOSPITAL | Age: 86
End: 2021-04-23

## 2021-04-23 NOTE — CASE MANAGEMENT/SOCIAL WORK
Case Management Discharge Note      Final Note: Home         Selected Continued Care - Discharged on 4/22/2021 Admission date: 4/18/2021 - Discharge disposition: Home or Self Care              Final Discharge Disposition Code: 01 - home or self-care

## 2021-04-23 NOTE — OUTREACH NOTE
Prep Survey      Responses   [facility] discharged [facility]?  [facility]   Is LACE score < 7 ?  No   Emergency Room discharge w/ pulse ox?  No   Eligibility  Readm Mgmt   Discharge diagnosis  CP,  heart cath   Does the patient have one of the following disease processes/diagnoses(primary or secondary)?  Other   Does the patient have Home health ordered?  No   Is there a DME ordered?  No   Comments regarding appointments  call for apmts   Medication alerts for this patient  plavix, imdur   Prep survey completed?  Yes          Velma Morley RN

## 2021-04-24 ENCOUNTER — APPOINTMENT (OUTPATIENT)
Dept: GENERAL RADIOLOGY | Facility: HOSPITAL | Age: 86
End: 2021-04-24

## 2021-04-24 ENCOUNTER — HOSPITAL ENCOUNTER (INPATIENT)
Facility: HOSPITAL | Age: 86
LOS: 7 days | Discharge: HOME HEALTH | End: 2021-05-01
Attending: EMERGENCY MEDICINE | Admitting: INTERNAL MEDICINE

## 2021-04-24 DIAGNOSIS — I20.8 ANGINA AT REST (HCC): ICD-10-CM

## 2021-04-24 DIAGNOSIS — R07.9 CHEST PAIN, UNSPECIFIED TYPE: Primary | ICD-10-CM

## 2021-04-24 DIAGNOSIS — R94.39 ABNORMAL NUCLEAR STRESS TEST: ICD-10-CM

## 2021-04-24 DIAGNOSIS — R06.00 DYSPNEA, UNSPECIFIED TYPE: ICD-10-CM

## 2021-04-24 LAB
ANION GAP SERPL CALCULATED.3IONS-SCNC: 10 MMOL/L (ref 5–15)
APTT PPP: 27.4 SECONDS (ref 24–31)
APTT PPP: 28.4 SECONDS (ref 61–76.5)
BASOPHILS # BLD AUTO: 0 10*3/MM3 (ref 0–0.2)
BASOPHILS # BLD AUTO: 0.1 10*3/MM3 (ref 0–0.2)
BASOPHILS NFR BLD AUTO: 0.3 % (ref 0–1.5)
BASOPHILS NFR BLD AUTO: 1.2 % (ref 0–1.5)
BUN SERPL-MCNC: 23 MG/DL (ref 8–23)
BUN/CREAT SERPL: 25.6 (ref 7–25)
CALCIUM SPEC-SCNC: 9.2 MG/DL (ref 8.6–10.5)
CHLORIDE SERPL-SCNC: 99 MMOL/L (ref 98–107)
CO2 SERPL-SCNC: 26 MMOL/L (ref 22–29)
CREAT SERPL-MCNC: 0.9 MG/DL (ref 0.57–1)
DEPRECATED RDW RBC AUTO: 45.9 FL (ref 37–54)
DEPRECATED RDW RBC AUTO: 46.8 FL (ref 37–54)
EOSINOPHIL # BLD AUTO: 0.2 10*3/MM3 (ref 0–0.4)
EOSINOPHIL # BLD AUTO: 0.3 10*3/MM3 (ref 0–0.4)
EOSINOPHIL NFR BLD AUTO: 3.2 % (ref 0.3–6.2)
EOSINOPHIL NFR BLD AUTO: 3.2 % (ref 0.3–6.2)
ERYTHROCYTE [DISTWIDTH] IN BLOOD BY AUTOMATED COUNT: 14.5 % (ref 12.3–15.4)
ERYTHROCYTE [DISTWIDTH] IN BLOOD BY AUTOMATED COUNT: 14.8 % (ref 12.3–15.4)
GFR SERPL CREATININE-BSD FRML MDRD: 60 ML/MIN/1.73
GLUCOSE SERPL-MCNC: 84 MG/DL (ref 65–99)
HCT VFR BLD AUTO: 37.9 % (ref 34–46.6)
HCT VFR BLD AUTO: 38.2 % (ref 34–46.6)
HGB BLD-MCNC: 12.8 G/DL (ref 12–15.9)
HGB BLD-MCNC: 13 G/DL (ref 12–15.9)
HOLD SPECIMEN: NORMAL
INR PPP: 1.09 (ref 0.93–1.1)
INR PPP: 1.09 (ref 0.93–1.1)
LYMPHOCYTES # BLD AUTO: 2.3 10*3/MM3 (ref 0.7–3.1)
LYMPHOCYTES # BLD AUTO: 2.7 10*3/MM3 (ref 0.7–3.1)
LYMPHOCYTES NFR BLD AUTO: 29.7 % (ref 19.6–45.3)
LYMPHOCYTES NFR BLD AUTO: 29.8 % (ref 19.6–45.3)
MCH RBC QN AUTO: 30.2 PG (ref 26.6–33)
MCH RBC QN AUTO: 30.4 PG (ref 26.6–33)
MCHC RBC AUTO-ENTMCNC: 33.8 G/DL (ref 31.5–35.7)
MCHC RBC AUTO-ENTMCNC: 33.9 G/DL (ref 31.5–35.7)
MCV RBC AUTO: 89.2 FL (ref 79–97)
MCV RBC AUTO: 89.7 FL (ref 79–97)
MONOCYTES # BLD AUTO: 0.6 10*3/MM3 (ref 0.1–0.9)
MONOCYTES # BLD AUTO: 0.7 10*3/MM3 (ref 0.1–0.9)
MONOCYTES NFR BLD AUTO: 8.3 % (ref 5–12)
MONOCYTES NFR BLD AUTO: 8.4 % (ref 5–12)
NEUTROPHILS NFR BLD AUTO: 4.5 10*3/MM3 (ref 1.7–7)
NEUTROPHILS NFR BLD AUTO: 5.2 10*3/MM3 (ref 1.7–7)
NEUTROPHILS NFR BLD AUTO: 57.5 % (ref 42.7–76)
NEUTROPHILS NFR BLD AUTO: 58.4 % (ref 42.7–76)
NRBC BLD AUTO-RTO: 0.1 /100 WBC (ref 0–0.2)
NRBC BLD AUTO-RTO: 0.1 /100 WBC (ref 0–0.2)
NT-PROBNP SERPL-MCNC: 1643 PG/ML (ref 0–1800)
PLATELET # BLD AUTO: 142 10*3/MM3 (ref 140–450)
PLATELET # BLD AUTO: 173 10*3/MM3 (ref 140–450)
PMV BLD AUTO: 10.1 FL (ref 6–12)
PMV BLD AUTO: 9.6 FL (ref 6–12)
POTASSIUM SERPL-SCNC: 4.7 MMOL/L (ref 3.5–5.2)
PROTHROMBIN TIME: 11.9 SECONDS (ref 9.6–11.7)
PROTHROMBIN TIME: 11.9 SECONDS (ref 9.6–11.7)
RBC # BLD AUTO: 4.26 10*6/MM3 (ref 3.77–5.28)
RBC # BLD AUTO: 4.27 10*6/MM3 (ref 3.77–5.28)
SARS-COV-2 RNA PNL SPEC NAA+PROBE: NOT DETECTED
SODIUM SERPL-SCNC: 135 MMOL/L (ref 136–145)
TROPONIN T SERPL-MCNC: <0.01 NG/ML (ref 0–0.03)
TROPONIN T SERPL-MCNC: <0.01 NG/ML (ref 0–0.03)
WBC # BLD AUTO: 7.7 10*3/MM3 (ref 3.4–10.8)
WBC # BLD AUTO: 9 10*3/MM3 (ref 3.4–10.8)

## 2021-04-24 PROCEDURE — 80048 BASIC METABOLIC PNL TOTAL CA: CPT | Performed by: EMERGENCY MEDICINE

## 2021-04-24 PROCEDURE — 85730 THROMBOPLASTIN TIME PARTIAL: CPT | Performed by: INTERNAL MEDICINE

## 2021-04-24 PROCEDURE — 85730 THROMBOPLASTIN TIME PARTIAL: CPT | Performed by: EMERGENCY MEDICINE

## 2021-04-24 PROCEDURE — 84484 ASSAY OF TROPONIN QUANT: CPT | Performed by: INTERNAL MEDICINE

## 2021-04-24 PROCEDURE — 71045 X-RAY EXAM CHEST 1 VIEW: CPT

## 2021-04-24 PROCEDURE — 84484 ASSAY OF TROPONIN QUANT: CPT | Performed by: EMERGENCY MEDICINE

## 2021-04-24 PROCEDURE — 99223 1ST HOSP IP/OBS HIGH 75: CPT | Performed by: INTERNAL MEDICINE

## 2021-04-24 PROCEDURE — 85025 COMPLETE CBC W/AUTO DIFF WBC: CPT | Performed by: EMERGENCY MEDICINE

## 2021-04-24 PROCEDURE — U0003 INFECTIOUS AGENT DETECTION BY NUCLEIC ACID (DNA OR RNA); SEVERE ACUTE RESPIRATORY SYNDROME CORONAVIRUS 2 (SARS-COV-2) (CORONAVIRUS DISEASE [COVID-19]), AMPLIFIED PROBE TECHNIQUE, MAKING USE OF HIGH THROUGHPUT TECHNOLOGIES AS DESCRIBED BY CMS-2020-01-R: HCPCS | Performed by: INTERNAL MEDICINE

## 2021-04-24 PROCEDURE — 83880 ASSAY OF NATRIURETIC PEPTIDE: CPT | Performed by: EMERGENCY MEDICINE

## 2021-04-24 PROCEDURE — 93005 ELECTROCARDIOGRAM TRACING: CPT | Performed by: EMERGENCY MEDICINE

## 2021-04-24 PROCEDURE — 25010000002 HEPARIN (PORCINE) 25000-0.45 UT/250ML-% SOLUTION: Performed by: INTERNAL MEDICINE

## 2021-04-24 PROCEDURE — 85025 COMPLETE CBC W/AUTO DIFF WBC: CPT | Performed by: INTERNAL MEDICINE

## 2021-04-24 PROCEDURE — 85610 PROTHROMBIN TIME: CPT | Performed by: INTERNAL MEDICINE

## 2021-04-24 PROCEDURE — 85610 PROTHROMBIN TIME: CPT | Performed by: EMERGENCY MEDICINE

## 2021-04-24 PROCEDURE — U0005 INFEC AGEN DETEC AMPLI PROBE: HCPCS | Performed by: INTERNAL MEDICINE

## 2021-04-24 PROCEDURE — 99283 EMERGENCY DEPT VISIT LOW MDM: CPT

## 2021-04-24 RX ORDER — ONDANSETRON 2 MG/ML
4 INJECTION INTRAMUSCULAR; INTRAVENOUS EVERY 6 HOURS PRN
Status: DISCONTINUED | OUTPATIENT
Start: 2021-04-24 | End: 2021-05-01 | Stop reason: HOSPADM

## 2021-04-24 RX ORDER — CLOPIDOGREL BISULFATE 75 MG/1
75 TABLET ORAL DAILY
Status: DISCONTINUED | OUTPATIENT
Start: 2021-04-25 | End: 2021-05-01 | Stop reason: HOSPADM

## 2021-04-24 RX ORDER — SODIUM CHLORIDE 0.9 % (FLUSH) 0.9 %
10 SYRINGE (ML) INJECTION AS NEEDED
Status: DISCONTINUED | OUTPATIENT
Start: 2021-04-24 | End: 2021-05-01 | Stop reason: HOSPADM

## 2021-04-24 RX ORDER — ISOSORBIDE MONONITRATE 30 MG/1
30 TABLET, EXTENDED RELEASE ORAL
Status: DISCONTINUED | OUTPATIENT
Start: 2021-04-25 | End: 2021-05-01 | Stop reason: HOSPADM

## 2021-04-24 RX ORDER — SPIRONOLACTONE 25 MG/1
12.5 TABLET ORAL EVERY EVENING
Status: DISCONTINUED | OUTPATIENT
Start: 2021-04-25 | End: 2021-04-30

## 2021-04-24 RX ORDER — ALPRAZOLAM 0.5 MG/1
0.5 TABLET ORAL 2 TIMES DAILY PRN
Status: DISCONTINUED | OUTPATIENT
Start: 2021-04-24 | End: 2021-05-01 | Stop reason: HOSPADM

## 2021-04-24 RX ORDER — HEPARIN SODIUM 10000 [USP'U]/100ML
12 INJECTION, SOLUTION INTRAVENOUS
Status: DISCONTINUED | OUTPATIENT
Start: 2021-04-24 | End: 2021-04-26

## 2021-04-24 RX ORDER — ATORVASTATIN CALCIUM 10 MG/1
10 TABLET, FILM COATED ORAL NIGHTLY
Status: DISCONTINUED | OUTPATIENT
Start: 2021-04-24 | End: 2021-05-01 | Stop reason: HOSPADM

## 2021-04-24 RX ORDER — DILTIAZEM HYDROCHLORIDE 120 MG/1
120 CAPSULE, COATED, EXTENDED RELEASE ORAL
Status: DISCONTINUED | OUTPATIENT
Start: 2021-04-25 | End: 2021-05-01 | Stop reason: HOSPADM

## 2021-04-24 RX ORDER — HYDROCODONE BITARTRATE AND ACETAMINOPHEN 7.5; 325 MG/1; MG/1
1 TABLET ORAL EVERY 8 HOURS PRN
Status: DISCONTINUED | OUTPATIENT
Start: 2021-04-24 | End: 2021-05-01 | Stop reason: HOSPADM

## 2021-04-24 RX ORDER — ACETAMINOPHEN 325 MG/1
650 TABLET ORAL EVERY 4 HOURS PRN
Status: DISCONTINUED | OUTPATIENT
Start: 2021-04-24 | End: 2021-05-01 | Stop reason: HOSPADM

## 2021-04-24 RX ORDER — CARBOXYMETHYLCELLULOSE SODIUM 10 MG/ML
1 GEL OPHTHALMIC 3 TIMES DAILY PRN
Status: DISCONTINUED | OUTPATIENT
Start: 2021-04-24 | End: 2021-05-01 | Stop reason: HOSPADM

## 2021-04-24 RX ORDER — ACETAMINOPHEN 650 MG/1
650 SUPPOSITORY RECTAL EVERY 4 HOURS PRN
Status: DISCONTINUED | OUTPATIENT
Start: 2021-04-24 | End: 2021-05-01 | Stop reason: HOSPADM

## 2021-04-24 RX ORDER — NITROGLYCERIN 0.4 MG/1
0.4 TABLET SUBLINGUAL
Status: DISCONTINUED | OUTPATIENT
Start: 2021-04-24 | End: 2021-05-01 | Stop reason: HOSPADM

## 2021-04-24 RX ORDER — ONDANSETRON 4 MG/1
4 TABLET, FILM COATED ORAL EVERY 6 HOURS PRN
Status: DISCONTINUED | OUTPATIENT
Start: 2021-04-24 | End: 2021-05-01 | Stop reason: HOSPADM

## 2021-04-24 RX ORDER — SODIUM CHLORIDE 0.9 % (FLUSH) 0.9 %
10 SYRINGE (ML) INJECTION EVERY 12 HOURS SCHEDULED
Status: DISCONTINUED | OUTPATIENT
Start: 2021-04-24 | End: 2021-05-01 | Stop reason: HOSPADM

## 2021-04-24 RX ORDER — WARFARIN SODIUM 2 MG/1
2 TABLET ORAL 3 TIMES WEEKLY
COMMUNITY
End: 2021-05-05 | Stop reason: ALTCHOICE

## 2021-04-24 RX ORDER — WARFARIN SODIUM 4 MG/1
4 TABLET ORAL
COMMUNITY
End: 2021-05-05 | Stop reason: ALTCHOICE

## 2021-04-24 RX ORDER — ACETAMINOPHEN 160 MG/5ML
650 SOLUTION ORAL EVERY 4 HOURS PRN
Status: DISCONTINUED | OUTPATIENT
Start: 2021-04-24 | End: 2021-05-01 | Stop reason: HOSPADM

## 2021-04-24 RX ORDER — PANTOPRAZOLE SODIUM 40 MG/1
40 TABLET, DELAYED RELEASE ORAL
Status: DISCONTINUED | OUTPATIENT
Start: 2021-04-25 | End: 2021-05-01 | Stop reason: HOSPADM

## 2021-04-24 RX ORDER — CHOLECALCIFEROL (VITAMIN D3) 125 MCG
5 CAPSULE ORAL NIGHTLY PRN
Status: DISCONTINUED | OUTPATIENT
Start: 2021-04-24 | End: 2021-05-01 | Stop reason: HOSPADM

## 2021-04-24 RX ORDER — CETIRIZINE HYDROCHLORIDE 10 MG/1
10 TABLET ORAL DAILY
Status: DISCONTINUED | OUTPATIENT
Start: 2021-04-25 | End: 2021-05-01 | Stop reason: HOSPADM

## 2021-04-24 RX ORDER — METOPROLOL TARTRATE 50 MG/1
100 TABLET, FILM COATED ORAL 2 TIMES DAILY
Status: DISCONTINUED | OUTPATIENT
Start: 2021-04-24 | End: 2021-05-01 | Stop reason: HOSPADM

## 2021-04-24 RX ADMIN — METOPROLOL TARTRATE 100 MG: 50 TABLET, FILM COATED ORAL at 20:10

## 2021-04-24 RX ADMIN — HYDROCODONE BITARTRATE AND ACETAMINOPHEN 1 TABLET: 7.5; 325 TABLET ORAL at 20:10

## 2021-04-24 RX ADMIN — ATORVASTATIN CALCIUM 10 MG: 10 TABLET, FILM COATED ORAL at 20:10

## 2021-04-24 RX ADMIN — HEPARIN SODIUM 12 UNITS/KG/HR: 10000 INJECTION, SOLUTION INTRAVENOUS at 20:16

## 2021-04-24 RX ADMIN — ALPRAZOLAM 0.5 MG: 0.5 TABLET ORAL at 20:10

## 2021-04-24 RX ADMIN — Medication 10 ML: at 20:10

## 2021-04-25 ENCOUNTER — READMISSION MANAGEMENT (OUTPATIENT)
Dept: CALL CENTER | Facility: HOSPITAL | Age: 86
End: 2021-04-25

## 2021-04-25 ENCOUNTER — APPOINTMENT (OUTPATIENT)
Dept: CARDIOLOGY | Facility: HOSPITAL | Age: 86
End: 2021-04-25

## 2021-04-25 LAB
ANION GAP SERPL CALCULATED.3IONS-SCNC: 11 MMOL/L (ref 5–15)
APTT PPP: 107.9 SECONDS (ref 61–76.5)
APTT PPP: 60.1 SECONDS (ref 61–76.5)
APTT PPP: 72.2 SECONDS (ref 61–76.5)
BH CV RIGHT GROIN PSA PROCEDURE SCRIPTING LRR: 1
BH CV XLRA MEAS EXT ILIAC A PSV RIGHT: 170 CM/SEC
BUN SERPL-MCNC: 18 MG/DL (ref 8–23)
BUN/CREAT SERPL: 20.5 (ref 7–25)
CALCIUM SPEC-SCNC: 9.3 MG/DL (ref 8.6–10.5)
CHLORIDE SERPL-SCNC: 99 MMOL/L (ref 98–107)
CO2 SERPL-SCNC: 28 MMOL/L (ref 22–29)
CREAT SERPL-MCNC: 0.88 MG/DL (ref 0.57–1)
GFR SERPL CREATININE-BSD FRML MDRD: 61 ML/MIN/1.73
GLUCOSE SERPL-MCNC: 107 MG/DL (ref 65–99)
HCT VFR BLD AUTO: 39.6 % (ref 34–46.6)
HGB BLD-MCNC: 13.2 G/DL (ref 12–15.9)
POTASSIUM SERPL-SCNC: 4 MMOL/L (ref 3.5–5.2)
PROX PFA PSV RIGHT: 49 CM/SEC
PROX SFA PSV RIGHT: 102 CM/SEC
QT INTERVAL: 430 MS
RIGHT GROIN CFA SYS: 112 CM/SEC
SODIUM SERPL-SCNC: 138 MMOL/L (ref 136–145)

## 2021-04-25 PROCEDURE — 85018 HEMOGLOBIN: CPT | Performed by: INTERNAL MEDICINE

## 2021-04-25 PROCEDURE — 85730 THROMBOPLASTIN TIME PARTIAL: CPT | Performed by: INTERNAL MEDICINE

## 2021-04-25 PROCEDURE — 99233 SBSQ HOSP IP/OBS HIGH 50: CPT | Performed by: INTERNAL MEDICINE

## 2021-04-25 PROCEDURE — 99222 1ST HOSP IP/OBS MODERATE 55: CPT | Performed by: INTERNAL MEDICINE

## 2021-04-25 PROCEDURE — 93926 LOWER EXTREMITY STUDY: CPT

## 2021-04-25 PROCEDURE — 85014 HEMATOCRIT: CPT | Performed by: INTERNAL MEDICINE

## 2021-04-25 PROCEDURE — 25010000002 HEPARIN (PORCINE) 25000-0.45 UT/250ML-% SOLUTION: Performed by: INTERNAL MEDICINE

## 2021-04-25 PROCEDURE — 80048 BASIC METABOLIC PNL TOTAL CA: CPT | Performed by: INTERNAL MEDICINE

## 2021-04-25 RX ADMIN — ATORVASTATIN CALCIUM 10 MG: 10 TABLET, FILM COATED ORAL at 21:01

## 2021-04-25 RX ADMIN — Medication 10 ML: at 21:01

## 2021-04-25 RX ADMIN — DILTIAZEM HYDROCHLORIDE 120 MG: 120 CAPSULE, EXTENDED RELEASE ORAL at 08:16

## 2021-04-25 RX ADMIN — PANTOPRAZOLE SODIUM 40 MG: 40 TABLET, DELAYED RELEASE ORAL at 06:02

## 2021-04-25 RX ADMIN — METOPROLOL TARTRATE 100 MG: 50 TABLET, FILM COATED ORAL at 08:16

## 2021-04-25 RX ADMIN — METOPROLOL TARTRATE 100 MG: 50 TABLET, FILM COATED ORAL at 21:01

## 2021-04-25 RX ADMIN — ISOSORBIDE MONONITRATE 30 MG: 30 TABLET, EXTENDED RELEASE ORAL at 08:16

## 2021-04-25 RX ADMIN — CLOPIDOGREL BISULFATE 75 MG: 75 TABLET ORAL at 08:16

## 2021-04-25 RX ADMIN — ALPRAZOLAM 0.5 MG: 0.5 TABLET ORAL at 21:01

## 2021-04-25 RX ADMIN — HEPARIN SODIUM 10 UNITS/KG/HR: 10000 INJECTION, SOLUTION INTRAVENOUS at 19:42

## 2021-04-25 RX ADMIN — HYDROCODONE BITARTRATE AND ACETAMINOPHEN 1 TABLET: 7.5; 325 TABLET ORAL at 12:23

## 2021-04-25 RX ADMIN — CETIRIZINE HYDROCHLORIDE 10 MG: 10 TABLET, FILM COATED ORAL at 08:16

## 2021-04-25 RX ADMIN — MAGNESIUM GLUCONATE 500 MG ORAL TABLET 400 MG: 500 TABLET ORAL at 08:16

## 2021-04-25 RX ADMIN — HYDROCODONE BITARTRATE AND ACETAMINOPHEN 1 TABLET: 7.5; 325 TABLET ORAL at 21:01

## 2021-04-26 LAB
ACT BLD: 153 SECONDS (ref 89–137)
ACT BLD: 175 SECONDS (ref 89–137)
ACT BLD: 219 SECONDS (ref 89–137)
ACT BLD: 268 SECONDS (ref 89–137)
ALBUMIN SERPL-MCNC: 3.3 G/DL (ref 3.5–5.2)
ALBUMIN/GLOB SERPL: 1.2 G/DL
ALP SERPL-CCNC: 111 U/L (ref 39–117)
ALT SERPL W P-5'-P-CCNC: 13 U/L (ref 1–33)
ANION GAP SERPL CALCULATED.3IONS-SCNC: 11 MMOL/L (ref 5–15)
ANION GAP SERPL CALCULATED.3IONS-SCNC: 12 MMOL/L (ref 5–15)
APTT PPP: 43.1 SECONDS (ref 24–31)
APTT PPP: 67.5 SECONDS (ref 61–76.5)
APTT PPP: 71.2 SECONDS (ref 61–76.5)
AST SERPL-CCNC: 16 U/L (ref 1–32)
BASOPHILS # BLD AUTO: 0 10*3/MM3 (ref 0–0.2)
BASOPHILS NFR BLD AUTO: 0.3 % (ref 0–1.5)
BASOPHILS NFR BLD AUTO: 0.3 % (ref 0–1.5)
BASOPHILS NFR BLD AUTO: 0.4 % (ref 0–1.5)
BILIRUB SERPL-MCNC: 1.2 MG/DL (ref 0–1.2)
BUN SERPL-MCNC: 20 MG/DL (ref 8–23)
BUN SERPL-MCNC: 23 MG/DL (ref 8–23)
BUN/CREAT SERPL: 20.7 (ref 7–25)
BUN/CREAT SERPL: 20.8 (ref 7–25)
CALCIUM SPEC-SCNC: 8.5 MG/DL (ref 8.6–10.5)
CALCIUM SPEC-SCNC: 9.2 MG/DL (ref 8.6–10.5)
CHLORIDE SERPL-SCNC: 100 MMOL/L (ref 98–107)
CHLORIDE SERPL-SCNC: 100 MMOL/L (ref 98–107)
CO2 SERPL-SCNC: 21 MMOL/L (ref 22–29)
CO2 SERPL-SCNC: 23 MMOL/L (ref 22–29)
CREAT SERPL-MCNC: 0.96 MG/DL (ref 0.57–1)
CREAT SERPL-MCNC: 1.11 MG/DL (ref 0.57–1)
DEPRECATED RDW RBC AUTO: 45.9 FL (ref 37–54)
DEPRECATED RDW RBC AUTO: 47.3 FL (ref 37–54)
DEPRECATED RDW RBC AUTO: 47.7 FL (ref 37–54)
EOSINOPHIL # BLD AUTO: 0 10*3/MM3 (ref 0–0.4)
EOSINOPHIL # BLD AUTO: 0.3 10*3/MM3 (ref 0–0.4)
EOSINOPHIL # BLD AUTO: 0.3 10*3/MM3 (ref 0–0.4)
EOSINOPHIL NFR BLD AUTO: 0 % (ref 0.3–6.2)
EOSINOPHIL NFR BLD AUTO: 2.9 % (ref 0.3–6.2)
EOSINOPHIL NFR BLD AUTO: 3 % (ref 0.3–6.2)
ERYTHROCYTE [DISTWIDTH] IN BLOOD BY AUTOMATED COUNT: 14.6 % (ref 12.3–15.4)
ERYTHROCYTE [DISTWIDTH] IN BLOOD BY AUTOMATED COUNT: 14.7 % (ref 12.3–15.4)
ERYTHROCYTE [DISTWIDTH] IN BLOOD BY AUTOMATED COUNT: 14.8 % (ref 12.3–15.4)
GFR SERPL CREATININE-BSD FRML MDRD: 47 ML/MIN/1.73
GFR SERPL CREATININE-BSD FRML MDRD: 56 ML/MIN/1.73
GLOBULIN UR ELPH-MCNC: 2.7 GM/DL
GLUCOSE SERPL-MCNC: 149 MG/DL (ref 65–99)
GLUCOSE SERPL-MCNC: 224 MG/DL (ref 65–99)
HCT VFR BLD AUTO: 37.2 % (ref 34–46.6)
HCT VFR BLD AUTO: 41.6 % (ref 34–46.6)
HCT VFR BLD AUTO: 44.6 % (ref 34–46.6)
HGB BLD-MCNC: 12.2 G/DL (ref 12–15.9)
HGB BLD-MCNC: 14 G/DL (ref 12–15.9)
HGB BLD-MCNC: 14.4 G/DL (ref 12–15.9)
INR PPP: 1.06 (ref 0.93–1.1)
LYMPHOCYTES # BLD AUTO: 1.8 10*3/MM3 (ref 0.7–3.1)
LYMPHOCYTES # BLD AUTO: 1.8 10*3/MM3 (ref 0.7–3.1)
LYMPHOCYTES # BLD AUTO: 2.9 10*3/MM3 (ref 0.7–3.1)
LYMPHOCYTES NFR BLD AUTO: 11.9 % (ref 19.6–45.3)
LYMPHOCYTES NFR BLD AUTO: 15.6 % (ref 19.6–45.3)
LYMPHOCYTES NFR BLD AUTO: 26 % (ref 19.6–45.3)
MCH RBC QN AUTO: 29.7 PG (ref 26.6–33)
MCH RBC QN AUTO: 29.9 PG (ref 26.6–33)
MCH RBC QN AUTO: 30.1 PG (ref 26.6–33)
MCHC RBC AUTO-ENTMCNC: 32.3 G/DL (ref 31.5–35.7)
MCHC RBC AUTO-ENTMCNC: 32.6 G/DL (ref 31.5–35.7)
MCHC RBC AUTO-ENTMCNC: 33.6 G/DL (ref 31.5–35.7)
MCV RBC AUTO: 89.6 FL (ref 79–97)
MCV RBC AUTO: 91 FL (ref 79–97)
MCV RBC AUTO: 92.5 FL (ref 79–97)
MONOCYTES # BLD AUTO: 0.2 10*3/MM3 (ref 0.1–0.9)
MONOCYTES # BLD AUTO: 0.6 10*3/MM3 (ref 0.1–0.9)
MONOCYTES # BLD AUTO: 1 10*3/MM3 (ref 0.1–0.9)
MONOCYTES NFR BLD AUTO: 1.3 % (ref 5–12)
MONOCYTES NFR BLD AUTO: 5.4 % (ref 5–12)
MONOCYTES NFR BLD AUTO: 8.8 % (ref 5–12)
NEUTROPHILS NFR BLD AUTO: 13.3 10*3/MM3 (ref 1.7–7)
NEUTROPHILS NFR BLD AUTO: 6.8 10*3/MM3 (ref 1.7–7)
NEUTROPHILS NFR BLD AUTO: 61.8 % (ref 42.7–76)
NEUTROPHILS NFR BLD AUTO: 75.8 % (ref 42.7–76)
NEUTROPHILS NFR BLD AUTO: 8.7 10*3/MM3 (ref 1.7–7)
NEUTROPHILS NFR BLD AUTO: 86.5 % (ref 42.7–76)
NRBC BLD AUTO-RTO: 0 /100 WBC (ref 0–0.2)
NRBC BLD AUTO-RTO: 0.1 /100 WBC (ref 0–0.2)
NRBC BLD AUTO-RTO: 0.1 /100 WBC (ref 0–0.2)
PLATELET # BLD AUTO: 155 10*3/MM3 (ref 140–450)
PLATELET # BLD AUTO: 156 10*3/MM3 (ref 140–450)
PLATELET # BLD AUTO: 177 10*3/MM3 (ref 140–450)
PMV BLD AUTO: 9.3 FL (ref 6–12)
PMV BLD AUTO: 9.6 FL (ref 6–12)
PMV BLD AUTO: 9.8 FL (ref 6–12)
POTASSIUM SERPL-SCNC: 4.4 MMOL/L (ref 3.5–5.2)
POTASSIUM SERPL-SCNC: 4.9 MMOL/L (ref 3.5–5.2)
PROT SERPL-MCNC: 6 G/DL (ref 6–8.5)
PROTHROMBIN TIME: 11.6 SECONDS (ref 9.6–11.7)
QT INTERVAL: 418 MS
RBC # BLD AUTO: 4.09 10*6/MM3 (ref 3.77–5.28)
RBC # BLD AUTO: 4.64 10*6/MM3 (ref 3.77–5.28)
RBC # BLD AUTO: 4.82 10*6/MM3 (ref 3.77–5.28)
SODIUM SERPL-SCNC: 133 MMOL/L (ref 136–145)
SODIUM SERPL-SCNC: 134 MMOL/L (ref 136–145)
WBC # BLD AUTO: 11 10*3/MM3 (ref 3.4–10.8)
WBC # BLD AUTO: 11.4 10*3/MM3 (ref 3.4–10.8)
WBC # BLD AUTO: 15.4 10*3/MM3 (ref 3.4–10.8)

## 2021-04-26 PROCEDURE — C1887 CATHETER, GUIDING: HCPCS | Performed by: INTERNAL MEDICINE

## 2021-04-26 PROCEDURE — 25010000002 MIDAZOLAM PER 1 MG: Performed by: INTERNAL MEDICINE

## 2021-04-26 PROCEDURE — 85025 COMPLETE CBC W/AUTO DIFF WBC: CPT | Performed by: INTERNAL MEDICINE

## 2021-04-26 PROCEDURE — 94799 UNLISTED PULMONARY SVC/PX: CPT

## 2021-04-26 PROCEDURE — C9600 PERC DRUG-EL COR STENT SING: HCPCS | Performed by: INTERNAL MEDICINE

## 2021-04-26 PROCEDURE — C1769 GUIDE WIRE: HCPCS | Performed by: INTERNAL MEDICINE

## 2021-04-26 PROCEDURE — 99153 MOD SED SAME PHYS/QHP EA: CPT | Performed by: INTERNAL MEDICINE

## 2021-04-26 PROCEDURE — 92928 PRQ TCAT PLMT NTRAC ST 1 LES: CPT | Performed by: INTERNAL MEDICINE

## 2021-04-26 PROCEDURE — 0 IOPAMIDOL PER 1 ML: Performed by: INTERNAL MEDICINE

## 2021-04-26 PROCEDURE — 99152 MOD SED SAME PHYS/QHP 5/>YRS: CPT | Performed by: INTERNAL MEDICINE

## 2021-04-26 PROCEDURE — 99233 SBSQ HOSP IP/OBS HIGH 50: CPT | Performed by: INTERNAL MEDICINE

## 2021-04-26 PROCEDURE — C1874 STENT, COATED/COV W/DEL SYS: HCPCS | Performed by: INTERNAL MEDICINE

## 2021-04-26 PROCEDURE — 93005 ELECTROCARDIOGRAM TRACING: CPT | Performed by: INTERNAL MEDICINE

## 2021-04-26 PROCEDURE — 25010000002 DIPHENHYDRAMINE PER 50 MG: Performed by: INTERNAL MEDICINE

## 2021-04-26 PROCEDURE — 85730 THROMBOPLASTIN TIME PARTIAL: CPT | Performed by: INTERNAL MEDICINE

## 2021-04-26 PROCEDURE — 25010000002 METHYLPREDNISOLONE PER 125 MG: Performed by: INTERNAL MEDICINE

## 2021-04-26 PROCEDURE — 027136Z DILATION OF CORONARY ARTERY, TWO ARTERIES WITH THREE DRUG-ELUTING INTRALUMINAL DEVICES, PERCUTANEOUS APPROACH: ICD-10-PCS | Performed by: INTERNAL MEDICINE

## 2021-04-26 PROCEDURE — 25010000002 FENTANYL CITRATE (PF) 100 MCG/2ML SOLUTION: Performed by: INTERNAL MEDICINE

## 2021-04-26 PROCEDURE — C1725 CATH, TRANSLUMIN NON-LASER: HCPCS | Performed by: INTERNAL MEDICINE

## 2021-04-26 PROCEDURE — 85610 PROTHROMBIN TIME: CPT | Performed by: INTERNAL MEDICINE

## 2021-04-26 PROCEDURE — 80053 COMPREHEN METABOLIC PANEL: CPT | Performed by: INTERNAL MEDICINE

## 2021-04-26 PROCEDURE — 25010000002 HEPARIN (PORCINE) PER 1000 UNITS: Performed by: INTERNAL MEDICINE

## 2021-04-26 PROCEDURE — 85347 COAGULATION TIME ACTIVATED: CPT

## 2021-04-26 PROCEDURE — C1894 INTRO/SHEATH, NON-LASER: HCPCS | Performed by: INTERNAL MEDICINE

## 2021-04-26 DEVICE — XIENCE SIERRA™ EVEROLIMUS ELUTING CORONARY STENT SYSTEM 2.50 MM X 15 MM / RAPID-EXCHANGE
Type: IMPLANTABLE DEVICE | Status: FUNCTIONAL
Brand: XIENCE SIERRA™

## 2021-04-26 DEVICE — XIENCE SIERRA™ EVEROLIMUS ELUTING CORONARY STENT SYSTEM 3.50 MM X 18 MM / RAPID-EXCHANGE
Type: IMPLANTABLE DEVICE | Status: FUNCTIONAL
Brand: XIENCE SIERRA™

## 2021-04-26 RX ORDER — FENTANYL CITRATE 50 UG/ML
25 INJECTION, SOLUTION INTRAMUSCULAR; INTRAVENOUS ONCE
Status: CANCELLED | OUTPATIENT
Start: 2021-04-26 | End: 2021-04-26

## 2021-04-26 RX ORDER — ACETAMINOPHEN 325 MG/1
650 TABLET ORAL EVERY 4 HOURS PRN
Status: DISCONTINUED | OUTPATIENT
Start: 2021-04-26 | End: 2021-04-26 | Stop reason: SDUPTHER

## 2021-04-26 RX ORDER — DIPHENHYDRAMINE HYDROCHLORIDE 50 MG/ML
50 INJECTION INTRAMUSCULAR; INTRAVENOUS 2 TIMES DAILY
Status: COMPLETED | OUTPATIENT
Start: 2021-04-26 | End: 2021-04-26

## 2021-04-26 RX ORDER — HEPARIN SODIUM 1000 [USP'U]/ML
INJECTION, SOLUTION INTRAVENOUS; SUBCUTANEOUS AS NEEDED
Status: DISCONTINUED | OUTPATIENT
Start: 2021-04-26 | End: 2021-04-26 | Stop reason: HOSPADM

## 2021-04-26 RX ORDER — MIDAZOLAM HYDROCHLORIDE 1 MG/ML
1 INJECTION INTRAMUSCULAR; INTRAVENOUS ONCE
Status: CANCELLED | OUTPATIENT
Start: 2021-04-26 | End: 2021-04-26

## 2021-04-26 RX ORDER — SODIUM CHLORIDE 9 MG/ML
1-3 INJECTION, SOLUTION INTRAVENOUS CONTINUOUS
Status: DISCONTINUED | OUTPATIENT
Start: 2021-04-26 | End: 2021-04-30

## 2021-04-26 RX ORDER — FENTANYL CITRATE 50 UG/ML
25 INJECTION, SOLUTION INTRAMUSCULAR; INTRAVENOUS ONCE
Status: COMPLETED | OUTPATIENT
Start: 2021-04-26 | End: 2021-04-26

## 2021-04-26 RX ORDER — SODIUM CHLORIDE 9 MG/ML
75 INJECTION, SOLUTION INTRAVENOUS CONTINUOUS
Status: DISCONTINUED | OUTPATIENT
Start: 2021-04-26 | End: 2021-04-30

## 2021-04-26 RX ORDER — LIDOCAINE HYDROCHLORIDE 20 MG/ML
INJECTION, SOLUTION INFILTRATION; PERINEURAL AS NEEDED
Status: DISCONTINUED | OUTPATIENT
Start: 2021-04-26 | End: 2021-04-26 | Stop reason: HOSPADM

## 2021-04-26 RX ORDER — FENTANYL CITRATE 50 UG/ML
INJECTION, SOLUTION INTRAMUSCULAR; INTRAVENOUS AS NEEDED
Status: DISCONTINUED | OUTPATIENT
Start: 2021-04-26 | End: 2021-04-26 | Stop reason: HOSPADM

## 2021-04-26 RX ORDER — SODIUM CHLORIDE 9 MG/ML
250 INJECTION, SOLUTION INTRAVENOUS ONCE AS NEEDED
Status: DISCONTINUED | OUTPATIENT
Start: 2021-04-26 | End: 2021-05-01 | Stop reason: HOSPADM

## 2021-04-26 RX ORDER — METHYLPREDNISOLONE SODIUM SUCCINATE 125 MG/2ML
125 INJECTION, POWDER, LYOPHILIZED, FOR SOLUTION INTRAMUSCULAR; INTRAVENOUS EVERY 6 HOURS
Status: COMPLETED | OUTPATIENT
Start: 2021-04-26 | End: 2021-04-26

## 2021-04-26 RX ORDER — FENTANYL CITRATE 50 UG/ML
25 INJECTION, SOLUTION INTRAMUSCULAR; INTRAVENOUS
Status: DISCONTINUED | OUTPATIENT
Start: 2021-04-26 | End: 2021-05-01 | Stop reason: HOSPADM

## 2021-04-26 RX ORDER — MIDAZOLAM HYDROCHLORIDE 1 MG/ML
INJECTION INTRAMUSCULAR; INTRAVENOUS AS NEEDED
Status: DISCONTINUED | OUTPATIENT
Start: 2021-04-26 | End: 2021-04-26 | Stop reason: HOSPADM

## 2021-04-26 RX ADMIN — Medication 10 ML: at 09:36

## 2021-04-26 RX ADMIN — METHYLPREDNISOLONE SODIUM SUCCINATE 125 MG: 125 INJECTION, POWDER, FOR SOLUTION INTRAMUSCULAR; INTRAVENOUS at 16:48

## 2021-04-26 RX ADMIN — MAGNESIUM GLUCONATE 500 MG ORAL TABLET 400 MG: 500 TABLET ORAL at 09:35

## 2021-04-26 RX ADMIN — ISOSORBIDE MONONITRATE 30 MG: 30 TABLET, EXTENDED RELEASE ORAL at 09:36

## 2021-04-26 RX ADMIN — DILTIAZEM HYDROCHLORIDE 120 MG: 120 CAPSULE, EXTENDED RELEASE ORAL at 09:35

## 2021-04-26 RX ADMIN — PANTOPRAZOLE SODIUM 40 MG: 40 TABLET, DELAYED RELEASE ORAL at 06:12

## 2021-04-26 RX ADMIN — DIPHENHYDRAMINE HYDROCHLORIDE 50 MG: 50 INJECTION, SOLUTION INTRAMUSCULAR; INTRAVENOUS at 10:58

## 2021-04-26 RX ADMIN — METOPROLOL TARTRATE 100 MG: 50 TABLET, FILM COATED ORAL at 09:35

## 2021-04-26 RX ADMIN — SODIUM CHLORIDE 3 ML/KG/HR: 9 INJECTION, SOLUTION INTRAVENOUS at 15:59

## 2021-04-26 RX ADMIN — FENTANYL CITRATE 25 MCG: 50 INJECTION, SOLUTION INTRAMUSCULAR; INTRAVENOUS at 19:12

## 2021-04-26 RX ADMIN — FENTANYL CITRATE 25 MCG: 50 INJECTION, SOLUTION INTRAMUSCULAR; INTRAVENOUS at 20:28

## 2021-04-26 RX ADMIN — CETIRIZINE HYDROCHLORIDE 10 MG: 10 TABLET, FILM COATED ORAL at 09:35

## 2021-04-26 RX ADMIN — CLOPIDOGREL BISULFATE 75 MG: 75 TABLET ORAL at 09:35

## 2021-04-26 RX ADMIN — SPIRONOLACTONE 12.5 MG: 25 TABLET ORAL at 09:36

## 2021-04-26 RX ADMIN — HYDROCODONE BITARTRATE AND ACETAMINOPHEN 1 TABLET: 7.5; 325 TABLET ORAL at 12:40

## 2021-04-26 RX ADMIN — METHYLPREDNISOLONE SODIUM SUCCINATE 125 MG: 125 INJECTION, POWDER, FOR SOLUTION INTRAMUSCULAR; INTRAVENOUS at 10:58

## 2021-04-26 RX ADMIN — DIPHENHYDRAMINE HYDROCHLORIDE 50 MG: 50 INJECTION, SOLUTION INTRAMUSCULAR; INTRAVENOUS at 16:48

## 2021-04-26 NOTE — OUTREACH NOTE
Medical Week 1 Survey      Responses   [facility] patient discharged from?  [facility]   Does the patient have one of the following disease processes/diagnoses(primary or secondary)?  Other   Week 1 attempt successful?  No   Revoke  Readmitted          Morena Morley RN

## 2021-04-27 ENCOUNTER — APPOINTMENT (OUTPATIENT)
Dept: CARDIOLOGY | Facility: HOSPITAL | Age: 86
End: 2021-04-27

## 2021-04-27 LAB
ANION GAP SERPL CALCULATED.3IONS-SCNC: 12 MMOL/L (ref 5–15)
APTT PPP: 25.4 SECONDS (ref 61–76.5)
BASOPHILS # BLD AUTO: 0 10*3/MM3 (ref 0–0.2)
BASOPHILS NFR BLD AUTO: 0.1 % (ref 0–1.5)
BH CV LEFT GROIN PSA PROCEDURE SCRIPTING LRR: 1
BUN SERPL-MCNC: 27 MG/DL (ref 8–23)
BUN/CREAT SERPL: 25 (ref 7–25)
CALCIUM SPEC-SCNC: 8.2 MG/DL (ref 8.6–10.5)
CHLORIDE SERPL-SCNC: 104 MMOL/L (ref 98–107)
CO2 SERPL-SCNC: 20 MMOL/L (ref 22–29)
CREAT SERPL-MCNC: 1.08 MG/DL (ref 0.57–1)
DEPRECATED RDW RBC AUTO: 45.9 FL (ref 37–54)
EOSINOPHIL # BLD AUTO: 0 10*3/MM3 (ref 0–0.4)
EOSINOPHIL NFR BLD AUTO: 0 % (ref 0.3–6.2)
ERYTHROCYTE [DISTWIDTH] IN BLOOD BY AUTOMATED COUNT: 14.4 % (ref 12.3–15.4)
GFR SERPL CREATININE-BSD FRML MDRD: 48 ML/MIN/1.73
GLUCOSE BLDC GLUCOMTR-MCNC: 172 MG/DL (ref 70–105)
GLUCOSE BLDC GLUCOMTR-MCNC: 237 MG/DL (ref 70–105)
GLUCOSE SERPL-MCNC: 196 MG/DL (ref 65–99)
HCT VFR BLD AUTO: 26.1 % (ref 34–46.6)
HCT VFR BLD AUTO: 27.6 % (ref 34–46.6)
HCT VFR BLD AUTO: 30.5 % (ref 34–46.6)
HCT VFR BLD AUTO: 31.6 % (ref 34–46.6)
HCT VFR BLD AUTO: 32.1 % (ref 34–46.6)
HCT VFR BLD AUTO: 35 % (ref 34–46.6)
HGB BLD-MCNC: 10 G/DL (ref 12–15.9)
HGB BLD-MCNC: 10.3 G/DL (ref 12–15.9)
HGB BLD-MCNC: 10.7 G/DL (ref 12–15.9)
HGB BLD-MCNC: 11.4 G/DL (ref 12–15.9)
HGB BLD-MCNC: 8.6 G/DL (ref 12–15.9)
HGB BLD-MCNC: 9.1 G/DL (ref 12–15.9)
LEFT GROIN CFA SYS: 98 CM/SEC
LYMPHOCYTES # BLD AUTO: 1.5 10*3/MM3 (ref 0.7–3.1)
LYMPHOCYTES NFR BLD AUTO: 9.4 % (ref 19.6–45.3)
MCH RBC QN AUTO: 29.7 PG (ref 26.6–33)
MCHC RBC AUTO-ENTMCNC: 32.6 G/DL (ref 31.5–35.7)
MCV RBC AUTO: 91.2 FL (ref 79–97)
MONOCYTES # BLD AUTO: 0.3 10*3/MM3 (ref 0.1–0.9)
MONOCYTES NFR BLD AUTO: 1.8 % (ref 5–12)
NEUTROPHILS NFR BLD AUTO: 14.5 10*3/MM3 (ref 1.7–7)
NEUTROPHILS NFR BLD AUTO: 88.7 % (ref 42.7–76)
NRBC BLD AUTO-RTO: 0 /100 WBC (ref 0–0.2)
PLATELET # BLD AUTO: 157 10*3/MM3 (ref 140–450)
PMV BLD AUTO: 9.9 FL (ref 6–12)
POTASSIUM SERPL-SCNC: 4.6 MMOL/L (ref 3.5–5.2)
PROX SFA PSV LEFT: 75 CM/SEC
RBC # BLD AUTO: 3.84 10*6/MM3 (ref 3.77–5.28)
SODIUM SERPL-SCNC: 136 MMOL/L (ref 136–145)
WBC # BLD AUTO: 16.3 10*3/MM3 (ref 3.4–10.8)

## 2021-04-27 PROCEDURE — 85018 HEMOGLOBIN: CPT | Performed by: SURGERY

## 2021-04-27 PROCEDURE — 85014 HEMATOCRIT: CPT | Performed by: SURGERY

## 2021-04-27 PROCEDURE — 83540 ASSAY OF IRON: CPT | Performed by: FAMILY MEDICINE

## 2021-04-27 PROCEDURE — 85025 COMPLETE CBC W/AUTO DIFF WBC: CPT | Performed by: INTERNAL MEDICINE

## 2021-04-27 PROCEDURE — 85730 THROMBOPLASTIN TIME PARTIAL: CPT | Performed by: INTERNAL MEDICINE

## 2021-04-27 PROCEDURE — 99233 SBSQ HOSP IP/OBS HIGH 50: CPT | Performed by: INTERNAL MEDICINE

## 2021-04-27 PROCEDURE — 84466 ASSAY OF TRANSFERRIN: CPT | Performed by: FAMILY MEDICINE

## 2021-04-27 PROCEDURE — 82962 GLUCOSE BLOOD TEST: CPT

## 2021-04-27 PROCEDURE — 93926 LOWER EXTREMITY STUDY: CPT

## 2021-04-27 PROCEDURE — 82728 ASSAY OF FERRITIN: CPT | Performed by: FAMILY MEDICINE

## 2021-04-27 PROCEDURE — 80048 BASIC METABOLIC PNL TOTAL CA: CPT | Performed by: INTERNAL MEDICINE

## 2021-04-27 RX ADMIN — HYDROCODONE BITARTRATE AND ACETAMINOPHEN 1 TABLET: 7.5; 325 TABLET ORAL at 06:32

## 2021-04-27 RX ADMIN — CLOPIDOGREL BISULFATE 75 MG: 75 TABLET ORAL at 08:04

## 2021-04-27 RX ADMIN — METOPROLOL TARTRATE 100 MG: 50 TABLET, FILM COATED ORAL at 21:28

## 2021-04-27 RX ADMIN — Medication 10 ML: at 08:04

## 2021-04-27 RX ADMIN — ACETAMINOPHEN 650 MG: 325 TABLET, FILM COATED ORAL at 14:15

## 2021-04-27 RX ADMIN — HYDROCODONE BITARTRATE AND ACETAMINOPHEN 1 TABLET: 7.5; 325 TABLET ORAL at 17:48

## 2021-04-27 RX ADMIN — ALPRAZOLAM 0.5 MG: 0.5 TABLET ORAL at 23:03

## 2021-04-27 RX ADMIN — Medication 10 ML: at 21:29

## 2021-04-27 RX ADMIN — PANTOPRAZOLE SODIUM 40 MG: 40 TABLET, DELAYED RELEASE ORAL at 05:36

## 2021-04-27 RX ADMIN — ATORVASTATIN CALCIUM 10 MG: 10 TABLET, FILM COATED ORAL at 21:28

## 2021-04-27 RX ADMIN — CETIRIZINE HYDROCHLORIDE 10 MG: 10 TABLET, FILM COATED ORAL at 08:04

## 2021-04-27 RX ADMIN — MAGNESIUM GLUCONATE 500 MG ORAL TABLET 400 MG: 500 TABLET ORAL at 08:04

## 2021-04-27 RX ADMIN — SODIUM CHLORIDE 75 ML/HR: 9 INJECTION, SOLUTION INTRAVENOUS at 23:03

## 2021-04-27 RX ADMIN — ALPRAZOLAM 0.5 MG: 0.5 TABLET ORAL at 08:04

## 2021-04-27 RX ADMIN — SPIRONOLACTONE 12.5 MG: 25 TABLET ORAL at 08:04

## 2021-04-28 LAB
APTT PPP: 25.4 SECONDS (ref 61–76.5)
BACTERIA UR QL AUTO: ABNORMAL /HPF
BASOPHILS # BLD AUTO: 0 10*3/MM3 (ref 0–0.2)
BASOPHILS NFR BLD AUTO: 0.1 % (ref 0–1.5)
BILIRUB UR QL STRIP: NEGATIVE
CLARITY UR: ABNORMAL
COLOR UR: YELLOW
DEPRECATED RDW RBC AUTO: 46.4 FL (ref 37–54)
EOSINOPHIL # BLD AUTO: 0 10*3/MM3 (ref 0–0.4)
EOSINOPHIL NFR BLD AUTO: 0 % (ref 0.3–6.2)
ERYTHROCYTE [DISTWIDTH] IN BLOOD BY AUTOMATED COUNT: 14.7 % (ref 12.3–15.4)
FERRITIN SERPL-MCNC: 141.8 NG/ML (ref 13–150)
FOLATE SERPL-MCNC: 8.14 NG/ML (ref 4.78–24.2)
GLUCOSE BLDC GLUCOMTR-MCNC: 116 MG/DL (ref 70–105)
GLUCOSE BLDC GLUCOMTR-MCNC: 134 MG/DL (ref 70–105)
GLUCOSE BLDC GLUCOMTR-MCNC: 165 MG/DL (ref 70–105)
GLUCOSE BLDC GLUCOMTR-MCNC: 193 MG/DL (ref 70–105)
GLUCOSE UR STRIP-MCNC: NEGATIVE MG/DL
HCT VFR BLD AUTO: 23.2 % (ref 34–46.6)
HCT VFR BLD AUTO: 24.6 % (ref 34–46.6)
HCT VFR BLD AUTO: 25.7 % (ref 34–46.6)
HCT VFR BLD AUTO: 26.3 % (ref 34–46.6)
HCT VFR BLD AUTO: 26.6 % (ref 34–46.6)
HCT VFR BLD AUTO: 27 % (ref 34–46.6)
HGB BLD-MCNC: 7.8 G/DL (ref 12–15.9)
HGB BLD-MCNC: 8.1 G/DL (ref 12–15.9)
HGB BLD-MCNC: 8.7 G/DL (ref 12–15.9)
HGB BLD-MCNC: 8.9 G/DL (ref 12–15.9)
HGB UR QL STRIP.AUTO: ABNORMAL
HYALINE CASTS UR QL AUTO: ABNORMAL /LPF
IRON 24H UR-MRATE: 40 MCG/DL (ref 37–145)
IRON SATN MFR SERPL: 12 % (ref 20–50)
KETONES UR QL STRIP: NEGATIVE
LEUKOCYTE ESTERASE UR QL STRIP.AUTO: ABNORMAL
LYMPHOCYTES # BLD AUTO: 1.3 10*3/MM3 (ref 0.7–3.1)
LYMPHOCYTES NFR BLD AUTO: 9.6 % (ref 19.6–45.3)
MCH RBC QN AUTO: 29.7 PG (ref 26.6–33)
MCHC RBC AUTO-ENTMCNC: 32.7 G/DL (ref 31.5–35.7)
MCV RBC AUTO: 90.6 FL (ref 79–97)
MONOCYTES # BLD AUTO: 1.1 10*3/MM3 (ref 0.1–0.9)
MONOCYTES NFR BLD AUTO: 7.9 % (ref 5–12)
NEUTROPHILS NFR BLD AUTO: 11.4 10*3/MM3 (ref 1.7–7)
NEUTROPHILS NFR BLD AUTO: 82.4 % (ref 42.7–76)
NITRITE UR QL STRIP: POSITIVE
NRBC BLD AUTO-RTO: 0 /100 WBC (ref 0–0.2)
PH UR STRIP.AUTO: 5.5 [PH] (ref 5–8)
PLATELET # BLD AUTO: 135 10*3/MM3 (ref 140–450)
PMV BLD AUTO: 9.9 FL (ref 6–12)
PROT UR QL STRIP: NEGATIVE
RBC # BLD AUTO: 2.94 10*6/MM3 (ref 3.77–5.28)
RBC # UR: ABNORMAL /HPF
REF LAB TEST METHOD: ABNORMAL
RETICS # AUTO: 0.06 10*6/MM3 (ref 0.02–0.13)
RETICS/RBC NFR AUTO: 1.93 % (ref 0.7–1.9)
SP GR UR STRIP: 1.01 (ref 1–1.03)
SQUAMOUS #/AREA URNS HPF: ABNORMAL /HPF
TIBC SERPL-MCNC: 337 MCG/DL (ref 298–536)
TRANSFERRIN SERPL-MCNC: 226 MG/DL (ref 200–360)
UROBILINOGEN UR QL STRIP: ABNORMAL
VIT B12 BLD-MCNC: 754 PG/ML (ref 211–946)
WBC # BLD AUTO: 13.9 10*3/MM3 (ref 3.4–10.8)
WBC UR QL AUTO: ABNORMAL /HPF

## 2021-04-28 PROCEDURE — 87086 URINE CULTURE/COLONY COUNT: CPT | Performed by: FAMILY MEDICINE

## 2021-04-28 PROCEDURE — 85018 HEMOGLOBIN: CPT | Performed by: SURGERY

## 2021-04-28 PROCEDURE — 85045 AUTOMATED RETICULOCYTE COUNT: CPT | Performed by: FAMILY MEDICINE

## 2021-04-28 PROCEDURE — 82746 ASSAY OF FOLIC ACID SERUM: CPT | Performed by: FAMILY MEDICINE

## 2021-04-28 PROCEDURE — 99232 SBSQ HOSP IP/OBS MODERATE 35: CPT | Performed by: INTERNAL MEDICINE

## 2021-04-28 PROCEDURE — 82962 GLUCOSE BLOOD TEST: CPT

## 2021-04-28 PROCEDURE — 82668 ASSAY OF ERYTHROPOIETIN: CPT | Performed by: FAMILY MEDICINE

## 2021-04-28 PROCEDURE — 87186 SC STD MICRODIL/AGAR DIL: CPT | Performed by: FAMILY MEDICINE

## 2021-04-28 PROCEDURE — 82607 VITAMIN B-12: CPT | Performed by: FAMILY MEDICINE

## 2021-04-28 PROCEDURE — 85025 COMPLETE CBC W/AUTO DIFF WBC: CPT | Performed by: INTERNAL MEDICINE

## 2021-04-28 PROCEDURE — 85014 HEMATOCRIT: CPT | Performed by: SURGERY

## 2021-04-28 PROCEDURE — 85730 THROMBOPLASTIN TIME PARTIAL: CPT | Performed by: INTERNAL MEDICINE

## 2021-04-28 PROCEDURE — 87088 URINE BACTERIA CULTURE: CPT | Performed by: FAMILY MEDICINE

## 2021-04-28 PROCEDURE — 81001 URINALYSIS AUTO W/SCOPE: CPT | Performed by: FAMILY MEDICINE

## 2021-04-28 RX ORDER — AMOXICILLIN AND CLAVULANATE POTASSIUM 500; 125 MG/1; MG/1
1 TABLET, FILM COATED ORAL EVERY 12 HOURS SCHEDULED
Status: DISCONTINUED | OUTPATIENT
Start: 2021-04-28 | End: 2021-05-01 | Stop reason: HOSPADM

## 2021-04-28 RX ADMIN — DILTIAZEM HYDROCHLORIDE 120 MG: 120 CAPSULE, EXTENDED RELEASE ORAL at 09:16

## 2021-04-28 RX ADMIN — SPIRONOLACTONE 12.5 MG: 25 TABLET ORAL at 09:16

## 2021-04-28 RX ADMIN — PANTOPRAZOLE SODIUM 40 MG: 40 TABLET, DELAYED RELEASE ORAL at 06:09

## 2021-04-28 RX ADMIN — ALPRAZOLAM 0.5 MG: 0.5 TABLET ORAL at 21:08

## 2021-04-28 RX ADMIN — AMOXICILLIN AND CLAVULANATE POTASSIUM 500 MG: 500; 125 TABLET, FILM COATED ORAL at 20:59

## 2021-04-28 RX ADMIN — METOPROLOL TARTRATE 100 MG: 50 TABLET, FILM COATED ORAL at 20:59

## 2021-04-28 RX ADMIN — MAGNESIUM GLUCONATE 500 MG ORAL TABLET 400 MG: 500 TABLET ORAL at 09:16

## 2021-04-28 RX ADMIN — HYDROCODONE BITARTRATE AND ACETAMINOPHEN 1 TABLET: 7.5; 325 TABLET ORAL at 21:09

## 2021-04-28 RX ADMIN — ISOSORBIDE MONONITRATE 30 MG: 30 TABLET, EXTENDED RELEASE ORAL at 09:16

## 2021-04-28 RX ADMIN — CLOPIDOGREL BISULFATE 75 MG: 75 TABLET ORAL at 09:16

## 2021-04-28 RX ADMIN — HYDROCODONE BITARTRATE AND ACETAMINOPHEN 1 TABLET: 7.5; 325 TABLET ORAL at 12:28

## 2021-04-28 RX ADMIN — ATORVASTATIN CALCIUM 10 MG: 10 TABLET, FILM COATED ORAL at 20:59

## 2021-04-28 RX ADMIN — CETIRIZINE HYDROCHLORIDE 10 MG: 10 TABLET, FILM COATED ORAL at 09:16

## 2021-04-28 RX ADMIN — Medication 10 ML: at 21:05

## 2021-04-28 RX ADMIN — Medication 10 ML: at 09:17

## 2021-04-28 RX ADMIN — SODIUM CHLORIDE 75 ML/HR: 9 INJECTION, SOLUTION INTRAVENOUS at 21:14

## 2021-04-28 RX ADMIN — METOPROLOL TARTRATE 100 MG: 50 TABLET, FILM COATED ORAL at 09:16

## 2021-04-29 LAB
ALBUMIN SERPL-MCNC: 3.2 G/DL (ref 3.5–5.2)
ALBUMIN/GLOB SERPL: 1.5 G/DL
ALP SERPL-CCNC: 89 U/L (ref 39–117)
ALT SERPL W P-5'-P-CCNC: 32 U/L (ref 1–33)
ANION GAP SERPL CALCULATED.3IONS-SCNC: 6 MMOL/L (ref 5–15)
APTT PPP: 23.9 SECONDS (ref 61–76.5)
AST SERPL-CCNC: 37 U/L (ref 1–32)
BILIRUB SERPL-MCNC: 0.8 MG/DL (ref 0–1.2)
BUN SERPL-MCNC: 18 MG/DL (ref 8–23)
BUN/CREAT SERPL: 21.4 (ref 7–25)
CALCIUM SPEC-SCNC: 8.2 MG/DL (ref 8.6–10.5)
CHLORIDE SERPL-SCNC: 108 MMOL/L (ref 98–107)
CO2 SERPL-SCNC: 23 MMOL/L (ref 22–29)
CREAT SERPL-MCNC: 0.84 MG/DL (ref 0.57–1)
EPO SERPL-ACNC: 45.5 MIU/ML (ref 2.6–18.5)
GFR SERPL CREATININE-BSD FRML MDRD: 65 ML/MIN/1.73
GLOBULIN UR ELPH-MCNC: 2.1 GM/DL
GLUCOSE SERPL-MCNC: 126 MG/DL (ref 65–99)
HCT VFR BLD AUTO: 22.2 % (ref 34–46.6)
HCT VFR BLD AUTO: 22.4 % (ref 34–46.6)
HCT VFR BLD AUTO: 22.8 % (ref 34–46.6)
HCT VFR BLD AUTO: 25.1 % (ref 34–46.6)
HCT VFR BLD AUTO: 25.4 % (ref 34–46.6)
HCT VFR BLD AUTO: 25.6 % (ref 34–46.6)
HGB BLD-MCNC: 7.6 G/DL (ref 12–15.9)
HGB BLD-MCNC: 7.6 G/DL (ref 12–15.9)
HGB BLD-MCNC: 7.8 G/DL (ref 12–15.9)
HGB BLD-MCNC: 8.2 G/DL (ref 12–15.9)
HGB BLD-MCNC: 8.5 G/DL (ref 12–15.9)
HGB BLD-MCNC: 8.6 G/DL (ref 12–15.9)
POTASSIUM SERPL-SCNC: 3.9 MMOL/L (ref 3.5–5.2)
PROT SERPL-MCNC: 5.3 G/DL (ref 6–8.5)
SODIUM SERPL-SCNC: 137 MMOL/L (ref 136–145)

## 2021-04-29 PROCEDURE — 82550 ASSAY OF CK (CPK): CPT | Performed by: INTERNAL MEDICINE

## 2021-04-29 PROCEDURE — 80053 COMPREHEN METABOLIC PANEL: CPT | Performed by: NURSE PRACTITIONER

## 2021-04-29 PROCEDURE — 85730 THROMBOPLASTIN TIME PARTIAL: CPT | Performed by: INTERNAL MEDICINE

## 2021-04-29 PROCEDURE — 85014 HEMATOCRIT: CPT | Performed by: SURGERY

## 2021-04-29 PROCEDURE — 85018 HEMOGLOBIN: CPT | Performed by: SURGERY

## 2021-04-29 PROCEDURE — 84550 ASSAY OF BLOOD/URIC ACID: CPT | Performed by: INTERNAL MEDICINE

## 2021-04-29 PROCEDURE — 99232 SBSQ HOSP IP/OBS MODERATE 35: CPT | Performed by: INTERNAL MEDICINE

## 2021-04-29 RX ORDER — FERROUS SULFATE TAB EC 324 MG (65 MG FE EQUIVALENT) 324 (65 FE) MG
324 TABLET DELAYED RESPONSE ORAL 2 TIMES DAILY WITH MEALS
Status: DISCONTINUED | OUTPATIENT
Start: 2021-04-29 | End: 2021-05-01 | Stop reason: HOSPADM

## 2021-04-29 RX ADMIN — HYDROCODONE BITARTRATE AND ACETAMINOPHEN 1 TABLET: 7.5; 325 TABLET ORAL at 20:47

## 2021-04-29 RX ADMIN — PANTOPRAZOLE SODIUM 40 MG: 40 TABLET, DELAYED RELEASE ORAL at 05:39

## 2021-04-29 RX ADMIN — Medication 10 ML: at 09:36

## 2021-04-29 RX ADMIN — AMOXICILLIN AND CLAVULANATE POTASSIUM 500 MG: 500; 125 TABLET, FILM COATED ORAL at 20:42

## 2021-04-29 RX ADMIN — SPIRONOLACTONE 12.5 MG: 25 TABLET ORAL at 09:36

## 2021-04-29 RX ADMIN — ACETAMINOPHEN 650 MG: 325 TABLET, FILM COATED ORAL at 14:52

## 2021-04-29 RX ADMIN — CLOPIDOGREL BISULFATE 75 MG: 75 TABLET ORAL at 09:36

## 2021-04-29 RX ADMIN — METOPROLOL TARTRATE 100 MG: 50 TABLET, FILM COATED ORAL at 20:42

## 2021-04-29 RX ADMIN — ATORVASTATIN CALCIUM 10 MG: 10 TABLET, FILM COATED ORAL at 20:42

## 2021-04-29 RX ADMIN — FERROUS SULFATE TAB EC 324 MG (65 MG FE EQUIVALENT) 324 MG: 324 (65 FE) TABLET DELAYED RESPONSE at 17:35

## 2021-04-29 RX ADMIN — ALPRAZOLAM 0.5 MG: 0.5 TABLET ORAL at 09:44

## 2021-04-29 RX ADMIN — Medication 5 MG: at 03:33

## 2021-04-29 RX ADMIN — Medication 10 ML: at 20:43

## 2021-04-29 RX ADMIN — METOPROLOL TARTRATE 100 MG: 50 TABLET, FILM COATED ORAL at 09:36

## 2021-04-29 RX ADMIN — Medication 5 MG: at 20:47

## 2021-04-29 RX ADMIN — AMOXICILLIN AND CLAVULANATE POTASSIUM 500 MG: 500; 125 TABLET, FILM COATED ORAL at 09:35

## 2021-04-29 RX ADMIN — CETIRIZINE HYDROCHLORIDE 10 MG: 10 TABLET, FILM COATED ORAL at 09:36

## 2021-04-29 RX ADMIN — ISOSORBIDE MONONITRATE 30 MG: 30 TABLET, EXTENDED RELEASE ORAL at 09:36

## 2021-04-29 RX ADMIN — FERROUS SULFATE TAB EC 324 MG (65 MG FE EQUIVALENT) 324 MG: 324 (65 FE) TABLET DELAYED RESPONSE at 09:36

## 2021-04-29 RX ADMIN — DILTIAZEM HYDROCHLORIDE 120 MG: 120 CAPSULE, EXTENDED RELEASE ORAL at 09:36

## 2021-04-29 RX ADMIN — HYDROCODONE BITARTRATE AND ACETAMINOPHEN 1 TABLET: 7.5; 325 TABLET ORAL at 05:39

## 2021-04-29 RX ADMIN — ACETAMINOPHEN 650 MG: 325 TABLET, FILM COATED ORAL at 03:27

## 2021-04-29 RX ADMIN — MAGNESIUM GLUCONATE 500 MG ORAL TABLET 400 MG: 500 TABLET ORAL at 09:36

## 2021-04-29 RX ADMIN — ALPRAZOLAM 0.5 MG: 0.5 TABLET ORAL at 20:47

## 2021-04-30 ENCOUNTER — APPOINTMENT (OUTPATIENT)
Dept: ULTRASOUND IMAGING | Facility: HOSPITAL | Age: 86
End: 2021-04-30

## 2021-04-30 LAB
APTT PPP: 24.2 SECONDS (ref 61–76.5)
BACTERIA SPEC AEROBE CULT: ABNORMAL
CK SERPL-CCNC: 33 U/L (ref 20–180)
DEPRECATED RDW RBC AUTO: 46.8 FL (ref 37–54)
EOSINOPHIL # BLD MANUAL: 0.14 10*3/MM3 (ref 0–0.4)
EOSINOPHIL NFR BLD MANUAL: 2 % (ref 0.3–6.2)
EOSINOPHIL SPEC QL MICRO: 0 % EOS/100 CELLS (ref 0–0)
ERYTHROCYTE [DISTWIDTH] IN BLOOD BY AUTOMATED COUNT: 14.6 % (ref 12.3–15.4)
HAPTOGLOB SERPL-MCNC: 213 MG/DL (ref 30–200)
HBA1C MFR BLD: 6 % (ref 3.5–5.6)
HCT VFR BLD AUTO: 22.7 % (ref 34–46.6)
HCT VFR BLD AUTO: 23.8 % (ref 34–46.6)
HCT VFR BLD AUTO: 25.1 % (ref 34–46.6)
HCT VFR BLD AUTO: 25.1 % (ref 34–46.6)
HCT VFR BLD AUTO: 26.3 % (ref 34–46.6)
HCT VFR BLD AUTO: 26.9 % (ref 34–46.6)
HEMOCCULT STL QL IA: NEGATIVE
HGB BLD-MCNC: 7.5 G/DL (ref 12–15.9)
HGB BLD-MCNC: 7.9 G/DL (ref 12–15.9)
HGB BLD-MCNC: 8.4 G/DL (ref 12–15.9)
HGB BLD-MCNC: 8.4 G/DL (ref 12–15.9)
HGB BLD-MCNC: 8.9 G/DL (ref 12–15.9)
HGB BLD-MCNC: 9.1 G/DL (ref 12–15.9)
LYMPHOCYTES # BLD MANUAL: 2.17 10*3/MM3 (ref 0.7–3.1)
LYMPHOCYTES NFR BLD MANUAL: 29 % (ref 19.6–45.3)
LYMPHOCYTES NFR BLD MANUAL: 6 % (ref 5–12)
MCH RBC QN AUTO: 30.3 PG (ref 26.6–33)
MCHC RBC AUTO-ENTMCNC: 33.4 G/DL (ref 31.5–35.7)
MCV RBC AUTO: 90.9 FL (ref 79–97)
METAMYELOCYTES NFR BLD MANUAL: 2 % (ref 0–0)
MONOCYTES # BLD AUTO: 0.42 10*3/MM3 (ref 0.1–0.9)
MYELOCYTES NFR BLD MANUAL: 1 % (ref 0–0)
NEUTROPHILS # BLD AUTO: 4.06 10*3/MM3 (ref 1.7–7)
NEUTROPHILS NFR BLD MANUAL: 55 % (ref 42.7–76)
NEUTS BAND NFR BLD MANUAL: 3 % (ref 0–5)
PLAT MORPH BLD: NORMAL
PLATELET # BLD AUTO: 140 10*3/MM3 (ref 140–450)
PMV BLD AUTO: 9.2 FL (ref 6–12)
PTH-INTACT SERPL-MCNC: 39.7 PG/ML (ref 15–65)
RBC # BLD AUTO: 2.76 10*6/MM3 (ref 3.77–5.28)
RBC MORPH BLD: NORMAL
SCAN SLIDE: NORMAL
SODIUM UR-SCNC: 92 MMOL/L
URATE SERPL-MCNC: 4.7 MG/DL (ref 2.4–5.7)
VARIANT LYMPHS NFR BLD MANUAL: 2 % (ref 0–5)
WBC # BLD AUTO: 7 10*3/MM3 (ref 3.4–10.8)
WBC MORPH BLD: NORMAL

## 2021-04-30 PROCEDURE — 85014 HEMATOCRIT: CPT | Performed by: SURGERY

## 2021-04-30 PROCEDURE — 97162 PT EVAL MOD COMPLEX 30 MIN: CPT

## 2021-04-30 PROCEDURE — 85007 BL SMEAR W/DIFF WBC COUNT: CPT | Performed by: INTERNAL MEDICINE

## 2021-04-30 PROCEDURE — 84300 ASSAY OF URINE SODIUM: CPT | Performed by: INTERNAL MEDICINE

## 2021-04-30 PROCEDURE — 85025 COMPLETE CBC W/AUTO DIFF WBC: CPT | Performed by: INTERNAL MEDICINE

## 2021-04-30 PROCEDURE — 76775 US EXAM ABDO BACK WALL LIM: CPT

## 2021-04-30 PROCEDURE — 85018 HEMOGLOBIN: CPT | Performed by: SURGERY

## 2021-04-30 PROCEDURE — 25010000002 IRON SUCROSE PER 1 MG: Performed by: NURSE PRACTITIONER

## 2021-04-30 PROCEDURE — 83036 HEMOGLOBIN GLYCOSYLATED A1C: CPT | Performed by: FAMILY MEDICINE

## 2021-04-30 PROCEDURE — 99232 SBSQ HOSP IP/OBS MODERATE 35: CPT | Performed by: INTERNAL MEDICINE

## 2021-04-30 PROCEDURE — 97535 SELF CARE MNGMENT TRAINING: CPT

## 2021-04-30 PROCEDURE — 83970 ASSAY OF PARATHORMONE: CPT | Performed by: INTERNAL MEDICINE

## 2021-04-30 PROCEDURE — 84155 ASSAY OF PROTEIN SERUM: CPT | Performed by: INTERNAL MEDICINE

## 2021-04-30 PROCEDURE — 87205 SMEAR GRAM STAIN: CPT | Performed by: INTERNAL MEDICINE

## 2021-04-30 PROCEDURE — 85730 THROMBOPLASTIN TIME PARTIAL: CPT | Performed by: INTERNAL MEDICINE

## 2021-04-30 PROCEDURE — 83010 ASSAY OF HAPTOGLOBIN QUANT: CPT | Performed by: NURSE PRACTITIONER

## 2021-04-30 PROCEDURE — 97166 OT EVAL MOD COMPLEX 45 MIN: CPT

## 2021-04-30 PROCEDURE — 82274 ASSAY TEST FOR BLOOD FECAL: CPT | Performed by: NURSE PRACTITIONER

## 2021-04-30 PROCEDURE — 84165 PROTEIN E-PHORESIS SERUM: CPT | Performed by: INTERNAL MEDICINE

## 2021-04-30 PROCEDURE — 25010000002 CALCIUM GLUCONATE-NACL 1-0.675 GM/50ML-% SOLUTION: Performed by: INTERNAL MEDICINE

## 2021-04-30 RX ORDER — METOPROLOL TARTRATE 100 MG/1
TABLET ORAL
Qty: 180 TABLET | Refills: 1 | Status: SHIPPED | OUTPATIENT
Start: 2021-04-30 | End: 2022-04-25

## 2021-04-30 RX ORDER — CALCIUM GLUCONATE 20 MG/ML
1 INJECTION, SOLUTION INTRAVENOUS ONCE
Status: COMPLETED | OUTPATIENT
Start: 2021-04-30 | End: 2021-04-30

## 2021-04-30 RX ADMIN — METOPROLOL TARTRATE 100 MG: 50 TABLET, FILM COATED ORAL at 21:42

## 2021-04-30 RX ADMIN — ATORVASTATIN CALCIUM 10 MG: 10 TABLET, FILM COATED ORAL at 21:42

## 2021-04-30 RX ADMIN — SODIUM CHLORIDE 75 ML/HR: 9 INJECTION, SOLUTION INTRAVENOUS at 02:42

## 2021-04-30 RX ADMIN — HYDROCODONE BITARTRATE AND ACETAMINOPHEN 1 TABLET: 7.5; 325 TABLET ORAL at 19:39

## 2021-04-30 RX ADMIN — ALPRAZOLAM 0.5 MG: 0.5 TABLET ORAL at 19:39

## 2021-04-30 RX ADMIN — AMOXICILLIN AND CLAVULANATE POTASSIUM 500 MG: 500; 125 TABLET, FILM COATED ORAL at 21:42

## 2021-04-30 RX ADMIN — METOPROLOL TARTRATE 100 MG: 50 TABLET, FILM COATED ORAL at 08:14

## 2021-04-30 RX ADMIN — HYDROCODONE BITARTRATE AND ACETAMINOPHEN 1 TABLET: 7.5; 325 TABLET ORAL at 05:05

## 2021-04-30 RX ADMIN — CETIRIZINE HYDROCHLORIDE 10 MG: 10 TABLET, FILM COATED ORAL at 08:15

## 2021-04-30 RX ADMIN — ISOSORBIDE MONONITRATE 30 MG: 30 TABLET, EXTENDED RELEASE ORAL at 08:15

## 2021-04-30 RX ADMIN — PANTOPRAZOLE SODIUM 40 MG: 40 TABLET, DELAYED RELEASE ORAL at 05:05

## 2021-04-30 RX ADMIN — SPIRONOLACTONE 12.5 MG: 25 TABLET ORAL at 08:15

## 2021-04-30 RX ADMIN — DILTIAZEM HYDROCHLORIDE 120 MG: 120 CAPSULE, EXTENDED RELEASE ORAL at 08:15

## 2021-04-30 RX ADMIN — ACETAMINOPHEN 650 MG: 325 TABLET, FILM COATED ORAL at 02:47

## 2021-04-30 RX ADMIN — MAGNESIUM GLUCONATE 500 MG ORAL TABLET 400 MG: 500 TABLET ORAL at 08:15

## 2021-04-30 RX ADMIN — IRON SUCROSE 200 MG: 20 INJECTION, SOLUTION INTRAVENOUS at 15:50

## 2021-04-30 RX ADMIN — CLOPIDOGREL BISULFATE 75 MG: 75 TABLET ORAL at 08:14

## 2021-04-30 RX ADMIN — Medication 10 ML: at 08:15

## 2021-04-30 RX ADMIN — AMOXICILLIN AND CLAVULANATE POTASSIUM 500 MG: 500; 125 TABLET, FILM COATED ORAL at 08:15

## 2021-04-30 RX ADMIN — FERROUS SULFATE TAB EC 324 MG (65 MG FE EQUIVALENT) 324 MG: 324 (65 FE) TABLET DELAYED RESPONSE at 17:54

## 2021-04-30 RX ADMIN — CALCIUM GLUCONATE 1 G: 20 INJECTION, SOLUTION INTRAVENOUS at 16:50

## 2021-04-30 RX ADMIN — Medication 10 ML: at 21:43

## 2021-04-30 RX ADMIN — FERROUS SULFATE TAB EC 324 MG (65 MG FE EQUIVALENT) 324 MG: 324 (65 FE) TABLET DELAYED RESPONSE at 08:15

## 2021-05-01 VITALS
WEIGHT: 177.47 LBS | HEIGHT: 61 IN | TEMPERATURE: 97.9 F | HEART RATE: 76 BPM | RESPIRATION RATE: 18 BRPM | SYSTOLIC BLOOD PRESSURE: 111 MMHG | BODY MASS INDEX: 33.51 KG/M2 | OXYGEN SATURATION: 96 % | DIASTOLIC BLOOD PRESSURE: 67 MMHG

## 2021-05-01 PROBLEM — M19.90 ARTHRITIS: Status: ACTIVE | Noted: 2021-05-01

## 2021-05-01 LAB
ALBUMIN SERPL-MCNC: 3.1 G/DL (ref 3.5–5.2)
ALBUMIN/GLOB SERPL: 1.3 G/DL
ALP SERPL-CCNC: 86 U/L (ref 39–117)
ALT SERPL W P-5'-P-CCNC: 25 U/L (ref 1–33)
ANION GAP SERPL CALCULATED.3IONS-SCNC: 9 MMOL/L (ref 5–15)
APTT PPP: 26.9 SECONDS (ref 61–76.5)
AST SERPL-CCNC: 23 U/L (ref 1–32)
BILIRUB SERPL-MCNC: 1.3 MG/DL (ref 0–1.2)
BUN SERPL-MCNC: 11 MG/DL (ref 8–23)
BUN/CREAT SERPL: 15.3 (ref 7–25)
CA-I SERPL ISE-MCNC: 1.23 MMOL/L (ref 1.2–1.3)
CALCIUM SPEC-SCNC: 8.7 MG/DL (ref 8.6–10.5)
CHLORIDE SERPL-SCNC: 107 MMOL/L (ref 98–107)
CO2 SERPL-SCNC: 26 MMOL/L (ref 22–29)
CREAT SERPL-MCNC: 0.72 MG/DL (ref 0.57–1)
DEPRECATED RDW RBC AUTO: 45.9 FL (ref 37–54)
ERYTHROCYTE [DISTWIDTH] IN BLOOD BY AUTOMATED COUNT: 14.6 % (ref 12.3–15.4)
GFR SERPL CREATININE-BSD FRML MDRD: 77 ML/MIN/1.73
GLOBULIN UR ELPH-MCNC: 2.3 GM/DL
GLUCOSE SERPL-MCNC: 94 MG/DL (ref 65–99)
HCT VFR BLD AUTO: 25.4 % (ref 34–46.6)
HCT VFR BLD AUTO: 25.9 % (ref 34–46.6)
HCT VFR BLD AUTO: 25.9 % (ref 34–46.6)
HGB BLD-MCNC: 8.4 G/DL (ref 12–15.9)
HGB BLD-MCNC: 8.6 G/DL (ref 12–15.9)
HGB BLD-MCNC: 8.7 G/DL (ref 12–15.9)
INR PPP: 1.03 (ref 2–3)
MAGNESIUM SERPL-MCNC: 1.9 MG/DL (ref 1.6–2.4)
MCH RBC QN AUTO: 30.3 PG (ref 26.6–33)
MCHC RBC AUTO-ENTMCNC: 33 G/DL (ref 31.5–35.7)
MCV RBC AUTO: 91.8 FL (ref 79–97)
PHOSPHATE SERPL-MCNC: 3.3 MG/DL (ref 2.5–4.5)
PLATELET # BLD AUTO: 135 10*3/MM3 (ref 140–450)
PMV BLD AUTO: 9.7 FL (ref 6–12)
POTASSIUM SERPL-SCNC: 4.1 MMOL/L (ref 3.5–5.2)
PROT SERPL-MCNC: 5.4 G/DL (ref 6–8.5)
PROTHROMBIN TIME: 11.3 SECONDS (ref 19.4–28.5)
RBC # BLD AUTO: 2.77 10*6/MM3 (ref 3.77–5.28)
SODIUM SERPL-SCNC: 142 MMOL/L (ref 136–145)
TSH SERPL DL<=0.05 MIU/L-ACNC: 2.26 UIU/ML (ref 0.27–4.2)
WBC # BLD AUTO: 7.5 10*3/MM3 (ref 3.4–10.8)

## 2021-05-01 PROCEDURE — 85014 HEMATOCRIT: CPT | Performed by: SURGERY

## 2021-05-01 PROCEDURE — 84100 ASSAY OF PHOSPHORUS: CPT | Performed by: INTERNAL MEDICINE

## 2021-05-01 PROCEDURE — 82330 ASSAY OF CALCIUM: CPT | Performed by: INTERNAL MEDICINE

## 2021-05-01 PROCEDURE — 85018 HEMOGLOBIN: CPT | Performed by: SURGERY

## 2021-05-01 PROCEDURE — 83735 ASSAY OF MAGNESIUM: CPT | Performed by: INTERNAL MEDICINE

## 2021-05-01 PROCEDURE — 84443 ASSAY THYROID STIM HORMONE: CPT | Performed by: INTERNAL MEDICINE

## 2021-05-01 PROCEDURE — 80053 COMPREHEN METABOLIC PANEL: CPT | Performed by: INTERNAL MEDICINE

## 2021-05-01 PROCEDURE — 25010000002 IRON SUCROSE PER 1 MG: Performed by: NURSE PRACTITIONER

## 2021-05-01 PROCEDURE — 85027 COMPLETE CBC AUTOMATED: CPT | Performed by: INTERNAL MEDICINE

## 2021-05-01 PROCEDURE — 85730 THROMBOPLASTIN TIME PARTIAL: CPT | Performed by: INTERNAL MEDICINE

## 2021-05-01 PROCEDURE — 85610 PROTHROMBIN TIME: CPT | Performed by: INTERNAL MEDICINE

## 2021-05-01 RX ORDER — ASPIRIN 81 MG/1
81 TABLET ORAL 3 TIMES WEEKLY
Qty: 12 TABLET | Refills: 6 | Status: SHIPPED | OUTPATIENT
Start: 2021-05-03 | End: 2023-02-14

## 2021-05-01 RX ORDER — AMOXICILLIN AND CLAVULANATE POTASSIUM 500; 125 MG/1; MG/1
1 TABLET, FILM COATED ORAL EVERY 12 HOURS SCHEDULED
Qty: 9 TABLET | Refills: 0 | Status: SHIPPED | OUTPATIENT
Start: 2021-05-01 | End: 2021-05-07

## 2021-05-01 RX ORDER — FERROUS SULFATE TAB EC 324 MG (65 MG FE EQUIVALENT) 324 (65 FE) MG
324 TABLET DELAYED RESPONSE ORAL
Qty: 30 TABLET | Refills: 0 | Status: SHIPPED | OUTPATIENT
Start: 2021-05-01

## 2021-05-01 RX ADMIN — MAGNESIUM GLUCONATE 500 MG ORAL TABLET 400 MG: 500 TABLET ORAL at 09:28

## 2021-05-01 RX ADMIN — IRON SUCROSE 200 MG: 20 INJECTION, SOLUTION INTRAVENOUS at 09:28

## 2021-05-01 RX ADMIN — Medication 10 ML: at 09:34

## 2021-05-01 RX ADMIN — DILTIAZEM HYDROCHLORIDE 120 MG: 120 CAPSULE, EXTENDED RELEASE ORAL at 09:28

## 2021-05-01 RX ADMIN — ISOSORBIDE MONONITRATE 30 MG: 30 TABLET, EXTENDED RELEASE ORAL at 09:28

## 2021-05-01 RX ADMIN — CLOPIDOGREL BISULFATE 75 MG: 75 TABLET ORAL at 09:28

## 2021-05-01 RX ADMIN — METOPROLOL TARTRATE 100 MG: 50 TABLET, FILM COATED ORAL at 09:28

## 2021-05-01 RX ADMIN — CETIRIZINE HYDROCHLORIDE 10 MG: 10 TABLET, FILM COATED ORAL at 09:28

## 2021-05-01 RX ADMIN — AMOXICILLIN AND CLAVULANATE POTASSIUM 500 MG: 500; 125 TABLET, FILM COATED ORAL at 09:28

## 2021-05-01 RX ADMIN — PANTOPRAZOLE SODIUM 40 MG: 40 TABLET, DELAYED RELEASE ORAL at 05:41

## 2021-05-01 RX ADMIN — FERROUS SULFATE TAB EC 324 MG (65 MG FE EQUIVALENT) 324 MG: 324 (65 FE) TABLET DELAYED RESPONSE at 09:28

## 2021-05-01 NOTE — PROGRESS NOTES
Hematology/Oncology Inpatient Progress Note    PATIENT NAME: Syl Herrera  : 1935  MRN: [id number]    CHIEF COMPLAINT: Anemia    HISTORY OF PRESENT ILLNESS:    85 y.o. female admitted through the ED 2021 with chest pressure and shortness of air.  She had been discharged on 2021 after work-up revealed three-vessel CAD and impression by cardiovascular surgery was that she was not a candidate for CABG, however, she was planned to undergo stent as outpatient to RCA, circumflex, and possible ramus intermedius.  On admission she was seen by cardiology and was initially continued on aspirin and Plavix with initiation of heparin drip. She underwent cardiac cath and stent placement on 2021 after which she became hypotensive with bleeding in the evening post with FemoStop placed and fluid resuscitation given.  Doppler was negative for pseudoaneurysm.  Chest x-ray on admission was negative for acute findings.  CBC on admission 2021 revealed WBC of 9.0, hemoglobin 13.0, and platelets 173,000.  Creatinine was normal at 0.9.  PT was 11.9 (9.6-11.7) and PTT 27.4 (24-31).  Post stent placement her H&H steadily dropped as low as 7.5 on 2021.  Iron studies performed 2021 revealed iron 40 (), iron saturation 12% (20-50), TIBC 337 (298-536), and ferritin 141.8 ().  Reticulocyte count was 1.93% (0.7-1.9), folate was 8.14 (4.78-24.2), Vitamin B-12 was 754 (211-946), and erythropoietin was 45.5 (2.6-18.5).  Heparin drip was discontinued the evening of 2021 and she was continued on Plavix without aspirin.  She was started on oral iron replacement (ferrous sulfate by mouth twice daily) on 2021.     21  Hematology/Oncology was consulted by Riddhi Grimm DO for anemia.  Review showed EGD done 2020 by Quinton Tapia MD revealed esophageal dysmotility that was dilated, large hiatal hernia, and gastritis that was biopsied with pathology revealing reactive gastropathy negative  for H. Pylori.     Past Medical History/Surgical History: Hypertension and hyperlipidemia for long duration.  Recurrent UTIs.  CABG 2017.  Right knee replacement 2011.  Atrial fibrillation for long duration.  Hysterectomy in 1973 for bleeding.  Right breast lump removed in 1974 with pathology benign.  Open cholecystectomy 1978.  Social History: She lives in [place], [place] alone.  He has a son that lives nearby.  She does not use tobacco, alcohol, or recreational drugs.  Family History: 1 sister had breast cancer, one sister had pancreatic cancer, and 2 brothers had lung cancer-  Allergies: Doxycycline causes chest pain.  CT dye causes itching.  Sulfa causes nausea.  Celebrex causes chest pain.  NSAIDs cause convulsions.  Some steroids cause pruritus and hallucinations with the patient not knowing which ones.     PCP: Nava Yu MD    INTERVAL HISTORY:  • 5/1/2021-hemoglobin 8.6.  Haptoglobin 213 ().  Stool heme-negative.  Renal ultrasound showed chronic MRD and bilateral renal cysts.  Plan to give second dose of Venofer prior to discharge and to continue ferrous sulfate 325 mg p.o. twice daily.    History of present illness reviewed since last visit and changes noted on 05/01/21.    Subjective   Reports some occasional shortness of air.    ROS:  Review of Systems   Constitutional: Negative for activity change, chills, fatigue, fever and unexpected weight change.   HENT: Negative for congestion, dental problem, hearing loss, mouth sores, nosebleeds, sore throat and trouble swallowing.    Eyes: Negative for photophobia and visual disturbance.   Respiratory: Positive for shortness of breath. Negative for cough and chest tightness.    Cardiovascular: Negative for chest pain, palpitations and leg swelling.   Gastrointestinal: Negative for abdominal distention, abdominal pain, blood in stool, constipation, diarrhea, nausea and vomiting.   Endocrine: Negative for cold intolerance and heat intolerance.    Genitourinary: Negative for decreased urine volume, difficulty urinating, dysuria, frequency, hematuria and urgency.   Musculoskeletal: Negative for arthralgias and gait problem.   Skin: Negative for rash and wound.   Neurological: Negative for dizziness, tremors, weakness, light-headedness, numbness and headaches.   Hematological: Negative for adenopathy. Does not bruise/bleed easily.   Psychiatric/Behavioral: Negative for confusion and hallucinations. The patient is not nervous/anxious.    All other systems reviewed and are negative.       MEDICATIONS:    Scheduled Meds:  amoxicillin-clavulanate, 1 tablet, Oral, Q12H  atorvastatin, 10 mg, Oral, Nightly  cetirizine, 10 mg, Oral, Daily  clopidogrel, 75 mg, Oral, Daily  dilTIAZem CD, 120 mg, Oral, Q24H  ferrous sulfate, 324 mg, Oral, BID With Meals  iron sucrose, 200 mg, Intravenous, Daily  isosorbide mononitrate, 30 mg, Oral, Q24H  magnesium oxide, 400 mg, Oral, Daily  metoprolol tartrate, 100 mg, Oral, BID  pantoprazole, 40 mg, Oral, Q AM  sodium chloride, 10 mL, Intravenous, Q12H       Continuous Infusions:      PRN Meds:  •  acetaminophen **OR** acetaminophen **OR** acetaminophen  •  ALPRAZolam  •  atropine  •  carboxymethylcellulose sod  •  fentanyl  •  HYDROcodone-acetaminophen  •  melatonin  •  nitroglycerin  •  ondansetron **OR** ondansetron  •  [COMPLETED] Insert peripheral IV **AND** sodium chloride  •  sodium chloride  •  sodium chloride     ALLERGIES:    Allergies   Allergen Reactions   • Doxycycline Other (See Comments)     Chest pain   • Celebrex [Celecoxib] Other (See Comments)     Chest pain   • Contrast Dye Itching   • Iodinated Diagnostic Agents Itching   • Nsaids Other (See Comments)     convulsion   • Other Itching     Pt states some steroids make her itch and hallucinate- she does not know the names   She said she is allergic to all arthritis medicine   • Sulfa Antibiotics Nausea Only       Objective    VITALS:   /74 (BP Location: Right  "arm, Patient Position: Lying)   Pulse 61   Temp 97.9 °F (36.6 °C) (Oral)   Resp 18   Ht 154.9 cm (61\")   Wt 80.5 kg (177 lb 7.5 oz)   SpO2 96%   BMI 33.53 kg/m²     PHYSICAL EXAM:  Physical Exam  Vitals and nursing note reviewed.   Constitutional:       General: She is not in acute distress.     Appearance: Normal appearance. She is well-developed. She is not diaphoretic.   HENT:      Head: Normocephalic and atraumatic.      Right Ear: External ear normal.      Left Ear: External ear normal.      Nose: Nose normal.      Mouth/Throat:      Mouth: Mucous membranes are moist.      Pharynx: Oropharynx is clear. No oropharyngeal exudate or posterior oropharyngeal erythema.   Eyes:      General: No scleral icterus.     Extraocular Movements: Extraocular movements intact.      Conjunctiva/sclera: Conjunctivae normal.      Pupils: Pupils are equal, round, and reactive to light.   Cardiovascular:      Rate and Rhythm: Normal rate and regular rhythm.      Heart sounds: Normal heart sounds. No murmur heard.        Comments: Midline vertical chest wall scar. Monitor leads.  Pulmonary:      Effort: Pulmonary effort is normal. No respiratory distress.      Breath sounds: Normal breath sounds. No wheezing or rales.   Abdominal:      General: Bowel sounds are normal. There is no distension.      Palpations: Abdomen is soft. There is no mass.      Tenderness: There is no abdominal tenderness. There is no guarding.      Comments: Obese   Genitourinary:     Comments: Deferred   Musculoskeletal:         General: No swelling, tenderness or deformity. Normal range of motion.      Cervical back: Normal range of motion and neck supple.      Right lower leg: No edema.      Left lower leg: No edema.      Comments: LUE IV.   Lymphadenopathy:      Cervical: No cervical adenopathy.      Upper Body:      Right upper body: No supraclavicular adenopathy.      Left upper body: No supraclavicular adenopathy.   Skin:     General: Skin is warm " and dry.      Coloration: Skin is not pale.      Findings: Bruising (Extensive over skin surfaces) present. No erythema or rash.      Comments: Opacities lower extremities. Vertical scar right knee.   Neurological:      General: No focal deficit present.      Mental Status: She is alert and oriented to person, place, and time.      Coordination: Coordination normal.   Psychiatric:         Mood and Affect: Mood normal.         Behavior: Behavior normal.         Thought Content: Thought content normal.           RECENT LABS:  Lab Results (last 24 hours)     Procedure Component Value Units Date/Time    Hemoglobin & Hematocrit, Blood [[id number]]  (Abnormal) Collected: 05/01/21 0502    Specimen: Blood Updated: 05/01/21 0625     Hemoglobin 8.6 g/dL      Hematocrit 25.9 %     TSH [[id number]]  (Normal) Collected: 05/01/21 0105    Specimen: Blood Updated: 05/01/21 0221     TSH 2.260 uIU/mL     Comprehensive Metabolic Panel [[id number]]  (Abnormal) Collected: 05/01/21 0105    Specimen: Blood Updated: 05/01/21 0208     Glucose 94 mg/dL      BUN 11 mg/dL      Creatinine 0.72 mg/dL      Sodium 142 mmol/L      Potassium 4.1 mmol/L      Chloride 107 mmol/L      CO2 26.0 mmol/L      Calcium 8.7 mg/dL      Total Protein 5.4 g/dL      Albumin 3.10 g/dL      ALT (SGPT) 25 U/L      AST (SGOT) 23 U/L      Alkaline Phosphatase 86 U/L      Total Bilirubin 1.3 mg/dL      eGFR Non African Amer 77 mL/min/1.73      Globulin 2.3 gm/dL      A/G Ratio 1.3 g/dL      BUN/Creatinine Ratio 15.3     Anion Gap 9.0 mmol/L     Narrative:      GFR Normal >60  Chronic Kidney Disease <60  Kidney Failure <15      Phosphorus [[id number]]  (Normal) Collected: 05/01/21 0105    Specimen: Blood Updated: 05/01/21 0208     Phosphorus 3.3 mg/dL     Magnesium [[id number]]  (Normal) Collected: 05/01/21 0105    Specimen: Blood Updated: 05/01/21 0208     Magnesium 1.9 mg/dL     Calcium, Ionized [[id number]]  (Normal) Collected: 05/01/21 0105    Specimen: Blood Updated:  05/01/21 0155     Ionized Calcium 1.23 mmol/L     aPTT [[id number]]  (Abnormal) Collected: 05/01/21 0105    Specimen: Blood Updated: 05/01/21 0149     PTT 26.9 seconds     Protime-INR [[id number]]  (Abnormal) Collected: 05/01/21 0105    Specimen: Blood Updated: 05/01/21 0148     Protime 11.3 Seconds      INR 1.03    CBC (No Diff) [[id number]]  (Abnormal) Collected: 05/01/21 0105    Specimen: Blood Updated: 05/01/21 0147     WBC 7.50 10*3/mm3      RBC 2.77 10*6/mm3      Hemoglobin 8.4 g/dL      Hematocrit 25.4 %      MCV 91.8 fL      MCH 30.3 pg      MCHC 33.0 g/dL      RDW 14.6 %      RDW-SD 45.9 fl      MPV 9.7 fL      Platelets 135 10*3/mm3     Hemoglobin & Hematocrit, Blood [[id number]]  (Abnormal) Collected: 04/30/21 2144    Specimen: Blood Updated: 04/30/21 2207     Hemoglobin 7.9 g/dL      Hematocrit 23.8 %     Hemoglobin & Hematocrit, Blood [[id number]]  (Abnormal) Collected: 04/30/21 1750    Specimen: Blood Updated: 04/30/21 1829     Hemoglobin 8.9 g/dL      Hematocrit 26.3 %     Haptoglobin [[id number]]  (Abnormal) Collected: 04/30/21 1303    Specimen: Blood Updated: 04/30/21 1632     Haptoglobin 213 mg/dL     Occult Blood, Fecal By Immunoassay - Stool, Per Rectum [[id number]]  (Normal) Collected: 04/30/21 1332    Specimen: Stool from Per Rectum Updated: 04/30/21 1404     Occult Blood, Fecal by Immunoassay Negative    PTH, Intact [[id number]]  (Normal) Collected: 04/30/21 1303    Specimen: Blood Updated: 04/30/21 1339     PTH, Intact 39.7 pg/mL     Narrative:      Results may be falsely decreased if patient taking Biotin.      Hemoglobin & Hematocrit, Blood [[id number]]  (Abnormal) Collected: 04/30/21 1303    Specimen: Blood Updated: 04/30/21 1321     Hemoglobin 8.4 g/dL      Hematocrit 25.1 %     Protein Elec + Interp, Serum [[id number]] Collected: 04/30/21 1303    Specimen: Blood Updated: 04/30/21 1314    Eosinophil Smear - Urine, Urine, Clean Catch [[id number]]  (Normal) Collected: 04/30/21 1121    Specimen: Urine,  Clean Catch Updated: 04/30/21 1311     Eosinophil Smear 0 % EOS/100 Cells     Sodium, Urine, Random - Urine, Clean Catch [[id number]] Collected: 04/30/21 1121    Specimen: Urine, Clean Catch Updated: 04/30/21 1208     Sodium, Urine 92 mmol/L     Narrative:      Reference intervals for random urine have not been established.  Clinical usage is dependent upon physician's interpretation in combination with other laboratory tests.             PENDING RESULTS: n/a    IMAGING REVIEWED:  US Renal Bilateral    Result Date: 4/30/2021   1. Features suggestive of chronic medical renal disease with cortical thinning and elevated cortical echotexture. 2. No hydronephrosis. 3. Single bilateral renal cysts.  Electronically Signed By-Lauren Fatima MD On:4/30/2021 2:50 PM This report was finalized on [id number] by  Lauren Fatima MD.      I have reviewed the patient's labs, imaging, reports, and other clinician documentation.    Assessment/Plan   ASSESSMENT:  1. Acute anemia-normocytic.  Acute drop post heart cath with hematoma this admit.  Low iron sat and on oral iron.  No hemolysis, B12 or folate deficiencies.  EGD last year showed large HH and gastritis.  She is on PPI.    Stool heme-negative.  Venofer initiated 4/30 given concomitant shortness of air and second dose ordered 5/1 prior to discharge.    2. Thrombocytopenia-mild. We'll recheck a CBC as outpatient.  3. CAD/A. Fib/HTN/HLD-s/p stent.  On Plavix and aspirin prior to admit had been on warfarin in the past for her A. fib.  Heparin drip was held post heart cath with hematoma.  Now on Plavix alone.  Per cardiology.  4. UTI/chronic recurrent UTIs-on Augmentin.  Urology planning cystoscopy outpatient after UTI cleared.  5. CKD stage III-creatinine normal on admit.  Her primary team.  6. GERD/hiatal hernia-on PPI.    PLAN:  1. Give second dose of Venofer prior to discharge.  2. Continue oral iron on discharge.  3. Follow CBC as outpatient.    Note prepared by LILIA Madden,  ARNP.  Patient seen and examined by Harshad Landin MD.  Electronically signed by SHILA Smith, 05/01/21, 9:24 AM EDT.        I have personally performed a face-to-face diagnostic evaluation on this patient.  I have discussed the case with Conner Madden NP, have edited/reviewed the note, and agree with the care plan. The patient is complaining of occasional shortness of air. On examination she has scattered ecchymoses on upper extremities and abdomen. Hemoglobin is 8.4 and platelet count is mildly suppressed. She likely has an anemia secondary to blood loss and consumptive thrombocytopenia. We'll plan on repeating CBC post iron replacement.        I have personally performed a face-to-face diagnostic evaluation on this patient.  I have reviewed and agree with the care plan.    I discussed the patients findings and my recommendations with patient.    Electronically signed by Harshad Landin MD, 05/01/21, 9:42 AM EDT.

## 2021-05-01 NOTE — PROGRESS NOTES
"NEPHROLOGY PROGRESS NOTE------[facility]    [facility]  [phone]  Jesenia Shaikh MD      Patient Care Team:  Nava Yu MD as PCP - General  Ivan Martell MD as Consulting Physician (Cardiology)  Greer Shaikh MD as Consulting Physician (Nephrology)  Harshad Landin MD as Consulting Physician (Hematology and Oncology)      Provider:  Jesenia Shaikh MD  Patient Name: Syl Herrera  :  1935    SUBJECTIVE:    F/U ARF/CKD/HTN    No complaints. Awaiting d/c.    Medication:  amoxicillin-clavulanate, 1 tablet, Oral, Q12H  atorvastatin, 10 mg, Oral, Nightly  cetirizine, 10 mg, Oral, Daily  clopidogrel, 75 mg, Oral, Daily  dilTIAZem CD, 120 mg, Oral, Q24H  ferrous sulfate, 324 mg, Oral, BID With Meals  iron sucrose, 200 mg, Intravenous, Daily  isosorbide mononitrate, 30 mg, Oral, Q24H  magnesium oxide, 400 mg, Oral, Daily  metoprolol tartrate, 100 mg, Oral, BID  pantoprazole, 40 mg, Oral, Q AM  sodium chloride, 10 mL, Intravenous, Q12H           OBJECTIVE    Vital Sign Min/Max for last 24 hours  Temp  Min: 97.3 °F (36.3 °C)  Max: 97.9 °F (36.6 °C)   BP  Min: 100/62  Max: 130/74   Pulse  Min: 57  Max: 72   Resp  Min: 18  Max: 20   SpO2  Min: 96 %  Max: 98 %   No data recorded   No data recorded     Flowsheet Rows      First Filed Value   Admission Height  154.9 cm (61\") Documented at 2021 1601   Admission Weight  80.3 kg (177 lb) Documented at 2021 1601          I/O this shift:  In: -   Out: 300 [Urine:300]  I/O last 3 completed shifts:  In: 720 [P.O.:720]  Out: 3450 [Urine:3450]    Physical Exam:  General Appearance: alert, appears stated age and cooperative  Head: normocephalic, without obvious abnormality and atraumatic  Eyes: conjunctivae and sclerae normal and no icterus  Neck: supple and no JVD  Lungs: clear to auscultation and respirations regular  Heart: IRREG IRREG +ARIAN  Chest: Wall no abnormalities " observed  Abdomen: normal bowel sounds and soft non-tender  Extremities: moves extremities well, no edema, no cyanosis and no redness +DJD  Skin: no bleeding, bruising or rash, turgor normal, color normal and no lesions noted  Neurologic: Alert, and oriented. No focal deficits    Labs:    WBC WBC   Date Value Ref Range Status   05/01/2021 7.50 3.40 - 10.80 10*3/mm3 Final   04/30/2021 7.00 3.40 - 10.80 10*3/mm3 Final      HGB Hemoglobin   Date Value Ref Range Status   05/01/2021 8.6 (L) 12.0 - 15.9 g/dL Final   05/01/2021 8.4 (L) 12.0 - 15.9 g/dL Final   04/30/2021 7.9 (L) 12.0 - 15.9 g/dL Final   04/30/2021 8.9 (L) 12.0 - 15.9 g/dL Final   04/30/2021 8.4 (L) 12.0 - 15.9 g/dL Final   04/30/2021 9.1 (L) 12.0 - 15.9 g/dL Final   04/30/2021 8.4 (L) 12.0 - 15.9 g/dL Final   04/30/2021 7.5 (L) 12.0 - 15.9 g/dL Final   04/29/2021 7.6 (L) 12.0 - 15.9 g/dL Final   04/29/2021 8.6 (L) 12.0 - 15.9 g/dL Final   04/29/2021 8.2 (L) 12.0 - 15.9 g/dL Final   04/29/2021 8.5 (L) 12.0 - 15.9 g/dL Final   04/29/2021 7.8 (L) 12.0 - 15.9 g/dL Final   04/29/2021 7.6 (L) 12.0 - 15.9 g/dL Final   04/28/2021 7.8 (L) 12.0 - 15.9 g/dL Final   04/28/2021 8.1 (L) 12.0 - 15.9 g/dL Final   04/28/2021 8.9 (L) 12.0 - 15.9 g/dL Final   04/28/2021 8.7 (L) 12.0 - 15.9 g/dL Final      HCT Hematocrit   Date Value Ref Range Status   05/01/2021 25.9 (L) 34.0 - 46.6 % Final   05/01/2021 25.4 (L) 34.0 - 46.6 % Final   04/30/2021 23.8 (L) 34.0 - 46.6 % Final   04/30/2021 26.3 (L) 34.0 - 46.6 % Final   04/30/2021 25.1 (L) 34.0 - 46.6 % Final   04/30/2021 26.9 (L) 34.0 - 46.6 % Final   04/30/2021 25.1 (L) 34.0 - 46.6 % Final   04/30/2021 22.7 (L) 34.0 - 46.6 % Final   04/29/2021 22.4 (L) 34.0 - 46.6 % Final   04/29/2021 25.1 (L) 34.0 - 46.6 % Final   04/29/2021 25.4 (L) 34.0 - 46.6 % Final   04/29/2021 25.6 (L) 34.0 - 46.6 % Final   04/29/2021 22.8 (L) 34.0 - 46.6 % Final   04/29/2021 22.2 (L) 34.0 - 46.6 % Final   04/28/2021 23.2 (L) 34.0 - 46.6 % Final    04/28/2021 24.6 (L) 34.0 - 46.6 % Final   04/28/2021 27.0 (L) 34.0 - 46.6 % Final   04/28/2021 26.3 (L) 34.0 - 46.6 % Final      Platlets No results found for: LABPLAT   MCV MCV   Date Value Ref Range Status   05/01/2021 91.8 79.0 - 97.0 fL Final   04/30/2021 90.9 79.0 - 97.0 fL Final          Sodium Sodium   Date Value Ref Range Status   05/01/2021 142 136 - 145 mmol/L Final   04/29/2021 137 136 - 145 mmol/L Final      Potassium Potassium   Date Value Ref Range Status   05/01/2021 4.1 3.5 - 5.2 mmol/L Final   04/29/2021 3.9 3.5 - 5.2 mmol/L Final      Chloride Chloride   Date Value Ref Range Status   05/01/2021 107 98 - 107 mmol/L Final   04/29/2021 108 (H) 98 - 107 mmol/L Final      CO2 CO2   Date Value Ref Range Status   05/01/2021 26.0 22.0 - 29.0 mmol/L Final   04/29/2021 23.0 22.0 - 29.0 mmol/L Final      BUN BUN   Date Value Ref Range Status   05/01/2021 11 8 - 23 mg/dL Final   04/29/2021 18 8 - 23 mg/dL Final      Creatinine Creatinine   Date Value Ref Range Status   05/01/2021 0.72 0.57 - 1.00 mg/dL Final   04/29/2021 0.84 0.57 - 1.00 mg/dL Final      Calcium Calcium   Date Value Ref Range Status   05/01/2021 8.7 8.6 - 10.5 mg/dL Final   04/29/2021 8.2 (L) 8.6 - 10.5 mg/dL Final      PO4 No components found for: PO4   Albumin Albumin   Date Value Ref Range Status   05/01/2021 3.10 (L) 3.50 - 5.20 g/dL Final   04/29/2021 3.20 (L) 3.50 - 5.20 g/dL Final      Magnesium Magnesium   Date Value Ref Range Status   05/01/2021 1.9 1.6 - 2.4 mg/dL Final      Uric Acid No components found for: URIC ACID     Imaging Results (Last 72 Hours)     Procedure Component Value Units Date/Time    US Renal Bilateral [[id number]] Collected: 04/30/21 1449     Updated: 04/30/21 1452    Narrative:      DATE OF EXAM:  4/30/2021 1:56 PM     PROCEDURE:  US RENAL BILATERAL-     INDICATIONS:  CKD; R07.9-Chest pain, unspecified; R06.00-Dyspnea, unspecified;  I20.8-Other forms of angina pectoris; R94.39-Abnormal result of  other  cardiovascular function study     COMPARISON:  CT abdomen and pelvis without contrast 8/15/2019     TECHNIQUE:   Grayscale and color Doppler ultrasound evaluation of the kidneys and  urinary bladder was performed.        FINDINGS:  The right kidney measures 8.6 x 4.4 x 4.3 cm. The left kidney measures  8.8 x 4.6 x 4.6 cm. A cystic the right lower renal pole measures 1.7 x  1.9 x 2.4 cm. A cyst at the left upper renal pole measures 1.1 x 1.0 x  1.1 cm. There is bilateral renal cortical thinning and a limited  cortical echo texture suggesting features of chronic medical renal  disease. No shadowing renal stone or hydronephrosis is identified. The  urinary bladder has a normal appearance with a prevoid volume of 117 cc.             Impression:         1. Features suggestive of chronic medical renal disease with cortical  thinning and elevated cortical echotexture.  2. No hydronephrosis.  3. Single bilateral renal cysts.     Electronically Signed By-Lauren Fatima MD On:4/30/2021 2:50 PM  This report was finalized on [id number] by  Lauren Fatima MD.          Results for orders placed during the hospital encounter of 04/24/21    XR Chest 1 View    Narrative  XR CHEST 1 VW-    Date of Exam: 4/24/2021 4:28 PM    Indication: chest pain.  Hypertension. COPD.    Comparison Exams: 04/18/2021    Technique: AP view of the chest    FINDINGS:  No new consolidations or pleural effusions are observed. The cardiac  silhouette and mediastinum are stable. Postoperative changes are noted.  A cardiac pacemaker/AICD is observed with leads intact. No acute osseous  abnormalities are identified.    Impression  There is no significant change when compared to the prior study. There  is no evidence for acute cardiopulmonary process.    Electronically Signed By-Nathan Mckinney MD On:4/24/2021 4:41 PM  This report was finalized on [id number] by  Nathan Mckinney MD.      Results for orders placed during the hospital encounter of  04/18/21    XR Chest 1 View    Narrative  DATE OF EXAM:  4/18/2021 4:52 PM    PROCEDURE:  XR CHEST 1 VW-    INDICATIONS:  chest pressure    COMPARISON:  02/01/2020    TECHNIQUE:  Single radiographic AP view of the chest was obtained.    FINDINGS:  The heart is enlarged. Sternotomy wires are noted. A cardiac pacemaker  device is present. It looks like there is a hiatal hernia. There are no  obvious infiltrates or definite pleural effusions.    Impression  1.Cardiomegaly  2.Hiatal hernia suggested.    Electronically Signed By-Arie Miller MD On:4/18/2021 5:08 PM  This report was finalized on [id number] by  Arie Miller MD.      Results for orders placed during the hospital encounter of 02/01/20    XR Chest 1 View    Narrative  Examination: XR CHEST 1 VW-    Date of Exam: 2/1/2020 11:18 AM    Indication: Dyspnea.    Comparison: 02/10/2018.    Technique: Single radiographic view of the chest was obtained.    Findings:  The heart is enlarged. There is evidence of prior median sternotomy and  CABG. There is a stable left-sided pacemaker. There is no pneumothorax,  pleural effusion or focal airspace consolidation. Cardiomediastinal  silhouette is unremarkable. Pulmonary vasculature appears within normal  limits. Regional bones appear grossly intact.  There is a retrocardiac  density indicating hiatal hernia.    Impression  Stable cardiomegaly, hiatal hernia and stable pacemaker without acute  cardiopulmonary abnormality.    Electronically Signed By-Marbin Bauman On:2/1/2020 11:42 AM  This report was finalized on [id number] by  Marbin Bauman, .      Results for orders placed during the hospital encounter of 04/24/21    Duplex Groin Pseudoaneurysm unilateral CAR    Interpretation Summary  · No evidence of pseudoaneurysm or AV fistula in the left groin. 4.5 2 cm left groin hematoma present.  ·        ASSESSMENT / PLAN      Chest pain    Atrial fibrillation (CMS/HCC) [I48.91]    Coronary artery disease     Dyslipidemia    Hypertension    Obesity (BMI 30-39.9)    GERD without esophagitis    CKD (chronic kidney disease) stage 3, GFR 30-59 ml/min (CMS/HCC)    Chronic pain    Angina at rest (CMS/HCC)    Abnormal nuclear stress test    1. ARF/ALBINA/CRF/CKD STG 2-------Nonoliguric. +ARF/ALBINA resolved. CRF/CKD STG 2 with current serum creatinine at baseline. CRF/CKD STG 2 likely secondary to HTN NS. Continue to hold diuretics for now.  No NSAIDs or IV dye. Dose meds for CrCl 60-75 cc/min     2. HYPOCALCEMIA------Replaced     3. HYPERLIPIDEMIA-------On Statin.       4. HTN WITH CKD-----BP okay. Avoid hypotension. No ACE-I/ARB/DRI     5. GERD------PPI. Counseled on risks of chronic PPI use with regards to CKD progression     6. ANEMIA-------Venofer for DEE. Check SPEP. , Hem/Onc following     7. CAD S/P RECENT CATH WITH H/O CABG-------per Cardiology. On Statin, Nitrate, Plavix and B-blocker     8. BILATERAL GROIN HEMATOMAS     9. ATRIAL FIBRILLATION-------Rate controlled with Cardizem and Lopressor     10. OA/DJD-----No NSAIDs.       11. SEASONAL ALLERGIES/ALLERGIC RHINITIS------On Zyrtec     12. RECURRENT UTI-------Consider daily chronic antibiotic prophylaxis as an outpatient     13. OBESITY/ENRIQUE    Okay from RENAL standpoint to d/c today    Jesenia Shaikh MD  [facility]  [phone]  05/01/21  08:06 EDT

## 2021-05-02 ENCOUNTER — READMISSION MANAGEMENT (OUTPATIENT)
Dept: CALL CENTER | Facility: HOSPITAL | Age: 86
End: 2021-05-02

## 2021-05-02 NOTE — OUTREACH NOTE
Prep Survey      Responses   [facility] patient discharged from?  [facility]   Is LACE score < 7 ?  No   Emergency Room discharge w/ pulse ox?  No   Eligibility  Readm Mgmt   Discharge diagnosis  Chest pain   Does the patient have one of the following disease processes/diagnoses(primary or secondary)?  Other   Does the patient have Home health ordered?  Yes   What is the Home health agency?   [facility]   Is there a DME ordered?  No   Prep survey completed?  Yes          Mounika Baker RN

## 2021-05-03 LAB
ALBUMIN SERPL ELPH-MCNC: 2.8 G/DL (ref 2.9–4.4)
ALBUMIN/GLOB SERPL: 1.1 {RATIO} (ref 0.7–1.7)
ALPHA1 GLOB SERPL ELPH-MCNC: 0.3 G/DL (ref 0–0.4)
ALPHA2 GLOB SERPL ELPH-MCNC: 0.8 G/DL (ref 0.4–1)
B-GLOBULIN SERPL ELPH-MCNC: 0.7 G/DL (ref 0.7–1.3)
GAMMA GLOB SERPL ELPH-MCNC: 0.7 G/DL (ref 0.4–1.8)
GLOBULIN SER CALC-MCNC: 2.5 G/DL (ref 2.2–3.9)
LABORATORY COMMENT REPORT: ABNORMAL
M PROTEIN SERPL ELPH-MCNC: ABNORMAL G/DL
PROT PATTERN SERPL ELPH-IMP: ABNORMAL
PROT SERPL-MCNC: 5.3 G/DL (ref 6–8.5)

## 2021-05-03 NOTE — CASE MANAGEMENT/SOCIAL WORK
Case Management Discharge Note      Final Note: [facility]         Selected Continued Care - Discharged on 5/1/2021 Admission date: 4/24/2021 - Discharge disposition: Home or Self Care        Home Medical Care Coordination complete    Service Provider Selected Services Address Phone Fax Patient Preferred    [facility] [address] [address] [phone] --                       Final Discharge Disposition Code: 06 - home with home health care

## 2021-05-05 ENCOUNTER — OFFICE VISIT (OUTPATIENT)
Dept: CARDIOLOGY | Facility: CLINIC | Age: 86
End: 2021-05-05

## 2021-05-05 ENCOUNTER — CLINICAL SUPPORT NO REQUIREMENTS (OUTPATIENT)
Dept: CARDIOLOGY | Facility: CLINIC | Age: 86
End: 2021-05-05

## 2021-05-05 ENCOUNTER — READMISSION MANAGEMENT (OUTPATIENT)
Dept: CALL CENTER | Facility: HOSPITAL | Age: 86
End: 2021-05-05

## 2021-05-05 VITALS
SYSTOLIC BLOOD PRESSURE: 97 MMHG | WEIGHT: 174 LBS | HEART RATE: 71 BPM | OXYGEN SATURATION: 97 % | DIASTOLIC BLOOD PRESSURE: 59 MMHG | HEIGHT: 62 IN | BODY MASS INDEX: 32.02 KG/M2

## 2021-05-05 DIAGNOSIS — R00.1 BRADYCARDIA, SINUS: ICD-10-CM

## 2021-05-05 DIAGNOSIS — I10 ESSENTIAL HYPERTENSION: ICD-10-CM

## 2021-05-05 DIAGNOSIS — Z95.0 PRESENCE OF CARDIAC PACEMAKER: Primary | ICD-10-CM

## 2021-05-05 DIAGNOSIS — I48.19 OTHER PERSISTENT ATRIAL FIBRILLATION (HCC): ICD-10-CM

## 2021-05-05 DIAGNOSIS — E78.00 PURE HYPERCHOLESTEROLEMIA: ICD-10-CM

## 2021-05-05 DIAGNOSIS — I25.810 CORONARY ARTERY DISEASE INVOLVING CORONARY BYPASS GRAFT OF NATIVE HEART WITHOUT ANGINA PECTORIS: ICD-10-CM

## 2021-05-05 PROCEDURE — 93280 PM DEVICE PROGR EVAL DUAL: CPT | Performed by: INTERNAL MEDICINE

## 2021-05-05 PROCEDURE — 99213 OFFICE O/P EST LOW 20 MIN: CPT | Performed by: INTERNAL MEDICINE

## 2021-05-05 NOTE — OUTREACH NOTE
Medical Week 1 Survey      Responses   [facility] [place] patient discharged from?  [facility]   Does the patient have one of the following disease processes/diagnoses(primary or secondary)?  Other   Week 1 attempt successful?  No   Unsuccessful attempts  Attempt 1          Margie Pedraza LPN

## 2021-05-05 NOTE — PROGRESS NOTES
Subjective:     Encounter Date:05/05/2021      Patient ID: Syl Herrera is a 85 y.o. female.    Chief Complaint:  History of Present Illness 85-year-old white female with history of coronary status post coronary bypass surgery history of stent placements atrial fibrillation hypertension hyperlipidemia sleep apnea presents to my office for follow-up.  Patient also had sick sinus syndrome and status post pacemaker placement.  Patient is currently stable without any signs of chest pain but has some shortness of breath with exertion.  No complaints any PND orthopnea.  She has occasional palpitation without any dizziness syncope or swelling of the feet.  While in the hospital she had IV infiltration and she had significant redness and swelling of her left upper extremity.    The following portions of the patient's history were reviewed and updated as appropriate: allergies, current medications, past family history, past medical history, past social history, past surgical history and problem list.  Past Medical History:   Diagnosis Date   • Anxiety    • Arthritis    • Atrial fibrillation (CMS/HCC)    • Coronary artery disease    • Dyslipidemia    • GERD (gastroesophageal reflux disease)    • History of bone density study 04/2015   • Hypertension    • Sleep apnea    • Wears dentures      Past Surgical History:   Procedure Laterality Date   • BLADDER REPAIR  1990   • BREAST LUMPECTOMY  1974    BENIGN   • CARDIAC CATHETERIZATION  05/25/2011   • CARDIAC CATHETERIZATION N/A 4/21/2021    Procedure: Left Heart Cath and coronary angiogram;  Surgeon: Ivan Martell MD;  Location: [facility] CATH INVASIVE LOCATION;  Service: Cardiovascular;  Laterality: N/A;   • CARDIAC CATHETERIZATION N/A 4/21/2021    Procedure: Left ventriculography;  Surgeon: Ivan Martell MD;  Location: [facility] CATH INVASIVE LOCATION;  Service: Cardiovascular;  Laterality: N/A;   • CARDIAC CATHETERIZATION  4/21/2021    Procedure: Saphenous Vein Graft;  Surgeon:  "Ivan Martell MD;  Location: [facility] CATH INVASIVE LOCATION;  Service: Cardiovascular;;   • CARDIAC CATHETERIZATION N/A 4/26/2021    Procedure: Percutaneous Coronary Intervention;  Surgeon: Ivan Martell MD;  Location: [facility] CATH INVASIVE LOCATION;  Service: Cardiovascular;  Laterality: N/A;   • CARDIAC CATHETERIZATION N/A 4/26/2021    Procedure: Stent RAY coronary;  Surgeon: Ivan Martell MD;  Location: [facility] CATH INVASIVE LOCATION;  Service: Cardiovascular;  Laterality: N/A;   • CARDIOVASCULAR STRESS TEST  05/04/2015   • CHOLECYSTECTOMY  1990   • CORONARY ARTERY BYPASS GRAFT  08/09/2017    X5  Dr Brandt   • ENDOSCOPY N/A 6/30/2020    Procedure: ESOPHAGOGASTRODUODENOSCOPY with biopsy x 1 area and dilatation (15-18mm balloon) up to 18mm;  Surgeon: Quinton Tapia MD;  Location: [facility] ENDOSCOPY;  Service: Gastroenterology;  Laterality: N/A;  post op: gastritis, large hiatal hernia, esophageal dysmotility   • HYSTERECTOMY  1990   • JOINT REPLACEMENT Right     knee    • OTHER SURGICAL HISTORY  06/2017    BLOOD CLOT REMOVED FROM LEFT EAR   • PACEMAKER IMPLANTATION  04/18/2016    DUAL CHAMBER ST ALESSIO     BP 97/59   Pulse 71   Ht 157.5 cm (62\")   Wt 78.9 kg (174 lb)   SpO2 97%   BMI 31.83 kg/m²   Family History   Problem Relation Age of Onset   • Hypertension Mother    • Heart disease Sister         PACEMAKER       Current Outpatient Medications:   •  ALPRAZolam (XANAX) 0.5 MG tablet, Take 0.5 mg by mouth 2 (Two) Times a Day As Needed., Disp: , Rfl: 5  •  amoxicillin-clavulanate (AUGMENTIN) 500-125 MG per tablet, Take 1 tablet by mouth Every 12 (Twelve) Hours for 9 doses. Indications: Urinary Tract Infection, Disp: 9 tablet, Rfl: 0  •  aspirin (aspirin) 81 MG EC tablet, Take 1 tablet by mouth 3 (Three) Times a Week., Disp: 12 tablet, Rfl: 6  •  atorvastatin (LIPITOR) 10 MG tablet, Take 10 mg by mouth Every Night., Disp: , Rfl:   •  carboxymethylcellulose (REFRESH PLUS) 0.5 % solution, Administer 1 drop to both " eyes 3 (Three) Times a Day As Needed for Dry Eyes., Disp: , Rfl:   •  clopidogrel (PLAVIX) 75 MG tablet, Take 1 tablet by mouth Daily., Disp: 30 tablet, Rfl: 0  •  dilTIAZem (TIAZAC) 120 MG 24 hr capsule, Take 1 capsule by mouth Daily., Disp: 90 capsule, Rfl: 1  •  diphenhydrAMINE HCl (BENADRYL ALLERGY PO), Take 1 tablet by mouth As Needed., Disp: , Rfl:   •  ferrous sulfate 324 (65 Fe) MG tablet delayed-release EC tablet, Take 1 tablet by mouth Daily With Breakfast., Disp: 30 tablet, Rfl: 0  •  HYDROcodone-acetaminophen (NORCO) 7.5-325 MG per tablet, Take 1 tablet by mouth Every 8 (Eight) Hours As Needed., Disp: , Rfl: 0  •  isosorbide mononitrate (IMDUR) 30 MG 24 hr tablet, Take 1 tablet by mouth Daily., Disp: 30 tablet, Rfl: 1  •  magnesium oxide (MAG-OX) 400 MG tablet, Take 400 mg by mouth Daily., Disp: , Rfl:   •  metoprolol tartrate (LOPRESSOR) 100 MG tablet, TAKE 1 TABLET BY MOUTH TWICE DAILY, Disp: 180 tablet, Rfl: 1  •  Multiple Vitamins-Minerals (VITEYES AREDS FORMULA) capsule, Take 1 capsule by mouth 2 (two) times a day., Disp: , Rfl:   •  ondansetron (ZOFRAN) 4 MG tablet, Take 4 mg by mouth Daily As Needed for Nausea or Vomiting., Disp: , Rfl:   •  pantoprazole (PROTONIX) 40 MG EC tablet, TK 1 T PO QD, Disp: , Rfl: 5  •  spironolactone (ALDACTONE) 25 MG tablet, TAKE ONE-HALF TABLET BY MOUTH DAILY, Disp: 45 tablet, Rfl: 3  Allergies   Allergen Reactions   • Doxycycline Other (See Comments)     Chest pain   • Celebrex [Celecoxib] Other (See Comments)     Chest pain   • Contrast Dye Itching   • Iodinated Diagnostic Agents Itching   • Nsaids Other (See Comments)     convulsion   • Other Itching     Pt states some steroids make her itch and hallucinate- she does not know the names   She said she is allergic to all arthritis medicine   • Sulfa Antibiotics Nausea Only   Current outpatient and discharge medications have been reconciled for the patient.  Reviewed by: Ivan Martell MD    Social History      Socioeconomic History   • Marital status:      Spouse name: Not on file   • Number of children: Not on file   • Years of education: Not on file   • Highest education level: Not on file   Tobacco Use   • Smoking status: Never Smoker   • Smokeless tobacco: Never Used   Substance and Sexual Activity   • Alcohol use: No   • Drug use: No   • Sexual activity: Defer     Review of Systems   Constitutional: Positive for malaise/fatigue. Negative for fever.   Cardiovascular: Positive for palpitations. Negative for chest pain, dyspnea on exertion and leg swelling.   Respiratory: Positive for shortness of breath. Negative for cough.    Skin: Negative for rash.   Gastrointestinal: Negative for abdominal pain, nausea and vomiting.   Neurological: Negative for focal weakness and headaches.   All other systems reviewed and are negative.             Objective:     Constitutional:       Appearance: Well-developed.   Eyes:      General: No scleral icterus.     Conjunctiva/sclera: Conjunctivae normal.   HENT:      Head: Normocephalic and atraumatic.   Neck:      Vascular: No carotid bruit or JVD.   Pulmonary:      Effort: Pulmonary effort is normal.      Breath sounds: Normal breath sounds. No wheezing. No rales.   Cardiovascular:      Normal rate. Irregularly irregular rhythm.   Pulses:     Intact distal pulses.   Abdominal:      General: Bowel sounds are normal.      Palpations: Abdomen is soft.   Musculoskeletal:      Cervical back: Normal range of motion and neck supple. Skin:     General: Skin is warm and dry.      Findings: No rash.   Neurological:      Mental Status: Alert.       Procedures    Lab Review:         MDM  1.  Coronary disease  Patient had coronary bypass surgery x4 vessels with a LIMA to LAD which is patent but her saphenous graft to the diagonal branch marginal branch and RCA are occluded and hence she underwent stent placements to the left circumflex artery and RCA.  Her diagonal branch is small and  hence we will continue medical therapy  Patient has normal LV systolic function  2.  Atrial fibrillation  Patient is currently stable on medical therapy but she had a Maze procedure at her time of surgery and she is not on any warfarin  3.  Hypertension  Patient blood pressure currently stable on medications  4.  Hyperlipidemia  Patient lipid levels are followed by the primary care doctor  5.  History of tachybradycardia syndrome with sick sinus syndrome and status post permanent pacemaker placement  Patient's pacemaker is working very well.

## 2021-05-06 ENCOUNTER — TELEPHONE (OUTPATIENT)
Dept: CARDIAC REHAB | Facility: HOSPITAL | Age: 86
End: 2021-05-06

## 2021-05-07 ENCOUNTER — APPOINTMENT (OUTPATIENT)
Dept: CARDIOLOGY | Facility: HOSPITAL | Age: 86
End: 2021-05-07

## 2021-05-07 ENCOUNTER — HOSPITAL ENCOUNTER (INPATIENT)
Facility: HOSPITAL | Age: 86
LOS: 1 days | Discharge: HOME HEALTH | End: 2021-05-11
Attending: INTERNAL MEDICINE | Admitting: INTERNAL MEDICINE

## 2021-05-07 ENCOUNTER — APPOINTMENT (OUTPATIENT)
Dept: GENERAL RADIOLOGY | Facility: HOSPITAL | Age: 86
End: 2021-05-07

## 2021-05-07 ENCOUNTER — LAB REQUISITION (OUTPATIENT)
Dept: LAB | Facility: HOSPITAL | Age: 86
End: 2021-05-07

## 2021-05-07 DIAGNOSIS — R06.02 SHORTNESS OF BREATH: Primary | ICD-10-CM

## 2021-05-07 DIAGNOSIS — R79.89 POSITIVE D DIMER: ICD-10-CM

## 2021-05-07 DIAGNOSIS — I25.119 ATHEROSCLEROTIC HEART DISEASE OF NATIVE CORONARY ARTERY WITH UNSPECIFIED ANGINA PECTORIS (HCC): ICD-10-CM

## 2021-05-07 DIAGNOSIS — I12.9 HYPERTENSIVE CHRONIC KIDNEY DISEASE WITH STAGE 1 THROUGH STAGE 4 CHRONIC KIDNEY DISEASE, OR UNSPECIFIED CHRONIC KIDNEY DISEASE: ICD-10-CM

## 2021-05-07 LAB
ALBUMIN SERPL-MCNC: 3.6 G/DL (ref 3.5–5.2)
ALBUMIN/GLOB SERPL: 1.3 G/DL
ALP SERPL-CCNC: 134 U/L (ref 39–117)
ALT SERPL W P-5'-P-CCNC: 31 U/L (ref 1–33)
ANION GAP SERPL CALCULATED.3IONS-SCNC: 10 MMOL/L (ref 5–15)
ANION GAP SERPL CALCULATED.3IONS-SCNC: 9 MMOL/L (ref 5–15)
AST SERPL-CCNC: 36 U/L (ref 1–32)
BASOPHILS # BLD AUTO: 0 10*3/MM3 (ref 0–0.2)
BASOPHILS # BLD AUTO: 0 10*3/MM3 (ref 0–0.2)
BASOPHILS NFR BLD AUTO: 0.2 % (ref 0–1.5)
BASOPHILS NFR BLD AUTO: 0.4 % (ref 0–1.5)
BH CV LEFT GROIN PSA PROCEDURE SCRIPTING LRR: 1
BILIRUB SERPL-MCNC: 1.9 MG/DL (ref 0–1.2)
BUN SERPL-MCNC: 19 MG/DL (ref 8–23)
BUN SERPL-MCNC: 20 MG/DL (ref 8–23)
BUN/CREAT SERPL: 20.9 (ref 7–25)
BUN/CREAT SERPL: 22.2 (ref 7–25)
CALCIUM SPEC-SCNC: 8.6 MG/DL (ref 8.6–10.5)
CALCIUM SPEC-SCNC: 8.8 MG/DL (ref 8.6–10.5)
CHLORIDE SERPL-SCNC: 97 MMOL/L (ref 98–107)
CHLORIDE SERPL-SCNC: 98 MMOL/L (ref 98–107)
CO2 SERPL-SCNC: 23 MMOL/L (ref 22–29)
CO2 SERPL-SCNC: 24 MMOL/L (ref 22–29)
CREAT SERPL-MCNC: 0.9 MG/DL (ref 0.57–1)
CREAT SERPL-MCNC: 0.91 MG/DL (ref 0.57–1)
D DIMER PPP FEU-MCNC: 4.31 MG/L (FEU) (ref 0–0.59)
DEPRECATED RDW RBC AUTO: 52.1 FL (ref 37–54)
DEPRECATED RDW RBC AUTO: 54.7 FL (ref 37–54)
EOSINOPHIL # BLD AUTO: 0.2 10*3/MM3 (ref 0–0.4)
EOSINOPHIL # BLD AUTO: 0.2 10*3/MM3 (ref 0–0.4)
EOSINOPHIL NFR BLD AUTO: 2 % (ref 0.3–6.2)
EOSINOPHIL NFR BLD AUTO: 2.1 % (ref 0.3–6.2)
ERYTHROCYTE [DISTWIDTH] IN BLOOD BY AUTOMATED COUNT: 16 % (ref 12.3–15.4)
ERYTHROCYTE [DISTWIDTH] IN BLOOD BY AUTOMATED COUNT: 16.7 % (ref 12.3–15.4)
GFR SERPL CREATININE-BSD FRML MDRD: 59 ML/MIN/1.73
GFR SERPL CREATININE-BSD FRML MDRD: 60 ML/MIN/1.73
GLOBULIN UR ELPH-MCNC: 2.8 GM/DL
GLUCOSE SERPL-MCNC: 114 MG/DL (ref 65–99)
GLUCOSE SERPL-MCNC: 149 MG/DL (ref 65–99)
HCT VFR BLD AUTO: 28 % (ref 34–46.6)
HCT VFR BLD AUTO: 29.4 % (ref 34–46.6)
HGB BLD-MCNC: 9.2 G/DL (ref 12–15.9)
HGB BLD-MCNC: 9.7 G/DL (ref 12–15.9)
HOLD SPECIMEN: NORMAL
LEFT GROIN CFA SYS: 138 CM/SEC
LYMPHOCYTES # BLD AUTO: 1.3 10*3/MM3 (ref 0.7–3.1)
LYMPHOCYTES # BLD AUTO: 1.4 10*3/MM3 (ref 0.7–3.1)
LYMPHOCYTES NFR BLD AUTO: 15.7 % (ref 19.6–45.3)
LYMPHOCYTES NFR BLD AUTO: 18.1 % (ref 19.6–45.3)
MAXIMAL PREDICTED HEART RATE: 137 BPM
MCH RBC QN AUTO: 30.6 PG (ref 26.6–33)
MCH RBC QN AUTO: 31.3 PG (ref 26.6–33)
MCHC RBC AUTO-ENTMCNC: 33 G/DL (ref 31.5–35.7)
MCHC RBC AUTO-ENTMCNC: 33.1 G/DL (ref 31.5–35.7)
MCV RBC AUTO: 92.8 FL (ref 79–97)
MCV RBC AUTO: 94.7 FL (ref 79–97)
MONOCYTES # BLD AUTO: 0.7 10*3/MM3 (ref 0.1–0.9)
MONOCYTES # BLD AUTO: 1 10*3/MM3 (ref 0.1–0.9)
MONOCYTES NFR BLD AUTO: 13.2 % (ref 5–12)
MONOCYTES NFR BLD AUTO: 8.9 % (ref 5–12)
NEUTROPHILS NFR BLD AUTO: 5.1 10*3/MM3 (ref 1.7–7)
NEUTROPHILS NFR BLD AUTO: 5.9 10*3/MM3 (ref 1.7–7)
NEUTROPHILS NFR BLD AUTO: 66.5 % (ref 42.7–76)
NEUTROPHILS NFR BLD AUTO: 72.9 % (ref 42.7–76)
NRBC BLD AUTO-RTO: 0 /100 WBC (ref 0–0.2)
NRBC BLD AUTO-RTO: 0 /100 WBC (ref 0–0.2)
NT-PROBNP SERPL-MCNC: 1352 PG/ML (ref 0–1800)
PLATELET # BLD AUTO: 175 10*3/MM3 (ref 140–450)
PLATELET # BLD AUTO: 176 10*3/MM3 (ref 140–450)
PMV BLD AUTO: 10.4 FL (ref 6–12)
PMV BLD AUTO: 8.5 FL (ref 6–12)
POTASSIUM SERPL-SCNC: 4.6 MMOL/L (ref 3.5–5.2)
POTASSIUM SERPL-SCNC: 4.9 MMOL/L (ref 3.5–5.2)
PROT SERPL-MCNC: 6.4 G/DL (ref 6–8.5)
PROX PFA PSV LEFT: 72.9 CM/SEC
PROX SFA PSV LEFT: 97.8 CM/SEC
RBC # BLD AUTO: 3.02 10*6/MM3 (ref 3.77–5.28)
RBC # BLD AUTO: 3.11 10*6/MM3 (ref 3.77–5.28)
SODIUM SERPL-SCNC: 130 MMOL/L (ref 136–145)
SODIUM SERPL-SCNC: 131 MMOL/L (ref 136–145)
STRESS TARGET HR: 116 BPM
TROPONIN T SERPL-MCNC: <0.01 NG/ML (ref 0–0.03)
WBC # BLD AUTO: 7.7 10*3/MM3 (ref 3.4–10.8)
WBC # BLD AUTO: 8.1 10*3/MM3 (ref 3.4–10.8)
WHOLE BLOOD HOLD SPECIMEN: NORMAL
WHOLE BLOOD HOLD SPECIMEN: NORMAL

## 2021-05-07 PROCEDURE — 25010000002 ENOXAPARIN PER 10 MG: Performed by: FAMILY MEDICINE

## 2021-05-07 PROCEDURE — G0378 HOSPITAL OBSERVATION PER HR: HCPCS

## 2021-05-07 PROCEDURE — 83880 ASSAY OF NATRIURETIC PEPTIDE: CPT | Performed by: NURSE PRACTITIONER

## 2021-05-07 PROCEDURE — 85025 COMPLETE CBC W/AUTO DIFF WBC: CPT | Performed by: FAMILY MEDICINE

## 2021-05-07 PROCEDURE — 85379 FIBRIN DEGRADATION QUANT: CPT | Performed by: NURSE PRACTITIONER

## 2021-05-07 PROCEDURE — 93005 ELECTROCARDIOGRAM TRACING: CPT

## 2021-05-07 PROCEDURE — 85025 COMPLETE CBC W/AUTO DIFF WBC: CPT | Performed by: NURSE PRACTITIONER

## 2021-05-07 PROCEDURE — 99284 EMERGENCY DEPT VISIT MOD MDM: CPT

## 2021-05-07 PROCEDURE — 84484 ASSAY OF TROPONIN QUANT: CPT | Performed by: NURSE PRACTITIONER

## 2021-05-07 PROCEDURE — 93926 LOWER EXTREMITY STUDY: CPT

## 2021-05-07 PROCEDURE — 71045 X-RAY EXAM CHEST 1 VIEW: CPT

## 2021-05-07 PROCEDURE — 93005 ELECTROCARDIOGRAM TRACING: CPT | Performed by: INTERNAL MEDICINE

## 2021-05-07 PROCEDURE — 80053 COMPREHEN METABOLIC PANEL: CPT | Performed by: FAMILY MEDICINE

## 2021-05-07 RX ORDER — CLOPIDOGREL BISULFATE 75 MG/1
75 TABLET ORAL DAILY
Status: DISCONTINUED | OUTPATIENT
Start: 2021-05-08 | End: 2021-05-11 | Stop reason: HOSPADM

## 2021-05-07 RX ORDER — SODIUM CHLORIDE 0.9 % (FLUSH) 0.9 %
3-10 SYRINGE (ML) INJECTION AS NEEDED
Status: DISCONTINUED | OUTPATIENT
Start: 2021-05-07 | End: 2021-05-11 | Stop reason: HOSPADM

## 2021-05-07 RX ORDER — ASPIRIN 81 MG/1
81 TABLET ORAL 3 TIMES WEEKLY
Status: DISCONTINUED | OUTPATIENT
Start: 2021-05-10 | End: 2021-05-11 | Stop reason: HOSPADM

## 2021-05-07 RX ORDER — DILTIAZEM HYDROCHLORIDE 120 MG/1
120 CAPSULE, COATED, EXTENDED RELEASE ORAL
Refills: 1 | Status: DISCONTINUED | OUTPATIENT
Start: 2021-05-08 | End: 2021-05-11 | Stop reason: HOSPADM

## 2021-05-07 RX ORDER — CEPHALEXIN 500 MG/1
500 CAPSULE ORAL 3 TIMES DAILY
Status: DISCONTINUED | OUTPATIENT
Start: 2021-05-07 | End: 2021-05-11 | Stop reason: HOSPADM

## 2021-05-07 RX ORDER — CEPHALEXIN 500 MG/1
500 CAPSULE ORAL 3 TIMES DAILY
COMMUNITY
Start: 2021-05-05 | End: 2021-05-15

## 2021-05-07 RX ORDER — CARBOXYMETHYLCELLULOSE SODIUM 10 MG/ML
1 GEL OPHTHALMIC 3 TIMES DAILY PRN
Status: DISCONTINUED | OUTPATIENT
Start: 2021-05-07 | End: 2021-05-11 | Stop reason: HOSPADM

## 2021-05-07 RX ORDER — ATORVASTATIN CALCIUM 10 MG/1
10 TABLET, FILM COATED ORAL NIGHTLY
Status: DISCONTINUED | OUTPATIENT
Start: 2021-05-07 | End: 2021-05-11 | Stop reason: HOSPADM

## 2021-05-07 RX ORDER — FERROUS SULFATE TAB EC 324 MG (65 MG FE EQUIVALENT) 324 (65 FE) MG
324 TABLET DELAYED RESPONSE ORAL
Status: DISCONTINUED | OUTPATIENT
Start: 2021-05-08 | End: 2021-05-11 | Stop reason: HOSPADM

## 2021-05-07 RX ORDER — HYDROCODONE BITARTRATE AND ACETAMINOPHEN 7.5; 325 MG/1; MG/1
1 TABLET ORAL EVERY 4 HOURS PRN
Status: DISCONTINUED | OUTPATIENT
Start: 2021-05-07 | End: 2021-05-11 | Stop reason: HOSPADM

## 2021-05-07 RX ORDER — PANTOPRAZOLE SODIUM 40 MG/1
40 TABLET, DELAYED RELEASE ORAL
Status: DISCONTINUED | OUTPATIENT
Start: 2021-05-08 | End: 2021-05-11 | Stop reason: HOSPADM

## 2021-05-07 RX ORDER — SODIUM CHLORIDE 0.9 % (FLUSH) 0.9 %
3 SYRINGE (ML) INJECTION EVERY 12 HOURS SCHEDULED
Status: DISCONTINUED | OUTPATIENT
Start: 2021-05-07 | End: 2021-05-11 | Stop reason: HOSPADM

## 2021-05-07 RX ORDER — ALPRAZOLAM 0.5 MG/1
0.5 TABLET ORAL 2 TIMES DAILY PRN
Status: DISCONTINUED | OUTPATIENT
Start: 2021-05-07 | End: 2021-05-11 | Stop reason: HOSPADM

## 2021-05-07 RX ORDER — SPIRONOLACTONE 25 MG/1
12.5 TABLET ORAL DAILY
Status: DISCONTINUED | OUTPATIENT
Start: 2021-05-08 | End: 2021-05-11 | Stop reason: HOSPADM

## 2021-05-07 RX ORDER — SODIUM CHLORIDE 0.9 % (FLUSH) 0.9 %
10 SYRINGE (ML) INJECTION AS NEEDED
Status: DISCONTINUED | OUTPATIENT
Start: 2021-05-07 | End: 2021-05-11 | Stop reason: HOSPADM

## 2021-05-07 RX ORDER — METOPROLOL TARTRATE 50 MG/1
100 TABLET, FILM COATED ORAL 2 TIMES DAILY
Status: DISCONTINUED | OUTPATIENT
Start: 2021-05-07 | End: 2021-05-11 | Stop reason: HOSPADM

## 2021-05-07 RX ORDER — ONDANSETRON 4 MG/1
4 TABLET, FILM COATED ORAL DAILY PRN
Status: DISCONTINUED | OUTPATIENT
Start: 2021-05-07 | End: 2021-05-11 | Stop reason: HOSPADM

## 2021-05-07 RX ORDER — HYDROCODONE BITARTRATE AND ACETAMINOPHEN 7.5; 325 MG/1; MG/1
1 TABLET ORAL EVERY 8 HOURS PRN
Status: DISCONTINUED | OUTPATIENT
Start: 2021-05-07 | End: 2021-05-11 | Stop reason: HOSPADM

## 2021-05-07 RX ADMIN — ATORVASTATIN CALCIUM 10 MG: 10 TABLET, FILM COATED ORAL at 22:07

## 2021-05-07 RX ADMIN — CEPHALEXIN 500 MG: 500 CAPSULE ORAL at 22:07

## 2021-05-07 RX ADMIN — HYDROCODONE BITARTRATE AND ACETAMINOPHEN 1 TABLET: 7.5; 325 TABLET ORAL at 21:55

## 2021-05-07 RX ADMIN — ENOXAPARIN SODIUM 80 MG: 80 INJECTION SUBCUTANEOUS at 23:02

## 2021-05-07 RX ADMIN — Medication 3 ML: at 23:02

## 2021-05-07 RX ADMIN — ALPRAZOLAM 0.5 MG: 0.5 TABLET ORAL at 22:07

## 2021-05-07 RX ADMIN — METOPROLOL TARTRATE 100 MG: 50 TABLET, FILM COATED ORAL at 22:07

## 2021-05-08 ENCOUNTER — APPOINTMENT (OUTPATIENT)
Dept: NUCLEAR MEDICINE | Facility: HOSPITAL | Age: 86
End: 2021-05-08

## 2021-05-08 LAB
ALBUMIN SERPL-MCNC: 3.2 G/DL (ref 3.5–5.2)
ALBUMIN/GLOB SERPL: 1.1 G/DL
ALP SERPL-CCNC: 110 U/L (ref 39–117)
ALT SERPL W P-5'-P-CCNC: 28 U/L (ref 1–33)
ANION GAP SERPL CALCULATED.3IONS-SCNC: 11 MMOL/L (ref 5–15)
AST SERPL-CCNC: 35 U/L (ref 1–32)
BILIRUB SERPL-MCNC: 2 MG/DL (ref 0–1.2)
BUN SERPL-MCNC: 15 MG/DL (ref 8–23)
BUN/CREAT SERPL: 20.8 (ref 7–25)
CALCIUM SPEC-SCNC: 8.6 MG/DL (ref 8.6–10.5)
CHLORIDE SERPL-SCNC: 101 MMOL/L (ref 98–107)
CO2 SERPL-SCNC: 23 MMOL/L (ref 22–29)
CREAT SERPL-MCNC: 0.72 MG/DL (ref 0.57–1)
DEPRECATED RDW RBC AUTO: 46.6 FL (ref 37–54)
ERYTHROCYTE [DISTWIDTH] IN BLOOD BY AUTOMATED COUNT: 15.2 % (ref 12.3–15.4)
GFR SERPL CREATININE-BSD FRML MDRD: 77 ML/MIN/1.73
GLOBULIN UR ELPH-MCNC: 2.8 GM/DL
GLUCOSE SERPL-MCNC: 99 MG/DL (ref 65–99)
HCT VFR BLD AUTO: 27.9 % (ref 34–46.6)
HGB BLD-MCNC: 9.3 G/DL (ref 12–15.9)
IRON 24H UR-MRATE: 65 MCG/DL (ref 37–145)
IRON SATN MFR SERPL: 20 % (ref 20–50)
MAGNESIUM SERPL-MCNC: 2.2 MG/DL (ref 1.6–2.4)
MCH RBC QN AUTO: 31 PG (ref 26.6–33)
MCHC RBC AUTO-ENTMCNC: 33.4 G/DL (ref 31.5–35.7)
MCV RBC AUTO: 92.7 FL (ref 79–97)
PLATELET # BLD AUTO: 163 10*3/MM3 (ref 140–450)
PMV BLD AUTO: 9.9 FL (ref 6–12)
POTASSIUM SERPL-SCNC: 4.3 MMOL/L (ref 3.5–5.2)
PROT SERPL-MCNC: 6 G/DL (ref 6–8.5)
RBC # BLD AUTO: 3.01 10*6/MM3 (ref 3.77–5.28)
SARS-COV-2 ORF1AB RESP QL NAA+PROBE: NOT DETECTED
SODIUM SERPL-SCNC: 135 MMOL/L (ref 136–145)
TIBC SERPL-MCNC: 317 MCG/DL (ref 298–536)
TRANSFERRIN SERPL-MCNC: 213 MG/DL (ref 200–360)
TSH SERPL DL<=0.05 MIU/L-ACNC: 0.63 UIU/ML (ref 0.27–4.2)
VIT B12 BLD-MCNC: 726 PG/ML (ref 211–946)
WBC # BLD AUTO: 6 10*3/MM3 (ref 3.4–10.8)

## 2021-05-08 PROCEDURE — 83735 ASSAY OF MAGNESIUM: CPT | Performed by: FAMILY MEDICINE

## 2021-05-08 PROCEDURE — 78582 LUNG VENTILAT&PERFUS IMAGING: CPT

## 2021-05-08 PROCEDURE — 85027 COMPLETE CBC AUTOMATED: CPT | Performed by: FAMILY MEDICINE

## 2021-05-08 PROCEDURE — U0004 COV-19 TEST NON-CDC HGH THRU: HCPCS | Performed by: INTERNAL MEDICINE

## 2021-05-08 PROCEDURE — G0378 HOSPITAL OBSERVATION PER HR: HCPCS

## 2021-05-08 PROCEDURE — 80053 COMPREHEN METABOLIC PANEL: CPT | Performed by: FAMILY MEDICINE

## 2021-05-08 PROCEDURE — 82607 VITAMIN B-12: CPT | Performed by: FAMILY MEDICINE

## 2021-05-08 PROCEDURE — A9538 TC99M PYROPHOSPHATE: HCPCS | Performed by: INTERNAL MEDICINE

## 2021-05-08 PROCEDURE — 25010000002 ENOXAPARIN PER 10 MG: Performed by: FAMILY MEDICINE

## 2021-05-08 PROCEDURE — A9540 TC99M MAA: HCPCS | Performed by: INTERNAL MEDICINE

## 2021-05-08 PROCEDURE — 84443 ASSAY THYROID STIM HORMONE: CPT | Performed by: FAMILY MEDICINE

## 2021-05-08 PROCEDURE — U0005 INFEC AGEN DETEC AMPLI PROBE: HCPCS | Performed by: INTERNAL MEDICINE

## 2021-05-08 PROCEDURE — 84466 ASSAY OF TRANSFERRIN: CPT | Performed by: FAMILY MEDICINE

## 2021-05-08 PROCEDURE — 83540 ASSAY OF IRON: CPT | Performed by: FAMILY MEDICINE

## 2021-05-08 PROCEDURE — 0 TECHNETIUM TC99M PYROPHOSPHATE: Performed by: INTERNAL MEDICINE

## 2021-05-08 PROCEDURE — 0 TECHNETIUM ALBUMIN AGGREGATED: Performed by: INTERNAL MEDICINE

## 2021-05-08 RX ADMIN — CEPHALEXIN 500 MG: 500 CAPSULE ORAL at 20:30

## 2021-05-08 RX ADMIN — HYDROCODONE BITARTRATE AND ACETAMINOPHEN 1 TABLET: 7.5; 325 TABLET ORAL at 14:25

## 2021-05-08 RX ADMIN — Medication 3 ML: at 09:19

## 2021-05-08 RX ADMIN — ALPRAZOLAM 0.5 MG: 0.5 TABLET ORAL at 20:33

## 2021-05-08 RX ADMIN — FERROUS SULFATE TAB EC 324 MG (65 MG FE EQUIVALENT) 324 MG: 324 (65 FE) TABLET DELAYED RESPONSE at 09:18

## 2021-05-08 RX ADMIN — TECHNETIUM TC99M PYROPHOSPHATE 1 DOSE: 12 INJECTION INTRAVENOUS at 11:41

## 2021-05-08 RX ADMIN — CLOPIDOGREL BISULFATE 75 MG: 75 TABLET ORAL at 09:18

## 2021-05-08 RX ADMIN — ATORVASTATIN CALCIUM 10 MG: 10 TABLET, FILM COATED ORAL at 20:30

## 2021-05-08 RX ADMIN — KIT FOR THE PREPARATION OF TECHNETIUM TC 99M ALBUMIN AGGREGATED 1 DOSE: 2.5 INJECTION, POWDER, FOR SOLUTION INTRAVENOUS at 12:00

## 2021-05-08 RX ADMIN — SPIRONOLACTONE 12.5 MG: 25 TABLET ORAL at 09:18

## 2021-05-08 RX ADMIN — Medication 3 ML: at 20:30

## 2021-05-08 RX ADMIN — HYDROCODONE BITARTRATE AND ACETAMINOPHEN 1 TABLET: 7.5; 325 TABLET ORAL at 20:30

## 2021-05-08 RX ADMIN — PANTOPRAZOLE SODIUM 40 MG: 40 TABLET, DELAYED RELEASE ORAL at 05:16

## 2021-05-08 RX ADMIN — ENOXAPARIN SODIUM 80 MG: 80 INJECTION SUBCUTANEOUS at 09:18

## 2021-05-08 RX ADMIN — MAGNESIUM GLUCONATE 500 MG ORAL TABLET 400 MG: 500 TABLET ORAL at 09:18

## 2021-05-08 RX ADMIN — CEPHALEXIN 500 MG: 500 CAPSULE ORAL at 16:36

## 2021-05-08 RX ADMIN — CEPHALEXIN 500 MG: 500 CAPSULE ORAL at 09:18

## 2021-05-08 RX ADMIN — METOPROLOL TARTRATE 100 MG: 50 TABLET, FILM COATED ORAL at 20:30

## 2021-05-08 RX ADMIN — ENOXAPARIN SODIUM 80 MG: 80 INJECTION SUBCUTANEOUS at 20:30

## 2021-05-08 RX ADMIN — DILTIAZEM HYDROCHLORIDE 120 MG: 120 CAPSULE, EXTENDED RELEASE ORAL at 09:19

## 2021-05-09 ENCOUNTER — READMISSION MANAGEMENT (OUTPATIENT)
Dept: CALL CENTER | Facility: HOSPITAL | Age: 86
End: 2021-05-09

## 2021-05-09 ENCOUNTER — APPOINTMENT (OUTPATIENT)
Dept: CARDIOLOGY | Facility: HOSPITAL | Age: 86
End: 2021-05-09

## 2021-05-09 LAB
ANION GAP SERPL CALCULATED.3IONS-SCNC: 7 MMOL/L (ref 5–15)
BH CV UPPER VENOUS LEFT AXILLARY AUGMENT: NORMAL
BH CV UPPER VENOUS LEFT AXILLARY COMPETENT: NORMAL
BH CV UPPER VENOUS LEFT AXILLARY COMPRESS: NORMAL
BH CV UPPER VENOUS LEFT AXILLARY PHASIC: NORMAL
BH CV UPPER VENOUS LEFT AXILLARY SPONT: NORMAL
BH CV UPPER VENOUS LEFT BASILIC FOREARM COLOR: 1
BH CV UPPER VENOUS LEFT BASILIC FOREARM COMPRESS: NORMAL
BH CV UPPER VENOUS LEFT BASILIC FOREARM THROMBUS: NORMAL
BH CV UPPER VENOUS LEFT BASILIC UPPER COMPRESS: NORMAL
BH CV UPPER VENOUS LEFT BRACHIAL COMPRESS: NORMAL
BH CV UPPER VENOUS LEFT CEPHALIC FOREARM COMPRESS: NORMAL
BH CV UPPER VENOUS LEFT CEPHALIC UPPER COMPRESS: NORMAL
BH CV UPPER VENOUS LEFT INTERNAL JUGULAR AUGMENT: NORMAL
BH CV UPPER VENOUS LEFT INTERNAL JUGULAR COMPETENT: NORMAL
BH CV UPPER VENOUS LEFT INTERNAL JUGULAR COMPRESS: NORMAL
BH CV UPPER VENOUS LEFT INTERNAL JUGULAR PHASIC: NORMAL
BH CV UPPER VENOUS LEFT INTERNAL JUGULAR SPONT: NORMAL
BH CV UPPER VENOUS LEFT MED CUBITAL COLOR: 1
BH CV UPPER VENOUS LEFT MED CUBITAL COMPRESS: NORMAL
BH CV UPPER VENOUS LEFT MED CUBITAL THROMBUS: NORMAL
BH CV UPPER VENOUS LEFT RADIAL COMPRESS: NORMAL
BH CV UPPER VENOUS LEFT SUBCLAVIAN AUGMENT: NORMAL
BH CV UPPER VENOUS LEFT SUBCLAVIAN COMPETENT: NORMAL
BH CV UPPER VENOUS LEFT SUBCLAVIAN COMPRESS: NORMAL
BH CV UPPER VENOUS LEFT SUBCLAVIAN PHASIC: NORMAL
BH CV UPPER VENOUS LEFT SUBCLAVIAN SPONT: NORMAL
BH CV UPPER VENOUS LEFT ULNAR COMPRESS: NORMAL
BH CV UPPER VENOUS RIGHT SUBCLAVIAN AUGMENT: NORMAL
BH CV UPPER VENOUS RIGHT SUBCLAVIAN COMPETENT: NORMAL
BH CV UPPER VENOUS RIGHT SUBCLAVIAN COMPRESS: NORMAL
BH CV UPPER VENOUS RIGHT SUBCLAVIAN PHASIC: NORMAL
BH CV UPPER VENOUS RIGHT SUBCLAVIAN SPONT: NORMAL
BUN SERPL-MCNC: 14 MG/DL (ref 8–23)
BUN/CREAT SERPL: 19.4 (ref 7–25)
CALCIUM SPEC-SCNC: 8.6 MG/DL (ref 8.6–10.5)
CHLORIDE SERPL-SCNC: 100 MMOL/L (ref 98–107)
CO2 SERPL-SCNC: 26 MMOL/L (ref 22–29)
CREAT SERPL-MCNC: 0.72 MG/DL (ref 0.57–1)
DEPRECATED RDW RBC AUTO: 51.2 FL (ref 37–54)
ERYTHROCYTE [DISTWIDTH] IN BLOOD BY AUTOMATED COUNT: 15.7 % (ref 12.3–15.4)
GFR SERPL CREATININE-BSD FRML MDRD: 77 ML/MIN/1.73
GLUCOSE SERPL-MCNC: 91 MG/DL (ref 65–99)
HCT VFR BLD AUTO: 27.5 % (ref 34–46.6)
HGB BLD-MCNC: 9.6 G/DL (ref 12–15.9)
MAXIMAL PREDICTED HEART RATE: 137 BPM
MCH RBC QN AUTO: 32.7 PG (ref 26.6–33)
MCHC RBC AUTO-ENTMCNC: 35.1 G/DL (ref 31.5–35.7)
MCV RBC AUTO: 93.1 FL (ref 79–97)
PLATELET # BLD AUTO: 165 10*3/MM3 (ref 140–450)
PMV BLD AUTO: 9.5 FL (ref 6–12)
POTASSIUM SERPL-SCNC: 4.6 MMOL/L (ref 3.5–5.2)
QT INTERVAL: 413 MS
RBC # BLD AUTO: 2.95 10*6/MM3 (ref 3.77–5.28)
SODIUM SERPL-SCNC: 133 MMOL/L (ref 136–145)
STRESS TARGET HR: 116 BPM
TROPONIN T SERPL-MCNC: <0.01 NG/ML (ref 0–0.03)
WBC # BLD AUTO: 4.2 10*3/MM3 (ref 3.4–10.8)

## 2021-05-09 PROCEDURE — 25010000002 ENOXAPARIN PER 10 MG: Performed by: FAMILY MEDICINE

## 2021-05-09 PROCEDURE — 80048 BASIC METABOLIC PNL TOTAL CA: CPT | Performed by: FAMILY MEDICINE

## 2021-05-09 PROCEDURE — 84484 ASSAY OF TROPONIN QUANT: CPT | Performed by: INTERNAL MEDICINE

## 2021-05-09 PROCEDURE — 85027 COMPLETE CBC AUTOMATED: CPT | Performed by: FAMILY MEDICINE

## 2021-05-09 PROCEDURE — 25010000002 FUROSEMIDE PER 20 MG: Performed by: INTERNAL MEDICINE

## 2021-05-09 PROCEDURE — 93971 EXTREMITY STUDY: CPT

## 2021-05-09 PROCEDURE — 99221 1ST HOSP IP/OBS SF/LOW 40: CPT | Performed by: INTERNAL MEDICINE

## 2021-05-09 PROCEDURE — G0378 HOSPITAL OBSERVATION PER HR: HCPCS

## 2021-05-09 RX ORDER — FUROSEMIDE 10 MG/ML
20 INJECTION INTRAMUSCULAR; INTRAVENOUS
Status: DISCONTINUED | OUTPATIENT
Start: 2021-05-09 | End: 2021-05-09

## 2021-05-09 RX ORDER — FUROSEMIDE 10 MG/ML
20 INJECTION INTRAMUSCULAR; INTRAVENOUS
Status: DISCONTINUED | OUTPATIENT
Start: 2021-05-10 | End: 2021-05-11 | Stop reason: HOSPADM

## 2021-05-09 RX ADMIN — FERROUS SULFATE TAB EC 324 MG (65 MG FE EQUIVALENT) 324 MG: 324 (65 FE) TABLET DELAYED RESPONSE at 08:39

## 2021-05-09 RX ADMIN — ENOXAPARIN SODIUM 80 MG: 80 INJECTION SUBCUTANEOUS at 08:40

## 2021-05-09 RX ADMIN — ALPRAZOLAM 0.5 MG: 0.5 TABLET ORAL at 14:01

## 2021-05-09 RX ADMIN — PANTOPRAZOLE SODIUM 40 MG: 40 TABLET, DELAYED RELEASE ORAL at 05:16

## 2021-05-09 RX ADMIN — CEPHALEXIN 500 MG: 500 CAPSULE ORAL at 17:50

## 2021-05-09 RX ADMIN — CLOPIDOGREL BISULFATE 75 MG: 75 TABLET ORAL at 08:40

## 2021-05-09 RX ADMIN — ATORVASTATIN CALCIUM 10 MG: 10 TABLET, FILM COATED ORAL at 21:01

## 2021-05-09 RX ADMIN — CEPHALEXIN 500 MG: 500 CAPSULE ORAL at 21:01

## 2021-05-09 RX ADMIN — Medication 3 ML: at 08:40

## 2021-05-09 RX ADMIN — SPIRONOLACTONE 12.5 MG: 25 TABLET ORAL at 08:40

## 2021-05-09 RX ADMIN — MAGNESIUM GLUCONATE 500 MG ORAL TABLET 400 MG: 500 TABLET ORAL at 08:40

## 2021-05-09 RX ADMIN — ENOXAPARIN SODIUM 80 MG: 80 INJECTION SUBCUTANEOUS at 21:01

## 2021-05-09 RX ADMIN — HYDROCODONE BITARTRATE AND ACETAMINOPHEN 1 TABLET: 7.5; 325 TABLET ORAL at 14:01

## 2021-05-09 RX ADMIN — Medication 3 ML: at 21:01

## 2021-05-09 RX ADMIN — FUROSEMIDE 20 MG: 10 INJECTION, SOLUTION INTRAMUSCULAR; INTRAVENOUS at 13:54

## 2021-05-09 RX ADMIN — CEPHALEXIN 500 MG: 500 CAPSULE ORAL at 08:39

## 2021-05-09 RX ADMIN — DILTIAZEM HYDROCHLORIDE 120 MG: 120 CAPSULE, EXTENDED RELEASE ORAL at 08:40

## 2021-05-09 RX ADMIN — METOPROLOL TARTRATE 100 MG: 50 TABLET, FILM COATED ORAL at 08:39

## 2021-05-09 NOTE — OUTREACH NOTE
Medical Week 1 Survey      Responses   [facility] patient discharged from?  [facility]   Does the patient have one of the following disease processes/diagnoses(primary or secondary)?  Other   Week 1 attempt successful?  No   Revoke  Readmitted          Morena Morley RN

## 2021-05-10 ENCOUNTER — APPOINTMENT (OUTPATIENT)
Dept: CT IMAGING | Facility: HOSPITAL | Age: 86
End: 2021-05-10

## 2021-05-10 LAB
ANION GAP SERPL CALCULATED.3IONS-SCNC: 11 MMOL/L (ref 5–15)
BUN SERPL-MCNC: 11 MG/DL (ref 8–23)
BUN/CREAT SERPL: 13.6 (ref 7–25)
CALCIUM SPEC-SCNC: 9.1 MG/DL (ref 8.6–10.5)
CHLORIDE SERPL-SCNC: 95 MMOL/L (ref 98–107)
CO2 SERPL-SCNC: 26 MMOL/L (ref 22–29)
CREAT SERPL-MCNC: 0.81 MG/DL (ref 0.57–1)
DEPRECATED RDW RBC AUTO: 51.6 FL (ref 37–54)
ERYTHROCYTE [DISTWIDTH] IN BLOOD BY AUTOMATED COUNT: 15.9 % (ref 12.3–15.4)
GFR SERPL CREATININE-BSD FRML MDRD: 68 ML/MIN/1.73
GLUCOSE SERPL-MCNC: 94 MG/DL (ref 65–99)
HCT VFR BLD AUTO: 28.5 % (ref 34–46.6)
HGB BLD-MCNC: 9.5 G/DL (ref 12–15.9)
MCH RBC QN AUTO: 30.9 PG (ref 26.6–33)
MCHC RBC AUTO-ENTMCNC: 33.5 G/DL (ref 31.5–35.7)
MCV RBC AUTO: 92.3 FL (ref 79–97)
PLATELET # BLD AUTO: 217 10*3/MM3 (ref 140–450)
PMV BLD AUTO: 9 FL (ref 6–12)
POTASSIUM SERPL-SCNC: 3.9 MMOL/L (ref 3.5–5.2)
RBC # BLD AUTO: 3.08 10*6/MM3 (ref 3.77–5.28)
SODIUM SERPL-SCNC: 132 MMOL/L (ref 136–145)
WBC # BLD AUTO: 5.9 10*3/MM3 (ref 3.4–10.8)

## 2021-05-10 PROCEDURE — 25010000002 FUROSEMIDE PER 20 MG: Performed by: INTERNAL MEDICINE

## 2021-05-10 PROCEDURE — 94799 UNLISTED PULMONARY SVC/PX: CPT

## 2021-05-10 PROCEDURE — 80048 BASIC METABOLIC PNL TOTAL CA: CPT | Performed by: INTERNAL MEDICINE

## 2021-05-10 PROCEDURE — 71250 CT THORAX DX C-: CPT

## 2021-05-10 PROCEDURE — 99232 SBSQ HOSP IP/OBS MODERATE 35: CPT | Performed by: INTERNAL MEDICINE

## 2021-05-10 PROCEDURE — 85027 COMPLETE CBC AUTOMATED: CPT | Performed by: INTERNAL MEDICINE

## 2021-05-10 PROCEDURE — 94640 AIRWAY INHALATION TREATMENT: CPT

## 2021-05-10 RX ORDER — BUDESONIDE 0.5 MG/2ML
0.5 INHALANT ORAL
Status: DISCONTINUED | OUTPATIENT
Start: 2021-05-10 | End: 2021-05-11 | Stop reason: HOSPADM

## 2021-05-10 RX ORDER — IPRATROPIUM BROMIDE AND ALBUTEROL SULFATE 2.5; .5 MG/3ML; MG/3ML
3 SOLUTION RESPIRATORY (INHALATION)
Status: DISCONTINUED | OUTPATIENT
Start: 2021-05-10 | End: 2021-05-11 | Stop reason: HOSPADM

## 2021-05-10 RX ADMIN — CLOPIDOGREL BISULFATE 75 MG: 75 TABLET ORAL at 09:27

## 2021-05-10 RX ADMIN — Medication 3 ML: at 20:42

## 2021-05-10 RX ADMIN — ASPIRIN 81 MG: 81 TABLET, COATED ORAL at 09:27

## 2021-05-10 RX ADMIN — DILTIAZEM HYDROCHLORIDE 120 MG: 120 CAPSULE, EXTENDED RELEASE ORAL at 09:27

## 2021-05-10 RX ADMIN — CEPHALEXIN 500 MG: 500 CAPSULE ORAL at 20:42

## 2021-05-10 RX ADMIN — MAGNESIUM GLUCONATE 500 MG ORAL TABLET 400 MG: 500 TABLET ORAL at 09:27

## 2021-05-10 RX ADMIN — CEPHALEXIN 500 MG: 500 CAPSULE ORAL at 09:27

## 2021-05-10 RX ADMIN — CEPHALEXIN 500 MG: 500 CAPSULE ORAL at 17:06

## 2021-05-10 RX ADMIN — HYDROCODONE BITARTRATE AND ACETAMINOPHEN 1 TABLET: 7.5; 325 TABLET ORAL at 19:22

## 2021-05-10 RX ADMIN — ATORVASTATIN CALCIUM 10 MG: 10 TABLET, FILM COATED ORAL at 20:41

## 2021-05-10 RX ADMIN — FERROUS SULFATE TAB EC 324 MG (65 MG FE EQUIVALENT) 324 MG: 324 (65 FE) TABLET DELAYED RESPONSE at 09:27

## 2021-05-10 RX ADMIN — Medication 3 ML: at 09:27

## 2021-05-10 RX ADMIN — HYDROCODONE BITARTRATE AND ACETAMINOPHEN 1 TABLET: 7.5; 325 TABLET ORAL at 12:08

## 2021-05-10 RX ADMIN — HYDROCODONE BITARTRATE AND ACETAMINOPHEN 1 TABLET: 7.5; 325 TABLET ORAL at 03:08

## 2021-05-10 RX ADMIN — FUROSEMIDE 20 MG: 10 INJECTION, SOLUTION INTRAMUSCULAR; INTRAVENOUS at 17:06

## 2021-05-10 RX ADMIN — BUDESONIDE 0.5 MG: 0.5 SUSPENSION RESPIRATORY (INHALATION) at 19:32

## 2021-05-10 RX ADMIN — IPRATROPIUM BROMIDE AND ALBUTEROL SULFATE 3 ML: 2.5; .5 SOLUTION RESPIRATORY (INHALATION) at 19:32

## 2021-05-10 RX ADMIN — FUROSEMIDE 20 MG: 10 INJECTION, SOLUTION INTRAMUSCULAR; INTRAVENOUS at 09:34

## 2021-05-10 RX ADMIN — FUROSEMIDE 20 MG: 10 INJECTION, SOLUTION INTRAMUSCULAR; INTRAVENOUS at 00:17

## 2021-05-10 RX ADMIN — PANTOPRAZOLE SODIUM 40 MG: 40 TABLET, DELAYED RELEASE ORAL at 05:36

## 2021-05-10 RX ADMIN — METOPROLOL TARTRATE 100 MG: 50 TABLET, FILM COATED ORAL at 09:26

## 2021-05-10 RX ADMIN — SPIRONOLACTONE 12.5 MG: 25 TABLET ORAL at 09:27

## 2021-05-10 RX ADMIN — ALPRAZOLAM 0.5 MG: 0.5 TABLET ORAL at 20:45

## 2021-05-11 VITALS
WEIGHT: 177.23 LBS | HEART RATE: 74 BPM | BODY MASS INDEX: 33.46 KG/M2 | DIASTOLIC BLOOD PRESSURE: 81 MMHG | HEIGHT: 61 IN | TEMPERATURE: 98.3 F | SYSTOLIC BLOOD PRESSURE: 114 MMHG | RESPIRATION RATE: 18 BRPM | OXYGEN SATURATION: 97 %

## 2021-05-11 PROBLEM — K44.9 HIATAL HERNIA: Status: ACTIVE | Noted: 2021-05-11

## 2021-05-11 PROBLEM — R79.89 POSITIVE D DIMER: Status: ACTIVE | Noted: 2021-05-11

## 2021-05-11 PROCEDURE — 25010000002 FUROSEMIDE PER 20 MG: Performed by: INTERNAL MEDICINE

## 2021-05-11 PROCEDURE — 94799 UNLISTED PULMONARY SVC/PX: CPT

## 2021-05-11 PROCEDURE — 94640 AIRWAY INHALATION TREATMENT: CPT

## 2021-05-11 PROCEDURE — 99232 SBSQ HOSP IP/OBS MODERATE 35: CPT | Performed by: INTERNAL MEDICINE

## 2021-05-11 RX ORDER — IPRATROPIUM BROMIDE AND ALBUTEROL SULFATE 2.5; .5 MG/3ML; MG/3ML
3 SOLUTION RESPIRATORY (INHALATION)
Qty: 180 ML | Refills: 1 | Status: SHIPPED | OUTPATIENT
Start: 2021-05-11

## 2021-05-11 RX ORDER — BUDESONIDE 0.5 MG/2ML
0.5 INHALANT ORAL
Qty: 60 EACH | Refills: 1 | Status: SHIPPED | OUTPATIENT
Start: 2021-05-11

## 2021-05-11 RX ADMIN — DILTIAZEM HYDROCHLORIDE 120 MG: 120 CAPSULE, EXTENDED RELEASE ORAL at 08:50

## 2021-05-11 RX ADMIN — CEPHALEXIN 500 MG: 500 CAPSULE ORAL at 08:50

## 2021-05-11 RX ADMIN — BUDESONIDE 0.5 MG: 0.5 SUSPENSION RESPIRATORY (INHALATION) at 07:10

## 2021-05-11 RX ADMIN — FUROSEMIDE 20 MG: 10 INJECTION, SOLUTION INTRAMUSCULAR; INTRAVENOUS at 08:51

## 2021-05-11 RX ADMIN — METOPROLOL TARTRATE 100 MG: 50 TABLET, FILM COATED ORAL at 08:51

## 2021-05-11 RX ADMIN — CLOPIDOGREL BISULFATE 75 MG: 75 TABLET ORAL at 08:51

## 2021-05-11 RX ADMIN — MAGNESIUM GLUCONATE 500 MG ORAL TABLET 400 MG: 500 TABLET ORAL at 08:50

## 2021-05-11 RX ADMIN — SPIRONOLACTONE 12.5 MG: 25 TABLET ORAL at 08:51

## 2021-05-11 RX ADMIN — Medication 3 ML: at 08:51

## 2021-05-11 RX ADMIN — HYDROCODONE BITARTRATE AND ACETAMINOPHEN 1 TABLET: 7.5; 325 TABLET ORAL at 04:16

## 2021-05-11 RX ADMIN — PANTOPRAZOLE SODIUM 40 MG: 40 TABLET, DELAYED RELEASE ORAL at 05:56

## 2021-05-11 RX ADMIN — IPRATROPIUM BROMIDE AND ALBUTEROL SULFATE 3 ML: 2.5; .5 SOLUTION RESPIRATORY (INHALATION) at 10:49

## 2021-05-11 RX ADMIN — FERROUS SULFATE TAB EC 324 MG (65 MG FE EQUIVALENT) 324 MG: 324 (65 FE) TABLET DELAYED RESPONSE at 08:51

## 2021-05-11 RX ADMIN — IPRATROPIUM BROMIDE AND ALBUTEROL SULFATE 3 ML: 2.5; .5 SOLUTION RESPIRATORY (INHALATION) at 07:10

## 2021-05-12 ENCOUNTER — READMISSION MANAGEMENT (OUTPATIENT)
Dept: CALL CENTER | Facility: HOSPITAL | Age: 86
End: 2021-05-12

## 2021-05-12 NOTE — OUTREACH NOTE
Prep Survey      Responses   [facility] facility patient discharged from?  [facility]   Is LACE score < 7 ?  No   Emergency Room discharge w/ pulse ox?  No   Eligibility  Readm Mgmt   Discharge diagnosis  shortness of breath   Does the patient have one of the following disease processes/diagnoses(primary or secondary)?  Other   Does the patient have Home health ordered?  Yes   What is the Home health agency?   [facility]   Is there a DME ordered?  No   Comments regarding appointments  see AVS   Prep survey completed?  Yes          Velma Morley RN

## 2021-05-13 ENCOUNTER — READMISSION MANAGEMENT (OUTPATIENT)
Dept: CALL CENTER | Facility: HOSPITAL | Age: 86
End: 2021-05-13

## 2021-05-13 NOTE — OUTREACH NOTE
Medical Week 1 Survey      Responses   [facility] patient discharged from?  [facility]   Does the patient have one of the following disease processes/diagnoses(primary or secondary)?  Other   Week 1 attempt successful?  Yes   Call start time  0928   Call end time  0929 [Daughter answered and states Mrs. Herrera is on her way out the door to a doctor's appointment]   Discharge diagnosis  shortness of breath   Week 1 call completed?  Yes   Wrap up additional comments  Mrs. Herrera was on her way to doctor's appointment and unable to come to the phone.           Eneida Baires RN

## 2021-05-21 ENCOUNTER — TRANSCRIBE ORDERS (OUTPATIENT)
Dept: LAB | Facility: HOSPITAL | Age: 86
End: 2021-05-21

## 2021-05-21 ENCOUNTER — LAB (OUTPATIENT)
Dept: LAB | Facility: HOSPITAL | Age: 86
End: 2021-05-21

## 2021-05-21 ENCOUNTER — READMISSION MANAGEMENT (OUTPATIENT)
Dept: CALL CENTER | Facility: HOSPITAL | Age: 86
End: 2021-05-21

## 2021-05-21 DIAGNOSIS — N18.2 CHRONIC KIDNEY DISEASE, STAGE II (MILD): ICD-10-CM

## 2021-05-21 DIAGNOSIS — N18.2 CHRONIC KIDNEY DISEASE, STAGE II (MILD): Primary | ICD-10-CM

## 2021-05-21 LAB
ANION GAP SERPL CALCULATED.3IONS-SCNC: 7.9 MMOL/L (ref 5–15)
BUN SERPL-MCNC: 13 MG/DL (ref 8–23)
BUN/CREAT SERPL: 14.8 (ref 7–25)
CALCIUM SPEC-SCNC: 9.3 MG/DL (ref 8.6–10.5)
CHLORIDE SERPL-SCNC: 89 MMOL/L (ref 98–107)
CO2 SERPL-SCNC: 27.1 MMOL/L (ref 22–29)
CREAT SERPL-MCNC: 0.88 MG/DL (ref 0.57–1)
DEPRECATED RDW RBC AUTO: 45 FL (ref 37–54)
ERYTHROCYTE [DISTWIDTH] IN BLOOD BY AUTOMATED COUNT: 13.4 % (ref 12.3–15.4)
GFR SERPL CREATININE-BSD FRML MDRD: 61 ML/MIN/1.73
GLUCOSE SERPL-MCNC: 93 MG/DL (ref 65–99)
HCT VFR BLD AUTO: 35 % (ref 34–46.6)
HGB BLD-MCNC: 11.7 G/DL (ref 12–15.9)
MCH RBC QN AUTO: 31.1 PG (ref 26.6–33)
MCHC RBC AUTO-ENTMCNC: 33.4 G/DL (ref 31.5–35.7)
MCV RBC AUTO: 93.1 FL (ref 79–97)
PLATELET # BLD AUTO: 248 10*3/MM3 (ref 140–450)
PMV BLD AUTO: 10.8 FL (ref 6–12)
POTASSIUM SERPL-SCNC: 5.1 MMOL/L (ref 3.5–5.2)
RBC # BLD AUTO: 3.76 10*6/MM3 (ref 3.77–5.28)
SODIUM SERPL-SCNC: 124 MMOL/L (ref 136–145)
WBC # BLD AUTO: 5.52 10*3/MM3 (ref 3.4–10.8)

## 2021-05-21 PROCEDURE — 80048 BASIC METABOLIC PNL TOTAL CA: CPT

## 2021-05-21 PROCEDURE — 36415 COLL VENOUS BLD VENIPUNCTURE: CPT

## 2021-05-21 PROCEDURE — 85027 COMPLETE CBC AUTOMATED: CPT

## 2021-05-21 RX ORDER — CLOPIDOGREL BISULFATE 75 MG/1
75 TABLET ORAL DAILY
Qty: 90 TABLET | Refills: 1 | Status: SHIPPED | OUTPATIENT
Start: 2021-05-21 | End: 2021-08-19

## 2021-05-21 NOTE — OUTREACH NOTE
Medical Week 2 Survey      Responses   [facility] patient discharged from?  [facility]   Does the patient have one of the following disease processes/diagnoses(primary or secondary)?  Other   Week 2 attempt successful?  Yes   Call start time  1612   Discharge diagnosis  shortness of breath   Call end time  1616   Medication alerts for this patient  plavix, imdur   Meds reviewed with patient/caregiver?  Yes   Is the patient having any side effects they believe may be caused by any medication additions or changes?  No   Does the patient have all medications ordered at discharge?  Yes   Is the patient taking all medications as directed (includes completed medication regime)?  Yes   Does the patient have a primary care provider?   Yes   Does the patient have an appointment with their PCP within 7 days of discharge?  Yes   Has the patient kept scheduled appointments due by today?  Yes   What is the Home health agency?   [facility]   Has home health visited the patient within 72 hours of discharge?  Yes   Psychosocial issues?  No   Did the patient receive a copy of their discharge instructions?  Yes   Nursing interventions  Reviewed instructions with patient   What is the patient's perception of their health status since discharge?  Improving   Is the patient/caregiver able to teach back signs and symptoms related to disease process for when to call PCP?  Yes   Is the patient/caregiver able to teach back signs and symptoms related to disease process for when to call 911?  Yes   Is the patient/caregiver able to teach back the hierarchy of who to call/visit for symptoms/problems? PCP, Specialist, Home health nurse, Urgent Care, ED, 911  Yes   If the patient is a current smoker, are they able to teach back resources for cessation?  Not a smoker   Additional teach back comments  She is still SOA but improving. She says HH is coming. She denies any questions or concerns.   Wrap up additional comments  Appts have been made and kept.           Sarai Leyva, RN

## 2021-06-09 ENCOUNTER — READMISSION MANAGEMENT (OUTPATIENT)
Dept: CALL CENTER | Facility: HOSPITAL | Age: 86
End: 2021-06-09

## 2021-06-09 NOTE — OUTREACH NOTE
Medical Week 4 Survey      Responses   [facility] patient discharged from?  [facility]   Does the patient have one of the following disease processes/diagnoses(primary or secondary)?  Other   Week 4 attempt successful?  Yes   Call start time  1041   Call end time  1013   Discharge diagnosis  shortness of breath   Meds reviewed with patient/caregiver?  Yes   Is the patient having any side effects they believe may be caused by any medication additions or changes?  No   Is the patient taking all medications as directed (includes completed medication regime)?  Yes   Has the patient kept scheduled appointments due by today?  Yes   Is the patient still receiving Home Health Services?  Yes   Psychosocial issues?  No   What is the patient's perception of their health status since discharge?  Improving   Is the patient/caregiver able to teach back signs and symptoms related to disease process for when to call PCP?  Yes   Is the patient/caregiver able to teach back signs and symptoms related to disease process for when to call 911?  Yes   Is the patient/caregiver able to teach back the hierarchy of who to call/visit for symptoms/problems? PCP, Specialist, Home health nurse, Urgent Care, ED, 911  Yes   If the patient is a current smoker, are they able to teach back resources for cessation?  Not a smoker   Week 4 Call Completed?  Yes   Would the patient like one additional call?  No   Graduated  Yes   Is the patient interested in additional calls from an ambulatory ?  NOTE:  applies to high risk patients requiring additional follow-up.  No   Did the patient feel the follow up calls were helpful during their recovery period?  Yes   Was the number of calls appropriate?  Yes          Margie Pedraza LPN

## 2021-07-06 RX ORDER — SPIRONOLACTONE 25 MG/1
TABLET ORAL
Qty: 45 TABLET | Refills: 3 | Status: SHIPPED | OUTPATIENT
Start: 2021-07-06 | End: 2022-04-07

## 2021-07-24 PROCEDURE — 93296 REM INTERROG EVL PM/IDS: CPT | Performed by: INTERNAL MEDICINE

## 2021-07-24 PROCEDURE — 93294 REM INTERROG EVL PM/LDLS PM: CPT | Performed by: INTERNAL MEDICINE

## 2021-08-02 RX ORDER — DILTIAZEM HYDROCHLORIDE 120 MG/1
120 CAPSULE, EXTENDED RELEASE ORAL DAILY
Qty: 90 CAPSULE | Refills: 1 | Status: SHIPPED | OUTPATIENT
Start: 2021-08-02 | End: 2022-01-26

## 2021-08-19 RX ORDER — CLOPIDOGREL BISULFATE 75 MG/1
75 TABLET ORAL DAILY
Qty: 90 TABLET | Refills: 1 | Status: SHIPPED | OUTPATIENT
Start: 2021-08-19 | End: 2022-05-13

## 2021-08-19 NOTE — TELEPHONE ENCOUNTER
Rx Refill Note  Requested Prescriptions     Pending Prescriptions Disp Refills   • clopidogrel (PLAVIX) 75 MG tablet [Pharmacy Med Name: CLOPIDOGREL 75MG TABLETS] 90 tablet 1     Sig: TAKE 1 TABLET BY MOUTH DAILY      Last office visit with prescribing clinician: 5/5/2021      Next office visit with prescribing clinician: 11/29/2021            Nusrat Pascual MA  08/19/21, 09:12 EDT

## 2021-11-02 ENCOUNTER — OFFICE (AMBULATORY)
Dept: URBAN - METROPOLITAN AREA CLINIC 64 | Facility: CLINIC | Age: 86
End: 2021-11-02

## 2021-11-02 VITALS
HEART RATE: 63 BPM | HEIGHT: 62 IN | SYSTOLIC BLOOD PRESSURE: 146 MMHG | WEIGHT: 177 LBS | DIASTOLIC BLOOD PRESSURE: 85 MMHG

## 2021-11-02 DIAGNOSIS — K74.3 PRIMARY BILIARY CIRRHOSIS: ICD-10-CM

## 2021-11-02 DIAGNOSIS — I48.20 CHRONIC ATRIAL FIBRILLATION, UNSPECIFIED: ICD-10-CM

## 2021-11-02 DIAGNOSIS — D50.0 IRON DEFICIENCY ANEMIA SECONDARY TO BLOOD LOSS (CHRONIC): ICD-10-CM

## 2021-11-02 DIAGNOSIS — K92.1 MELENA: ICD-10-CM

## 2021-11-02 DIAGNOSIS — R13.10 DYSPHAGIA, UNSPECIFIED: ICD-10-CM

## 2021-11-02 DIAGNOSIS — R94.5 ABNORMAL RESULTS OF LIVER FUNCTION STUDIES: ICD-10-CM

## 2021-11-02 PROCEDURE — 99214 OFFICE O/P EST MOD 30 MIN: CPT | Performed by: INTERNAL MEDICINE

## 2021-11-03 ENCOUNTER — TELEPHONE (OUTPATIENT)
Dept: CARDIOLOGY | Facility: CLINIC | Age: 86
End: 2021-11-03

## 2021-11-06 ENCOUNTER — HOSPITAL ENCOUNTER (EMERGENCY)
Facility: HOSPITAL | Age: 86
Discharge: HOME | End: 2021-11-06
Attending: EMERGENCY MEDICINE | Admitting: EMERGENCY MEDICINE

## 2021-11-06 ENCOUNTER — APPOINTMENT (OUTPATIENT)
Dept: CT IMAGING | Facility: HOSPITAL | Age: 86
End: 2021-11-06

## 2021-11-06 VITALS
SYSTOLIC BLOOD PRESSURE: 138 MMHG | BODY MASS INDEX: 32.62 KG/M2 | RESPIRATION RATE: 14 BRPM | WEIGHT: 177.25 LBS | HEART RATE: 65 BPM | TEMPERATURE: 98 F | OXYGEN SATURATION: 96 % | DIASTOLIC BLOOD PRESSURE: 75 MMHG | HEIGHT: 62 IN

## 2021-11-06 DIAGNOSIS — J20.9 ACUTE BRONCHITIS, UNSPECIFIED ORGANISM: Primary | ICD-10-CM

## 2021-11-06 DIAGNOSIS — R04.2 HEMOPTYSIS: ICD-10-CM

## 2021-11-06 LAB
ANION GAP SERPL CALCULATED.3IONS-SCNC: 13 MMOL/L (ref 5–15)
APTT PPP: 29 SECONDS (ref 24–31)
BASOPHILS # BLD AUTO: 0 10*3/MM3 (ref 0–0.2)
BASOPHILS NFR BLD AUTO: 0.4 % (ref 0–1.5)
BUN SERPL-MCNC: 24 MG/DL (ref 8–23)
BUN/CREAT SERPL: 25.3 (ref 7–25)
CALCIUM SPEC-SCNC: 9.5 MG/DL (ref 8.6–10.5)
CHLORIDE SERPL-SCNC: 96 MMOL/L (ref 98–107)
CO2 SERPL-SCNC: 25 MMOL/L (ref 22–29)
CREAT SERPL-MCNC: 0.95 MG/DL (ref 0.57–1)
DEPRECATED RDW RBC AUTO: 43.8 FL (ref 37–54)
EOSINOPHIL # BLD AUTO: 0.3 10*3/MM3 (ref 0–0.4)
EOSINOPHIL NFR BLD AUTO: 3.7 % (ref 0.3–6.2)
ERYTHROCYTE [DISTWIDTH] IN BLOOD BY AUTOMATED COUNT: 14.1 % (ref 12.3–15.4)
GFR SERPL CREATININE-BSD FRML MDRD: 56 ML/MIN/1.73
GLUCOSE SERPL-MCNC: 103 MG/DL (ref 65–99)
HCT VFR BLD AUTO: 39.9 % (ref 34–46.6)
HGB BLD-MCNC: 13.3 G/DL (ref 12–15.9)
HOLD SPECIMEN: NORMAL
INR PPP: 1.02 (ref 0.93–1.1)
LYMPHOCYTES # BLD AUTO: 1.7 10*3/MM3 (ref 0.7–3.1)
LYMPHOCYTES NFR BLD AUTO: 22.1 % (ref 19.6–45.3)
MCH RBC QN AUTO: 30 PG (ref 26.6–33)
MCHC RBC AUTO-ENTMCNC: 33.3 G/DL (ref 31.5–35.7)
MCV RBC AUTO: 90.4 FL (ref 79–97)
MONOCYTES # BLD AUTO: 0.8 10*3/MM3 (ref 0.1–0.9)
MONOCYTES NFR BLD AUTO: 10.7 % (ref 5–12)
NEUTROPHILS NFR BLD AUTO: 4.9 10*3/MM3 (ref 1.7–7)
NEUTROPHILS NFR BLD AUTO: 63.1 % (ref 42.7–76)
NRBC BLD AUTO-RTO: 0 /100 WBC (ref 0–0.2)
PLATELET # BLD AUTO: 172 10*3/MM3 (ref 140–450)
PMV BLD AUTO: 8.9 FL (ref 6–12)
POTASSIUM SERPL-SCNC: 4.6 MMOL/L (ref 3.5–5.2)
PROTHROMBIN TIME: 11.3 SECONDS (ref 9.6–11.7)
RBC # BLD AUTO: 4.42 10*6/MM3 (ref 3.77–5.28)
SODIUM SERPL-SCNC: 134 MMOL/L (ref 136–145)
WBC # BLD AUTO: 7.8 10*3/MM3 (ref 3.4–10.8)

## 2021-11-06 PROCEDURE — 0 IOPAMIDOL PER 1 ML: Performed by: EMERGENCY MEDICINE

## 2021-11-06 PROCEDURE — 25010000002 DIPHENHYDRAMINE PER 50 MG: Performed by: EMERGENCY MEDICINE

## 2021-11-06 PROCEDURE — 96374 THER/PROPH/DIAG INJ IV PUSH: CPT

## 2021-11-06 PROCEDURE — 80048 BASIC METABOLIC PNL TOTAL CA: CPT | Performed by: EMERGENCY MEDICINE

## 2021-11-06 PROCEDURE — 99283 EMERGENCY DEPT VISIT LOW MDM: CPT

## 2021-11-06 PROCEDURE — 85730 THROMBOPLASTIN TIME PARTIAL: CPT | Performed by: EMERGENCY MEDICINE

## 2021-11-06 PROCEDURE — 25010000002 METHYLPREDNISOLONE PER 125 MG: Performed by: EMERGENCY MEDICINE

## 2021-11-06 PROCEDURE — 85025 COMPLETE CBC W/AUTO DIFF WBC: CPT | Performed by: EMERGENCY MEDICINE

## 2021-11-06 PROCEDURE — 85610 PROTHROMBIN TIME: CPT | Performed by: EMERGENCY MEDICINE

## 2021-11-06 PROCEDURE — 71275 CT ANGIOGRAPHY CHEST: CPT

## 2021-11-06 PROCEDURE — 96375 TX/PRO/DX INJ NEW DRUG ADDON: CPT

## 2021-11-06 RX ORDER — SODIUM CHLORIDE 0.9 % (FLUSH) 0.9 %
10 SYRINGE (ML) INJECTION AS NEEDED
Status: DISCONTINUED | OUTPATIENT
Start: 2021-11-06 | End: 2021-11-06 | Stop reason: HOSPADM

## 2021-11-06 RX ORDER — HYDROCODONE BITARTRATE AND ACETAMINOPHEN 7.5; 325 MG/1; MG/1
1 TABLET ORAL ONCE
Status: COMPLETED | OUTPATIENT
Start: 2021-11-06 | End: 2021-11-06

## 2021-11-06 RX ORDER — METHYLPREDNISOLONE SODIUM SUCCINATE 125 MG/2ML
125 INJECTION, POWDER, LYOPHILIZED, FOR SOLUTION INTRAMUSCULAR; INTRAVENOUS ONCE
Status: COMPLETED | OUTPATIENT
Start: 2021-11-06 | End: 2021-11-06

## 2021-11-06 RX ORDER — AZITHROMYCIN 250 MG/1
TABLET, FILM COATED ORAL
Qty: 6 TABLET | Refills: 0 | Status: SHIPPED | OUTPATIENT
Start: 2021-11-06

## 2021-11-06 RX ORDER — METHYLPREDNISOLONE 4 MG/1
TABLET ORAL
Qty: 21 TABLET | Refills: 0 | Status: SHIPPED | OUTPATIENT
Start: 2021-11-06

## 2021-11-06 RX ORDER — DIPHENHYDRAMINE HYDROCHLORIDE 50 MG/ML
25 INJECTION INTRAMUSCULAR; INTRAVENOUS ONCE
Status: COMPLETED | OUTPATIENT
Start: 2021-11-06 | End: 2021-11-06

## 2021-11-06 RX ADMIN — HYDROCODONE BITARTRATE AND ACETAMINOPHEN 1 TABLET: 7.5; 325 TABLET ORAL at 20:10

## 2021-11-06 RX ADMIN — IOPAMIDOL 100 ML: 755 INJECTION, SOLUTION INTRAVENOUS at 19:35

## 2021-11-06 RX ADMIN — DIPHENHYDRAMINE HYDROCHLORIDE 25 MG: 50 INJECTION, SOLUTION INTRAMUSCULAR; INTRAVENOUS at 17:51

## 2021-11-06 RX ADMIN — METHYLPREDNISOLONE SODIUM SUCCINATE 125 MG: 125 INJECTION, POWDER, FOR SOLUTION INTRAMUSCULAR; INTRAVENOUS at 17:51

## 2021-11-06 NOTE — ED PROVIDER NOTES
Subjective   Chief complaint: Coughing up blood    86-year-old female presents with hemoptysis.  Patient states this started 2 or 3 hours ago.  She states she has a chronic cough that has been present for the past 2 or 3 years.  She states the cough was unchanged until 2 to 3 hours ago when she started coughing up blood.  She states there is no mucus associated with this.  She denies any chest pain or shortness of breath.  She has had no fever.  She denies any alleviating or exacerbating factors.  No known sick contacts.  Patient is on aspirin and Plavix.      History provided by:  Patient      Review of Systems   Constitutional: Negative for fever.   HENT: Negative for congestion and sore throat.    Eyes: Negative for redness.   Respiratory: Positive for cough. Negative for shortness of breath.    Cardiovascular: Negative for chest pain.   Gastrointestinal: Negative for abdominal pain, diarrhea and vomiting.   Genitourinary: Negative for dysuria.   Musculoskeletal: Negative for back pain.   Skin: Negative for rash.   Neurological: Negative for dizziness and headaches.   Psychiatric/Behavioral: Negative for confusion.       Past Medical History:   Diagnosis Date   • Anxiety    • Arthritis    • Atrial fibrillation (CMS/HCC)    • Coronary artery disease    • Dyslipidemia    • GERD (gastroesophageal reflux disease)    • History of bone density study 04/2015   • Hypertension    • Sleep apnea    • Wears dentures        Allergies   Allergen Reactions   • Doxycycline Other (See Comments)     Chest pain   • Celebrex [Celecoxib] Other (See Comments)     Chest pain   • Contrast Dye Itching   • Iodinated Diagnostic Agents Itching   • Nsaids Other (See Comments)     convulsion   • Other Itching     Pt states some steroids make her itch and hallucinate- she does not know the names   She said she is allergic to all arthritis medicine   • Sulfa Antibiotics Nausea Only       Past Surgical History:   Procedure Laterality Date   •  BLADDER REPAIR  1990   • BREAST LUMPECTOMY  1974    BENIGN   • CARDIAC CATHETERIZATION  05/25/2011   • CARDIAC CATHETERIZATION N/A 4/21/2021    Procedure: Left Heart Cath and coronary angiogram;  Surgeon: Ivan Martell MD;  Location: [facility] CATH INVASIVE LOCATION;  Service: Cardiovascular;  Laterality: N/A;   • CARDIAC CATHETERIZATION N/A 4/21/2021    Procedure: Left ventriculography;  Surgeon: Ivan Martell MD;  Location: [facility] CATH INVASIVE LOCATION;  Service: Cardiovascular;  Laterality: N/A;   • CARDIAC CATHETERIZATION  4/21/2021    Procedure: Saphenous Vein Graft;  Surgeon: Ivan Martell MD;  Location: [facility] CATH INVASIVE LOCATION;  Service: Cardiovascular;;   • CARDIAC CATHETERIZATION N/A 4/26/2021    Procedure: Percutaneous Coronary Intervention;  Surgeon: Ivan Martell MD;  Location: [facility] CATH INVASIVE LOCATION;  Service: Cardiovascular;  Laterality: N/A;   • CARDIAC CATHETERIZATION N/A 4/26/2021    Procedure: Stent RAY coronary;  Surgeon: Ivan Martell MD;  Location: [facility] CATH INVASIVE LOCATION;  Service: Cardiovascular;  Laterality: N/A;   • CARDIOVASCULAR STRESS TEST  05/04/2015   • CHOLECYSTECTOMY  1990   • CORONARY ARTERY BYPASS GRAFT  08/09/2017    X5  Dr Brandt   • ENDOSCOPY N/A 6/30/2020    Procedure: ESOPHAGOGASTRODUODENOSCOPY with biopsy x 1 area and dilatation (15-18mm balloon) up to 18mm;  Surgeon: Quinton Tapia MD;  Location: [facility] ENDOSCOPY;  Service: Gastroenterology;  Laterality: N/A;  post op: gastritis, large hiatal hernia, esophageal dysmotility   • HYSTERECTOMY  1990   • JOINT REPLACEMENT Right     knee    • OTHER SURGICAL HISTORY  06/2017    BLOOD CLOT REMOVED FROM LEFT EAR   • PACEMAKER IMPLANTATION  04/18/2016    DUAL CHAMBER ST [facility]ESSIO       Family History   Problem Relation Age of Onset   • Hypertension Mother    • Heart disease Sister         PACEMAKER       Social History     Socioeconomic History   • Marital status:    Tobacco Use   • Smoking status: Never Smoker  "  • Smokeless tobacco: Never Used   Vaping Use   • Vaping Use: Never used   Substance and Sexual Activity   • Alcohol use: No   • Drug use: No   • Sexual activity: Defer       /83 (BP Location: Left arm, Patient Position: Sitting)   Pulse 70   Temp 98.1 °F (36.7 °C) (Oral)   Resp 15   Ht 157.5 cm (62\")   Wt 80.4 kg (177 lb 4 oz)   SpO2 98%   BMI 32.42 kg/m²       Objective   Physical Exam  Vitals and nursing note reviewed.   Constitutional:       Appearance: Normal appearance. She is well-developed.   HENT:      Head: Normocephalic and atraumatic.   Eyes:      Pupils: Pupils are equal, round, and reactive to light.   Cardiovascular:      Rate and Rhythm: Normal rate and regular rhythm.      Heart sounds: Normal heart sounds.   Pulmonary:      Effort: Pulmonary effort is normal. No respiratory distress.      Breath sounds: Normal breath sounds.   Abdominal:      General: Bowel sounds are normal.      Palpations: Abdomen is soft.      Tenderness: There is no abdominal tenderness.   Musculoskeletal:         General: Normal range of motion.      Cervical back: Normal range of motion and neck supple.   Skin:     General: Skin is warm and dry.   Neurological:      General: No focal deficit present.      Mental Status: She is alert and oriented to person, place, and time.         Procedures           ED Course      Results for orders placed or performed during the hospital encounter of 11/06/21   Basic Metabolic Panel    Specimen: Blood   Result Value Ref Range    Glucose 103 (H) 65 - 99 mg/dL    BUN 24 (H) 8 - 23 mg/dL    Creatinine 0.95 0.57 - 1.00 mg/dL    Sodium 134 (L) 136 - 145 mmol/L    Potassium 4.6 3.5 - 5.2 mmol/L    Chloride 96 (L) 98 - 107 mmol/L    CO2 25.0 22.0 - 29.0 mmol/L    Calcium 9.5 8.6 - 10.5 mg/dL    eGFR Non African Amer 56 (L) >60 mL/min/1.73    BUN/Creatinine Ratio 25.3 (H) 7.0 - 25.0    Anion Gap 13.0 5.0 - 15.0 mmol/L   Protime-INR    Specimen: Blood   Result Value Ref Range    " Protime 11.3 9.6 - 11.7 Seconds    INR 1.02 0.93 - 1.10   aPTT    Specimen: Blood   Result Value Ref Range    PTT 29.0 24.0 - 31.0 seconds   CBC Auto Differential    Specimen: Blood   Result Value Ref Range    WBC 7.80 3.40 - 10.80 10*3/mm3    RBC 4.42 3.77 - 5.28 10*6/mm3    Hemoglobin 13.3 12.0 - 15.9 g/dL    Hematocrit 39.9 34.0 - 46.6 %    MCV 90.4 79.0 - 97.0 fL    MCH 30.0 26.6 - 33.0 pg    MCHC 33.3 31.5 - 35.7 g/dL    RDW 14.1 12.3 - 15.4 %    RDW-SD 43.8 37.0 - 54.0 fl    MPV 8.9 6.0 - 12.0 fL    Platelets 172 140 - 450 10*3/mm3    Neutrophil % 63.1 42.7 - 76.0 %    Lymphocyte % 22.1 19.6 - 45.3 %    Monocyte % 10.7 5.0 - 12.0 %    Eosinophil % 3.7 0.3 - 6.2 %    Basophil % 0.4 0.0 - 1.5 %    Neutrophils, Absolute 4.90 1.70 - 7.00 10*3/mm3    Lymphocytes, Absolute 1.70 0.70 - 3.10 10*3/mm3    Monocytes, Absolute 0.80 0.10 - 0.90 10*3/mm3    Eosinophils, Absolute 0.30 0.00 - 0.40 10*3/mm3    Basophils, Absolute 0.00 0.00 - 0.20 10*3/mm3    nRBC 0.0 0.0 - 0.2 /100 WBC   Gold Top - SST   Result Value Ref Range    Extra Tube Hold for add-ons.      CT Chest Pulmonary Embolism    Result Date: 11/6/2021  1. No evidence of pulmonary embolism. 2. Central bronchial wall thickening likely related to bronchitis or reactive airway disease. 3. Areas of peripheral interlobular septal thickening and reticulation likely related to early interstitial lung disease and/or interstitial edema. This appears increased from 5/10/2021 suggesting superimposed edema.    Electronically Signed By-Efren Craig MD On:11/6/2021 7:54 PM This report was finalized on [id number] by  Efren Craig MD.                                         MDM   Patient had the above evaluation.  Results were discussed with the patient.  White blood cell count was normal.  Labs are fairly unremarkable.  CT PE protocol was obtained which showed no PE.  It did show evidence of bronchitis.  This is consistent with patient's symptoms.  Patient will be  discharged with prescriptions for Medrol Dosepak and azithromycin.  She is to follow-up with her primary doctor.      Final diagnoses:   Acute bronchitis, unspecified organism   Hemoptysis       ED Disposition  ED Disposition     ED Disposition Condition Comment    Discharge Stable           Nava Yu MD  [address]  [place] [address] [phone]    Call in 2 days           Medication List      New Prescriptions    azithromycin 250 MG tablet  Commonly known as: ZITHROMAX  Take 2 tablets the first day, then 1 tablet daily for 4 days.     methylPREDNISolone 4 MG dose pack  Commonly known as: MEDROL  Take as directed on package instructions.           Where to Get Your Medications      These medications were sent to [id number] - [place], [place] - [address] AT SEC OF [place] [address] & [address] - [phone]  - [phone]   [address] [place] [address]    Phone: [phone]   · azithromycin 250 MG tablet  · methylPREDNISolone 4 MG dose pack          Raymon Rushing MD  11/06/21 2008

## 2021-11-29 ENCOUNTER — OFFICE VISIT (OUTPATIENT)
Dept: CARDIOLOGY | Facility: CLINIC | Age: 86
End: 2021-11-29

## 2021-11-29 ENCOUNTER — CLINICAL SUPPORT NO REQUIREMENTS (OUTPATIENT)
Dept: CARDIOLOGY | Facility: CLINIC | Age: 86
End: 2021-11-29

## 2021-11-29 VITALS
SYSTOLIC BLOOD PRESSURE: 149 MMHG | BODY MASS INDEX: 32.02 KG/M2 | HEIGHT: 62 IN | WEIGHT: 174 LBS | HEART RATE: 74 BPM | OXYGEN SATURATION: 97 % | DIASTOLIC BLOOD PRESSURE: 81 MMHG

## 2021-11-29 DIAGNOSIS — R00.1 BRADYCARDIA, SINUS: ICD-10-CM

## 2021-11-29 DIAGNOSIS — I25.810 CORONARY ARTERY DISEASE INVOLVING CORONARY BYPASS GRAFT OF NATIVE HEART WITHOUT ANGINA PECTORIS: ICD-10-CM

## 2021-11-29 DIAGNOSIS — E78.00 PURE HYPERCHOLESTEROLEMIA: ICD-10-CM

## 2021-11-29 DIAGNOSIS — Z95.0 PRESENCE OF CARDIAC PACEMAKER: Primary | ICD-10-CM

## 2021-11-29 DIAGNOSIS — I10 ESSENTIAL HYPERTENSION: ICD-10-CM

## 2021-11-29 PROCEDURE — 99214 OFFICE O/P EST MOD 30 MIN: CPT | Performed by: INTERNAL MEDICINE

## 2021-11-29 PROCEDURE — 93280 PM DEVICE PROGR EVAL DUAL: CPT | Performed by: INTERNAL MEDICINE

## 2021-11-29 RX ORDER — NITROFURANTOIN MACROCRYSTALS 50 MG/1
50 CAPSULE ORAL 4 TIMES DAILY
COMMUNITY

## 2021-11-29 NOTE — PROGRESS NOTES
Subjective:     Encounter Date:11/29/2021      Patient ID: Syl Herrera is a 86 y.o. female.    Chief Complaint:  History of Present Illness 86-year-old white female with history of coronary disease hypertension hyperlipidemia and history of pacemaker placement for sinus bradycardia presents to office for follow-up.  Patient is currently stable without any symptoms of chest pain but has some shortness of breath with exertion.  No complaints any PND orthopnea.  No palpitation dizziness seen.  Patient has some swelling of the feet.  She is taking her medicines regularly.  She does not smoke.    The following portions of the patient's history were reviewed and updated as appropriate: allergies, current medications, past family history, past medical history, past social history, past surgical history and problem list.  Past Medical History:   Diagnosis Date   • Anxiety    • Arthritis    • Atrial fibrillation (HCC)    • Coronary artery disease    • Dyslipidemia    • GERD (gastroesophageal reflux disease)    • History of bone density study 04/2015   • Hypertension    • Sleep apnea    • Wears dentures      Past Surgical History:   Procedure Laterality Date   • BLADDER REPAIR  1990   • BREAST LUMPECTOMY  1974    BENIGN   • CARDIAC CATHETERIZATION  05/25/2011   • CARDIAC CATHETERIZATION N/A 4/21/2021    Procedure: Left Heart Cath and coronary angiogram;  Surgeon: Ivan Martell MD;  Location: [facility] CATH INVASIVE LOCATION;  Service: Cardiovascular;  Laterality: N/A;   • CARDIAC CATHETERIZATION N/A 4/21/2021    Procedure: Left ventriculography;  Surgeon: Ivan Martell MD;  Location: [facility] CATH INVASIVE LOCATION;  Service: Cardiovascular;  Laterality: N/A;   • CARDIAC CATHETERIZATION  4/21/2021    Procedure: Saphenous Vein Graft;  Surgeon: Ivan Martell MD;  Location: [facility] CATH INVASIVE LOCATION;  Service: Cardiovascular;;   • CARDIAC CATHETERIZATION N/A 4/26/2021    Procedure: Percutaneous Coronary Intervention;   "Surgeon: Ivan Martell MD;  Location: [facility] CATH INVASIVE LOCATION;  Service: Cardiovascular;  Laterality: N/A;   • CARDIAC CATHETERIZATION N/A 4/26/2021    Procedure: Stent RAY coronary;  Surgeon: Ivan Martell MD;  Location: [facility] CATH INVASIVE LOCATION;  Service: Cardiovascular;  Laterality: N/A;   • CARDIOVASCULAR STRESS TEST  05/04/2015   • CHOLECYSTECTOMY  1990   • CORONARY ARTERY BYPASS GRAFT  08/09/2017    X5  Dr Brandt   • ENDOSCOPY N/A 6/30/2020    Procedure: ESOPHAGOGASTRODUODENOSCOPY with biopsy x 1 area and dilatation (15-18mm balloon) up to 18mm;  Surgeon: Quinton Tapia MD;  Location: [facility] ENDOSCOPY;  Service: Gastroenterology;  Laterality: N/A;  post op: gastritis, large hiatal hernia, esophageal dysmotility   • HYSTERECTOMY  1990   • JOINT REPLACEMENT Right     knee    • OTHER SURGICAL HISTORY  06/2017    BLOOD CLOT REMOVED FROM LEFT EAR   • PACEMAKER IMPLANTATION  04/18/2016    DUAL CHAMBER [facility]     /81 (BP Location: Right arm, Patient Position: Sitting, Cuff Size: Adult)   Pulse 74   Ht 157.5 cm (62\")   Wt 78.9 kg (174 lb)   SpO2 97%   BMI 31.83 kg/m²   Family History   Problem Relation Age of Onset   • Hypertension Mother    • Heart disease Sister         PACEMAKER       Current Outpatient Medications:   •  ALPRAZolam (XANAX) 0.5 MG tablet, Take 0.5 mg by mouth 2 (Two) Times a Day As Needed., Disp: , Rfl: 5  •  aspirin (aspirin) 81 MG EC tablet, Take 1 tablet by mouth 3 (Three) Times a Week., Disp: 12 tablet, Rfl: 6  •  atorvastatin (LIPITOR) 10 MG tablet, Take 10 mg by mouth Every Night., Disp: , Rfl:   •  azithromycin (ZITHROMAX) 250 MG tablet, Take 2 tablets the first day, then 1 tablet daily for 4 days., Disp: 6 tablet, Rfl: 0  •  budesonide (PULMICORT) 0.5 MG/2ML nebulizer solution, Take 2 mL by nebulization 2 (Two) Times a Day., Disp: 60 each, Rfl: 1  •  carboxymethylcellulose (REFRESH PLUS) 0.5 % solution, Administer 1 drop to both eyes 3 (Three) Times a Day As Needed " for Dry Eyes., Disp: , Rfl:   •  clopidogrel (PLAVIX) 75 MG tablet, TAKE 1 TABLET BY MOUTH DAILY, Disp: 90 tablet, Rfl: 1  •  Diclofenac Sodium (VOLTAREN) 1 % gel gel, Apply 4 g topically to the appropriate area as directed 4 (Four) Times a Day As Needed., Disp: , Rfl:   •  dilTIAZem (TIAZAC) 120 MG 24 hr capsule, TAKE 1 CAPSULE BY MOUTH DAILY, Disp: 90 capsule, Rfl: 1  •  diphenhydrAMINE HCl (BENADRYL ALLERGY PO), Take 1 tablet by mouth As Needed., Disp: , Rfl:   •  ferrous sulfate 324 (65 Fe) MG tablet delayed-release EC tablet, Take 1 tablet by mouth Daily With Breakfast., Disp: 30 tablet, Rfl: 0  •  HYDROcodone-acetaminophen (NORCO) 7.5-325 MG per tablet, Take 1 tablet by mouth Every 8 (Eight) Hours As Needed., Disp: , Rfl: 0  •  ipratropium-albuterol (DUO-NEB) 0.5-2.5 mg/3 ml nebulizer, Take 3 mL by nebulization 4 (Four) Times a Day., Disp: 180 mL, Rfl: 1  •  magnesium oxide (MAG-OX) 400 MG tablet, Take 400 mg by mouth Daily., Disp: , Rfl:   •  methylPREDNISolone (MEDROL) 4 MG dose pack, Take as directed on package instructions., Disp: 21 tablet, Rfl: 0  •  metoprolol tartrate (LOPRESSOR) 100 MG tablet, TAKE 1 TABLET BY MOUTH TWICE DAILY, Disp: 180 tablet, Rfl: 1  •  Multiple Vitamins-Minerals (VITEYES AREDS FORMULA) capsule, Take 1 capsule by mouth 2 (two) times a day., Disp: , Rfl:   •  nitrofurantoin (MACRODANTIN) 50 MG capsule, Take 50 mg by mouth 4 (Four) Times a Day., Disp: , Rfl:   •  ondansetron (ZOFRAN) 4 MG tablet, Take 4 mg by mouth Daily As Needed for Nausea or Vomiting., Disp: , Rfl:   •  pantoprazole (PROTONIX) 40 MG EC tablet, TK 1 T PO QD, Disp: , Rfl: 5  •  spironolactone (ALDACTONE) 25 MG tablet, TAKE 1/2 TABLET BY MOUTH DAILY, Disp: 45 tablet, Rfl: 3  Allergies   Allergen Reactions   • Doxycycline Other (See Comments)     Chest pain   • Celebrex [Celecoxib] Other (See Comments)     Chest pain   • Contrast Dye Itching   • Iodinated Diagnostic Agents Itching   • Nsaids Other (See Comments)      convulsion   • Other Itching     Pt states some steroids make her itch and hallucinate- she does not know the names   She said she is allergic to all arthritis medicine   • Sulfa Antibiotics Nausea Only     Social History     Socioeconomic History   • Marital status:    Tobacco Use   • Smoking status: Never Smoker   • Smokeless tobacco: Never Used   Vaping Use   • Vaping Use: Never used   Substance and Sexual Activity   • Alcohol use: No   • Drug use: No   • Sexual activity: Defer     Review of Systems   Constitutional: Positive for malaise/fatigue. Negative for fever.   Cardiovascular: Positive for leg swelling. Negative for chest pain, dyspnea on exertion and palpitations.   Respiratory: Positive for shortness of breath. Negative for cough.    Skin: Negative for rash.   Gastrointestinal: Positive for nausea. Negative for abdominal pain and vomiting.   Neurological: Negative for focal weakness and headaches.   All other systems reviewed and are negative.             Objective:     Constitutional:       Appearance: Well-developed.   Eyes:      General: No scleral icterus.     Conjunctiva/sclera: Conjunctivae normal.   HENT:      Head: Normocephalic and atraumatic.   Neck:      Vascular: No carotid bruit or JVD.   Pulmonary:      Effort: Pulmonary effort is normal.      Breath sounds: Normal breath sounds. No wheezing. No rales.   Cardiovascular:      Normal rate. Regular rhythm.   Pulses:     Intact distal pulses.   Abdominal:      General: Bowel sounds are normal.      Palpations: Abdomen is soft.   Musculoskeletal:      Cervical back: Normal range of motion and neck supple. Skin:     General: Skin is warm and dry.      Findings: No rash.   Neurological:      Mental Status: Alert.       Procedures    Lab Review:         MDM  1.  Coronary disease  Patient has nonobstructive disease with normally systolic function is currently stable on medications  2.  Hypertension  Patient blood pressure currently stable on  metoprolol  3.  Hyperlipidemia  Patient is currently on statins and the lipid levels are followed by the primary care doctor  4.  History of pacemaker placement  Patient's pacemaker is working very well.      Patient's previous medical records, labs, and EKG were reviewed and discussed with the patient at today's visit.

## 2021-12-01 ENCOUNTER — TELEPHONE (OUTPATIENT)
Dept: CARDIOLOGY | Facility: CLINIC | Age: 86
End: 2021-12-01

## 2021-12-01 NOTE — TELEPHONE ENCOUNTER
CARDIAC CLEARANCE  EGD/COLONOSCOPY  SCHEDULED: 01/14/22  LOV: 11/29/21  SANAZ ESTRADA MD  [phone]

## 2022-01-26 RX ORDER — DILTIAZEM HYDROCHLORIDE 120 MG/1
120 CAPSULE, EXTENDED RELEASE ORAL DAILY
Qty: 90 CAPSULE | Refills: 1 | Status: SHIPPED | OUTPATIENT
Start: 2022-01-26 | End: 2022-07-25

## 2022-01-26 NOTE — TELEPHONE ENCOUNTER
Rx Refill Note  Requested Prescriptions     Pending Prescriptions Disp Refills   • dilTIAZem (TIAZAC) 120 MG 24 hr capsule [Pharmacy Med Name: DILTIAZEM ER 120MG CAPSULES (24 HR)] 90 capsule 1     Sig: TAKE 1 CAPSULE BY MOUTH DAILY      Last office visit with prescribing clinician: 11/29/2021      Next office visit with prescribing clinician: 6/16/2022            Rhoda Sinclair MA  01/26/22, 14:56 EST

## 2022-03-24 ENCOUNTER — HOSPITAL ENCOUNTER (OUTPATIENT)
Dept: CT IMAGING | Facility: HOSPITAL | Age: 87
Discharge: HOME | End: 2022-03-24
Admitting: FAMILY MEDICINE

## 2022-03-24 ENCOUNTER — TRANSCRIBE ORDERS (OUTPATIENT)
Dept: ADMINISTRATIVE | Facility: HOSPITAL | Age: 87
End: 2022-03-24

## 2022-03-24 DIAGNOSIS — R06.02 SOB (SHORTNESS OF BREATH): ICD-10-CM

## 2022-03-24 DIAGNOSIS — R06.02 SOB (SHORTNESS OF BREATH): Primary | ICD-10-CM

## 2022-03-24 PROCEDURE — 71250 CT THORAX DX C-: CPT

## 2022-04-07 RX ORDER — SPIRONOLACTONE 25 MG/1
TABLET ORAL
Qty: 45 TABLET | Refills: 3 | Status: SHIPPED | OUTPATIENT
Start: 2022-04-07

## 2022-04-07 NOTE — TELEPHONE ENCOUNTER
Rx Refill Note  Requested Prescriptions     Pending Prescriptions Disp Refills   • spironolactone (ALDACTONE) 25 MG tablet [Pharmacy Med Name: SPIRONOLACTONE 25MG TABLETS] 45 tablet 3     Sig: TAKE 1/2 TABLET BY MOUTH DAILY      Last office visit with prescribing clinician: 11/29/2021      Next office visit with prescribing clinician: 6/16/2022            Rhoda Sinclair MA  04/07/22, 08:29 EDT

## 2022-04-25 RX ORDER — METOPROLOL TARTRATE 100 MG/1
TABLET ORAL
Qty: 180 TABLET | Refills: 1 | Status: SHIPPED | OUTPATIENT
Start: 2022-04-25 | End: 2022-10-20

## 2022-04-25 NOTE — TELEPHONE ENCOUNTER
Rx Refill Note  Requested Prescriptions     Pending Prescriptions Disp Refills   • metoprolol tartrate (LOPRESSOR) 100 MG tablet [Pharmacy Med Name: METOPROLOL TARTRATE 100MG TABLETS] 180 tablet 1     Sig: TAKE 1 TABLET BY MOUTH TWICE DAILY      Last office visit with prescribing clinician: 11/29/2021      Next office visit with prescribing clinician: 6/20/2022            Nusrat Pascual MA  04/25/22, 07:46 EDT

## 2022-05-04 ENCOUNTER — OFFICE (AMBULATORY)
Dept: URBAN - METROPOLITAN AREA CLINIC 64 | Facility: CLINIC | Age: 87
End: 2022-05-04

## 2022-05-04 VITALS
HEART RATE: 63 BPM | SYSTOLIC BLOOD PRESSURE: 127 MMHG | WEIGHT: 176 LBS | HEIGHT: 62 IN | DIASTOLIC BLOOD PRESSURE: 75 MMHG

## 2022-05-04 DIAGNOSIS — D50.9 IRON DEFICIENCY ANEMIA, UNSPECIFIED: ICD-10-CM

## 2022-05-04 DIAGNOSIS — R94.5 ABNORMAL RESULTS OF LIVER FUNCTION STUDIES: ICD-10-CM

## 2022-05-04 DIAGNOSIS — R13.10 DYSPHAGIA, UNSPECIFIED: ICD-10-CM

## 2022-05-04 PROCEDURE — 99214 OFFICE O/P EST MOD 30 MIN: CPT | Performed by: INTERNAL MEDICINE

## 2022-05-05 ENCOUNTER — TELEPHONE (OUTPATIENT)
Dept: CARDIOLOGY | Facility: CLINIC | Age: 87
End: 2022-05-05

## 2022-05-05 NOTE — TELEPHONE ENCOUNTER
SANAZ ESTRADA MD  [facility]  EGD/COLONOSCOPY  SCHEDULED FOR 7/11/2022  HOLD PLAVIX FOR 5 DAYS  PHONE # [phone]  FAX # [phone]

## 2022-05-13 RX ORDER — CLOPIDOGREL BISULFATE 75 MG/1
75 TABLET ORAL DAILY
Qty: 90 TABLET | Refills: 1 | Status: SHIPPED | OUTPATIENT
Start: 2022-05-13 | End: 2022-11-07

## 2022-05-13 NOTE — TELEPHONE ENCOUNTER
Rx Refill Note  Requested Prescriptions     Pending Prescriptions Disp Refills   • clopidogrel (PLAVIX) 75 MG tablet [Pharmacy Med Name: CLOPIDOGREL 75MG TABLETS] 90 tablet 1     Sig: TAKE 1 TABLET BY MOUTH DAILY      Last office visit with prescribing clinician: 11/29/2021      Next office visit with prescribing clinician: 6/20/2022            Nusrat Pascual MA  05/13/22, 08:28 EDT

## 2022-05-23 NOTE — DISCHARGE SUMMARY
Date of Discharge:  5/1/2021    Discharge Diagnosis:     Atherosclerotic heart disease of native coronary arteries with angina pectoris  Status post elective coronary catheterization with percutaneous coronary intervention with stent placement 4/26/2021  Status post elective coronary catheterization 4/21/2021 with identification of 3 of 4 coronary artery bypass grafts occluded  Bilateral groin hematomas  Paroxysmal atrial fibrillation  Acute E. coli urinary tract infection  History of recurrent urinary tract infections  Chronic kidney disease stage II  Acute renal failure  Hypocalcemia  Hyperlipidemia  Hypertension associated chronic kidney disease stage II  History of previous coronary artery bypass grafting  Acute normocytic anemia  History of DEE  Gastroesophageal reflux disease with esophagitis  History of hiatal hernia  secondary hyperaldosteronism  Seasonal allergic rhinitis  Osteoarthritis        Presenting Problem/History of Present Illness  Active Hospital Problems    Diagnosis  POA   • **Chest pain [R07.9]  Yes   • CKD (chronic kidney disease) stage 3, GFR 30-59 ml/min (CMS/HCC) [N18.30]  Yes   • GERD without esophagitis [K21.9]  Yes   • Obesity (BMI 30-39.9) [E66.9]  Yes   • Chronic pain [G89.29]  Yes   • Angina at rest (CMS/HCC) [I20.8]  Yes   • Abnormal nuclear stress test [R94.39]  Unknown   • Hypertension [I10]  Yes   • Atrial fibrillation (CMS/HCC) [I48.91] [I48.91]  Yes   • Coronary artery disease [I25.10]  Yes   • Dyslipidemia [E78.5]  Yes      Resolved Hospital Problems   No resolved problems to display.          Hospital Course  Patient is a 85 y.o. female who presented with substernal chest pain with unstable angina.  She was admitted and evaluated by cardiology and went for elective coronary catheterization which feels severe three-vessel coronary disease.  She was referred for repeat coronary artery bypass surgery but was felt not to be an appropriate candidate.  She went to the coronary  catheterization lab and had stent placement of the right coronary artery in August to the circumflex artery with drug-eluting stents.  She was found afterwards to have a large hematoma which did require some fluid resuscitation.  Vascular surgery was consulted with no evidence of pseudoaneurysm formation.  She was thought to be a good candidate to remain on aspirin therapy alone as she has had a left atrial appendage ligation.  She was seen in consultation by urology secondary to family concerns about multiple urinary tract infections and was found to have an E. coli species which was pansensitive.  Enteral antimicrobial therapy was initiated.  She was also seen by nephrology secondary to nonoliguric renal failure with underlying chronic kidney disease stage II.  Electrolytes were managed specifically hypocalcemia and medications were modified and she improved overall.  She was also seen by hematology secondary to acute normocytic anemia which was thought likely secondary to the hematoma.  Venofer was initiated.  She improved overall and was deemed stable for discharge home today for close outpatient follow-up with hematology and urology and cardiology.  She will also follow-up with us within 1 to 2 weeks time.  There were no other complications throughout the patient's hospital stay    Procedures Performed    Procedure(s):  Percutaneous Coronary Intervention  Stent RAY coronary  -------------------       Consults:   Consults     Date and Time Order Name Status Description    4/30/2021  8:13 AM Inpatient Nephrology Consult Completed     4/30/2021  8:04 AM Inpatient Urology Consult Completed     4/30/2021  7:59 AM Hematology & Oncology Inpatient Consult Completed     4/26/2021 10:03 PM Inpatient Vascular Surgery Consult Completed     4/24/2021  7:45 PM Inpatient Cardiology Consult Completed     4/24/2021  5:27 PM Hospitalist (on-call MD unless specified) Completed     4/21/2021  1:39 PM Inpatient Cardiothoracic  Surgery Consult Completed     4/19/2021  4:35 PM Inpatient Cardiology Consult Completed     4/18/2021  5:32 PM Hospitalist (on-call MD unless specified) Completed           Pertinent Test Results:XR Chest 1 View    Result Date: 4/24/2021  There is no significant change when compared to the prior study. There is no evidence for acute cardiopulmonary process.  Electronically Signed By-Nathan Mckinney MD On:4/24/2021 4:41 PM This report was finalized on [id number] by  Nathan Mckinney MD.    US Renal Bilateral    Result Date: 4/30/2021   1. Features suggestive of chronic medical renal disease with cortical thinning and elevated cortical echotexture. 2. No hydronephrosis. 3. Single bilateral renal cysts.  Electronically Signed By-Lauren Fatima MD On:4/30/2021 2:50 PM This report was finalized on [id number] by  Lauren Fatima MD.      Imaging Results (Last 7 Days)     Procedure Component Value Units Date/Time    US Renal Bilateral [[id number]] Collected: 04/30/21 1449     Updated: 04/30/21 1452    Narrative:      DATE OF EXAM:  4/30/2021 1:56 PM     PROCEDURE:  US RENAL BILATERAL-     INDICATIONS:  CKD; R07.9-Chest pain, unspecified; R06.00-Dyspnea, unspecified;  I20.8-Other forms of angina pectoris; R94.39-Abnormal result of other  cardiovascular function study     COMPARISON:  CT abdomen and pelvis without contrast 8/15/2019     TECHNIQUE:   Grayscale and color Doppler ultrasound evaluation of the kidneys and  urinary bladder was performed.        FINDINGS:  The right kidney measures 8.6 x 4.4 x 4.3 cm. The left kidney measures  8.8 x 4.6 x 4.6 cm. A cystic the right lower renal pole measures 1.7 x  1.9 x 2.4 cm. A cyst at the left upper renal pole measures 1.1 x 1.0 x  1.1 cm. There is bilateral renal cortical thinning and a limited  cortical echo texture suggesting features of chronic medical renal  disease. No shadowing renal stone or hydronephrosis is identified. The  urinary bladder has a normal appearance  with a prevoid volume of 117 cc.             Impression:         1. Features suggestive of chronic medical renal disease with cortical  thinning and elevated cortical echotexture.  2. No hydronephrosis.  3. Single bilateral renal cysts.     Electronically Signed By-Lauren Fatima MD On:4/30/2021 2:50 PM  This report was finalized on [id number] by  Lauren Fatima MD.    XR Chest 1 View [[id number]] Collected: 04/24/21 1641     Updated: 04/24/21 1643    Narrative:      XR CHEST 1 VW-     Date of Exam: 4/24/2021 4:28 PM     Indication: chest pain.  Hypertension. COPD.      Comparison Exams: 04/18/2021     Technique: AP view of the chest     FINDINGS:  No new consolidations or pleural effusions are observed. The cardiac  silhouette and mediastinum are stable. Postoperative changes are noted.  A cardiac pacemaker/AICD is observed with leads intact. No acute osseous  abnormalities are identified.       Impression:      There is no significant change when compared to the prior study. There  is no evidence for acute cardiopulmonary process.     Electronically Signed By-Nathan Mckinney MD On:4/24/2021 4:41 PM  This report was finalized on [id number] by  Nathan Mckinney MD.              Condition on Discharge:  Fair    Vital Signs  Temp:  [97.3 °F (36.3 °C)-97.9 °F (36.6 °C)] 97.9 °F (36.6 °C)  Heart Rate:  [57-72] 61  Resp:  [18-20] 18  BP: (100-130)/(62-74) 130/74    Physical Exam:     General Appearance:    Alert, cooperative, in no acute distress   Head:    Normocephalic, without obvious abnormality, atraumatic   Eyes:           Conjunctivae and sclerae normal, no   icterus, no pallor, corneas clear, PERRLA   Throat:   No oral lesions, no thrush, oral mucosa moist   Neck:   No adenopathy, supple, trachea midline, no thyromegaly, no   carotid bruit, no JVD   Lungs:     Clear to auscultation,respirations regular, even and                  unlabored    Heart:    Regular rhythm and normal rate, normal S1 and S2, no             murmur, no gallop, no rub, no click   Chest Wall:    No abnormalities observed   Abdomen:     Normal bowel sounds, no masses, no organomegaly, soft        non-tender, non-distended, no guarding, no rebound                tenderness   Rectal:     Deferred   Extremities:   Moves all extremities well, no edema, no cyanosis, no             redness   Pulses:   Pulses palpable and equal bilaterally   Skin:   No bleeding, bruising or rash   Lymph nodes:   No palpable adenopathy   Neurologic:   Cranial nerves 2 - 12 grossly intact, sensation intact, DTR       present and equal bilaterally         Discharge Disposition  Home or Self Care    Discharge Medications     Discharge Medications      New Medications      Instructions Start Date   amoxicillin-clavulanate 500-125 MG per tablet  Commonly known as: AUGMENTIN   500 mg, Oral, Every 12 Hours Scheduled      aspirin 81 MG EC tablet   81 mg, Oral, 3 Times Weekly   Start Date: May 3, 2021     ferrous sulfate 324 (65 Fe) MG tablet delayed-release EC tablet   324 mg, Oral, Daily With Breakfast         Continue These Medications      Instructions Start Date   ALPRAZolam 0.5 MG tablet  Commonly known as: XANAX   0.5 mg, Oral, 2 Times Daily PRN      atorvastatin 10 MG tablet  Commonly known as: LIPITOR   10 mg, Oral, Nightly      BENADRYL ALLERGY PO   1 tablet, Oral, As Needed      carboxymethylcellulose 0.5 % solution  Commonly known as: REFRESH PLUS   1 drop, Both Eyes, 3 Times Daily PRN      clopidogrel 75 MG tablet  Commonly known as: PLAVIX   75 mg, Oral, Daily      dilTIAZem 120 MG 24 hr capsule  Commonly known as: TIAZAC   120 mg, Oral, Daily      HYDROcodone-acetaminophen 7.5-325 MG per tablet  Commonly known as: NORCO   1 tablet, Oral, Every 8 Hours PRN      isosorbide mononitrate 30 MG 24 hr tablet  Commonly known as: IMDUR   30 mg, Oral, Every 24 Hours Scheduled      magnesium oxide 400 MG tablet  Commonly known as: MAG-OX   400 mg, Oral, Daily      metoprolol  tartrate 100 MG tablet  Commonly known as: LOPRESSOR   TAKE 1 TABLET BY MOUTH TWICE DAILY      ondansetron 4 MG tablet  Commonly known as: ZOFRAN   4 mg, Oral, Daily PRN      pantoprazole 40 MG EC tablet  Commonly known as: PROTONIX   TK 1 T PO QD      spironolactone 25 MG tablet  Commonly known as: ALDACTONE   TAKE ONE-HALF TABLET BY MOUTH DAILY      Viteyes AREDS Formula capsule   1 capsule, Oral, 2 times daily      warfarin 2 MG tablet  Commonly known as: COUMADIN   2 mg, Oral, 3 Times Weekly, Mon, Wed, Fri    On hold from 4/19/21 for upcoming procedure       warfarin 4 MG tablet  Commonly known as: COUMADIN   4 mg, Oral, 4 Times Weekly, Tue, Thur, Sat, Sun   On hold from 4/19/21 for upcoming procedure          Stop These Medications    cetirizine 10 MG tablet  Commonly known as: zyrTEC     TUSSIN CF COUGH & COLD PO            Discharge Diet:   Cardiac ADA  Activity at Discharge:   As tolerated  Follow-up Appointments    Follow-up with us in the office this coming week  Follow-up with cardiology within 2 to 4 weeks time  Follow-up with urology within 1 month  Follow-up with nephrology within 1 month  Follow-up with hematology within 1 month    Future Appointments   Date Time Provider Department Center   5/5/2021  3:15 PM [facility] [place] [facility]   5/5/2021  3:30 PM [facility]   5/5/2021  3:50 PM Ivan Martell MD [facility]     Additional Instructions for the Follow-ups that You Need to Schedule     Ambulatory Referral to Home Health   As directed      Face to Face Visit Date: 4/27/2021    Follow-up provider for Plan of Care?: I treated the patient in an acute care facility and will not continue treatment after discharge.    Follow-up provider: JESICA COATES [[id number]]    Reason/Clinical Findings: post hospital admission    Describe mobility limitations that make leaving home difficult: weakness    Nursing/Therapeutic Services  Requested: Other ([facility] to eval )    Frequency: 1 Week 1               Test Results Pending at Discharge  Pending Labs     Order Current Status    Protein Elec + Interp, Serum In process           Lenin Zambrano MD  05/01/21  07:25 EDT               Depth Of Tumor Invasion (For Histology): adipose tissue

## 2022-06-30 ENCOUNTER — CLINICAL SUPPORT NO REQUIREMENTS (OUTPATIENT)
Dept: CARDIOLOGY | Facility: CLINIC | Age: 87
End: 2022-06-30

## 2022-06-30 ENCOUNTER — OFFICE VISIT (OUTPATIENT)
Dept: CARDIOLOGY | Facility: CLINIC | Age: 87
End: 2022-06-30

## 2022-06-30 VITALS
OXYGEN SATURATION: 96 % | WEIGHT: 174 LBS | BODY MASS INDEX: 32.02 KG/M2 | HEIGHT: 62 IN | HEART RATE: 60 BPM | SYSTOLIC BLOOD PRESSURE: 132 MMHG | DIASTOLIC BLOOD PRESSURE: 68 MMHG

## 2022-06-30 DIAGNOSIS — I25.810 CORONARY ARTERY DISEASE INVOLVING CORONARY BYPASS GRAFT OF NATIVE HEART WITHOUT ANGINA PECTORIS: ICD-10-CM

## 2022-06-30 DIAGNOSIS — Z95.0 PRESENCE OF CARDIAC PACEMAKER: Primary | ICD-10-CM

## 2022-06-30 DIAGNOSIS — E78.00 PURE HYPERCHOLESTEROLEMIA: ICD-10-CM

## 2022-06-30 DIAGNOSIS — R00.1 BRADYCARDIA, SINUS: ICD-10-CM

## 2022-06-30 DIAGNOSIS — I10 ESSENTIAL HYPERTENSION: ICD-10-CM

## 2022-06-30 PROCEDURE — 99214 OFFICE O/P EST MOD 30 MIN: CPT | Performed by: INTERNAL MEDICINE

## 2022-06-30 PROCEDURE — 93280 PM DEVICE PROGR EVAL DUAL: CPT | Performed by: INTERNAL MEDICINE

## 2022-06-30 NOTE — PROGRESS NOTES
Subjective:     Encounter Date:06/30/2022      Patient ID: Syl Herrera is a 87 y.o. female.    Chief Complaint:  History of Present Illness 87-year-old white female with history of sick sinus syndrome status post pacemaker placement history of coronary disease hypertension hyperlipidemia presents to my office for follow-up.  Patient is currently stable without any symptoms of chest pain but has some shortness of breath exertion but no complains any PND orthopnea.  No palpitation dizziness.  Patient has some swelling of the feet.  Patient is taking all the medicines regularly.  Patient does not smoke.  Patient is walking with the help of a walker    The following portions of the patient's history were reviewed and updated as appropriate: allergies, current medications, past family history, past medical history, past social history, past surgical history and problem list.  Past Medical History:   Diagnosis Date   • Anxiety    • Arthritis    • Atrial fibrillation (HCC)    • Coronary artery disease    • Dyslipidemia    • GERD (gastroesophageal reflux disease)    • History of bone density study 04/2015   • Hypertension    • Sleep apnea    • Wears dentures      Past Surgical History:   Procedure Laterality Date   • BLADDER REPAIR  1990   • BREAST LUMPECTOMY  1974    BENIGN   • CARDIAC CATHETERIZATION  05/25/2011   • CARDIAC CATHETERIZATION N/A 4/21/2021    Procedure: Left Heart Cath and coronary angiogram;  Surgeon: Ivan Martell MD;  [facility];  Service: Cardiovascular;  Laterality: N/A;   • CARDIAC CATHETERIZATION N/A 4/21/2021    Procedure: Left ventriculography;  Surgeon: Ivan Martell MD;  [facility];  Service: Cardiovascular;  Laterality: N/A;   • CARDIAC CATHETERIZATION  4/21/2021    Procedure: Saphenous Vein Graft;  Surgeon: Ivan Martell MD;  [facility];  Service: Cardiovascular;;   • CARDIAC CATHETERIZATION N/A  "4/26/2021    Procedure: Percutaneous Coronary Intervention;  Surgeon: Ivan Martell MD;  Location: [facility] CATH INVASIVE LOCATION;  Service: Cardiovascular;  Laterality: N/A;   • CARDIAC CATHETERIZATION N/A 4/26/2021    Procedure: Stent RAY coronary;  Surgeon: Ivan Martell MD;  Location: [facility] CATH INVASIVE LOCATION;  Service: Cardiovascular;  Laterality: N/A;   • CARDIOVASCULAR STRESS TEST  05/04/2015   • CHOLECYSTECTOMY  1990   • CORONARY ARTERY BYPASS GRAFT  08/09/2017    X5  Dr Brandt   • ENDOSCOPY N/A 6/30/2020    Procedure: ESOPHAGOGASTRODUODENOSCOPY with biopsy x 1 area and dilatation (15-18mm balloon) up to 18mm;  Surgeon: Quinton Tapia MD;  Location: [facility] ENDOSCOPY;  Service: Gastroenterology;  Laterality: N/A;  post op: gastritis, large hiatal hernia, esophageal dysmotility   • HYSTERECTOMY  1990   • JOINT REPLACEMENT Right     knee    • OTHER SURGICAL HISTORY  06/2017    BLOOD CLOT REMOVED FROM LEFT EAR   • PACEMAKER IMPLANTATION  04/18/2016    DUAL CHAMBER ST ALESSIO     /68 (BP Location: Left arm, Patient Position: Sitting, Cuff Size: Adult)   Pulse 60   Ht 157.5 cm (62\")   Wt 78.9 kg (174 lb)   SpO2 96%   BMI 31.83 kg/m²   Family History   Problem Relation Age of Onset   • Hypertension Mother    • Heart disease Sister         PACEMAKER       Current Outpatient Medications:   •  ALPRAZolam (XANAX) 0.5 MG tablet, Take 0.5 mg by mouth 2 (Two) Times a Day As Needed., Disp: , Rfl: 5  •  aspirin (aspirin) 81 MG EC tablet, Take 1 tablet by mouth 3 (Three) Times a Week., Disp: 12 tablet, Rfl: 6  •  atorvastatin (LIPITOR) 10 MG tablet, Take 10 mg by mouth Every Night., Disp: , Rfl:   •  azithromycin (ZITHROMAX) 250 MG tablet, Take 2 tablets the first day, then 1 tablet daily for 4 days., Disp: 6 tablet, Rfl: 0  •  budesonide (PULMICORT) 0.5 MG/2ML nebulizer solution, Take 2 mL by nebulization 2 (Two) Times a Day., Disp: 60 each, Rfl: 1  •  carboxymethylcellulose (REFRESH PLUS) 0.5 % solution, " Administer 1 drop to both eyes 3 (Three) Times a Day As Needed for Dry Eyes., Disp: , Rfl:   •  clopidogrel (PLAVIX) 75 MG tablet, TAKE 1 TABLET BY MOUTH DAILY, Disp: 90 tablet, Rfl: 1  •  Diclofenac Sodium (VOLTAREN) 1 % gel gel, Apply 4 g topically to the appropriate area as directed 4 (Four) Times a Day As Needed., Disp: , Rfl:   •  dilTIAZem (TIAZAC) 120 MG 24 hr capsule, TAKE 1 CAPSULE BY MOUTH DAILY, Disp: 90 capsule, Rfl: 1  •  diphenhydrAMINE HCl (BENADRYL ALLERGY PO), Take 1 tablet by mouth As Needed., Disp: , Rfl:   •  ferrous sulfate 324 (65 Fe) MG tablet delayed-release EC tablet, Take 1 tablet by mouth Daily With Breakfast., Disp: 30 tablet, Rfl: 0  •  HYDROcodone-acetaminophen (NORCO) 7.5-325 MG per tablet, Take 1 tablet by mouth Every 8 (Eight) Hours As Needed., Disp: , Rfl: 0  •  ipratropium-albuterol (DUO-NEB) 0.5-2.5 mg/3 ml nebulizer, Take 3 mL by nebulization 4 (Four) Times a Day., Disp: 180 mL, Rfl: 1  •  magnesium oxide (MAG-OX) 400 MG tablet, Take 400 mg by mouth Daily., Disp: , Rfl:   •  methylPREDNISolone (MEDROL) 4 MG dose pack, Take as directed on package instructions., Disp: 21 tablet, Rfl: 0  •  metoprolol tartrate (LOPRESSOR) 100 MG tablet, TAKE 1 TABLET BY MOUTH TWICE DAILY, Disp: 180 tablet, Rfl: 1  •  Multiple Vitamins-Minerals (VITEYES AREDS FORMULA) capsule, Take 1 capsule by mouth 2 (two) times a day., Disp: , Rfl:   •  nitrofurantoin (MACRODANTIN) 50 MG capsule, Take 50 mg by mouth 4 (Four) Times a Day., Disp: , Rfl:   •  ondansetron (ZOFRAN) 4 MG tablet, Take 4 mg by mouth Daily As Needed for Nausea or Vomiting., Disp: , Rfl:   •  pantoprazole (PROTONIX) 40 MG EC tablet, TK 1 T PO QD, Disp: , Rfl: 5  •  spironolactone (ALDACTONE) 25 MG tablet, TAKE 1/2 TABLET BY MOUTH DAILY, Disp: 45 tablet, Rfl: 3  Allergies   Allergen Reactions   • Doxycycline Other (See Comments)     Chest pain   • Celebrex [Celecoxib] Other (See Comments)     Chest pain   • Contrast Dye Itching   • Iodinated  Diagnostic Agents Itching   • Nsaids Other (See Comments)     convulsion   • Other Itching     Pt states some steroids make her itch and hallucinate- she does not know the names   She said she is allergic to all arthritis medicine   • Sulfa Antibiotics Nausea Only     Social History     Socioeconomic History   • Marital status:    Tobacco Use   • Smoking status: Never Smoker   • Smokeless tobacco: Never Used   Vaping Use   • Vaping Use: Never used   Substance and Sexual Activity   • Alcohol use: No   • Drug use: No   • Sexual activity: Defer     Review of Systems   Constitutional: Positive for malaise/fatigue. Negative for fever.   Cardiovascular: Positive for leg swelling. Negative for chest pain, dyspnea on exertion and palpitations.   Respiratory: Positive for shortness of breath. Negative for cough.    Skin: Negative for rash.   Gastrointestinal: Negative for abdominal pain, nausea and vomiting.   Neurological: Positive for numbness. Negative for focal weakness and headaches.   All other systems reviewed and are negative.             Objective:     Constitutional:       Appearance: Well-developed.   Eyes:      General: No scleral icterus.     Conjunctiva/sclera: Conjunctivae normal.   HENT:      Head: Normocephalic and atraumatic.   Neck:      Vascular: No carotid bruit or JVD.   Pulmonary:      Effort: Pulmonary effort is normal.      Breath sounds: Normal breath sounds. No wheezing. No rales.   Cardiovascular:      Normal rate. Regular rhythm.      Murmurs: There is a systolic murmur.   Pulses:     Intact distal pulses.   Abdominal:      General: Bowel sounds are normal.      Palpations: Abdomen is soft.   Musculoskeletal:      Cervical back: Normal range of motion and neck supple. Skin:     General: Skin is warm and dry.      Findings: No rash.   Neurological:      Mental Status: Alert.       Procedures    Lab Review:         MDM  1.  Sick sinus syndrome  Patient has pacemaker and is working very well  at this time.  2.  Coronary disease  Patient has nonobstructive disease with normal function is currently stable  3.  Hypertension  Patient blood pressure currently stable metoprolol and diltiazem  4.  Hyperlipidemia  Patient's lipid levels are followed by the primary care doctor.    Patient's previous medical records, labs, and EKG were reviewed and discussed with the patient at today's visit.

## 2022-07-25 RX ORDER — DILTIAZEM HYDROCHLORIDE 120 MG/1
120 CAPSULE, EXTENDED RELEASE ORAL DAILY
Qty: 90 CAPSULE | Refills: 1 | Status: SHIPPED | OUTPATIENT
Start: 2022-07-25 | End: 2022-12-29

## 2022-07-25 NOTE — TELEPHONE ENCOUNTER
Rx Refill Note  Requested Prescriptions     Pending Prescriptions Disp Refills   • dilTIAZem (TIAZAC) 120 MG 24 hr capsule [Pharmacy Med Name: DILTIAZEM ER 120MG CAPSULES (24 HR)] 90 capsule 1     Sig: TAKE 1 CAPSULE BY MOUTH DAILY      Last office visit with prescribing clinician: 6/30/2022      Next office visit with prescribing clinician: 1/24/2023            ZACH PACHECO MA  07/25/22, 08:40 EDT

## 2022-07-26 ENCOUNTER — LAB (OUTPATIENT)
Dept: LAB | Facility: HOSPITAL | Age: 87
End: 2022-07-26

## 2022-07-26 ENCOUNTER — TRANSCRIBE ORDERS (OUTPATIENT)
Dept: ADMINISTRATIVE | Facility: HOSPITAL | Age: 87
End: 2022-07-26

## 2022-07-26 DIAGNOSIS — J84.9 INTERSTITIAL LUNG DISEASE: ICD-10-CM

## 2022-07-26 DIAGNOSIS — J84.9 INTERSTITIAL LUNG DISEASE: Primary | ICD-10-CM

## 2022-07-26 LAB
CHROMATIN AB SERPL-ACNC: <10 IU/ML (ref 0–14)
CRP SERPL-MCNC: <0.3 MG/DL (ref 0–0.5)
ERYTHROCYTE [SEDIMENTATION RATE] IN BLOOD: 23 MM/HR (ref 0–30)

## 2022-07-26 PROCEDURE — 86609 BACTERIUM ANTIBODY: CPT

## 2022-07-26 PROCEDURE — 86431 RHEUMATOID FACTOR QUANT: CPT

## 2022-07-26 PROCEDURE — 85652 RBC SED RATE AUTOMATED: CPT

## 2022-07-26 PROCEDURE — 86200 CCP ANTIBODY: CPT

## 2022-07-26 PROCEDURE — 86602 ANTINOMYCES ANTIBODY: CPT

## 2022-07-26 PROCEDURE — 86331 IMMUNODIFFUSION OUCHTERLONY: CPT

## 2022-07-26 PROCEDURE — 82085 ASSAY OF ALDOLASE: CPT

## 2022-07-26 PROCEDURE — 83516 IMMUNOASSAY NONANTIBODY: CPT

## 2022-07-26 PROCEDURE — 86606 ASPERGILLUS ANTIBODY: CPT

## 2022-07-26 PROCEDURE — 86038 ANTINUCLEAR ANTIBODIES: CPT

## 2022-07-26 PROCEDURE — 86235 NUCLEAR ANTIGEN ANTIBODY: CPT

## 2022-07-26 PROCEDURE — 36415 COLL VENOUS BLD VENIPUNCTURE: CPT

## 2022-07-26 PROCEDURE — 86140 C-REACTIVE PROTEIN: CPT

## 2022-07-26 PROCEDURE — 86671 FUNGUS NES ANTIBODY: CPT

## 2022-07-27 LAB
ALDOLASE SERPL-CCNC: 3.1 U/L (ref 3.3–10.3)
ANA SER QL: NEGATIVE
ENA SCL70 AB SER-ACNC: <0.2 AI (ref 0–0.9)

## 2022-07-28 LAB — CCP IGA+IGG SERPL IA-ACNC: 6 UNITS (ref 0–19)

## 2022-08-01 LAB
A FUMIGATUS IGG SER QL: NEGATIVE
A PULLULANS IGG SER QL: NEGATIVE
LACEYELLA SACCHARI AB SER QL ID: NEGATIVE
PIGEON SERUM IGG QL: NEGATIVE
S RECTIVIRGULA IGG SER QL ID: NEGATIVE
T VULGARIS IGG SER QL: NEGATIVE

## 2022-08-06 LAB
EJ AB SER QL: NEGATIVE
ENA JO1 AB SER IA-ACNC: <20 UNITS
ENA PM/SCL AB SER-ACNC: <20 UNITS
ENA SS-A 52KD IGG SER IA-ACNC: <20 UNITS
FIBRILLARIN AB SER QL: NEGATIVE
KU AB SER QL: NEGATIVE
MDA5 AB SER LINE BLOT-ACNC: <20 UNITS
MI2 AB SER QL: NEGATIVE
MJ AB SER LINE BLOT-ACNC: <20 UNITS
OJ AB SER QL: NEGATIVE
PL12 AB SER QL: NEGATIVE
PL7 AB SER QL: NEGATIVE
SAE1 IGG SER QL LINE BLOT: <20 UNITS
SRP AB SERPL QL: NEGATIVE
TIF1-GAMMA AB SER LINE BLOT-ACNC: <20 UNITS
U1 SNRNP AB SER IA-ACNC: 31 UNITS
U2 SNRNP AB SER QL: NEGATIVE

## 2022-08-19 ENCOUNTER — OFFICE (AMBULATORY)
Dept: URBAN - METROPOLITAN AREA PATHOLOGY 4 | Facility: PATHOLOGY | Age: 87
End: 2022-08-19

## 2022-08-19 ENCOUNTER — ON CAMPUS - OUTPATIENT (AMBULATORY)
Dept: URBAN - METROPOLITAN AREA HOSPITAL 2 | Facility: HOSPITAL | Age: 87
End: 2022-08-19
Payer: COMMERCIAL

## 2022-08-19 ENCOUNTER — OFFICE (AMBULATORY)
Dept: URBAN - METROPOLITAN AREA PATHOLOGY 4 | Facility: PATHOLOGY | Age: 87
End: 2022-08-19
Payer: COMMERCIAL

## 2022-08-19 VITALS
SYSTOLIC BLOOD PRESSURE: 130 MMHG | SYSTOLIC BLOOD PRESSURE: 90 MMHG | DIASTOLIC BLOOD PRESSURE: 48 MMHG | SYSTOLIC BLOOD PRESSURE: 104 MMHG | SYSTOLIC BLOOD PRESSURE: 97 MMHG | HEART RATE: 68 BPM | DIASTOLIC BLOOD PRESSURE: 78 MMHG | HEART RATE: 67 BPM | HEART RATE: 61 BPM | DIASTOLIC BLOOD PRESSURE: 66 MMHG | DIASTOLIC BLOOD PRESSURE: 46 MMHG | DIASTOLIC BLOOD PRESSURE: 57 MMHG | SYSTOLIC BLOOD PRESSURE: 92 MMHG | HEART RATE: 66 BPM | OXYGEN SATURATION: 94 % | SYSTOLIC BLOOD PRESSURE: 137 MMHG | RESPIRATION RATE: 14 BRPM | OXYGEN SATURATION: 96 % | SYSTOLIC BLOOD PRESSURE: 127 MMHG | RESPIRATION RATE: 16 BRPM | SYSTOLIC BLOOD PRESSURE: 111 MMHG | TEMPERATURE: 96.8 F | DIASTOLIC BLOOD PRESSURE: 55 MMHG | HEIGHT: 61 IN | DIASTOLIC BLOOD PRESSURE: 60 MMHG | OXYGEN SATURATION: 99 % | DIASTOLIC BLOOD PRESSURE: 75 MMHG | HEART RATE: 60 BPM | SYSTOLIC BLOOD PRESSURE: 113 MMHG | WEIGHT: 168 LBS | RESPIRATION RATE: 18 BRPM | SYSTOLIC BLOOD PRESSURE: 149 MMHG | DIASTOLIC BLOOD PRESSURE: 56 MMHG

## 2022-08-19 DIAGNOSIS — K22.4 DYSKINESIA OF ESOPHAGUS: ICD-10-CM

## 2022-08-19 DIAGNOSIS — K44.9 DIAPHRAGMATIC HERNIA WITHOUT OBSTRUCTION OR GANGRENE: ICD-10-CM

## 2022-08-19 DIAGNOSIS — K31.89 OTHER DISEASES OF STOMACH AND DUODENUM: ICD-10-CM

## 2022-08-19 DIAGNOSIS — K57.30 DIVERTICULOSIS OF LARGE INTESTINE WITHOUT PERFORATION OR ABS: ICD-10-CM

## 2022-08-19 DIAGNOSIS — R13.10 DYSPHAGIA, UNSPECIFIED: ICD-10-CM

## 2022-08-19 DIAGNOSIS — R19.7 DIARRHEA, UNSPECIFIED: ICD-10-CM

## 2022-08-19 DIAGNOSIS — R15.9 FULL INCONTINENCE OF FECES: ICD-10-CM

## 2022-08-19 LAB
GI HISTOLOGY: A. SELECT: (no result)
GI HISTOLOGY: B. UNSPECIFIED: (no result)
GI HISTOLOGY: PDF REPORT: (no result)

## 2022-08-19 PROCEDURE — 88305 TISSUE EXAM BY PATHOLOGIST: CPT | Mod: 26 | Performed by: INTERNAL MEDICINE

## 2022-08-19 PROCEDURE — 43249 ESOPH EGD DILATION <30 MM: CPT | Performed by: INTERNAL MEDICINE

## 2022-08-19 PROCEDURE — 43239 EGD BIOPSY SINGLE/MULTIPLE: CPT | Mod: 59 | Performed by: INTERNAL MEDICINE

## 2022-08-19 PROCEDURE — 45380 COLONOSCOPY AND BIOPSY: CPT | Performed by: INTERNAL MEDICINE

## 2022-10-20 RX ORDER — METOPROLOL TARTRATE 100 MG/1
TABLET ORAL
Qty: 180 TABLET | Refills: 1 | Status: SHIPPED | OUTPATIENT
Start: 2022-10-20

## 2022-10-20 NOTE — TELEPHONE ENCOUNTER
Rx Refill Note  Requested Prescriptions     Pending Prescriptions Disp Refills   • metoprolol tartrate (LOPRESSOR) 100 MG tablet [Pharmacy Med Name: METOPROLOL TARTRATE 100MG TABLETS] 180 tablet 1     Sig: TAKE 1 TABLET BY MOUTH TWICE DAILY      Last office visit with prescribing clinician: 6/30/2022      Next office visit with prescribing clinician: 1/24/2023            Odalis Coronado MA  10/20/22, 08:28 EDT

## 2022-11-07 RX ORDER — CLOPIDOGREL BISULFATE 75 MG/1
75 TABLET ORAL DAILY
Qty: 90 TABLET | Refills: 1 | Status: SHIPPED | OUTPATIENT
Start: 2022-11-07

## 2022-11-07 NOTE — TELEPHONE ENCOUNTER
Rx Refill Note  Requested Prescriptions     Pending Prescriptions Disp Refills   • clopidogrel (PLAVIX) 75 MG tablet [Pharmacy Med Name: CLOPIDOGREL 75MG TABLETS] 90 tablet 1     Sig: TAKE 1 TABLET BY MOUTH DAILY      Last office visit with prescribing clinician: 6/30/2022      Next office visit with prescribing clinician: 1/24/2023            Odalis Coronado MA  11/07/22, 08:06 EST

## 2022-12-29 RX ORDER — DILTIAZEM HYDROCHLORIDE 120 MG/1
120 CAPSULE, EXTENDED RELEASE ORAL DAILY
Qty: 90 CAPSULE | Refills: 1 | Status: SHIPPED | OUTPATIENT
Start: 2022-12-29

## 2022-12-29 NOTE — TELEPHONE ENCOUNTER
Rx Refill Note  Requested Prescriptions     Pending Prescriptions Disp Refills   • dilTIAZem (TIAZAC) 120 MG 24 hr capsule [Pharmacy Med Name: DILTIAZEM ER 120MG CAPSULES (24 HR)] 90 capsule 1     Sig: TAKE 1 CAPSULE BY MOUTH DAILY      Last office visit with prescribing clinician: 6/30/2022   Last telemedicine visit with prescribing clinician: 1/24/2023   Next office visit with prescribing clinician: 1/24/2023                         Would you like a call back once the refill request has been completed: [] Yes [] No    If the office needs to give you a call back, can they leave a voicemail: [] Yes [] No    Suzy Burt MA  12/29/22, 14:53 EST

## 2023-01-24 ENCOUNTER — OFFICE VISIT (OUTPATIENT)
Dept: CARDIOLOGY | Facility: CLINIC | Age: 88
End: 2023-01-24
Payer: MEDICARE

## 2023-01-24 ENCOUNTER — CLINICAL SUPPORT NO REQUIREMENTS (OUTPATIENT)
Dept: CARDIOLOGY | Facility: CLINIC | Age: 88
End: 2023-01-24
Payer: MEDICARE

## 2023-01-24 VITALS
HEART RATE: 60 BPM | WEIGHT: 169 LBS | DIASTOLIC BLOOD PRESSURE: 90 MMHG | SYSTOLIC BLOOD PRESSURE: 151 MMHG | HEIGHT: 62 IN | OXYGEN SATURATION: 96 % | BODY MASS INDEX: 31.1 KG/M2

## 2023-01-24 DIAGNOSIS — R00.1 BRADYCARDIA, SINUS: ICD-10-CM

## 2023-01-24 DIAGNOSIS — E78.00 PURE HYPERCHOLESTEROLEMIA: ICD-10-CM

## 2023-01-24 DIAGNOSIS — I25.810 CORONARY ARTERY DISEASE INVOLVING CORONARY BYPASS GRAFT OF NATIVE HEART WITHOUT ANGINA PECTORIS: ICD-10-CM

## 2023-01-24 DIAGNOSIS — Z95.0 PRESENCE OF CARDIAC PACEMAKER: Primary | ICD-10-CM

## 2023-01-24 DIAGNOSIS — G47.33 OBSTRUCTIVE SLEEP APNEA: ICD-10-CM

## 2023-01-24 DIAGNOSIS — I48.0 PAROXYSMAL ATRIAL FIBRILLATION: ICD-10-CM

## 2023-01-24 DIAGNOSIS — I10 ESSENTIAL HYPERTENSION: ICD-10-CM

## 2023-01-24 PROCEDURE — 93280 PM DEVICE PROGR EVAL DUAL: CPT | Performed by: INTERNAL MEDICINE

## 2023-01-24 PROCEDURE — 99214 OFFICE O/P EST MOD 30 MIN: CPT | Performed by: INTERNAL MEDICINE

## 2023-01-24 RX ORDER — ISOSORBIDE MONONITRATE 30 MG/1
30 TABLET, EXTENDED RELEASE ORAL EVERY MORNING
Qty: 90 TABLET | Refills: 3 | Status: SHIPPED | OUTPATIENT
Start: 2023-01-24

## 2023-01-24 NOTE — PROGRESS NOTES
Subjective:     Encounter Date:01/24/2023      Patient ID: Syl Herrera is a 87 y.o. female.    Chief Complaint:  History of Present Illness 86-year-old white female with history of coronary disease history of hypertension hyperlipidemia sick sinus syndrome status post pacemaker placement and paroxysmal fibrillation presents to my office for follow-up.  Patient is currently stable without any signs of chest pain but has some shortness of breath exertion.  No complains any PND orthopnea.  He has occasional palpable dizziness.  No syncope.  She has some swelling of the feet.  She is taking her medicines regularly.  She does not smoke.    The following portions of the patient's history were reviewed and updated as appropriate: allergies, current medications, past family history, past medical history, past social history, past surgical history and problem list.  Past Medical History:   Diagnosis Date   • Anxiety    • Arthritis    • Asthma    • Atrial fibrillation (HCC)    • Coronary artery disease    • Dyslipidemia    • GERD (gastroesophageal reflux disease)    • History of bone density study 04/2015   • Hypertension    • Sleep apnea    • Wears dentures      Past Surgical History:   Procedure Laterality Date   • BLADDER REPAIR  1990   • BREAST LUMPECTOMY  1974    BENIGN   • CARDIAC CATHETERIZATION  05/25/2011   • CARDIAC CATHETERIZATION N/A 04/21/2021    Procedure: Left Heart Cath and coronary angiogram;  Surgeon: Ivan Martell MD;  Location: [facility] CATH INVASIVE LOCATION;  Service: Cardiovascular;  Laterality: N/A;   • CARDIAC CATHETERIZATION N/A 04/21/2021    Procedure: Left ventriculography;  Surgeon: Ivan Martell MD;  Location: [facility] CATH INVASIVE LOCATION;  Service: Cardiovascular;  Laterality: N/A;   • CARDIAC CATHETERIZATION  04/21/2021    Procedure: Saphenous Vein Graft;  Surgeon: Ivan Martell MD;  Location: [facility] CATH INVASIVE LOCATION;  Service: Cardiovascular;;   • CARDIAC CATHETERIZATION N/A  "04/26/2021    Procedure: Percutaneous Coronary Intervention;  Surgeon: Ivan Martell MD;  Location: [facility] CATH INVASIVE LOCATION;  Service: Cardiovascular;  Laterality: N/A;   • CARDIAC CATHETERIZATION N/A 04/26/2021    Procedure: Stent RAY coronary;  Surgeon: Ivan Martell MD;  Location: [facility] CATH INVASIVE LOCATION;  Service: Cardiovascular;  Laterality: N/A;   • CARDIOVASCULAR STRESS TEST  05/04/2015   • CHOLECYSTECTOMY  1990   • CORONARY ARTERY BYPASS GRAFT  08/09/2017    X5  Dr Brandt   • ENDOSCOPY N/A 06/30/2020    Procedure: ESOPHAGOGASTRODUODENOSCOPY with biopsy x 1 area and dilatation (15-18mm balloon) up to 18mm;  Surgeon: Quinton Tapia MD;  Location: [facility] ENDOSCOPY;  Service: Gastroenterology;  Laterality: N/A;  post op: gastritis, large hiatal hernia, esophageal dysmotility   • HYSTERECTOMY  1990   • INSERT / REPLACE / REMOVE PACEMAKER     • JOINT REPLACEMENT Right     knee    • OTHER SURGICAL HISTORY  06/2017    BLOOD CLOT REMOVED FROM LEFT EAR   • PACEMAKER IMPLANTATION  04/18/2016    DUAL CHAMBER ST ALESSIO     /90 Comment: recheck  Pulse 60   Ht 157.5 cm (62.01\")   Wt 76.7 kg (169 lb)   SpO2 96%   BMI 30.90 kg/m²   Family History   Problem Relation Age of Onset   • Hypertension Mother    • Heart disease Mother    • Heart disease Sister         PACEMAKER   • Heart disease Brother        Current Outpatient Medications:   •  ALPRAZolam (XANAX) 0.5 MG tablet, Take 0.5 mg by mouth 2 (Two) Times a Day As Needed., Disp: , Rfl: 5  •  aspirin (aspirin) 81 MG EC tablet, Take 1 tablet by mouth 3 (Three) Times a Week., Disp: 12 tablet, Rfl: 6  •  atorvastatin (LIPITOR) 10 MG tablet, Take 10 mg by mouth Every Night., Disp: , Rfl:   •  azithromycin (ZITHROMAX) 250 MG tablet, Take 2 tablets the first day, then 1 tablet daily for 4 days., Disp: 6 tablet, Rfl: 0  •  budesonide (PULMICORT) 0.5 MG/2ML nebulizer solution, Take 2 mL by nebulization 2 (Two) Times a Day., Disp: 60 each, Rfl: 1  •  " carboxymethylcellulose (REFRESH PLUS) 0.5 % solution, Administer 1 drop to both eyes 3 (Three) Times a Day As Needed for Dry Eyes., Disp: , Rfl:   •  clopidogrel (PLAVIX) 75 MG tablet, TAKE 1 TABLET BY MOUTH DAILY, Disp: 90 tablet, Rfl: 1  •  Diclofenac Sodium (VOLTAREN) 1 % gel gel, Apply 4 g topically to the appropriate area as directed 4 (Four) Times a Day As Needed., Disp: , Rfl:   •  dilTIAZem (TIAZAC) 120 MG 24 hr capsule, TAKE 1 CAPSULE BY MOUTH DAILY, Disp: 90 capsule, Rfl: 1  •  diphenhydrAMINE HCl (BENADRYL ALLERGY PO), Take 1 tablet by mouth As Needed., Disp: , Rfl:   •  ferrous sulfate 324 (65 Fe) MG tablet delayed-release EC tablet, Take 1 tablet by mouth Daily With Breakfast., Disp: 30 tablet, Rfl: 0  •  HYDROcodone-acetaminophen (NORCO) 7.5-325 MG per tablet, Take 1 tablet by mouth Every 8 (Eight) Hours As Needed., Disp: , Rfl: 0  •  ipratropium-albuterol (DUO-NEB) 0.5-2.5 mg/3 ml nebulizer, Take 3 mL by nebulization 4 (Four) Times a Day., Disp: 180 mL, Rfl: 1  •  magnesium oxide (MAG-OX) 400 MG tablet, Take 400 mg by mouth Daily., Disp: , Rfl:   •  methylPREDNISolone (MEDROL) 4 MG dose pack, Take as directed on package instructions., Disp: 21 tablet, Rfl: 0  •  metoprolol tartrate (LOPRESSOR) 100 MG tablet, TAKE 1 TABLET BY MOUTH TWICE DAILY, Disp: 180 tablet, Rfl: 1  •  Multiple Vitamins-Minerals (VITEYES AREDS FORMULA) capsule, Take 1 capsule by mouth 2 (two) times a day., Disp: , Rfl:   •  nitrofurantoin (MACRODANTIN) 50 MG capsule, Take 50 mg by mouth 4 (Four) Times a Day., Disp: , Rfl:   •  ondansetron (ZOFRAN) 4 MG tablet, Take 4 mg by mouth Daily As Needed for Nausea or Vomiting., Disp: , Rfl:   •  pantoprazole (PROTONIX) 40 MG EC tablet, TK 1 T PO QD, Disp: , Rfl: 5  •  spironolactone (ALDACTONE) 25 MG tablet, TAKE 1/2 TABLET BY MOUTH DAILY, Disp: 45 tablet, Rfl: 3  Allergies   Allergen Reactions   • Doxycycline Other (See Comments)     Chest pain   • Celebrex [Celecoxib] Other (See Comments)      Chest pain   • Contrast Dye (Echo Or Unknown Ct/Mr) Itching   • Iodinated Contrast Media Itching   • Nsaids Other (See Comments)     convulsion   • Other Itching     Pt states some steroids make her itch and hallucinate- she does not know the names   She said she is allergic to all arthritis medicine   • Sulfa Antibiotics Nausea Only     Social History     Socioeconomic History   • Marital status:    Tobacco Use   • Smoking status: Never   • Smokeless tobacco: Never   Vaping Use   • Vaping Use: Never used   Substance and Sexual Activity   • Alcohol use: No   • Drug use: No   • Sexual activity: Not Currently     Partners: Female     Birth control/protection: Hysterectomy     Review of Systems   Constitutional: Positive for malaise/fatigue.   Cardiovascular: Positive for leg swelling and palpitations. Negative for chest pain and dyspnea on exertion.   Respiratory: Positive for shortness of breath. Negative for cough.    Gastrointestinal: Positive for nausea and vomiting. Negative for abdominal pain.   Neurological: Positive for dizziness, light-headedness and numbness. Negative for focal weakness and headaches.   All other systems reviewed and are negative.             Objective:     Constitutional:       Appearance: Well-developed.   Eyes:      General: No scleral icterus.     Conjunctiva/sclera: Conjunctivae normal.   HENT:      Head: Normocephalic and atraumatic.   Neck:      Vascular: No carotid bruit or JVD.   Pulmonary:      Effort: Pulmonary effort is normal.      Breath sounds: Normal breath sounds. No wheezing. No rales.   Cardiovascular:      Normal rate. Regular rhythm.   Pulses:     Intact distal pulses.   Abdominal:      General: Bowel sounds are normal.      Palpations: Abdomen is soft.   Musculoskeletal:      Cervical back: Normal range of motion and neck supple. Skin:     General: Skin is warm and dry.      Findings: No rash.   Neurological:      Mental Status: Alert.       Procedures    Lab  Review:         MDM  1.  Coronary disease  Patient had coronary bypass surgery x3 vessels with a LIMA to LAD and saphenous graft to the marginal branch and RCA and has normal V function is currently stable  2.  Pacemaker placement  Patient had sick sinus syndrome and status post permanent pacemaker placement  Patient's pacemaker is working very well  3.  Hypertension  Patient blood pressure currently stable on medications including diltiazem and metoprolol  4.  Hyperlipidemia  Been on atorvastatin and the lipid levels are well within normal limits  5.  Atrial fibrillation  Patient has paroxysmal fibrillation and I will place her on Eliquis or Xarelto depending on her insurance plan  6.  Sleep apnea  Patient is sleep apnea and uses a CPAP machine      Patient's previous medical records, labs, and EKG were reviewed and discussed with the patient at today's visit.

## 2023-01-24 NOTE — TELEPHONE ENCOUNTER
Rx Refill Note  Requested Prescriptions     Pending Prescriptions Disp Refills   • isosorbide mononitrate (IMDUR) 30 MG 24 hr tablet 90 tablet 3     Sig: Take 1 tablet by mouth Every Morning.      Last office visit with prescribing clinician: 1/24/2023   Last telemedicine visit with prescribing clinician: Visit date not found   Next office visit with prescribing clinician: Visit date not found                         Would you like a call back once the refill request has been completed: [] Yes [] No    If the office needs to give you a call back, can they leave a voicemail: [] Yes [] No    Vivek Connell MA  01/24/23, 14:13 EST

## 2023-02-14 ENCOUNTER — TELEPHONE (OUTPATIENT)
Dept: CARDIOLOGY | Facility: CLINIC | Age: 88
End: 2023-02-14
Payer: MEDICARE

## 2023-02-14 NOTE — TELEPHONE ENCOUNTER
Yes, please send Eliquis 5 mg twice daily for patient.  Please let patient know when she starts taking Eliquis to stop taking daily aspirin to avoid triple therapy with aspirin, Plavix, and Eliquis. She should only take Plavix 75 mg daily and Eliquis 5 mg twice daily.  Let me know if they have any questions.

## 2023-02-14 NOTE — TELEPHONE ENCOUNTER
Caller: Rosa Lazcano    Relationship: Emergency Contact    Best call back number: [phone]    What is the best time to reach you: ANY    Who are you requesting to speak with (clinical staff, provider,  specific staff member): ANY    What was the call regarding: PATIENTS DAUGHTER ADVISED THAT BASED ON THEIR INS, ELIQUIS WOULD BE THE BEST OPTION FOR THE PATIENT

## 2023-02-14 NOTE — TELEPHONE ENCOUNTER
Rx Refill Note  Requested Prescriptions     Signed Prescriptions Disp Refills   • apixaban (Eliquis) 5 MG tablet tablet 180 tablet 3     Sig: Take 1 tablet by mouth 2 (Two) Times a Day.     Authorizing Provider: CORKY BRAGG     Ordering User: AURELIA JYA      Last office visit with prescribing clinician: 1/24/2023   Last telemedicine visit with prescribing clinician: 7/27/2023   Next office visit with prescribing clinician: 7/27/2023                         Would you like a call back once the refill request has been completed: [] Yes [] No    If the office needs to give you a call back, can they leave a voicemail: [] Yes [] No    Aurelia Jay MA  02/14/23, 15:26 EST

## 2023-02-16 ENCOUNTER — TELEPHONE (OUTPATIENT)
Dept: CARDIOLOGY | Facility: CLINIC | Age: 88
End: 2023-02-16
Payer: MEDICARE

## 2023-02-16 NOTE — TELEPHONE ENCOUNTER
----- Message from Syl Herrera sent at 2/16/2023 12:57 PM EST -----  Regarding: Eliquis  Contact: [phone]  When we was at the office,  I was told to not take Plavix with the Eliquis. Then when I called to start the Eliquis  I was told to continue taking the plavix also.  I just need to clarify how I am to take the Eliquis

## 2023-02-16 NOTE — TELEPHONE ENCOUNTER
Please let patient know I do not see in Jayshree's last office note where he states for the patient to stop taking Plavix.  It is safe for patient to be on both Eliquis and Plavix.  Eliquis is for anticoagulation and Plavix as an antiplatelet that the patient is taking due to extensive CAD history.   However, please make sure the patient is not also taking a daily aspirin as we would like to avoid triple therapy.  If she is please have her stop the aspirin and continue to take Plavix daily and Eliquis twice daily.  Thank you and let me know if she has any further questions

## 2023-02-16 NOTE — TELEPHONE ENCOUNTER
Called patient and verbally explained that she can take her plavix and eliquis, but not take her aspirin.    -Patient verbally understood.

## 2023-04-13 ENCOUNTER — APPOINTMENT (OUTPATIENT)
Dept: CT IMAGING | Facility: HOSPITAL | Age: 88
End: 2023-04-13
Payer: MEDICARE

## 2023-04-13 ENCOUNTER — APPOINTMENT (OUTPATIENT)
Dept: GENERAL RADIOLOGY | Facility: HOSPITAL | Age: 88
End: 2023-04-13
Payer: MEDICARE

## 2023-04-13 ENCOUNTER — HOSPITAL ENCOUNTER (EMERGENCY)
Facility: HOSPITAL | Age: 88
Discharge: HOME | End: 2023-04-14
Attending: EMERGENCY MEDICINE | Admitting: EMERGENCY MEDICINE
Payer: MEDICARE

## 2023-04-13 DIAGNOSIS — N39.0 ACUTE UTI: ICD-10-CM

## 2023-04-13 DIAGNOSIS — J20.8 VIRAL BRONCHITIS: Primary | ICD-10-CM

## 2023-04-13 DIAGNOSIS — R05.1 ACUTE COUGH: ICD-10-CM

## 2023-04-13 LAB
ALBUMIN SERPL-MCNC: 4.5 G/DL (ref 3.5–5.2)
ALBUMIN/GLOB SERPL: 1.6 G/DL
ALP SERPL-CCNC: 156 U/L (ref 39–117)
ALT SERPL W P-5'-P-CCNC: 18 U/L (ref 1–33)
ANION GAP SERPL CALCULATED.3IONS-SCNC: 12 MMOL/L (ref 5–15)
AST SERPL-CCNC: 29 U/L (ref 1–32)
B PARAPERT DNA SPEC QL NAA+PROBE: NOT DETECTED
B PERT DNA SPEC QL NAA+PROBE: NOT DETECTED
BACTERIA UR QL AUTO: ABNORMAL /HPF
BASOPHILS # BLD AUTO: 0 10*3/MM3 (ref 0–0.2)
BASOPHILS NFR BLD AUTO: 0.9 % (ref 0–1.5)
BILIRUB SERPL-MCNC: 1.1 MG/DL (ref 0–1.2)
BILIRUB UR QL STRIP: NEGATIVE
BUN SERPL-MCNC: 20 MG/DL (ref 8–23)
BUN/CREAT SERPL: 18 (ref 7–25)
C PNEUM DNA NPH QL NAA+NON-PROBE: NOT DETECTED
CALCIUM SPEC-SCNC: 9.4 MG/DL (ref 8.6–10.5)
CHLORIDE SERPL-SCNC: 93 MMOL/L (ref 98–107)
CLARITY UR: CLEAR
CO2 SERPL-SCNC: 25 MMOL/L (ref 22–29)
COLOR UR: YELLOW
CREAT SERPL-MCNC: 1.11 MG/DL (ref 0.57–1)
D DIMER PPP FEU-MCNC: 0.31 MG/L (FEU) (ref 0–0.85)
DEPRECATED RDW RBC AUTO: 45.1 FL (ref 37–54)
EGFRCR SERPLBLD CKD-EPI 2021: 48.8 ML/MIN/1.73
EOSINOPHIL # BLD AUTO: 0.1 10*3/MM3 (ref 0–0.4)
EOSINOPHIL NFR BLD AUTO: 1.6 % (ref 0.3–6.2)
ERYTHROCYTE [DISTWIDTH] IN BLOOD BY AUTOMATED COUNT: 14.3 % (ref 12.3–15.4)
FLUAV SUBTYP SPEC NAA+PROBE: NOT DETECTED
FLUBV RNA ISLT QL NAA+PROBE: NOT DETECTED
GEN 5 2HR TROPONIN T REFLEX: 10 NG/L
GLOBULIN UR ELPH-MCNC: 2.8 GM/DL
GLUCOSE SERPL-MCNC: 101 MG/DL (ref 65–99)
GLUCOSE UR STRIP-MCNC: NEGATIVE MG/DL
HADV DNA SPEC NAA+PROBE: NOT DETECTED
HCOV 229E RNA SPEC QL NAA+PROBE: NOT DETECTED
HCOV HKU1 RNA SPEC QL NAA+PROBE: NOT DETECTED
HCOV NL63 RNA SPEC QL NAA+PROBE: NOT DETECTED
HCOV OC43 RNA SPEC QL NAA+PROBE: NOT DETECTED
HCT VFR BLD AUTO: 41.2 % (ref 34–46.6)
HGB BLD-MCNC: 13.2 G/DL (ref 12–15.9)
HGB UR QL STRIP.AUTO: NEGATIVE
HMPV RNA NPH QL NAA+NON-PROBE: NOT DETECTED
HPIV1 RNA ISLT QL NAA+PROBE: NOT DETECTED
HPIV2 RNA SPEC QL NAA+PROBE: NOT DETECTED
HPIV3 RNA NPH QL NAA+PROBE: DETECTED
HPIV4 P GENE NPH QL NAA+PROBE: NOT DETECTED
HYALINE CASTS UR QL AUTO: ABNORMAL /LPF
KETONES UR QL STRIP: ABNORMAL
LEUKOCYTE ESTERASE UR QL STRIP.AUTO: ABNORMAL
LIPASE SERPL-CCNC: 14 U/L (ref 13–60)
LYMPHOCYTES # BLD AUTO: 1 10*3/MM3 (ref 0.7–3.1)
LYMPHOCYTES NFR BLD AUTO: 18.4 % (ref 19.6–45.3)
M PNEUMO IGG SER IA-ACNC: NOT DETECTED
MCH RBC QN AUTO: 29.3 PG (ref 26.6–33)
MCHC RBC AUTO-ENTMCNC: 32 G/DL (ref 31.5–35.7)
MCV RBC AUTO: 91.8 FL (ref 79–97)
MONOCYTES # BLD AUTO: 0.6 10*3/MM3 (ref 0.1–0.9)
MONOCYTES NFR BLD AUTO: 11.4 % (ref 5–12)
NEUTROPHILS NFR BLD AUTO: 3.7 10*3/MM3 (ref 1.7–7)
NEUTROPHILS NFR BLD AUTO: 67.7 % (ref 42.7–76)
NITRITE UR QL STRIP: NEGATIVE
NRBC BLD AUTO-RTO: 0 /100 WBC (ref 0–0.2)
PH UR STRIP.AUTO: 6.5 [PH] (ref 5–8)
PLATELET # BLD AUTO: 126 10*3/MM3 (ref 140–450)
PMV BLD AUTO: 9.4 FL (ref 6–12)
POTASSIUM SERPL-SCNC: 4.7 MMOL/L (ref 3.5–5.2)
PROT SERPL-MCNC: 7.3 G/DL (ref 6–8.5)
PROT UR QL STRIP: NEGATIVE
RBC # BLD AUTO: 4.49 10*6/MM3 (ref 3.77–5.28)
RBC # UR STRIP: ABNORMAL /HPF
REF LAB TEST METHOD: ABNORMAL
RHINOVIRUS RNA SPEC NAA+PROBE: NOT DETECTED
RSV RNA NPH QL NAA+NON-PROBE: NOT DETECTED
SARS-COV-2 RNA NPH QL NAA+NON-PROBE: NOT DETECTED
SODIUM SERPL-SCNC: 130 MMOL/L (ref 136–145)
SP GR UR STRIP: 1.02 (ref 1–1.03)
SQUAMOUS #/AREA URNS HPF: ABNORMAL /HPF
TROPONIN T DELTA: 0 NG/L
TROPONIN T SERPL HS-MCNC: 10 NG/L
UROBILINOGEN UR QL STRIP: ABNORMAL
WBC # UR STRIP: ABNORMAL /HPF
WBC NRBC COR # BLD: 5.4 10*3/MM3 (ref 3.4–10.8)

## 2023-04-13 PROCEDURE — 80053 COMPREHEN METABOLIC PANEL: CPT | Performed by: NURSE PRACTITIONER

## 2023-04-13 PROCEDURE — 0202U NFCT DS 22 TRGT SARS-COV-2: CPT | Performed by: NURSE PRACTITIONER

## 2023-04-13 PROCEDURE — 94799 UNLISTED PULMONARY SVC/PX: CPT

## 2023-04-13 PROCEDURE — 96374 THER/PROPH/DIAG INJ IV PUSH: CPT

## 2023-04-13 PROCEDURE — 25010000002 METHYLPREDNISOLONE PER 125 MG: Performed by: PHYSICIAN ASSISTANT

## 2023-04-13 PROCEDURE — 87086 URINE CULTURE/COLONY COUNT: CPT | Performed by: PHYSICIAN ASSISTANT

## 2023-04-13 PROCEDURE — 93005 ELECTROCARDIOGRAM TRACING: CPT | Performed by: NURSE PRACTITIONER

## 2023-04-13 PROCEDURE — 83690 ASSAY OF LIPASE: CPT | Performed by: PHYSICIAN ASSISTANT

## 2023-04-13 PROCEDURE — 85379 FIBRIN DEGRADATION QUANT: CPT | Performed by: PHYSICIAN ASSISTANT

## 2023-04-13 PROCEDURE — 71250 CT THORAX DX C-: CPT

## 2023-04-13 PROCEDURE — 36415 COLL VENOUS BLD VENIPUNCTURE: CPT

## 2023-04-13 PROCEDURE — 87205 SMEAR GRAM STAIN: CPT | Performed by: NURSE PRACTITIONER

## 2023-04-13 PROCEDURE — 74176 CT ABD & PELVIS W/O CONTRAST: CPT

## 2023-04-13 PROCEDURE — 94640 AIRWAY INHALATION TREATMENT: CPT

## 2023-04-13 PROCEDURE — 85025 COMPLETE CBC W/AUTO DIFF WBC: CPT | Performed by: NURSE PRACTITIONER

## 2023-04-13 PROCEDURE — 84484 ASSAY OF TROPONIN QUANT: CPT | Performed by: PHYSICIAN ASSISTANT

## 2023-04-13 PROCEDURE — 81001 URINALYSIS AUTO W/SCOPE: CPT | Performed by: NURSE PRACTITIONER

## 2023-04-13 PROCEDURE — 99284 EMERGENCY DEPT VISIT MOD MDM: CPT

## 2023-04-13 PROCEDURE — 71046 X-RAY EXAM CHEST 2 VIEWS: CPT

## 2023-04-13 RX ORDER — BENZONATATE 200 MG/1
200 CAPSULE ORAL 3 TIMES DAILY PRN
Qty: 30 CAPSULE | Refills: 0 | Status: SHIPPED | OUTPATIENT
Start: 2023-04-13

## 2023-04-13 RX ORDER — CEFDINIR 300 MG/1
300 CAPSULE ORAL 2 TIMES DAILY
Qty: 14 CAPSULE | Refills: 0 | Status: SHIPPED | OUTPATIENT
Start: 2023-04-13

## 2023-04-13 RX ORDER — ACETAMINOPHEN 500 MG
1000 TABLET ORAL ONCE
Status: COMPLETED | OUTPATIENT
Start: 2023-04-13 | End: 2023-04-13

## 2023-04-13 RX ORDER — CEFDINIR 300 MG/1
300 CAPSULE ORAL ONCE
Status: COMPLETED | OUTPATIENT
Start: 2023-04-13 | End: 2023-04-13

## 2023-04-13 RX ORDER — GUAIFENESIN 600 MG/1
1200 TABLET, EXTENDED RELEASE ORAL 2 TIMES DAILY
Qty: 28 TABLET | Refills: 0 | Status: SHIPPED | OUTPATIENT
Start: 2023-04-13

## 2023-04-13 RX ORDER — METHYLPREDNISOLONE 4 MG/1
TABLET ORAL
Qty: 21 EACH | Refills: 0 | Status: SHIPPED | OUTPATIENT
Start: 2023-04-13

## 2023-04-13 RX ORDER — METHYLPREDNISOLONE SODIUM SUCCINATE 125 MG/2ML
125 INJECTION, POWDER, LYOPHILIZED, FOR SOLUTION INTRAMUSCULAR; INTRAVENOUS ONCE
Status: COMPLETED | OUTPATIENT
Start: 2023-04-13 | End: 2023-04-13

## 2023-04-13 RX ORDER — SODIUM CHLORIDE 0.9 % (FLUSH) 0.9 %
10 SYRINGE (ML) INJECTION AS NEEDED
Status: DISCONTINUED | OUTPATIENT
Start: 2023-04-13 | End: 2023-04-14 | Stop reason: HOSPADM

## 2023-04-13 RX ORDER — IPRATROPIUM BROMIDE AND ALBUTEROL SULFATE 2.5; .5 MG/3ML; MG/3ML
3 SOLUTION RESPIRATORY (INHALATION) ONCE
Status: DISCONTINUED | OUTPATIENT
Start: 2023-04-13 | End: 2023-04-13

## 2023-04-13 RX ORDER — IPRATROPIUM BROMIDE AND ALBUTEROL SULFATE 2.5; .5 MG/3ML; MG/3ML
3 SOLUTION RESPIRATORY (INHALATION)
Status: DISCONTINUED | OUTPATIENT
Start: 2023-04-13 | End: 2023-04-14 | Stop reason: HOSPADM

## 2023-04-13 RX ADMIN — METHYLPREDNISOLONE SODIUM SUCCINATE 125 MG: 125 INJECTION, POWDER, FOR SOLUTION INTRAMUSCULAR; INTRAVENOUS at 21:24

## 2023-04-13 RX ADMIN — IPRATROPIUM BROMIDE AND ALBUTEROL SULFATE 3 ML: .5; 3 SOLUTION RESPIRATORY (INHALATION) at 23:04

## 2023-04-13 RX ADMIN — CEFDINIR 300 MG: 300 CAPSULE ORAL at 23:38

## 2023-04-13 RX ADMIN — SODIUM CHLORIDE 500 ML: 9 INJECTION, SOLUTION INTRAVENOUS at 21:02

## 2023-04-13 RX ADMIN — ACETAMINOPHEN 1000 MG: 500 TABLET, FILM COATED ORAL at 21:56

## 2023-04-13 NOTE — Clinical Note
[facility] EMERGENCY DEPARTMENT  [address] [place] [address]  Phone: [phone]    Syl Herrera was seen and treated in our emergency department on 4/13/2023.  She may return to work on 04/14/2023.         Thank you for choosing [facility].    Sukumar Corona PA

## 2023-04-13 NOTE — ED PROVIDER NOTES
Subjective    Provider in Triage Note  Cough x 5 years, progressively worsening over the last couple weeks.  Denies any chest pain.  She follows with Dr. Castellanos with pulmonology and is not on any oxygen CPAP or BiPAP.  No known fever  + dyspnea  She reports chronic diarrhea with multiple evaluations for that is currently at baseline    Pcp: alea  Cardio- manchi  pulm- lakisha       Chart review ct chest 3/2022: 1. Multiple subcentimeter noncalcified pulmonary nodules bilaterally   measuring up to 4 mm. Follow-up chest CT in 12 months is recommended for   patients with risk factors for lung cancer per Fleischner guidelines.   2. 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   There has been a decrease in associated groundglass opacities suggesting   decreasing edema or inflammatory component.       History of Present Illness     HPI reviewed as above.  Patient reports ongoing cough for the past few years but worse and more productive over the past few days yellowish-green sputum.  No blood.  Patient denies any lower extremity swelling, vomiting, diarrhea.     Review of Systems   Respiratory: Positive for chest tightness and shortness of breath.    Gastrointestinal: Positive for diarrhea.       Past Medical History:   Diagnosis Date   • Anxiety    • Arthritis    • Asthma    • Atrial fibrillation    • Coronary artery disease    • Dyslipidemia    • GERD (gastroesophageal reflux disease)    • History of bone density study 04/2015   • Hypertension    • Sleep apnea    • Wears dentures        Allergies   Allergen Reactions   • Doxycycline Other (See Comments)     Chest pain   • Celebrex [Celecoxib] Other (See Comments)     Chest pain   • Contrast Dye (Echo Or Unknown Ct/Mr) Itching   • Iodinated Contrast Media Itching   • Nsaids Other (See Comments)     convulsion   • Other Itching     Pt states some steroids make her itch and hallucinate- she does not know the names   She said she is allergic to all  arthritis medicine   • Sulfa Antibiotics Nausea Only       Past Surgical History:   Procedure Laterality Date   • BLADDER REPAIR  1990   • BREAST LUMPECTOMY  1974    BENIGN   • CARDIAC CATHETERIZATION  05/25/2011   • CARDIAC CATHETERIZATION N/A 04/21/2021    Procedure: Left Heart Cath and coronary angiogram;  Surgeon: Ivan Martell MD;  Location: [facility] CATH INVASIVE LOCATION;  Service: Cardiovascular;  Laterality: N/A;   • CARDIAC CATHETERIZATION N/A 04/21/2021    Procedure: Left ventriculography;  Surgeon: Ivan Martell MD;  Location: [facility] CATH INVASIVE LOCATION;  Service: Cardiovascular;  Laterality: N/A;   • CARDIAC CATHETERIZATION  04/21/2021    Procedure: Saphenous Vein Graft;  Surgeon: Ivan Martell MD;  Location: [facility] CATH INVASIVE LOCATION;  Service: Cardiovascular;;   • CARDIAC CATHETERIZATION N/A 04/26/2021    Procedure: Percutaneous Coronary Intervention;  Surgeon: Ivan Martell MD;  Location: [facility] CATH INVASIVE LOCATION;  Service: Cardiovascular;  Laterality: N/A;   • CARDIAC CATHETERIZATION N/A 04/26/2021    Procedure: Stent RAY coronary;  Surgeon: Ivan Martell MD;  Location: [facility] CATH INVASIVE LOCATION;  Service: Cardiovascular;  Laterality: N/A;   • CARDIOVASCULAR STRESS TEST  05/04/2015   • CHOLECYSTECTOMY  1990   • CORONARY ARTERY BYPASS GRAFT  08/09/2017    X5  Dr Brandt   • ENDOSCOPY N/A 06/30/2020    Procedure: ESOPHAGOGASTRODUODENOSCOPY with biopsy x 1 area and dilatation (15-18mm balloon) up to 18mm;  Surgeon: Quinton Tapia MD;  Location: [facility] ENDOSCOPY;  Service: Gastroenterology;  Laterality: N/A;  post op: gastritis, large hiatal hernia, esophageal dysmotility   • HYSTERECTOMY  1990   • INSERT / REPLACE / REMOVE PACEMAKER     • JOINT REPLACEMENT Right     knee    • OTHER SURGICAL HISTORY  06/2017    BLOOD CLOT REMOVED FROM LEFT EAR   • PACEMAKER IMPLANTATION  04/18/2016    DUAL CHAMBER ST ALESSIO       Family History   Problem Relation Age of Onset   • Hypertension Mother     • Heart disease Mother    • Heart disease Sister         PACEMAKER   • Heart disease Brother        Social History     Socioeconomic History   • Marital status:    Tobacco Use   • Smoking status: Never   • Smokeless tobacco: Never   Vaping Use   • Vaping Use: Never used   Substance and Sexual Activity   • Alcohol use: No   • Drug use: No   • Sexual activity: Not Currently     Partners: Female     Birth control/protection: Hysterectomy           Objective   Physical Exam  Vitals and nursing note reviewed.   Constitutional:       General: She is not in acute distress.     Appearance: She is well-developed. She is not diaphoretic.   HENT:      Head: Normocephalic and atraumatic.      Nose: Nose normal.      Mouth/Throat:      Pharynx: No oropharyngeal exudate.   Eyes:      Extraocular Movements: Extraocular movements intact.      Conjunctiva/sclera: Conjunctivae normal.      Pupils: Pupils are equal, round, and reactive to light.   Cardiovascular:      Rate and Rhythm: Normal rate and regular rhythm.      Heart sounds: Normal heart sounds.      Comments: S1, S2 audible.  Pulmonary:      Effort: Pulmonary effort is normal. No respiratory distress.      Breath sounds: Examination of the right-middle field reveals wheezing. Examination of the left-middle field reveals wheezing. Examination of the right-lower field reveals wheezing. Examination of the left-lower field reveals wheezing. Wheezing present. No rhonchi or rales.      Comments: On room air.  Abdominal:      General: Bowel sounds are normal. There is no distension.      Palpations: Abdomen is soft.      Tenderness: There is no abdominal tenderness.   Musculoskeletal:         General: No tenderness or deformity. Normal range of motion.      Cervical back: Normal range of motion.      Right lower leg: No edema.      Left lower leg: No edema.   Skin:     General: Skin is warm.      Capillary Refill: Capillary refill takes less than 2 seconds.      Findings:  "No erythema or rash.   Neurological:      Mental Status: She is alert and oriented to person, place, and time.      Cranial Nerves: No cranial nerve deficit.   Psychiatric:         Mood and Affect: Mood normal.         Behavior: Behavior normal.         Procedures           ED Course  ED Course as of 04/14/23 0340   u Apr 13, 2023 2142 Awaiting ct [RL]   2341 Awaiting walk test with pulse ox [RL]   2352 Patient was 95% SPO2 or above upon ambulating per ED tech. [RL]      ED Course User Index  [RL] Sukumar Corona PA      /68   Pulse 67   Temp 98 °F (36.7 °C) (Oral)   Resp 20   Ht 152.4 cm (60\")   Wt 75.8 kg (167 lb 1.7 oz)   SpO2 95%   BMI 32.64 kg/m²   Labs Reviewed   RESPIRATORY PANEL PCR W/ COVID-19 (SARS-COV-2) GERRY/MARQUISE/BENNY/PAD/COR/MAD/OLGA IN-HOUSE, NP SWAB IN UTM/VTP, 3-4 HR TAT - Abnormal; Notable for the following components:       Result Value    Parainfluenza Virus 3 Detected (*)     All other components within normal limits    Narrative:     In the setting of a positive respiratory panel with a viral infection PLUS a negative procalcitonin without other underlying concern for bacterial infection, consider observing off antibiotics or discontinuation of antibiotics and continue supportive care. If the respiratory panel is positive for atypical bacterial infection (Bordetella pertussis, Chlamydophila pneumoniae, or Mycoplasma pneumoniae), consider antibiotic de-escalation to target atypical bacterial infection.   URINALYSIS W/ CULTURE IF INDICATED - Abnormal; Notable for the following components:    Ketones, UA 40 mg/dL (2+) (*)     Leuk Esterase, UA Trace (*)     All other components within normal limits    Narrative:     In absence of clinical symptoms, the presence of pyuria, bacteria, and/or nitrites on the urinalysis result does not correlate with infection.   COMPREHENSIVE METABOLIC PANEL - Abnormal; Notable for the following components:    Glucose 101 (*)     Creatinine 1.11 (*)     Sodium " 130 (*)     Chloride 93 (*)     Alkaline Phosphatase 156 (*)     eGFR 48.8 (*)     All other components within normal limits    Narrative:     GFR Normal >60  Chronic Kidney Disease <60  Kidney Failure <15    The GFR formula is only valid for adults with stable renal function between ages 18 and 70.   CBC WITH AUTO DIFFERENTIAL - Abnormal; Notable for the following components:    Platelets 126 (*)     Lymphocyte % 18.4 (*)     All other components within normal limits   URINALYSIS, MICROSCOPIC ONLY - Abnormal; Notable for the following components:    RBC, UA 0-2 (*)     WBC, UA 0-2 (*)     Squamous Epithelial Cells, UA 3-6 (*)     All other components within normal limits   TROPONIN - Abnormal; Notable for the following components:    HS Troponin T 10 (*)     All other components within normal limits    Narrative:     High Sensitive Troponin T Reference Range:  <10.0 ng/L- Negative Female for AMI  <15.0 ng/L- Negative Male for AMI  >=10 - Abnormal Female indicating possible myocardial injury.  >=15 - Abnormal Male indicating possible myocardial injury.   Clinicians would have to utilize clinical acumen, EKG, Troponin, and serial changes to determine if it is an Acute Myocardial Infarction or myocardial injury due to an underlying chronic condition.        HIGH SENSITIVITIY TROPONIN T 2HR - Abnormal; Notable for the following components:    HS Troponin T 10 (*)     All other components within normal limits    Narrative:     High Sensitive Troponin T Reference Range:  <10.0 ng/L- Negative Female for AMI  <15.0 ng/L- Negative Male for AMI  >=10 - Abnormal Female indicating possible myocardial injury.  >=15 - Abnormal Male indicating possible myocardial injury.   Clinicians would have to utilize clinical acumen, EKG, Troponin, and serial changes to determine if it is an Acute Myocardial Infarction or myocardial injury due to an underlying chronic condition.        LIPASE - Normal   D-DIMER, QUANTITATIVE - Normal     "Narrative:     According to the assay 's published package insert, a normal (<0.50 mg/L (FEU)) D-dimer result in conjunction with a non-high clinical probability assessment, excludes deep vein thrombosis (DVT) and pulmonary embolism (PE) with high sensitivity.    D-dimer values increase with age and this can make VTE exclusion of an older population difficult. To address this, the [employer], based on best available evidence and recent guidelines, recommends that clinicians use age-adjusted D-dimer thresholds in patients greater than 50 years of age with: a) a low probability of PE who do not meet all Pulmonary Embolism Rule Out Criteria, or b) in those with intermediate probability of PE.   The formula for an age-adjusted D-dimer cut-off is \"age/100\".  For example, a 60 year old patient would have an age-adjusted cut-off of 0.60 mg/L (FEU) and an 80 year old 0.80 mg/L (FEU).   RESPIRATORY CULTURE   URINE CULTURE   CBC AND DIFFERENTIAL    Narrative:     The following orders were created for panel order CBC & Differential.  Procedure                               Abnormality         Status                     ---------                               -----------         ------                     CBC Auto Differential[[id number]]        Abnormal            Final result                 Please view results for these tests on the individual orders.     CT Abdomen Pelvis Without Contrast    Result Date: 4/13/2023  Chest: No acute findings. Cardiomegaly, moderate coronary artery atherosclerotic cast location, moderate hiatal hernia, chronic changes of lung bases with septal thickening and bronchiectasis. Abdomen pelvis: Mild wall thickening urinary bladder with small amount gas in the bladder, question recent instrumentation. Correlate for cystitis. Diverticulosis without diverticulitis. Moderate stool burden. Electronically Signed: Mary Ellen Cormier  4/13/2023 10:55 PM EDT  Workstation ID: " [id number]    XR Chest 2 View    Result Date: 4/13/2023  Impression: No acute cardiopulmonary abnormality. Cardiomegaly. Moderate to large hiatal hernia. Electronically Signed: Mary Ellen Vonoris  4/13/2023 7:39 PM EDT  Workstation ID: [id number]    CT Chest Without Contrast Diagnostic    Result Date: 4/13/2023  Chest: No acute findings. Cardiomegaly, moderate coronary artery atherosclerotic cast location, moderate hiatal hernia, chronic changes of lung bases with septal thickening and bronchiectasis. Abdomen pelvis: Mild wall thickening urinary bladder with small amount gas in the bladder, question recent instrumentation. Correlate for cystitis. Diverticulosis without diverticulitis. Moderate stool burden. Electronically Signed: Mary Ellen Vonoris  4/13/2023 10:55 PM EDT  Workstation ID: [id number]                                         [facility]     Differential diagnosis: Bronchitis, pneumonia, ACS, PE, not meant to be an all-inclusive list.    Chart review: Chart review mentioned above.    EKG:  EKG independently interpreted by myself shows sinus rhythm 60 bpm, no ST elevation apparent.  Similar to previous study done on 5/7/2021 showed sinus rhythm, 70bpm, no ST elevation apparent.  Findings confirmed by attending provider above.    Imaging: Chest x-ray independently interpreted by myself shows cardiomegaly, pacemaker device, no obvious pulmonary infiltrates.  Official radiology read shows no acute cardiopulmonary abnormality.  Cardiomegaly.  Moderate to large hiatal hernia.  CT chest abdomen pelvis shows no acute findings.  Cardiomegaly.  Moderate's coronary artery atherosclerotic changes.  Moderate hiatal hernia.  Changes of lung bases that appear chronic with septal thickening and bronchiectasis.  Mild wall thickening of the urinary bladder with small amount of gas in the bladder.  Question recent instrumentation.  Correlate for cystitis.  Moderate stool burden.    Labs: Lab work independently interpreted by myself shows CBC largely  "unremarkable for acute findings, platelets low at 126, similar to previous study.  CMP largely unremarkable for acute findings.  Respiratory viral panel was positive for parainfluenza virus.  Lipase normal.  Troponin and very mildly elevated at 10, repeat of 10.  UA shows trace leukocyte esterase, no bacteria seen.  Urine culture pending.  D-dimer negative.  Respiratory culture of sputum collected.  Vitals:  /68   Pulse 67   Temp 98 °F (36.7 °C) (Oral)   Resp 20   Ht 152.4 cm (60\")   Wt 75.8 kg (167 lb 1.7 oz)   SpO2 95%   BMI 32.64 kg/m²     Medications given:    Medications   sodium chloride 0.9 % bolus 500 mL (0 mL Intravenous Stopped 4/13/23 2156)   methylPREDNISolone sodium succinate (SOLU-Medrol) injection 125 mg (125 mg Intravenous Given 4/13/23 2124)   acetaminophen (TYLENOL) tablet 1,000 mg (1,000 mg Oral Given 4/13/23 2156)   cefdinir (OMNICEF) capsule 300 mg (300 mg Oral Given 4/13/23 2338)       Procedures:  Not indicated  MDM: Patient is a 89-year-old female comes in complaining of shortness of breath and cough.  Patient states her shortness of breath is chronic and mildly worse.  Patient has bilateral wheezing on exam.  IV established.  Lab work independently interpreted by myself shows CBC largely unremarkable for acute findings, platelets low at 126, similar to previous study.  CMP largely unremarkable for acute findings.  Respiratory viral panel was positive for parainfluenza virus.  Lipase normal.  Troponin and very mildly elevated at 10, repeat of 10.  UA shows trace leukocyte esterase, no bacteria seen.  Urine culture pending.  D-dimer negative.  Respiratory culture of sputum collected. Chest x-ray independently interpreted by myself shows cardiomegaly, pacemaker device, no obvious pulmonary infiltrates.  Official radiology read shows no acute cardiopulmonary abnormality.  Cardiomegaly.  Moderate to large hiatal hernia.  CT chest abdomen pelvis shows  Cardiomegaly.  Moderate's " coronary artery atherosclerotic changes.  Moderate hiatal hernia.  Changes of lung bases that appear chronic with septal thickening and bronchiectasis.  Mild wall thickening of the urinary bladder with small amount of gas in the bladder.  Question recent instrumentation.  Correlate for cystitis.  Moderate stool burden.   EKG independently interpreted by myself shows sinus rhythm 60 bpm, no ST elevation apparent.  Similar to previous study done on 5/7/2021 showed sinus rhythm, 70bpm, no ST elevation apparent.  Findings confirmed by attending provider above.  Patient was given Solu-Medrol and breathing treatment with improvement of wheezing.  Patient was given 500 normal saline bolus given ketones in urine.  Patient was given Tylenol for headache.  Patient is likely suffering from viral bronchitis secondary to parainfluenza virus.  Patient to be discharged on Medrol Dosepak as well as Omnicef for questionable UTI on lab and CT.  Patient and family given strict return precautions and voiced understanding.  Patient was ambulated upon discharge and did not fall below 95% SPO2 on room air.  See full discharge instructions for further details.  Results and plan discussed with patient and is agreeable with plan.    Final diagnoses:   Viral bronchitis   Acute UTI   Acute cough       ED Disposition  ED Disposition     ED Disposition   Discharge    Condition   Stable    Comment   --             [facility] EMERGENCY DEPARTMENT  [address] [address] [phone]  Go in 1 day  As needed, If symptoms worsen    Nava Yu MD  [address] [facility] [place] [address] [phone]    Schedule an appointment as soon as possible for a visit in 1 week  for symptom follow up         Medication List      New Prescriptions    benzonatate 200 MG capsule  Commonly known as: TESSALON  Take 1 capsule by mouth 3 (Three) Times a Day As Needed for Cough.     cefdinir 300 MG capsule  Commonly known as:  OMNICEF  Take 1 capsule by mouth 2 (Two) Times a Day.     guaiFENesin 600 MG 12 hr tablet  Commonly known as: MUCINEX  Take 2 tablets by mouth 2 (Two) Times a Day.           Where to Get Your Medications      These medications were sent to [id number] - [place], [place] - [address] AT SEC OF [place] [address] & [address] - [phone]  - [phone] FX  [address], [place] [address]    Phone: [phone]   · benzonatate 200 MG capsule  · cefdinir 300 MG capsule  · guaiFENesin 600 MG 12 hr tablet  · methylPREDNISolone 4 MG dose pack          Sukumar Corona PA  04/14/23 2284

## 2023-04-14 VITALS
OXYGEN SATURATION: 95 % | HEIGHT: 60 IN | SYSTOLIC BLOOD PRESSURE: 132 MMHG | HEART RATE: 67 BPM | BODY MASS INDEX: 32.81 KG/M2 | RESPIRATION RATE: 20 BRPM | WEIGHT: 167.11 LBS | TEMPERATURE: 98 F | DIASTOLIC BLOOD PRESSURE: 68 MMHG

## 2023-04-14 LAB
BACTERIA SPEC RESP CULT: NORMAL
GRAM STN SPEC: NORMAL

## 2023-04-15 LAB — BACTERIA SPEC AEROBE CULT: NORMAL

## 2023-04-17 LAB — QT INTERVAL: 416 MS

## 2023-04-17 RX ORDER — METOPROLOL TARTRATE 100 MG/1
TABLET ORAL
Qty: 180 TABLET | Refills: 1 | Status: SHIPPED | OUTPATIENT
Start: 2023-04-17

## 2023-04-17 NOTE — TELEPHONE ENCOUNTER
Rx Refill Note  Requested Prescriptions     Pending Prescriptions Disp Refills   • metoprolol tartrate (LOPRESSOR) 100 MG tablet [Pharmacy Med Name: METOPROLOL TARTRATE 100MG TABLETS] 180 tablet 1     Sig: TAKE 1 TABLET BY MOUTH TWICE DAILY      Last office visit with prescribing clinician: 1/24/2023      Next office visit with prescribing clinician: 7/27/2023                       Rhoda Sinclair MA  04/17/23, 09:14 EDT

## 2023-04-22 PROCEDURE — 93294 REM INTERROG EVL PM/LDLS PM: CPT | Performed by: INTERNAL MEDICINE

## 2023-04-22 PROCEDURE — 93296 REM INTERROG EVL PM/IDS: CPT | Performed by: INTERNAL MEDICINE

## 2023-05-01 RX ORDER — CLOPIDOGREL BISULFATE 75 MG/1
75 TABLET ORAL DAILY
Qty: 90 TABLET | Refills: 1 | Status: SHIPPED | OUTPATIENT
Start: 2023-05-01

## 2023-05-01 NOTE — TELEPHONE ENCOUNTER
Rx Refill Note  Requested Prescriptions     Pending Prescriptions Disp Refills   • clopidogrel (PLAVIX) 75 MG tablet [Pharmacy Med Name: CLOPIDOGREL 75MG TABLETS] 90 tablet 1     Sig: TAKE 1 TABLET BY MOUTH DAILY      Last office visit with prescribing clinician: 1/24/2023   Last telemedicine visit with prescribing clinician: 7/27/2023   Next office visit with prescribing clinician: 7/27/2023                         Would you like a call back once the refill request has been completed: [] Yes [] No    If the office needs to give you a call back, can they leave a voicemail: [] Yes [] No    Vivek Connell MA  05/01/23, 08:02 EDT

## 2023-05-31 ENCOUNTER — OFFICE (AMBULATORY)
Dept: URBAN - METROPOLITAN AREA CLINIC 64 | Facility: CLINIC | Age: 88
End: 2023-05-31

## 2023-05-31 VITALS
HEIGHT: 61 IN | HEART RATE: 60 BPM | WEIGHT: 166 LBS | SYSTOLIC BLOOD PRESSURE: 109 MMHG | DIASTOLIC BLOOD PRESSURE: 62 MMHG

## 2023-05-31 DIAGNOSIS — R13.10 DYSPHAGIA, UNSPECIFIED: ICD-10-CM

## 2023-05-31 DIAGNOSIS — R10.84 GENERALIZED ABDOMINAL PAIN: ICD-10-CM

## 2023-05-31 DIAGNOSIS — R19.7 DIARRHEA, UNSPECIFIED: ICD-10-CM

## 2023-05-31 PROCEDURE — 99213 OFFICE O/P EST LOW 20 MIN: CPT | Performed by: INTERNAL MEDICINE

## 2023-06-07 ENCOUNTER — TELEPHONE (OUTPATIENT)
Dept: CARDIOLOGY | Facility: CLINIC | Age: 88
End: 2023-06-07
Payer: MEDICARE

## 2023-06-07 DIAGNOSIS — R60.0 EDEMA LEG: Primary | ICD-10-CM

## 2023-06-07 RX ORDER — FUROSEMIDE 40 MG/1
40 TABLET ORAL 2 TIMES DAILY
Qty: 5 TABLET | Refills: 0 | Status: SHIPPED | OUTPATIENT
Start: 2023-06-07

## 2023-06-07 NOTE — TELEPHONE ENCOUNTER
Please let patient know I will send in 5 days of lasix. If no improvement in LE edema by Monday please have her make an appointment to be seen in office next week. Thank you.

## 2023-06-07 NOTE — TELEPHONE ENCOUNTER
LEGS ARE VERYSWOLLEN. HAS BEEN GOING ON 3 DAYS. ASKING IF DR. BRAGG WILL CALL HER IN WATER PILL TO [facility].    Spoke to patient regarding biopsies.  Per patient radiologist did tell her to repeat diagnostic mammogram and US in 6 months. Will need order.

## 2023-06-19 RX ORDER — SPIRONOLACTONE 25 MG/1
TABLET ORAL
Qty: 45 TABLET | Refills: 3 | Status: SHIPPED | OUTPATIENT
Start: 2023-06-19

## 2023-06-19 NOTE — TELEPHONE ENCOUNTER
Rx Refill Note  Requested Prescriptions     Pending Prescriptions Disp Refills    spironolactone (ALDACTONE) 25 MG tablet [Pharmacy Med Name: SPIRONOLACTONE 25MG TABLETS] 45 tablet 3     Sig: TAKE 1/2 TABLET BY MOUTH DAILY      Last office visit with prescribing clinician: 1/24/2023   Last telemedicine visit with prescribing clinician: Visit date not found   Next office visit with prescribing clinician: 7/27/2023                         Would you like a call back once the refill request has been completed: [] Yes [] No    If the office needs to give you a call back, can they leave a voicemail: [] Yes [] No    Vivek Connell MA  06/19/23, 07:45 EDT

## 2023-07-17 ENCOUNTER — HOSPITAL ENCOUNTER (OUTPATIENT)
Facility: HOSPITAL | Age: 88
Discharge: HOME | End: 2023-07-17
Attending: EMERGENCY MEDICINE | Admitting: EMERGENCY MEDICINE
Payer: MEDICARE

## 2023-07-17 VITALS
WEIGHT: 162 LBS | SYSTOLIC BLOOD PRESSURE: 156 MMHG | BODY MASS INDEX: 31.8 KG/M2 | TEMPERATURE: 97.9 F | HEART RATE: 65 BPM | DIASTOLIC BLOOD PRESSURE: 76 MMHG | OXYGEN SATURATION: 96 % | HEIGHT: 60 IN | RESPIRATION RATE: 18 BRPM

## 2023-07-17 DIAGNOSIS — S60.222A PAROXYSMAL HEMATOMA OF LEFT HAND, INITIAL ENCOUNTER: Primary | ICD-10-CM

## 2023-07-17 PROCEDURE — G0463 HOSPITAL OUTPT CLINIC VISIT: HCPCS

## 2023-07-17 NOTE — FSED PROVIDER NOTE
Subjective   History of Present Illness  88-year-old female presents here to the emergency department with family member, reports that approximately 1-1/2 weeks ago she had a purple spot to the base of the left thumb palmar surface.  She reports to be on Plavix and Eliquis.  She reports that over the last 1 to 2 days this purple spot has grown in size to a blood blister.  She denies active bleeding.  She reports the skin is still intact over the blood blister.  She reports of a follow-up with her cardiologist within 2 weeks.  She is denying all other concerns including chest pain shortness of breath nausea vomiting diarrhea and fever.  And bleeding from any other part of her body.  He denies recent injury or trauma    Review of Systems   All other systems reviewed and are negative.    Past Medical History:   Diagnosis Date    Anxiety     Arthritis     Asthma     Atrial fibrillation     Coronary artery disease     Dyslipidemia     GERD (gastroesophageal reflux disease)     History of bone density study 04/2015    Hypertension     Sleep apnea     Wears dentures        Allergies   Allergen Reactions    Doxycycline Other (See Comments)     Chest pain    Celebrex [Celecoxib] Other (See Comments)     Chest pain    Contrast Dye (Echo Or Unknown Ct/Mr) Itching    Iodinated Contrast Media Itching    Nsaids Other (See Comments)     convulsion    Other Itching     Pt states some steroids make her itch and hallucinate- she does not know the names   She said she is allergic to all arthritis medicine    Sulfa Antibiotics Nausea Only       Past Surgical History:   Procedure Laterality Date    BLADDER REPAIR  1990    BREAST LUMPECTOMY  1974    BENIGN    CARDIAC CATHETERIZATION  05/25/2011    CARDIAC CATHETERIZATION N/A 04/21/2021    Procedure: Left Heart Cath and coronary angiogram;  Surgeon: Ivan Martell MD;  Location: [facility] CATH INVASIVE LOCATION;  Service: Cardiovascular;  Laterality: N/A;    CARDIAC CATHETERIZATION N/A  04/21/2021    Procedure: Left ventriculography;  Surgeon: Ivan Martell MD;  Location: [facility] CATH INVASIVE LOCATION;  Service: Cardiovascular;  Laterality: N/A;    CARDIAC CATHETERIZATION  04/21/2021    Procedure: Saphenous Vein Graft;  Surgeon: Ivan Martell MD;  Location: [facility] CATH INVASIVE LOCATION;  Service: Cardiovascular;;    CARDIAC CATHETERIZATION N/A 04/26/2021    Procedure: Percutaneous Coronary Intervention;  Surgeon: Ivan Martell MD;  Location: [facility] CATH INVASIVE LOCATION;  Service: Cardiovascular;  Laterality: N/A;    CARDIAC CATHETERIZATION N/A 04/26/2021    Procedure: Stent RAY coronary;  Surgeon: Ivan Martell MD;  Location: [facility] CATH INVASIVE LOCATION;  Service: Cardiovascular;  Laterality: N/A;    CARDIOVASCULAR STRESS TEST  05/04/2015    CHOLECYSTECTOMY  1990    CORONARY ARTERY BYPASS GRAFT  08/09/2017    X5  Dr Brandt    ENDOSCOPY N/A 06/30/2020    Procedure: ESOPHAGOGASTRODUODENOSCOPY with biopsy x 1 area and dilatation (15-18mm balloon) up to 18mm;  Surgeon: Quinton Tapia MD;  Location: [facility] ENDOSCOPY;  Service: Gastroenterology;  Laterality: N/A;  post op: gastritis, large hiatal hernia, esophageal dysmotility    HYSTERECTOMY  1990    INSERT / REPLACE / REMOVE PACEMAKER      JOINT REPLACEMENT Right     knee     OTHER SURGICAL HISTORY  06/2017    BLOOD CLOT REMOVED FROM LEFT EAR    PACEMAKER IMPLANTATION  04/18/2016    DUAL CHAMBER [facility] ALESSIO       Family History   Problem Relation Age of Onset    Hypertension Mother     Heart disease Mother     Heart disease Sister         PACEMAKER    Heart disease Brother        Social History     Socioeconomic History    Marital status:    Tobacco Use    Smoking status: Never    Smokeless tobacco: Never   Vaping Use    Vaping Use: Never used   Substance and Sexual Activity    Alcohol use: No    Drug use: No    Sexual activity: Not Currently     Partners: Female     Birth control/protection: Hysterectomy           Objective   Physical  Exam  Constitutional:       Appearance: Normal appearance.   HENT:      Head: Normocephalic and atraumatic.      Nose: Nose normal.      Mouth/Throat:      Mouth: Mucous membranes are moist. Mucous membranes are dry.      Pharynx: Oropharynx is clear.   Eyes:      Extraocular Movements: Extraocular movements intact.      Pupils: Pupils are equal, round, and reactive to light.   Cardiovascular:      Rate and Rhythm: Normal rate.      Pulses: Normal pulses.      Heart sounds: Normal heart sounds.   Pulmonary:      Effort: Pulmonary effort is normal.      Breath sounds: Normal breath sounds.   Abdominal:      General: Abdomen is flat.      Palpations: Abdomen is soft.   Musculoskeletal:         General: Normal range of motion.      Cervical back: Normal range of motion.   Skin:     Comments: To the left hand at the base of patient's left thumb palmar surface there is a point 5 cm circular slightly raised blood blister.  With palpation of the blister is appears firm.  There is a callused edge beginning to form around the rim.  There is no warmth or tenderness I do not see any signs of purulent or abscess formation or cellulitic type inflammation.   Neurological:      General: No focal deficit present.      Mental Status: She is alert and oriented to person, place, and time.       Procedures           ED Course                                           Medical Decision Making  Discussed with patient that most likely this is a superficial rupture of a blood vessel that has bled under the surface of the skin and is causing the blood blister.  There does not appear to be any active bleeding at this time.  The blood blister or hematoma is starting to firm up the integrity of the skin is intact overlying the blood blister.  The recommending coverage with a foam dressing and gentle compression and elevation.  Discussed with caregiver that if the wound ruptures and patient has active bleeding to apply pressure and return to ER  as patient may need more advanced wound care at that time.    At this time I do not see the need to I&D or david or drain as this will disrupt skin integrity and potentially cause further bleeding    Problems Addressed:  Paroxysmal hematoma of left hand, initial encounter: acute illness or injury        Final diagnoses:   Paroxysmal hematoma of left hand, initial encounter       ED Disposition  ED Disposition       ED Disposition   Discharge    Condition   Stable    Comment   --               Nava Yu MD  [address] [facility] [address] [phone]               Medication List      No changes were made to your prescriptions during this visit.

## 2023-07-22 PROCEDURE — 93294 REM INTERROG EVL PM/LDLS PM: CPT | Performed by: INTERNAL MEDICINE

## 2023-07-22 PROCEDURE — 93296 REM INTERROG EVL PM/IDS: CPT | Performed by: INTERNAL MEDICINE

## 2023-07-27 ENCOUNTER — CLINICAL SUPPORT NO REQUIREMENTS (OUTPATIENT)
Dept: CARDIOLOGY | Facility: CLINIC | Age: 88
End: 2023-07-27
Payer: MEDICARE

## 2023-07-27 ENCOUNTER — OFFICE VISIT (OUTPATIENT)
Dept: CARDIOLOGY | Facility: CLINIC | Age: 88
End: 2023-07-27
Payer: MEDICARE

## 2023-07-27 VITALS
BODY MASS INDEX: 32.39 KG/M2 | OXYGEN SATURATION: 96 % | WEIGHT: 165 LBS | DIASTOLIC BLOOD PRESSURE: 83 MMHG | HEIGHT: 60 IN | HEART RATE: 59 BPM | SYSTOLIC BLOOD PRESSURE: 138 MMHG

## 2023-07-27 DIAGNOSIS — I48.0 PAROXYSMAL ATRIAL FIBRILLATION: ICD-10-CM

## 2023-07-27 DIAGNOSIS — Z95.0 PRESENCE OF CARDIAC PACEMAKER: Primary | ICD-10-CM

## 2023-07-27 DIAGNOSIS — E78.00 PURE HYPERCHOLESTEROLEMIA: ICD-10-CM

## 2023-07-27 DIAGNOSIS — I10 ESSENTIAL HYPERTENSION: ICD-10-CM

## 2023-07-27 DIAGNOSIS — G47.33 OBSTRUCTIVE SLEEP APNEA: ICD-10-CM

## 2023-07-27 DIAGNOSIS — I25.810 CORONARY ARTERY DISEASE INVOLVING CORONARY BYPASS GRAFT OF NATIVE HEART WITHOUT ANGINA PECTORIS: ICD-10-CM

## 2023-07-27 DIAGNOSIS — R00.1 BRADYCARDIA, SINUS: ICD-10-CM

## 2023-07-27 NOTE — PROGRESS NOTES
Subjective:     Encounter Date:07/27/2023      Patient ID: Syl Herrera is a 88 y.o. female.    Chief Complaint:  History of Present Illness 88-year-old white female with history of coronary disease history of atrial fibrillation status post pacemaker placement hypertension hyperlipidemia sleep apnea presents to my office for a follow-up.  Patient is currently stable without isms of chest pain but has some shortness of breath with exertion radiograms any PND orthopnea.  No palpitation but has some dizziness.  No syncope.  Patient has some swelling of the feet.  She is taking her medicines regularly.  She does not smoke.  Her pacemaker is working very well    The following portions of the patient's history were reviewed and updated as appropriate: allergies, current medications, past family history, past medical history, past social history, past surgical history, and problem list.  Past Medical History:   Diagnosis Date    Anxiety     Arthritis     Asthma     Atrial fibrillation     Coronary artery disease     Dyslipidemia     GERD (gastroesophageal reflux disease)     History of bone density study 04/2015    Hypertension     Sleep apnea     Wears dentures      Past Surgical History:   Procedure Laterality Date    BLADDER REPAIR  1990    BREAST LUMPECTOMY  1974    BENIGN    CARDIAC CATHETERIZATION  05/25/2011    CARDIAC CATHETERIZATION N/A 04/21/2021    Procedure: Left Heart Cath and coronary angiogram;  Surgeon: Ivan Martell MD;  Location: [facility] CATH INVASIVE LOCATION;  Service: Cardiovascular;  Laterality: N/A;    CARDIAC CATHETERIZATION N/A 04/21/2021    Procedure: Left ventriculography;  Surgeon: Ivan Martell MD;  Location: [facility] CATH INVASIVE LOCATION;  Service: Cardiovascular;  Laterality: N/A;    CARDIAC CATHETERIZATION  04/21/2021    Procedure: Saphenous Vein Graft;  Surgeon: Ivan Martell MD;  Location: [facility] CATH INVASIVE LOCATION;  Service: Cardiovascular;;    CARDIAC CATHETERIZATION N/A  "04/26/2021    Procedure: Percutaneous Coronary Intervention;  Surgeon: Ivan Martell MD;  Location: [facility] CATH INVASIVE LOCATION;  Service: Cardiovascular;  Laterality: N/A;    CARDIAC CATHETERIZATION N/A 04/26/2021    Procedure: Stent RAY coronary;  Surgeon: Ivan Martell MD;  Location: [facility] CATH INVASIVE LOCATION;  Service: Cardiovascular;  Laterality: N/A;    CARDIOVASCULAR STRESS TEST  05/04/2015    CHOLECYSTECTOMY  1990    CORONARY ARTERY BYPASS GRAFT  08/09/2017    X5  Dr Brandt    ENDOSCOPY N/A 06/30/2020    Procedure: ESOPHAGOGASTRODUODENOSCOPY with biopsy x 1 area and dilatation (15-18mm balloon) up to 18mm;  Surgeon: Quinton Tapia MD;  Location: [facility] ENDOSCOPY;  Service: Gastroenterology;  Laterality: N/A;  post op: gastritis, large hiatal hernia, esophageal dysmotility    HYSTERECTOMY  1990    INSERT / REPLACE / REMOVE PACEMAKER      JOINT REPLACEMENT Right     knee     OTHER SURGICAL HISTORY  06/2017    BLOOD CLOT REMOVED FROM LEFT EAR    PACEMAKER IMPLANTATION  04/18/2016    DUAL CHAMBER [facility] ALESSIO     /83   Pulse 59   Ht 152.4 cm (60\")   Wt 74.8 kg (165 lb)   SpO2 96%   BMI 32.22 kg/m²   Family History   Problem Relation Age of Onset    Hypertension Mother     Heart disease Mother     Heart disease Sister         PACEMAKER    Heart disease Brother        Current Outpatient Medications:     ALPRAZolam (XANAX) 0.5 MG tablet, Take 1 tablet by mouth 2 (Two) Times a Day As Needed., Disp: , Rfl: 5    apixaban (Eliquis) 5 MG tablet tablet, Take 1 tablet by mouth 2 (Two) Times a Day., Disp: 180 tablet, Rfl: 3    atorvastatin (LIPITOR) 10 MG tablet, Take 1 tablet by mouth Every Night., Disp: , Rfl:     azithromycin (ZITHROMAX) 250 MG tablet, Take 2 tablets the first day, then 1 tablet daily for 4 days., Disp: 6 tablet, Rfl: 0    benzonatate (TESSALON) 200 MG capsule, Take 1 capsule by mouth 3 (Three) Times a Day As Needed for Cough., Disp: 30 capsule, Rfl: 0    budesonide (PULMICORT) 0.5 MG/2ML " nebulizer solution, Take 2 mL by nebulization 2 (Two) Times a Day., Disp: 60 each, Rfl: 1    carboxymethylcellulose (REFRESH PLUS) 0.5 % solution, Administer 1 drop to both eyes 3 (Three) Times a Day As Needed for Dry Eyes., Disp: , Rfl:     cefdinir (OMNICEF) 300 MG capsule, Take 1 capsule by mouth 2 (Two) Times a Day., Disp: 14 capsule, Rfl: 0    clopidogrel (PLAVIX) 75 MG tablet, TAKE 1 TABLET BY MOUTH DAILY, Disp: 90 tablet, Rfl: 1    Diclofenac Sodium (VOLTAREN) 1 % gel gel, Apply 4 g topically to the appropriate area as directed 4 (Four) Times a Day As Needed., Disp: , Rfl:     dilTIAZem (TIAZAC) 120 MG 24 hr capsule, TAKE 1 CAPSULE BY MOUTH DAILY, Disp: 90 capsule, Rfl: 1    diphenhydrAMINE HCl (BENADRYL ALLERGY PO), Take 1 tablet by mouth As Needed., Disp: , Rfl:     ferrous sulfate 324 (65 Fe) MG tablet delayed-release EC tablet, Take 1 tablet by mouth Daily With Breakfast., Disp: 30 tablet, Rfl: 0    furosemide (LASIX) 40 MG tablet, Take 1 tablet by mouth 2 (Two) Times a Day., Disp: 5 tablet, Rfl: 0    guaiFENesin (MUCINEX) 600 MG 12 hr tablet, Take 2 tablets by mouth 2 (Two) Times a Day., Disp: 28 tablet, Rfl: 0    HYDROcodone-acetaminophen (NORCO) 7.5-325 MG per tablet, Take 1 tablet by mouth Every 8 (Eight) Hours As Needed., Disp: , Rfl: 0    ipratropium-albuterol (DUO-NEB) 0.5-2.5 mg/3 ml nebulizer, Take 3 mL by nebulization 4 (Four) Times a Day., Disp: 180 mL, Rfl: 1    isosorbide mononitrate (IMDUR) 30 MG 24 hr tablet, Take 1 tablet by mouth Every Morning., Disp: 90 tablet, Rfl: 3    magnesium oxide (MAG-OX) 400 MG tablet, Take 1 tablet by mouth Daily., Disp: , Rfl:     methylPREDNISolone (MEDROL) 4 MG dose pack, Take as directed on package instructions., Disp: 21 each, Rfl: 0    metoprolol tartrate (LOPRESSOR) 100 MG tablet, TAKE 1 TABLET BY MOUTH TWICE DAILY, Disp: 180 tablet, Rfl: 1    Multiple Vitamins-Minerals (VITEYES AREDS FORMULA) capsule, Take 1 capsule by mouth 2 (two) times a day., Disp: ,  Rfl:     nitrofurantoin (MACRODANTIN) 50 MG capsule, Take 1 capsule by mouth 4 (Four) Times a Day., Disp: , Rfl:     ondansetron (ZOFRAN) 4 MG tablet, Take 1 tablet by mouth Daily As Needed for Nausea or Vomiting., Disp: , Rfl:     pantoprazole (PROTONIX) 40 MG EC tablet, TK 1 T PO QD, Disp: , Rfl: 5    spironolactone (ALDACTONE) 25 MG tablet, TAKE 1/2 TABLET BY MOUTH DAILY, Disp: 45 tablet, Rfl: 3  Allergies   Allergen Reactions    Doxycycline Other (See Comments)     Chest pain    Celebrex [Celecoxib] Other (See Comments)     Chest pain    Contrast Dye (Echo Or Unknown Ct/Mr) Itching    Iodinated Contrast Media Itching    Nsaids Other (See Comments)     convulsion    Other Itching     Pt states some steroids make her itch and hallucinate- she does not know the names   She said she is allergic to all arthritis medicine    Sulfa Antibiotics Nausea Only     Social History     Socioeconomic History    Marital status:    Tobacco Use    Smoking status: Never    Smokeless tobacco: Never   Vaping Use    Vaping Use: Never used   Substance and Sexual Activity    Alcohol use: No    Drug use: No    Sexual activity: Not Currently     Partners: Female     Birth control/protection: Hysterectomy     Review of Systems   Constitutional: Negative for malaise/fatigue.   Cardiovascular:  Positive for leg swelling. Negative for chest pain, dyspnea on exertion and palpitations.   Respiratory:  Positive for shortness of breath. Negative for cough.    Gastrointestinal:  Negative for abdominal pain, nausea and vomiting.   Neurological:  Positive for dizziness and light-headedness. Negative for focal weakness, headaches and numbness.   All other systems reviewed and are negative.           Objective:     Constitutional:       Appearance: Well-developed.   Eyes:      General: No scleral icterus.     Conjunctiva/sclera: Conjunctivae normal.   HENT:      Head: Normocephalic and atraumatic.   Neck:      Vascular: No carotid bruit or JVD.    Pulmonary:      Effort: Pulmonary effort is normal.      Breath sounds: Normal breath sounds. No wheezing. No rales.   Cardiovascular:      Normal rate. Irregularly irregular rhythm.   Pulses:     Intact distal pulses.   Abdominal:      General: Bowel sounds are normal.      Palpations: Abdomen is soft.   Musculoskeletal:      Cervical back: Normal range of motion and neck supple. Skin:     General: Skin is warm and dry.      Findings: No rash.   Neurological:      Mental Status: Alert.     Procedures    Lab Review:         MDM    #1 coronary disease  Patient has nonobstructive disease in the past and is currently stable on medications with normal function  2.  Hypertension  Patient blood pressure is currently stable on medications including diltiazem and metoprolol  3.  Atrial fibrillation  Patient has history of atrial fibrillation is currently on Eliquis for anticoagulation rate control medications including diltiazem and metoprolol  4.  Pacemaker placement  Patient has a permanent pacemaker which is working very well  5.  Hyperlipidemia  Patient is on Lipitor and the lipid levels are well within normal limits  6.  Sleep apnea  Patient has sleep apnea and uses a CPAP machine    Patient's previous medical records, labs, and EKG were reviewed and discussed with the patient at today's visit.

## 2023-10-16 RX ORDER — METOPROLOL TARTRATE 100 MG/1
TABLET ORAL
Qty: 180 TABLET | Refills: 1 | Status: SHIPPED | OUTPATIENT
Start: 2023-10-16

## 2023-10-16 NOTE — TELEPHONE ENCOUNTER
Rx Refill Note  Requested Prescriptions     Pending Prescriptions Disp Refills    metoprolol tartrate (LOPRESSOR) 100 MG tablet [Pharmacy Med Name: METOPROLOL TARTRATE 100MG TABLETS] 180 tablet 1     Sig: TAKE 1 TABLET BY MOUTH TWICE DAILY      Last office visit with prescribing clinician: 7/27/2023   Last telemedicine visit with prescribing clinician: Visit date not found   Next office visit with prescribing clinician: 1/31/2024                         Would you like a call back once the refill request has been completed: [] Yes [] No    If the office needs to give you a call back, can they leave a voicemail: [] Yes [] No    Vivek Connell MA  10/16/23, 07:59 EDT

## 2023-10-21 PROCEDURE — 93296 REM INTERROG EVL PM/IDS: CPT | Performed by: INTERNAL MEDICINE

## 2023-10-21 PROCEDURE — 93294 REM INTERROG EVL PM/LDLS PM: CPT | Performed by: INTERNAL MEDICINE

## 2023-12-22 RX ORDER — DILTIAZEM HYDROCHLORIDE 120 MG/1
120 CAPSULE, EXTENDED RELEASE ORAL DAILY
Qty: 90 CAPSULE | Refills: 1 | Status: SHIPPED | OUTPATIENT
Start: 2023-12-22

## 2023-12-22 NOTE — TELEPHONE ENCOUNTER
Rx Refill Note  Requested Prescriptions     Pending Prescriptions Disp Refills    dilTIAZem (TIAZAC) 120 MG 24 hr capsule [Pharmacy Med Name: DILTIAZEM ER 120MG CAPSULES (24 HR)] 90 capsule 1     Sig: TAKE 1 CAPSULE BY MOUTH DAILY      Last office visit with prescribing clinician: 7/27/2023   Last telemedicine visit with prescribing clinician: Visit date not found   Next office visit with prescribing clinician: 1/31/2024                         Would you like a call back once the refill request has been completed: [] Yes [] No    If the office needs to give you a call back, can they leave a voicemail: [] Yes [] No    Odalis Coronado MA  12/22/23, 07:54 EST

## 2024-01-15 RX ORDER — ISOSORBIDE MONONITRATE 30 MG/1
30 TABLET, EXTENDED RELEASE ORAL EVERY MORNING
Qty: 90 TABLET | Refills: 3 | Status: SHIPPED | OUTPATIENT
Start: 2024-01-15

## 2024-01-15 NOTE — TELEPHONE ENCOUNTER
Rx Refill Note  Requested Prescriptions     Pending Prescriptions Disp Refills    isosorbide mononitrate (IMDUR) 30 MG 24 hr tablet [Pharmacy Med Name: ISOSORBIDE MONONITRATE 30MG ER TABS] 90 tablet 3     Sig: TAKE 1 TABLET BY MOUTH EVERY MORNING      Last office visit with prescribing clinician: 7/27/2023   Last telemedicine visit with prescribing clinician: Visit date not found   Next office visit with prescribing clinician: 1/31/2024                         Would you like a call back once the refill request has been completed: [] Yes [] No    If the office needs to give you a call back, can they leave a voicemail: [] Yes [] No    Suzy Burt MA  01/15/24, 07:31 EST

## 2024-01-18 ENCOUNTER — TRANSCRIBE ORDERS (OUTPATIENT)
Dept: ADMINISTRATIVE | Facility: HOSPITAL | Age: 89
End: 2024-01-18
Payer: MEDICARE

## 2024-01-18 DIAGNOSIS — J84.9 TROPICAL PULMONARY ALVEOLITIS: Primary | ICD-10-CM

## 2024-01-31 ENCOUNTER — OFFICE VISIT (OUTPATIENT)
Dept: CARDIOLOGY | Facility: CLINIC | Age: 89
End: 2024-01-31
Payer: MEDICARE

## 2024-01-31 ENCOUNTER — CLINICAL SUPPORT NO REQUIREMENTS (OUTPATIENT)
Dept: CARDIOLOGY | Facility: CLINIC | Age: 89
End: 2024-01-31
Payer: MEDICARE

## 2024-01-31 VITALS
DIASTOLIC BLOOD PRESSURE: 70 MMHG | SYSTOLIC BLOOD PRESSURE: 116 MMHG | WEIGHT: 167 LBS | BODY MASS INDEX: 30.73 KG/M2 | OXYGEN SATURATION: 98 % | HEIGHT: 62 IN | HEART RATE: 60 BPM

## 2024-01-31 DIAGNOSIS — I25.810 CORONARY ARTERY DISEASE INVOLVING CORONARY BYPASS GRAFT OF NATIVE HEART WITHOUT ANGINA PECTORIS: ICD-10-CM

## 2024-01-31 DIAGNOSIS — Z95.0 PRESENCE OF CARDIAC PACEMAKER: ICD-10-CM

## 2024-01-31 DIAGNOSIS — Z95.0 PRESENCE OF CARDIAC PACEMAKER: Primary | ICD-10-CM

## 2024-01-31 DIAGNOSIS — I10 ESSENTIAL HYPERTENSION: ICD-10-CM

## 2024-01-31 DIAGNOSIS — E78.00 PURE HYPERCHOLESTEROLEMIA: ICD-10-CM

## 2024-01-31 DIAGNOSIS — I48.0 PAROXYSMAL ATRIAL FIBRILLATION: ICD-10-CM

## 2024-01-31 DIAGNOSIS — R00.1 BRADYCARDIA, SINUS: Primary | ICD-10-CM

## 2024-01-31 DIAGNOSIS — G47.33 OBSTRUCTIVE SLEEP APNEA: ICD-10-CM

## 2024-01-31 RX ORDER — CETIRIZINE HYDROCHLORIDE 10 MG/1
10 TABLET ORAL DAILY
COMMUNITY

## 2024-01-31 NOTE — PROGRESS NOTES
Subjective:     Encounter Date:01/31/2024      Patient ID: Syl Herrera is a 88 y.o. female.    Chief Complaint:  History of Present Illness 88-year-old white female with history of coronary disease sleep apnea paroxysmal fibrillation hypertension hyperlipidemia and pacemaker placement presents to the office for a follow-up.  Patient is currently stable without any signs of chest pain but has some shortness of breath with exertion radiograms any PND orthopnea.  No palpitations dizziness syncope.  She has some swelling of the feet.  She is taking her medicines regular.  She does not smoke    The following portions of the patient's history were reviewed and updated as appropriate: allergies, current medications, past family history, past medical history, past social history, past surgical history, and problem list.  Past Medical History:   Diagnosis Date    Anxiety     Arthritis     Asthma     Atrial fibrillation     CKD (chronic kidney disease) stage 3, GFR 30-59 ml/min 04/18/2021    Coronary artery disease     Dyslipidemia     Dyslipidemia 01/05/2015    GERD (gastroesophageal reflux disease)     History of bone density study 04/2015    Hypertension     Sleep apnea     Wears dentures      Past Surgical History:   Procedure Laterality Date    BLADDER REPAIR  1990    BREAST LUMPECTOMY  1974    BENIGN    CARDIAC CATHETERIZATION  05/25/2011    CARDIAC CATHETERIZATION N/A 04/21/2021    Procedure: Left Heart Cath and coronary angiogram;  Surgeon: Ivan Martell MD;  Location: [facility] CATH INVASIVE LOCATION;  Service: Cardiovascular;  Laterality: N/A;    CARDIAC CATHETERIZATION N/A 04/21/2021    Procedure: Left ventriculography;  Surgeon: Ivan Martell MD;  Location: [facility] CATH INVASIVE LOCATION;  Service: Cardiovascular;  Laterality: N/A;    CARDIAC CATHETERIZATION  04/21/2021    Procedure: Saphenous Vein Graft;  Surgeon: Ivan Martell MD;  Location: [facility] CATH INVASIVE LOCATION;  Service: Cardiovascular;;    CARDIAC  "CATHETERIZATION N/A 04/26/2021    Procedure: Percutaneous Coronary Intervention;  Surgeon: Ivan Martell MD;  Location: [facility] CATH INVASIVE LOCATION;  Service: Cardiovascular;  Laterality: N/A;    CARDIAC CATHETERIZATION N/A 04/26/2021    Procedure: Stent RAY coronary;  Surgeon: Ivan Martell MD;  Location: [facility] CATH INVASIVE LOCATION;  Service: Cardiovascular;  Laterality: N/A;    CARDIOVASCULAR STRESS TEST  05/04/2015    CHOLECYSTECTOMY  1990    CORONARY ARTERY BYPASS GRAFT  08/09/2017    X5  Dr Brandt    ENDOSCOPY N/A 06/30/2020    Procedure: ESOPHAGOGASTRODUODENOSCOPY with biopsy x 1 area and dilatation (15-18mm balloon) up to 18mm;  Surgeon: Quinton Tapia MD;  Location: [facility] ENDOSCOPY;  Service: Gastroenterology;  Laterality: N/A;  post op: gastritis, large hiatal hernia, esophageal dysmotility    HYSTERECTOMY  1990    INSERT / REPLACE / REMOVE PACEMAKER      JOINT REPLACEMENT Right     knee     OTHER SURGICAL HISTORY  06/2017    BLOOD CLOT REMOVED FROM LEFT EAR    PACEMAKER IMPLANTATION  04/18/2016    DUAL CHAMBER ST ALESSIO     /70   Pulse 60   Ht 157.5 cm (62\")   Wt 75.8 kg (167 lb)   SpO2 98%   BMI 30.54 kg/m²   Family History   Problem Relation Age of Onset    Hypertension Mother     Heart disease Mother     Heart disease Sister         PACEMAKER    Heart disease Brother        Current Outpatient Medications:     ALPRAZolam (XANAX) 0.5 MG tablet, Take 1 tablet by mouth 2 (Two) Times a Day As Needed., Disp: , Rfl: 5    apixaban (Eliquis) 5 MG tablet tablet, Take 1 tablet by mouth 2 (Two) Times a Day., Disp: 180 tablet, Rfl: 3    atorvastatin (LIPITOR) 10 MG tablet, Take 1 tablet by mouth Every Night., Disp: , Rfl:     azithromycin (ZITHROMAX) 250 MG tablet, Take 2 tablets the first day, then 1 tablet daily for 4 days., Disp: 6 tablet, Rfl: 0    benzonatate (TESSALON) 200 MG capsule, Take 1 capsule by mouth 3 (Three) Times a Day As Needed for Cough., Disp: 30 capsule, Rfl: 0    budesonide " (PULMICORT) 0.5 MG/2ML nebulizer solution, Take 2 mL by nebulization 2 (Two) Times a Day., Disp: 60 each, Rfl: 1    CALCIUM PO, Take  by mouth., Disp: , Rfl:     carboxymethylcellulose (REFRESH PLUS) 0.5 % solution, Administer 1 drop to both eyes 3 (Three) Times a Day As Needed for Dry Eyes., Disp: , Rfl:     cetirizine (zyrTEC) 10 MG tablet, Take 1 tablet by mouth Daily., Disp: , Rfl:     Cholecalciferol (VITAMIN D-3 PO), Take  by mouth., Disp: , Rfl:     Cyanocobalamin (VITAMIN B-12 PO), Take  by mouth., Disp: , Rfl:     Diclofenac Sodium (VOLTAREN) 1 % gel gel, Apply 4 g topically to the appropriate area as directed 4 (Four) Times a Day As Needed., Disp: , Rfl:     dilTIAZem (TIAZAC) 120 MG 24 hr capsule, TAKE 1 CAPSULE BY MOUTH DAILY, Disp: 90 capsule, Rfl: 1    guaiFENesin (MUCINEX) 600 MG 12 hr tablet, Take 2 tablets by mouth 2 (Two) Times a Day., Disp: 28 tablet, Rfl: 0    ipratropium-albuterol (DUO-NEB) 0.5-2.5 mg/3 ml nebulizer, Take 3 mL by nebulization 4 (Four) Times a Day., Disp: 180 mL, Rfl: 1    isosorbide mononitrate (IMDUR) 30 MG 24 hr tablet, TAKE 1 TABLET BY MOUTH EVERY MORNING, Disp: 90 tablet, Rfl: 3    magnesium oxide (MAG-OX) 400 MG tablet, Take 1 tablet by mouth Daily., Disp: , Rfl:     metoprolol tartrate (LOPRESSOR) 100 MG tablet, TAKE 1 TABLET BY MOUTH TWICE DAILY, Disp: 180 tablet, Rfl: 1    Multiple Vitamins-Minerals (VITEYES AREDS FORMULA) capsule, Take 1 capsule by mouth 2 (two) times a day., Disp: , Rfl:     nitrofurantoin (MACRODANTIN) 50 MG capsule, Take 1 capsule by mouth 4 (Four) Times a Day., Disp: , Rfl:     ondansetron (ZOFRAN) 4 MG tablet, Take 1 tablet by mouth Daily As Needed for Nausea or Vomiting., Disp: , Rfl:     pantoprazole (PROTONIX) 40 MG EC tablet, TK 1 T PO QD, Disp: , Rfl: 5    spironolactone (ALDACTONE) 25 MG tablet, TAKE 1/2 TABLET BY MOUTH DAILY, Disp: 45 tablet, Rfl: 3    cefdinir (OMNICEF) 300 MG capsule, Take 1 capsule by mouth 2 (Two) Times a Day., Disp: 14  capsule, Rfl: 0    clopidogrel (PLAVIX) 75 MG tablet, TAKE 1 TABLET BY MOUTH DAILY, Disp: 90 tablet, Rfl: 1    diphenhydrAMINE HCl (BENADRYL ALLERGY PO), Take 1 tablet by mouth As Needed., Disp: , Rfl:     ferrous sulfate 324 (65 Fe) MG tablet delayed-release EC tablet, Take 1 tablet by mouth Daily With Breakfast., Disp: 30 tablet, Rfl: 0    furosemide (LASIX) 40 MG tablet, Take 1 tablet by mouth 2 (Two) Times a Day., Disp: 5 tablet, Rfl: 0    HYDROcodone-acetaminophen (NORCO) 7.5-325 MG per tablet, Take 1 tablet by mouth Every 8 (Eight) Hours As Needed., Disp: , Rfl: 0    methylPREDNISolone (MEDROL) 4 MG dose pack, Take as directed on package instructions., Disp: 21 each, Rfl: 0  Allergies   Allergen Reactions    Doxycycline Other (See Comments)     Chest pain    Celebrex [Celecoxib] Other (See Comments)     Chest pain    Contrast Dye (Echo Or Unknown Ct/Mr) Itching    Iodinated Contrast Media Itching    Nsaids Other (See Comments)     convulsion    Other Itching     Pt states some steroids make her itch and hallucinate- she does not know the names   She said she is allergic to all arthritis medicine    Sulfa Antibiotics Nausea Only     Social History     Socioeconomic History    Marital status:    Tobacco Use    Smoking status: Never    Smokeless tobacco: Never   Vaping Use    Vaping Use: Never used   Substance and Sexual Activity    Alcohol use: No    Drug use: No    Sexual activity: Not Currently     Partners: Female     Birth control/protection: Hysterectomy     Review of Systems   Constitutional: Positive for malaise/fatigue.   Cardiovascular:  Positive for leg swelling. Negative for chest pain, dyspnea on exertion and palpitations.   Respiratory:  Positive for shortness of breath. Negative for cough.    Gastrointestinal:  Negative for abdominal pain, nausea and vomiting.   Neurological:  Negative for dizziness, focal weakness, headaches, light-headedness and numbness.   All other systems reviewed and  are negative.             Objective:     Constitutional:       Appearance: Well-developed.   Eyes:      General: No scleral icterus.     Conjunctiva/sclera: Conjunctivae normal.   HENT:      Head: Normocephalic and atraumatic.   Neck:      Vascular: No carotid bruit or JVD.   Pulmonary:      Effort: Pulmonary effort is normal.      Breath sounds: Normal breath sounds. No wheezing. No rales.   Cardiovascular:      Normal rate. Regular rhythm.   Pulses:     Intact distal pulses.   Edema:     Peripheral edema present.  Abdominal:      General: Bowel sounds are normal.      Palpations: Abdomen is soft.   Musculoskeletal:      Cervical back: Normal range of motion and neck supple. Skin:     General: Skin is warm and dry.      Findings: No rash.   Neurological:      Mental Status: Alert.       Procedures    Lab Review:         MDM    #1 coronary disease  Patient had history of coronary bypass surgery x 5 vessels with a LIMA to LAD and a saphenous graft to the LAD and branch marginal branch and PDA.  Branches and has normal V function is currently stable on medications  2.  Hypertension  Patient blood pressure currently stable on medications including diltiazem and metoprolol  3.  Atrial fibrillation  Patient is currently on diltiazem metoprolol and Eliquis for anticoagulation  4.  Hyperlipidemia  Patient is on statins and the lipid levels are well within normal meds  5.  Sleep apnea  Patient is sleep apnea and uses a CPAP machine.    Patient's previous medical records, labs, and EKG were reviewed and discussed with the patient at today's visit.

## 2024-02-07 ENCOUNTER — TELEPHONE (OUTPATIENT)
Dept: CARDIOLOGY | Facility: CLINIC | Age: 89
End: 2024-02-07
Payer: MEDICARE

## 2024-02-12 RX ORDER — APIXABAN 5 MG/1
5 TABLET, FILM COATED ORAL 2 TIMES DAILY
Qty: 180 TABLET | Refills: 3 | Status: SHIPPED | OUTPATIENT
Start: 2024-02-12

## 2024-02-13 ENCOUNTER — TELEPHONE (OUTPATIENT)
Dept: CARDIOLOGY | Facility: CLINIC | Age: 89
End: 2024-02-13
Payer: MEDICARE

## 2024-02-13 NOTE — TELEPHONE ENCOUNTER
"  Caller: Syl Herrera \"Syl or Grisel\"    Relationship: Self    Best call back number: [phone]    What is the best time to reach you: ANY    Who are you requesting to speak with (clinical staff, provider,  specific staff member): ANY    Do you know the name of the person who called: RANDI    What was the call regarding: PATIENT STATED THAT SHE WAS RETURNING TO [facility] ABOUT PACEMAKER READING. PLEASE CONTACT PATIENT. THANK YOU.      Is it okay if the provider responds through [facility]: CALL          "

## 2024-03-27 ENCOUNTER — APPOINTMENT (OUTPATIENT)
Dept: GENERAL RADIOLOGY | Facility: HOSPITAL | Age: 89
End: 2024-03-27
Payer: MEDICARE

## 2024-03-27 ENCOUNTER — HOSPITAL ENCOUNTER (EMERGENCY)
Facility: HOSPITAL | Age: 89
Discharge: HOME | End: 2024-03-27
Attending: EMERGENCY MEDICINE
Payer: MEDICARE

## 2024-03-27 VITALS
BODY MASS INDEX: 29.81 KG/M2 | HEIGHT: 62 IN | WEIGHT: 162 LBS | SYSTOLIC BLOOD PRESSURE: 136 MMHG | OXYGEN SATURATION: 95 % | HEART RATE: 60 BPM | DIASTOLIC BLOOD PRESSURE: 71 MMHG | RESPIRATION RATE: 22 BRPM | TEMPERATURE: 98.2 F

## 2024-03-27 DIAGNOSIS — J44.89 COPD (CHRONIC OBSTRUCTIVE PULMONARY DISEASE) WITH CHRONIC BRONCHITIS: ICD-10-CM

## 2024-03-27 DIAGNOSIS — J40 BRONCHITIS: Primary | ICD-10-CM

## 2024-03-27 LAB
ALBUMIN SERPL-MCNC: 4.1 G/DL (ref 3.5–5.2)
ALBUMIN/GLOB SERPL: 1.8 G/DL
ALP SERPL-CCNC: 142 U/L (ref 39–117)
ALT SERPL W P-5'-P-CCNC: 21 U/L (ref 1–33)
ANION GAP SERPL CALCULATED.3IONS-SCNC: 3.9 MMOL/L (ref 5–15)
AST SERPL-CCNC: 28 U/L (ref 1–32)
BASOPHILS # BLD AUTO: 0.01 10*3/MM3 (ref 0–0.2)
BASOPHILS NFR BLD AUTO: 0.2 % (ref 0–1.5)
BILIRUB SERPL-MCNC: 0.7 MG/DL (ref 0–1.2)
BUN SERPL-MCNC: 26 MG/DL (ref 8–23)
BUN/CREAT SERPL: 21.5 (ref 7–25)
CALCIUM SPEC-SCNC: 9.2 MG/DL (ref 8.6–10.5)
CHLORIDE SERPL-SCNC: 92 MMOL/L (ref 98–107)
CO2 SERPL-SCNC: 29.1 MMOL/L (ref 22–29)
CREAT SERPL-MCNC: 1.21 MG/DL (ref 0.57–1)
D DIMER PPP FEU-MCNC: 0.21 MCGFEU/ML (ref 0–0.86)
DEPRECATED RDW RBC AUTO: 46.8 FL (ref 37–54)
EGFRCR SERPLBLD CKD-EPI 2021: 43.7 ML/MIN/1.73
EOSINOPHIL # BLD AUTO: 0.39 10*3/MM3 (ref 0–0.4)
EOSINOPHIL NFR BLD AUTO: 6.7 % (ref 0.3–6.2)
ERYTHROCYTE [DISTWIDTH] IN BLOOD BY AUTOMATED COUNT: 13.8 % (ref 12.3–15.4)
FLUAV SUBTYP SPEC NAA+PROBE: NOT DETECTED
FLUBV RNA ISLT QL NAA+PROBE: NOT DETECTED
GLOBULIN UR ELPH-MCNC: 2.3 GM/DL
GLUCOSE SERPL-MCNC: 117 MG/DL (ref 65–99)
HCT VFR BLD AUTO: 36.6 % (ref 34–46.6)
HGB BLD-MCNC: 11.7 G/DL (ref 12–15.9)
HOLD SPECIMEN: NORMAL
HOLD SPECIMEN: NORMAL
IMM GRANULOCYTES # BLD AUTO: 0.01 10*3/MM3 (ref 0–0.05)
IMM GRANULOCYTES NFR BLD AUTO: 0.2 % (ref 0–0.5)
INR PPP: 1.1 (ref 0.8–1.2)
INR PPP: 1.1 (ref 0.93–1.1)
LYMPHOCYTES # BLD AUTO: 1.53 10*3/MM3 (ref 0.7–3.1)
LYMPHOCYTES NFR BLD AUTO: 26.2 % (ref 19.6–45.3)
MCH RBC QN AUTO: 29.4 PG (ref 26.6–33)
MCHC RBC AUTO-ENTMCNC: 32 G/DL (ref 31.5–35.7)
MCV RBC AUTO: 92 FL (ref 79–97)
MONOCYTES # BLD AUTO: 0.54 10*3/MM3 (ref 0.1–0.9)
MONOCYTES NFR BLD AUTO: 9.2 % (ref 5–12)
NEUTROPHILS NFR BLD AUTO: 3.37 10*3/MM3 (ref 1.7–7)
NEUTROPHILS NFR BLD AUTO: 57.5 % (ref 42.7–76)
NT-PROBNP SERPL-MCNC: 1064 PG/ML (ref 0–1800)
PLATELET # BLD AUTO: 121 10*3/MM3 (ref 140–450)
PMV BLD AUTO: 10.6 FL (ref 6–12)
POTASSIUM SERPL-SCNC: 4.5 MMOL/L (ref 3.5–5.2)
PROT SERPL-MCNC: 6.4 G/DL (ref 6–8.5)
PROTHROMBIN TIME: 11.9 SECONDS (ref 9.6–11.7)
PROTHROMBIN TIME: 12.6 SECONDS
RBC # BLD AUTO: 3.98 10*6/MM3 (ref 3.77–5.28)
SARS-COV-2 RNA RESP QL NAA+PROBE: NOT DETECTED
SODIUM SERPL-SCNC: 125 MMOL/L (ref 136–145)
TROPONIN T SERPL HS-MCNC: 11 NG/L
WBC NRBC COR # BLD AUTO: 5.85 10*3/MM3 (ref 3.4–10.8)
WHOLE BLOOD HOLD COAG: NORMAL
WHOLE BLOOD HOLD SPECIMEN: NORMAL

## 2024-03-27 PROCEDURE — 93005 ELECTROCARDIOGRAM TRACING: CPT | Performed by: EMERGENCY MEDICINE

## 2024-03-27 PROCEDURE — 84484 ASSAY OF TROPONIN QUANT: CPT | Performed by: EMERGENCY MEDICINE

## 2024-03-27 PROCEDURE — 87636 SARSCOV2 & INF A&B AMP PRB: CPT | Performed by: EMERGENCY MEDICINE

## 2024-03-27 PROCEDURE — 85610 PROTHROMBIN TIME: CPT | Performed by: EMERGENCY MEDICINE

## 2024-03-27 PROCEDURE — 85025 COMPLETE CBC W/AUTO DIFF WBC: CPT | Performed by: EMERGENCY MEDICINE

## 2024-03-27 PROCEDURE — 80053 COMPREHEN METABOLIC PANEL: CPT | Performed by: EMERGENCY MEDICINE

## 2024-03-27 PROCEDURE — 93010 ELECTROCARDIOGRAM REPORT: CPT | Performed by: EMERGENCY MEDICINE

## 2024-03-27 PROCEDURE — 71045 X-RAY EXAM CHEST 1 VIEW: CPT

## 2024-03-27 PROCEDURE — 96374 THER/PROPH/DIAG INJ IV PUSH: CPT

## 2024-03-27 PROCEDURE — 85379 FIBRIN DEGRADATION QUANT: CPT | Performed by: EMERGENCY MEDICINE

## 2024-03-27 PROCEDURE — 99284 EMERGENCY DEPT VISIT MOD MDM: CPT | Performed by: EMERGENCY MEDICINE

## 2024-03-27 PROCEDURE — 99284 EMERGENCY DEPT VISIT MOD MDM: CPT

## 2024-03-27 PROCEDURE — 83880 ASSAY OF NATRIURETIC PEPTIDE: CPT | Performed by: EMERGENCY MEDICINE

## 2024-03-27 PROCEDURE — 25010000002 METHYLPREDNISOLONE PER 125 MG: Performed by: EMERGENCY MEDICINE

## 2024-03-27 RX ORDER — LEVOFLOXACIN 500 MG/1
500 TABLET, FILM COATED ORAL DAILY
Qty: 7 TABLET | Refills: 0 | Status: SHIPPED | OUTPATIENT
Start: 2024-03-27 | End: 2024-04-03

## 2024-03-27 RX ORDER — METHYLPREDNISOLONE SODIUM SUCCINATE 125 MG/2ML
125 INJECTION, POWDER, LYOPHILIZED, FOR SOLUTION INTRAMUSCULAR; INTRAVENOUS ONCE
Status: COMPLETED | OUTPATIENT
Start: 2024-03-27 | End: 2024-03-27

## 2024-03-27 RX ORDER — IPRATROPIUM BROMIDE AND ALBUTEROL SULFATE 2.5; .5 MG/3ML; MG/3ML
3 SOLUTION RESPIRATORY (INHALATION) ONCE
Status: COMPLETED | OUTPATIENT
Start: 2024-03-27 | End: 2024-03-27

## 2024-03-27 RX ORDER — PREDNISONE 50 MG/1
50 TABLET ORAL DAILY
Qty: 7 TABLET | Refills: 0 | Status: SHIPPED | OUTPATIENT
Start: 2024-03-27 | End: 2024-04-03

## 2024-03-27 RX ORDER — SODIUM CHLORIDE 0.9 % (FLUSH) 0.9 %
10 SYRINGE (ML) INJECTION AS NEEDED
Status: DISCONTINUED | OUTPATIENT
Start: 2024-03-27 | End: 2024-03-28 | Stop reason: HOSPADM

## 2024-03-27 RX ADMIN — IPRATROPIUM BROMIDE AND ALBUTEROL SULFATE 3 ML: .5; 3 SOLUTION RESPIRATORY (INHALATION) at 21:48

## 2024-03-27 RX ADMIN — METHYLPREDNISOLONE SODIUM SUCCINATE 125 MG: 125 INJECTION, POWDER, FOR SOLUTION INTRAMUSCULAR; INTRAVENOUS at 21:48

## 2024-03-28 LAB
QT INTERVAL: 469 MS
QTC INTERVAL: 469 MS

## 2024-03-28 NOTE — ED NOTES
Congested cough for several weeks. States will feel winded at times and uses her inhaler but it only helps a little. She used to take neb tx at home but one of her doctors took her off of it. She is A&ox4, ambulatory with a steady gait with her walker. She is in NAD, no acute respiratory distress.

## 2024-03-28 NOTE — FSED PROVIDER NOTE
Subjective   History of Present Illness patient presents emergency department with complaints of about 3 to 4 weeks of intermittent but persistent coughing.  She says sometimes she does feel drainage going down the back of her throat.  She also states that she has nebulizer treatments at home that she does not and has not been using.  She has a past medical history significant for pacemaker quintuple bypass stents and COPD.  She has a pulmonologist that she follows up with as well.  Says the coughing has been gone and she has not followed up with her pulmonologist as of yet.  The coughing is worse when she walks and she said she does feel herself wheezing sometimes.  Again she has not used her nebulizer treatments for which she is prescribed for COPD and pulmonary disease.  Patient has no chest pain.    Review of Systems   Constitutional:  Negative for activity change, appetite change, chills, diaphoresis, fatigue, fever and unexpected weight change.   HENT:  Positive for postnasal drip. Negative for congestion, dental problem, drooling, ear pain, facial swelling, hearing loss, mouth sores, nosebleeds, rhinorrhea, sinus pain, sneezing, sore throat, tinnitus, trouble swallowing and voice change.    Eyes: Negative.    Respiratory:  Positive for cough, shortness of breath and wheezing. Negative for apnea, choking, chest tightness and stridor.    Cardiovascular:  Negative for chest pain, palpitations and leg swelling.   Gastrointestinal: Negative.    Endocrine: Negative.    Genitourinary: Negative.    Musculoskeletal: Negative.    Allergic/Immunologic: Negative.    Neurological: Negative.    Hematological: Negative.    Psychiatric/Behavioral: Negative.         Past Medical History:   Diagnosis Date    Anxiety     Arthritis     Asthma     Atrial fibrillation     CKD (chronic kidney disease) stage 3, GFR 30-59 ml/min 04/18/2021    Coronary artery disease     Dyslipidemia     Dyslipidemia 01/05/2015    GERD  (gastroesophageal reflux disease)     History of bone density study 04/2015    Hypertension     Sleep apnea     Wears dentures        Allergies   Allergen Reactions    Doxycycline Other (See Comments)     Chest pain    Celebrex [Celecoxib] Other (See Comments)     Chest pain    Contrast Dye (Echo Or Unknown Ct/Mr) Itching    Iodinated Contrast Media Itching    Nsaids Other (See Comments)     convulsion    Other Itching     Pt states some steroids make her itch and hallucinate- she does not know the names   She said she is allergic to all arthritis medicine    Sulfa Antibiotics Nausea Only       Past Surgical History:   Procedure Laterality Date    BLADDER REPAIR  1990    BREAST LUMPECTOMY  1974    BENIGN    CARDIAC CATHETERIZATION  05/25/2011    CARDIAC CATHETERIZATION N/A 04/21/2021    Procedure: Left Heart Cath and coronary angiogram;  Surgeon: Ivan Martell MD;  Location: [facility] CATH INVASIVE LOCATION;  Service: Cardiovascular;  Laterality: N/A;    CARDIAC CATHETERIZATION N/A 04/21/2021    Procedure: Left ventriculography;  Surgeon: Ivan Martell MD;  Location: [facility] CATH INVASIVE LOCATION;  Service: Cardiovascular;  Laterality: N/A;    CARDIAC CATHETERIZATION  04/21/2021    Procedure: Saphenous Vein Graft;  Surgeon: Ivan Martell MD;  Location: [facility] CATH INVASIVE LOCATION;  Service: Cardiovascular;;    CARDIAC CATHETERIZATION N/A 04/26/2021    Procedure: Percutaneous Coronary Intervention;  Surgeon: Ivan Martell MD;  Location: [facility] CATH INVASIVE LOCATION;  Service: Cardiovascular;  Laterality: N/A;    CARDIAC CATHETERIZATION N/A 04/26/2021    Procedure: Stent RAY coronary;  Surgeon: Ivan Martell MD;  Location: [facility] CATH INVASIVE LOCATION;  Service: Cardiovascular;  Laterality: N/A;    CARDIOVASCULAR STRESS TEST  05/04/2015    CHOLECYSTECTOMY  1990    CORONARY ARTERY BYPASS GRAFT  08/09/2017    X5  Dr Brandt    ENDOSCOPY N/A 06/30/2020    Procedure: ESOPHAGOGASTRODUODENOSCOPY with biopsy x 1 area and  dilatation (15-18mm balloon) up to 18mm;  Surgeon: Quinton Tapia MD;  Location: [facility] ENDOSCOPY;  Service: Gastroenterology;  Laterality: N/A;  post op: gastritis, large hiatal hernia, esophageal dysmotility    HYSTERECTOMY  1990    INSERT / REPLACE / REMOVE PACEMAKER      JOINT REPLACEMENT Right     knee     OTHER SURGICAL HISTORY  06/2017    BLOOD CLOT REMOVED FROM LEFT EAR    PACEMAKER IMPLANTATION  04/18/2016    DUAL CHAMBER ST ALESSIO       Family History   Problem Relation Age of Onset    Hypertension Mother     Heart disease Mother     Heart disease Sister         PACEMAKER    Heart disease Brother        Social History     Socioeconomic History    Marital status:    Tobacco Use    Smoking status: Never    Smokeless tobacco: Never   Vaping Use    Vaping status: Never Used   Substance and Sexual Activity    Alcohol use: No    Drug use: No    Sexual activity: Not Currently     Partners: Female     Birth control/protection: Hysterectomy           Objective   Physical Exam  Vitals and nursing note reviewed.   Constitutional:       General: She is not in acute distress.     Appearance: She is well-developed. She is not ill-appearing, toxic-appearing or diaphoretic.   HENT:      Head: Normocephalic and atraumatic.      Comments: Patient mentioned that she previously felt like her lymph nodes in the back of her neck and throat were enlarged and tender but on examination a prominent right posterior cervical lymph node was appreciated but it was nontender.     Mouth/Throat:      Mouth: Mucous membranes are moist.      Pharynx: Oropharynx is clear. No pharyngeal swelling or oropharyngeal exudate.   Neck:      Vascular: No JVD.   Cardiovascular:      Rate and Rhythm: Normal rate. No extrasystoles are present.     Pulses: No decreased pulses.      Heart sounds: No murmur heard.  Pulmonary:      Effort: Pulmonary effort is normal. No bradypnea or respiratory distress.      Breath sounds: Examination of the  right-middle field reveals wheezing and rhonchi. Examination of the left-middle field reveals wheezing and rhonchi. Wheezing and rhonchi present. No decreased breath sounds.   Chest:      Chest wall: No mass, deformity, tenderness, crepitus or edema. There is no dullness to percussion.   Abdominal:      General: Bowel sounds are normal.      Palpations: Abdomen is soft.   Musculoskeletal:         General: Normal range of motion.   Skin:     General: Skin is warm and dry.      Capillary Refill: Capillary refill takes less than 2 seconds.   Neurological:      General: No focal deficit present.      Mental Status: She is alert.   Psychiatric:         Mood and Affect: Mood normal.         ECG 12 Lead      Date/Time: 3/27/2024 11:14 PM    Performed by: Radha Stein MD  Authorized by: Radha Stein MD  Interpreted by ED physician  Comparison: compared with previous ECG   Rhythm: paced  Rate: normal  QRS axis: normal  Pacin% capture  Clinical impression: non-specific ECG  Comments: No acute ST changes               ED Course  ED Course as of 03/27/24 2316   Wed Mar 27, 2024   2209 proBNP: 1,064.0 [LR]    D-Dimer, Quant: 0.21 [LR]    Glucose(!): 117 [LR]    Creatinine(!): 1.21 [LR]    Protime(!): 11.9 [LR]    INR: 1.10 [LR]    Hemoglobin(!): 11.7 [LR]    Glucose(!): 117 [LR]    Creatinine(!): 1.21 [LR]   231 Sodium(!): 125 [LR]   2315 Chloride(!): 92 [LR]   2315 CO2(!): 29.1 [LR]   2315 Alkaline Phosphatase(!): 142 [LR]   2315 Anion Gap(!): 3.9 [LR]   2315 INR: 1.1 [LR]   2315 D-Dimer, Quant: 0.21 [LR]    INR: 1.10 [LR]   231 Hemoglobin(!): 11.7 [LR]   2315 HS Troponin T: 11 [LR]   2315 proBNP: 1,064.0 [LR]   2315 COVID19: Not Detected [LR]   2315 Influenza A PCR: Not Detected [LR]   2315 Influenza B PCR: Not Detected [LR]      ED Course User Index  [LR] Radha Stein MD                                           Medical Decision  Making  Medical decision making for this patient includes acute COPD exacerbation acute CHF acute coronary syndrome he has small exacerbation and pulmonary embolism.  Workup most consistent with acute COPD exacerbation.  Patient has significant improvement with albuterol nebulizers.  States that she does have them at home she just did not consider taking them while she was there.  Patient's troponin is negative chest x-ray consistent with pulmonary edema 6 BNP has been consistently thousand at least in reviewing all of her past BNP levels.  Patient's not hypoxic not tachypneic tachycardic after breathing treatments.  Reexamination no wheezing no discomfort.  Patient has a pulmonologist and primary doctor.  Patient states that she will get her antibiotics filled as well as get her some breathing treatments every 4-6 hours as recommended and states she will call her pulmonologist for [facility] in the morning.  Patient stable for discharge.    Problems Addressed:  Bronchitis: complicated acute illness or injury  COPD (chronic obstructive pulmonary disease) with chronic bronchitis: complicated acute illness or injury    Amount and/or Complexity of Data Reviewed  Labs: ordered. Decision-making details documented in ED Course.  Radiology: ordered.  ECG/medicine tests: ordered and independent interpretation performed.    Risk  Prescription drug management.        Final diagnoses:   Bronchitis   COPD (chronic obstructive pulmonary disease) with chronic bronchitis       ED Disposition  ED Disposition       ED Disposition   Discharge    Condition   Stable    Comment   --               Nava Yu MD  [address] [place] [address]  [phone]    In 1 day      Juaquin Parra MD  [address] [address]  [phone]    In 2 days  If symptoms worsen         Medication List        New Prescriptions      levoFLOXacin 500 MG tablet  Commonly known as: LEVAQUIN  Take 1 tablet by mouth Daily for  7 days.     predniSONE 50 MG tablet  Commonly known as: DELTASONE  Take 1 tablet by mouth Daily for 7 days.               Where to Get Your Medications        These medications were sent to [id number] - [place] [place] - [address] AT SEC OF [facility] [address] - [phone]  - [phone] FX  [address] [place] [address]      Phone: [phone]   levoFLOXacin 500 MG tablet  predniSONE 50 MG tablet

## 2024-04-16 RX ORDER — METOPROLOL TARTRATE 100 MG/1
TABLET ORAL
Qty: 180 TABLET | Refills: 1 | Status: SHIPPED | OUTPATIENT
Start: 2024-04-16

## 2024-04-16 NOTE — TELEPHONE ENCOUNTER
Rx Refill Note  Requested Prescriptions     Pending Prescriptions Disp Refills    metoprolol tartrate (LOPRESSOR) 100 MG tablet [Pharmacy Med Name: METOPROLOL TARTRATE 100MG TABLETS] 180 tablet 1     Sig: TAKE 1 TABLET BY MOUTH TWICE DAILY      Last office visit with prescribing clinician: 1/31/2024   Last telemedicine visit with prescribing clinician: Visit date not found   Next office visit with prescribing clinician: 8/14/2024                         Would you like a call back once the refill request has been completed: [] Yes [] No    If the office needs to give you a call back, can they leave a voicemail: [] Yes [] No    Suzy Burt MA  04/16/24, 08:40 EDT

## 2024-05-25 NOTE — ANESTHESIA PREPROCEDURE EVALUATION
Anesthesia Evaluation     Patient summary reviewed   NPO Solid Status: > 8 hours  NPO Liquid Status: > 8 hours           Airway   Mallampati: II  TM distance: >3 FB  Neck ROM: full  No difficulty expected  Dental - normal exam     Pulmonary - normal exam   (+) sleep apnea,   Cardiovascular - normal exam    (+) hypertension, CAD, CABG, dysrhythmias Atrial Fib,       Neuro/Psych  GI/Hepatic/Renal/Endo    (+)  GERD,      Musculoskeletal     Abdominal  - normal exam    Bowel sounds: normal.   Substance History      OB/GYN          Other   arthritis,                      Anesthesia Plan    ASA 3     MAC     intravenous induction     Anesthetic plan, all risks, benefits, and alternatives have been provided, discussed and informed consent has been obtained with: patient.      
None

## 2024-06-16 ENCOUNTER — HOSPITAL ENCOUNTER (INPATIENT)
Facility: HOSPITAL | Age: 89
LOS: 1 days | Discharge: HOME | End: 2024-06-18
Attending: EMERGENCY MEDICINE | Admitting: STUDENT IN AN ORGANIZED HEALTH CARE EDUCATION/TRAINING PROGRAM
Payer: MEDICARE

## 2024-06-16 ENCOUNTER — APPOINTMENT (OUTPATIENT)
Dept: GENERAL RADIOLOGY | Facility: HOSPITAL | Age: 89
End: 2024-06-16
Payer: MEDICARE

## 2024-06-16 DIAGNOSIS — R07.9 CHEST PAIN, UNSPECIFIED TYPE: Primary | ICD-10-CM

## 2024-06-16 LAB
ALBUMIN SERPL-MCNC: 4.1 G/DL (ref 3.5–5.2)
ALBUMIN/GLOB SERPL: 1.9 G/DL
ALP SERPL-CCNC: 146 U/L (ref 39–117)
ALT SERPL W P-5'-P-CCNC: 19 U/L (ref 1–33)
ANION GAP SERPL CALCULATED.3IONS-SCNC: 8.4 MMOL/L (ref 5–15)
AST SERPL-CCNC: 24 U/L (ref 1–32)
BASOPHILS # BLD AUTO: 0.02 10*3/MM3 (ref 0–0.2)
BASOPHILS NFR BLD AUTO: 0.3 % (ref 0–1.5)
BILIRUB SERPL-MCNC: 0.7 MG/DL (ref 0–1.2)
BILIRUB UR QL STRIP: NEGATIVE
BUN SERPL-MCNC: 27 MG/DL (ref 8–23)
BUN/CREAT SERPL: 23.5 (ref 7–25)
CALCIUM SPEC-SCNC: 9.3 MG/DL (ref 8.6–10.5)
CHLORIDE SERPL-SCNC: 95 MMOL/L (ref 98–107)
CLARITY UR: CLEAR
CO2 SERPL-SCNC: 24.6 MMOL/L (ref 22–29)
COLOR UR: YELLOW
CREAT SERPL-MCNC: 1.15 MG/DL (ref 0.57–1)
DEPRECATED RDW RBC AUTO: 50.8 FL (ref 37–54)
EGFRCR SERPLBLD CKD-EPI 2021: 46.5 ML/MIN/1.73
EOSINOPHIL # BLD AUTO: 0.15 10*3/MM3 (ref 0–0.4)
EOSINOPHIL NFR BLD AUTO: 2.4 % (ref 0.3–6.2)
ERYTHROCYTE [DISTWIDTH] IN BLOOD BY AUTOMATED COUNT: 14.5 % (ref 12.3–15.4)
GEN 5 2HR TROPONIN T REFLEX: 12 NG/L
GLOBULIN UR ELPH-MCNC: 2.2 GM/DL
GLUCOSE SERPL-MCNC: 102 MG/DL (ref 65–99)
GLUCOSE UR STRIP-MCNC: NEGATIVE MG/DL
HCT VFR BLD AUTO: 37.3 % (ref 34–46.6)
HGB BLD-MCNC: 11.8 G/DL (ref 12–15.9)
HGB UR QL STRIP.AUTO: NEGATIVE
IMM GRANULOCYTES # BLD AUTO: 0.01 10*3/MM3 (ref 0–0.05)
IMM GRANULOCYTES NFR BLD AUTO: 0.2 % (ref 0–0.5)
KETONES UR QL STRIP: NEGATIVE
LEUKOCYTE ESTERASE UR QL STRIP.AUTO: NEGATIVE
LYMPHOCYTES # BLD AUTO: 1.56 10*3/MM3 (ref 0.7–3.1)
LYMPHOCYTES NFR BLD AUTO: 24.5 % (ref 19.6–45.3)
MCH RBC QN AUTO: 29.3 PG (ref 26.6–33)
MCHC RBC AUTO-ENTMCNC: 31.6 G/DL (ref 31.5–35.7)
MCV RBC AUTO: 92.6 FL (ref 79–97)
MONOCYTES # BLD AUTO: 0.52 10*3/MM3 (ref 0.1–0.9)
MONOCYTES NFR BLD AUTO: 8.2 % (ref 5–12)
NEUTROPHILS NFR BLD AUTO: 4.11 10*3/MM3 (ref 1.7–7)
NEUTROPHILS NFR BLD AUTO: 64.4 % (ref 42.7–76)
NITRITE UR QL STRIP: NEGATIVE
NT-PROBNP SERPL-MCNC: 746 PG/ML (ref 0–1800)
PH UR STRIP.AUTO: 7 [PH] (ref 5–8)
PLATELET # BLD AUTO: 131 10*3/MM3 (ref 140–450)
PMV BLD AUTO: 11 FL (ref 6–12)
POTASSIUM SERPL-SCNC: 4.6 MMOL/L (ref 3.5–5.2)
PROT SERPL-MCNC: 6.3 G/DL (ref 6–8.5)
PROT UR QL STRIP: NEGATIVE
RBC # BLD AUTO: 4.03 10*6/MM3 (ref 3.77–5.28)
SODIUM SERPL-SCNC: 128 MMOL/L (ref 136–145)
SP GR UR STRIP: 1.01 (ref 1–1.03)
TROPONIN T DELTA: 0 NG/L
TROPONIN T SERPL HS-MCNC: 12 NG/L
UROBILINOGEN UR QL STRIP: NORMAL
WBC NRBC COR # BLD AUTO: 6.37 10*3/MM3 (ref 3.4–10.8)

## 2024-06-16 PROCEDURE — 85025 COMPLETE CBC W/AUTO DIFF WBC: CPT

## 2024-06-16 PROCEDURE — 93010 ELECTROCARDIOGRAM REPORT: CPT | Performed by: EMERGENCY MEDICINE

## 2024-06-16 PROCEDURE — 99285 EMERGENCY DEPT VISIT HI MDM: CPT

## 2024-06-16 PROCEDURE — 36415 COLL VENOUS BLD VENIPUNCTURE: CPT

## 2024-06-16 PROCEDURE — 81003 URINALYSIS AUTO W/O SCOPE: CPT

## 2024-06-16 PROCEDURE — 93005 ELECTROCARDIOGRAM TRACING: CPT | Performed by: EMERGENCY MEDICINE

## 2024-06-16 PROCEDURE — 83880 ASSAY OF NATRIURETIC PEPTIDE: CPT | Performed by: EMERGENCY MEDICINE

## 2024-06-16 PROCEDURE — 80053 COMPREHEN METABOLIC PANEL: CPT | Performed by: EMERGENCY MEDICINE

## 2024-06-16 PROCEDURE — 99285 EMERGENCY DEPT VISIT HI MDM: CPT | Performed by: EMERGENCY MEDICINE

## 2024-06-16 PROCEDURE — 71045 X-RAY EXAM CHEST 1 VIEW: CPT

## 2024-06-16 PROCEDURE — 84484 ASSAY OF TROPONIN QUANT: CPT | Performed by: EMERGENCY MEDICINE

## 2024-06-16 RX ORDER — SODIUM CHLORIDE 0.9 % (FLUSH) 0.9 %
10 SYRINGE (ML) INJECTION AS NEEDED
Status: DISCONTINUED | OUTPATIENT
Start: 2024-06-16 | End: 2024-06-18 | Stop reason: HOSPADM

## 2024-06-17 ENCOUNTER — APPOINTMENT (OUTPATIENT)
Dept: NUCLEAR MEDICINE | Facility: HOSPITAL | Age: 89
End: 2024-06-17
Payer: MEDICARE

## 2024-06-17 ENCOUNTER — APPOINTMENT (OUTPATIENT)
Dept: CARDIOLOGY | Facility: HOSPITAL | Age: 89
End: 2024-06-17
Payer: MEDICARE

## 2024-06-17 LAB
B PARAPERT DNA SPEC QL NAA+PROBE: NOT DETECTED
B PERT DNA SPEC QL NAA+PROBE: NOT DETECTED
BASOPHILS # BLD AUTO: 0.02 10*3/MM3 (ref 0–0.2)
BASOPHILS NFR BLD AUTO: 0.4 % (ref 0–1.5)
BH CV ECHO MEAS - ACS: 1.9 CM
BH CV ECHO MEAS - AO MAX PG: 9.1 MMHG
BH CV ECHO MEAS - AO MEAN PG: 5 MMHG
BH CV ECHO MEAS - AO V2 MAX: 151 CM/SEC
BH CV ECHO MEAS - AO V2 VTI: 31.7 CM
BH CV ECHO MEAS - AVA(I,D): 2.03 CM2
BH CV ECHO MEAS - EDV(CUBED): 59.3 ML
BH CV ECHO MEAS - EDV(MOD-SP4): 59.7 ML
BH CV ECHO MEAS - EF(MOD-BP): 69 %
BH CV ECHO MEAS - EF(MOD-SP4): 69.3 %
BH CV ECHO MEAS - ESV(CUBED): 17.6 ML
BH CV ECHO MEAS - ESV(MOD-SP4): 18.3 ML
BH CV ECHO MEAS - FS: 33.3 %
BH CV ECHO MEAS - IVS/LVPW: 0.91 CM
BH CV ECHO MEAS - IVSD: 1 CM
BH CV ECHO MEAS - LA DIMENSION: 3.6 CM
BH CV ECHO MEAS - LAT PEAK E' VEL: 8.2 CM/SEC
BH CV ECHO MEAS - LV DIASTOLIC VOL/BSA (35-75): 34.7 CM2
BH CV ECHO MEAS - LV MASS(C)D: 131 GRAMS
BH CV ECHO MEAS - LV MAX PG: 3.5 MMHG
BH CV ECHO MEAS - LV MEAN PG: 2 MMHG
BH CV ECHO MEAS - LV SYSTOLIC VOL/BSA (12-30): 10.6 CM2
BH CV ECHO MEAS - LV V1 MAX: 94.2 CM/SEC
BH CV ECHO MEAS - LV V1 VTI: 20.5 CM
BH CV ECHO MEAS - LVIDD: 3.9 CM
BH CV ECHO MEAS - LVIDS: 2.6 CM
BH CV ECHO MEAS - LVOT AREA: 3.1 CM2
BH CV ECHO MEAS - LVOT DIAM: 2 CM
BH CV ECHO MEAS - LVPWD: 1.1 CM
BH CV ECHO MEAS - MED PEAK E' VEL: 5.6 CM/SEC
BH CV ECHO MEAS - MV A MAX VEL: 37.7 CM/SEC
BH CV ECHO MEAS - MV DEC SLOPE: 254 CM/SEC2
BH CV ECHO MEAS - MV DEC TIME: 0.21 SEC
BH CV ECHO MEAS - MV E MAX VEL: 93.4 CM/SEC
BH CV ECHO MEAS - MV E/A: 2.48
BH CV ECHO MEAS - MV MAX PG: 5 MMHG
BH CV ECHO MEAS - MV MEAN PG: 2 MMHG
BH CV ECHO MEAS - MV P1/2T: 126.8 MSEC
BH CV ECHO MEAS - MV V2 VTI: 34.6 CM
BH CV ECHO MEAS - MVA(P1/2T): 1.73 CM2
BH CV ECHO MEAS - MVA(VTI): 1.86 CM2
BH CV ECHO MEAS - PA ACC TIME: 0.04 SEC
BH CV ECHO MEAS - PA V2 MAX: 107 CM/SEC
BH CV ECHO MEAS - RAP SYSTOLE: 3 MMHG
BH CV ECHO MEAS - RV MAX PG: 3.6 MMHG
BH CV ECHO MEAS - RV V1 MAX: 94.9 CM/SEC
BH CV ECHO MEAS - RV V1 VTI: 17.6 CM
BH CV ECHO MEAS - RVDD: 3.8 CM
BH CV ECHO MEAS - RVSP: 41.2 MMHG
BH CV ECHO MEAS - SV(LVOT): 64.4 ML
BH CV ECHO MEAS - SV(MOD-SP4): 41.4 ML
BH CV ECHO MEAS - SVI(LVOT): 37.4 ML/M2
BH CV ECHO MEAS - SVI(MOD-SP4): 24.1 ML/M2
BH CV ECHO MEAS - TAPSE (>1.6): 1.39 CM
BH CV ECHO MEAS - TR MAX PG: 38.2 MMHG
BH CV ECHO MEAS - TR MAX VEL: 309 CM/SEC
BH CV ECHO MEAS RV FREE WALL STRAIN: -12.8 %
BH CV ECHO MEASUREMENTS AVERAGE E/E' RATIO: 13.54
BH CV REST NUCLEAR ISOTOPE DOSE: 9 MCI
BH CV STRESS BP STAGE 1: NORMAL
BH CV STRESS BP STAGE 2: NORMAL
BH CV STRESS COMMENTS STAGE 1: NORMAL
BH CV STRESS COMMENTS STAGE 2: NORMAL
BH CV STRESS DOSE REGADENOSON STAGE 1: 0.4
BH CV STRESS DURATION MIN STAGE 1: 0
BH CV STRESS DURATION MIN STAGE 2: 4
BH CV STRESS DURATION SEC STAGE 1: 10
BH CV STRESS DURATION SEC STAGE 2: 0
BH CV STRESS HR STAGE 1: 65
BH CV STRESS HR STAGE 2: 81
BH CV STRESS NUCLEAR ISOTOPE DOSE: 32.2 MCI
BH CV STRESS PROTOCOL 1: NORMAL
BH CV STRESS RECOVERY BP: NORMAL MMHG
BH CV STRESS RECOVERY HR: 72 BPM
BH CV STRESS STAGE 1: 1
BH CV STRESS STAGE 2: 2
BH CV XLRA - TDI S': 9.7 CM/SEC
C PNEUM DNA NPH QL NAA+NON-PROBE: NOT DETECTED
CK SERPL-CCNC: 61 U/L (ref 20–180)
D DIMER PPP FEU-MCNC: 0.36 MG/L (FEU) (ref 0–0.86)
DEPRECATED RDW RBC AUTO: 48.4 FL (ref 37–54)
EOSINOPHIL # BLD AUTO: 0.13 10*3/MM3 (ref 0–0.4)
EOSINOPHIL NFR BLD AUTO: 2.4 % (ref 0.3–6.2)
ERYTHROCYTE [DISTWIDTH] IN BLOOD BY AUTOMATED COUNT: 14.6 % (ref 12.3–15.4)
FLUAV SUBTYP SPEC NAA+PROBE: NOT DETECTED
FLUBV RNA ISLT QL NAA+PROBE: NOT DETECTED
HADV DNA SPEC NAA+PROBE: NOT DETECTED
HCOV 229E RNA SPEC QL NAA+PROBE: NOT DETECTED
HCOV HKU1 RNA SPEC QL NAA+PROBE: NOT DETECTED
HCOV NL63 RNA SPEC QL NAA+PROBE: NOT DETECTED
HCOV OC43 RNA SPEC QL NAA+PROBE: NOT DETECTED
HCT VFR BLD AUTO: 38.8 % (ref 34–46.6)
HGB BLD-MCNC: 12.4 G/DL (ref 12–15.9)
HMPV RNA NPH QL NAA+NON-PROBE: NOT DETECTED
HPIV1 RNA ISLT QL NAA+PROBE: NOT DETECTED
HPIV2 RNA SPEC QL NAA+PROBE: NOT DETECTED
HPIV3 RNA NPH QL NAA+PROBE: NOT DETECTED
HPIV4 P GENE NPH QL NAA+PROBE: NOT DETECTED
IMM GRANULOCYTES # BLD AUTO: 0.02 10*3/MM3 (ref 0–0.05)
IMM GRANULOCYTES NFR BLD AUTO: 0.4 % (ref 0–0.5)
LEFT ATRIUM VOLUME INDEX: 26.9 ML/M2
LV EF NUC BP: 88 %
LYMPHOCYTES # BLD AUTO: 1.5 10*3/MM3 (ref 0.7–3.1)
LYMPHOCYTES NFR BLD AUTO: 28.2 % (ref 19.6–45.3)
M PNEUMO IGG SER IA-ACNC: NOT DETECTED
MAGNESIUM SERPL-MCNC: 2 MG/DL (ref 1.6–2.4)
MAXIMAL PREDICTED HEART RATE: 134 BPM
MCH RBC QN AUTO: 29.1 PG (ref 26.6–33)
MCHC RBC AUTO-ENTMCNC: 32 G/DL (ref 31.5–35.7)
MCV RBC AUTO: 91.1 FL (ref 79–97)
MONOCYTES # BLD AUTO: 0.42 10*3/MM3 (ref 0.1–0.9)
MONOCYTES NFR BLD AUTO: 7.9 % (ref 5–12)
NEUTROPHILS NFR BLD AUTO: 3.22 10*3/MM3 (ref 1.7–7)
NEUTROPHILS NFR BLD AUTO: 60.7 % (ref 42.7–76)
NRBC BLD AUTO-RTO: 0 /100 WBC (ref 0–0.2)
PERCENT MAX PREDICTED HR: 60.45 %
PHOSPHATE SERPL-MCNC: 2.8 MG/DL (ref 2.5–4.5)
PLATELET # BLD AUTO: 137 10*3/MM3 (ref 140–450)
PMV BLD AUTO: 11.1 FL (ref 6–12)
QT INTERVAL: 462 MS
QTC INTERVAL: 463 MS
RBC # BLD AUTO: 4.26 10*6/MM3 (ref 3.77–5.28)
RHINOVIRUS RNA SPEC NAA+PROBE: DETECTED
RSV RNA NPH QL NAA+NON-PROBE: NOT DETECTED
SARS-COV-2 RNA NPH QL NAA+NON-PROBE: NOT DETECTED
SINUS: 2.8 CM
STJ: 2.2 CM
STRESS BASELINE BP: NORMAL MMHG
STRESS BASELINE HR: 65 BPM
STRESS PERCENT HR: 71 %
STRESS POST PEAK BP: NORMAL MMHG
STRESS POST PEAK HR: 81 BPM
STRESS TARGET HR: 114 BPM
TROPONIN T SERPL HS-MCNC: 13 NG/L
TSH SERPL DL<=0.05 MIU/L-ACNC: 1.17 UIU/ML (ref 0.27–4.2)
WBC NRBC COR # BLD AUTO: 5.31 10*3/MM3 (ref 3.4–10.8)

## 2024-06-17 PROCEDURE — 25010000002 REGADENOSON 0.4 MG/5ML SOLUTION: Performed by: INTERNAL MEDICINE

## 2024-06-17 PROCEDURE — 0202U NFCT DS 22 TRGT SARS-COV-2: CPT | Performed by: INTERNAL MEDICINE

## 2024-06-17 PROCEDURE — A9502 TC99M TETROFOSMIN: HCPCS | Performed by: INTERNAL MEDICINE

## 2024-06-17 PROCEDURE — 94664 DEMO&/EVAL PT USE INHALER: CPT

## 2024-06-17 PROCEDURE — 85379 FIBRIN DEGRADATION QUANT: CPT | Performed by: INTERNAL MEDICINE

## 2024-06-17 PROCEDURE — 84484 ASSAY OF TROPONIN QUANT: CPT | Performed by: STUDENT IN AN ORGANIZED HEALTH CARE EDUCATION/TRAINING PROGRAM

## 2024-06-17 PROCEDURE — 93356 MYOCRD STRAIN IMG SPCKL TRCK: CPT | Performed by: INTERNAL MEDICINE

## 2024-06-17 PROCEDURE — 94761 N-INVAS EAR/PLS OXIMETRY MLT: CPT

## 2024-06-17 PROCEDURE — 93306 TTE W/DOPPLER COMPLETE: CPT

## 2024-06-17 PROCEDURE — 94799 UNLISTED PULMONARY SVC/PX: CPT

## 2024-06-17 PROCEDURE — 93017 CV STRESS TEST TRACING ONLY: CPT

## 2024-06-17 PROCEDURE — 78452 HT MUSCLE IMAGE SPECT MULT: CPT

## 2024-06-17 PROCEDURE — 93018 CV STRESS TEST I&R ONLY: CPT | Performed by: INTERNAL MEDICINE

## 2024-06-17 PROCEDURE — 0 TECHNETIUM TETROFOSMIN KIT: Performed by: INTERNAL MEDICINE

## 2024-06-17 PROCEDURE — 78452 HT MUSCLE IMAGE SPECT MULT: CPT | Performed by: INTERNAL MEDICINE

## 2024-06-17 PROCEDURE — 83735 ASSAY OF MAGNESIUM: CPT | Performed by: STUDENT IN AN ORGANIZED HEALTH CARE EDUCATION/TRAINING PROGRAM

## 2024-06-17 PROCEDURE — 99222 1ST HOSP IP/OBS MODERATE 55: CPT | Performed by: INTERNAL MEDICINE

## 2024-06-17 PROCEDURE — 93306 TTE W/DOPPLER COMPLETE: CPT | Performed by: INTERNAL MEDICINE

## 2024-06-17 PROCEDURE — 84100 ASSAY OF PHOSPHORUS: CPT | Performed by: STUDENT IN AN ORGANIZED HEALTH CARE EDUCATION/TRAINING PROGRAM

## 2024-06-17 PROCEDURE — 94640 AIRWAY INHALATION TREATMENT: CPT

## 2024-06-17 PROCEDURE — 82550 ASSAY OF CK (CPK): CPT | Performed by: STUDENT IN AN ORGANIZED HEALTH CARE EDUCATION/TRAINING PROGRAM

## 2024-06-17 PROCEDURE — 84443 ASSAY THYROID STIM HORMONE: CPT | Performed by: STUDENT IN AN ORGANIZED HEALTH CARE EDUCATION/TRAINING PROGRAM

## 2024-06-17 PROCEDURE — 85025 COMPLETE CBC W/AUTO DIFF WBC: CPT | Performed by: STUDENT IN AN ORGANIZED HEALTH CARE EDUCATION/TRAINING PROGRAM

## 2024-06-17 PROCEDURE — 93356 MYOCRD STRAIN IMG SPCKL TRCK: CPT

## 2024-06-17 RX ORDER — LOPERAMIDE HYDROCHLORIDE 2 MG/1
4 CAPSULE ORAL EVERY 4 HOURS PRN
Status: DISCONTINUED | OUTPATIENT
Start: 2024-06-17 | End: 2024-06-18 | Stop reason: HOSPADM

## 2024-06-17 RX ORDER — METOPROLOL TARTRATE 50 MG/1
100 TABLET, FILM COATED ORAL 2 TIMES DAILY
Status: DISCONTINUED | OUTPATIENT
Start: 2024-06-17 | End: 2024-06-18 | Stop reason: HOSPADM

## 2024-06-17 RX ORDER — IPRATROPIUM BROMIDE AND ALBUTEROL SULFATE 2.5; .5 MG/3ML; MG/3ML
3 SOLUTION RESPIRATORY (INHALATION) EVERY 6 HOURS PRN
Status: DISCONTINUED | OUTPATIENT
Start: 2024-06-17 | End: 2024-06-18 | Stop reason: HOSPADM

## 2024-06-17 RX ORDER — BISACODYL 5 MG/1
5 TABLET, DELAYED RELEASE ORAL DAILY PRN
Status: DISCONTINUED | OUTPATIENT
Start: 2024-06-17 | End: 2024-06-18 | Stop reason: HOSPADM

## 2024-06-17 RX ORDER — FUROSEMIDE 40 MG/1
40 TABLET ORAL 2 TIMES DAILY
Status: DISCONTINUED | OUTPATIENT
Start: 2024-06-17 | End: 2024-06-18 | Stop reason: HOSPADM

## 2024-06-17 RX ORDER — ATORVASTATIN CALCIUM 10 MG/1
10 TABLET, FILM COATED ORAL NIGHTLY
Status: DISCONTINUED | OUTPATIENT
Start: 2024-06-17 | End: 2024-06-18 | Stop reason: HOSPADM

## 2024-06-17 RX ORDER — BUDESONIDE AND FORMOTEROL FUMARATE DIHYDRATE 160; 4.5 UG/1; UG/1
2 AEROSOL RESPIRATORY (INHALATION)
Status: DISCONTINUED | OUTPATIENT
Start: 2024-06-17 | End: 2024-06-18 | Stop reason: HOSPADM

## 2024-06-17 RX ORDER — CLOPIDOGREL BISULFATE 75 MG/1
75 TABLET ORAL DAILY
Status: DISCONTINUED | OUTPATIENT
Start: 2024-06-17 | End: 2024-06-18 | Stop reason: HOSPADM

## 2024-06-17 RX ORDER — REGADENOSON 0.08 MG/ML
0.4 INJECTION, SOLUTION INTRAVENOUS
Status: COMPLETED | OUTPATIENT
Start: 2024-06-17 | End: 2024-06-17

## 2024-06-17 RX ORDER — SODIUM CHLORIDE 0.9 % (FLUSH) 0.9 %
10 SYRINGE (ML) INJECTION AS NEEDED
Status: DISCONTINUED | OUTPATIENT
Start: 2024-06-17 | End: 2024-06-18 | Stop reason: HOSPADM

## 2024-06-17 RX ORDER — ALBUTEROL SULFATE 90 UG/1
2 AEROSOL, METERED RESPIRATORY (INHALATION) 2 TIMES DAILY
COMMUNITY

## 2024-06-17 RX ORDER — AMOXICILLIN 250 MG
2 CAPSULE ORAL 2 TIMES DAILY PRN
Status: DISCONTINUED | OUTPATIENT
Start: 2024-06-17 | End: 2024-06-18 | Stop reason: HOSPADM

## 2024-06-17 RX ORDER — BISACODYL 10 MG
10 SUPPOSITORY, RECTAL RECTAL DAILY PRN
Status: DISCONTINUED | OUTPATIENT
Start: 2024-06-17 | End: 2024-06-18 | Stop reason: HOSPADM

## 2024-06-17 RX ORDER — FERROUS SULFATE 324(65)MG
324 TABLET, DELAYED RELEASE (ENTERIC COATED) ORAL
Status: DISCONTINUED | OUTPATIENT
Start: 2024-06-18 | End: 2024-06-18 | Stop reason: HOSPADM

## 2024-06-17 RX ORDER — NITROGLYCERIN 0.4 MG/1
0.4 TABLET SUBLINGUAL
Status: DISCONTINUED | OUTPATIENT
Start: 2024-06-17 | End: 2024-06-18 | Stop reason: HOSPADM

## 2024-06-17 RX ORDER — SPIRONOLACTONE 25 MG/1
TABLET ORAL
Qty: 45 TABLET | Refills: 3 | Status: SHIPPED | OUTPATIENT
Start: 2024-06-17

## 2024-06-17 RX ORDER — CARBOXYMETHYLCELLULOSE SODIUM 10 MG/ML
1 GEL OPHTHALMIC 4 TIMES DAILY PRN
Status: DISCONTINUED | OUTPATIENT
Start: 2024-06-17 | End: 2024-06-18 | Stop reason: HOSPADM

## 2024-06-17 RX ORDER — DILTIAZEM HYDROCHLORIDE 120 MG/1
120 CAPSULE, COATED, EXTENDED RELEASE ORAL
Status: DISCONTINUED | OUTPATIENT
Start: 2024-06-17 | End: 2024-06-18 | Stop reason: HOSPADM

## 2024-06-17 RX ORDER — BUDESONIDE 0.5 MG/2ML
0.5 INHALANT ORAL
Status: DISCONTINUED | OUTPATIENT
Start: 2024-06-17 | End: 2024-06-17

## 2024-06-17 RX ORDER — HYDROCODONE BITARTRATE AND ACETAMINOPHEN 7.5; 325 MG/1; MG/1
1 TABLET ORAL EVERY 8 HOURS PRN
Status: DISCONTINUED | OUTPATIENT
Start: 2024-06-17 | End: 2024-06-18 | Stop reason: HOSPADM

## 2024-06-17 RX ORDER — BUDESONIDE AND FORMOTEROL FUMARATE DIHYDRATE 160; 4.5 UG/1; UG/1
2 AEROSOL RESPIRATORY (INHALATION)
COMMUNITY
End: 2024-06-18 | Stop reason: HOSPADM

## 2024-06-17 RX ORDER — SODIUM CHLORIDE 9 MG/ML
40 INJECTION, SOLUTION INTRAVENOUS AS NEEDED
Status: DISCONTINUED | OUTPATIENT
Start: 2024-06-17 | End: 2024-06-18 | Stop reason: HOSPADM

## 2024-06-17 RX ORDER — ALPRAZOLAM 0.5 MG/1
0.5 TABLET ORAL 2 TIMES DAILY PRN
Status: DISCONTINUED | OUTPATIENT
Start: 2024-06-17 | End: 2024-06-18 | Stop reason: HOSPADM

## 2024-06-17 RX ORDER — ISOSORBIDE MONONITRATE 30 MG/1
30 TABLET, EXTENDED RELEASE ORAL EVERY MORNING
Status: DISCONTINUED | OUTPATIENT
Start: 2024-06-17 | End: 2024-06-18 | Stop reason: HOSPADM

## 2024-06-17 RX ORDER — PANTOPRAZOLE SODIUM 40 MG/1
40 TABLET, DELAYED RELEASE ORAL
Status: DISCONTINUED | OUTPATIENT
Start: 2024-06-18 | End: 2024-06-18 | Stop reason: HOSPADM

## 2024-06-17 RX ORDER — SODIUM CHLORIDE 0.9 % (FLUSH) 0.9 %
10 SYRINGE (ML) INJECTION EVERY 12 HOURS SCHEDULED
Status: DISCONTINUED | OUTPATIENT
Start: 2024-06-17 | End: 2024-06-18 | Stop reason: HOSPADM

## 2024-06-17 RX ORDER — POLYETHYLENE GLYCOL 3350 17 G/17G
17 POWDER, FOR SOLUTION ORAL DAILY PRN
Status: DISCONTINUED | OUTPATIENT
Start: 2024-06-17 | End: 2024-06-18 | Stop reason: HOSPADM

## 2024-06-17 RX ADMIN — BUDESONIDE AND FORMOTEROL FUMARATE DIHYDRATE 2 PUFF: 160; 4.5 AEROSOL RESPIRATORY (INHALATION) at 19:08

## 2024-06-17 RX ADMIN — Medication 10 ML: at 21:20

## 2024-06-17 RX ADMIN — APIXABAN 5 MG: 5 TABLET, FILM COATED ORAL at 08:41

## 2024-06-17 RX ADMIN — Medication 10 ML: at 08:42

## 2024-06-17 RX ADMIN — HYDROCODONE BITARTRATE AND ACETAMINOPHEN 1 TABLET: 7.5; 325 TABLET ORAL at 21:17

## 2024-06-17 RX ADMIN — BUDESONIDE 0.5 MG: 0.5 INHALANT RESPIRATORY (INHALATION) at 08:14

## 2024-06-17 RX ADMIN — APIXABAN 5 MG: 5 TABLET, FILM COATED ORAL at 21:20

## 2024-06-17 RX ADMIN — TETROFOSMIN 1 DOSE: 1.38 INJECTION, POWDER, LYOPHILIZED, FOR SOLUTION INTRAVENOUS at 09:36

## 2024-06-17 RX ADMIN — TETROFOSMIN 1 DOSE: 1.38 INJECTION, POWDER, LYOPHILIZED, FOR SOLUTION INTRAVENOUS at 10:48

## 2024-06-17 RX ADMIN — ATORVASTATIN CALCIUM 10 MG: 10 TABLET, FILM COATED ORAL at 21:17

## 2024-06-17 RX ADMIN — REGADENOSON 0.4 MG: 0.08 INJECTION, SOLUTION INTRAVENOUS at 10:48

## 2024-06-17 RX ADMIN — LOPERAMIDE HYDROCHLORIDE 4 MG: 2 CAPSULE ORAL at 21:16

## 2024-06-17 RX ADMIN — ISOSORBIDE MONONITRATE 30 MG: 30 TABLET, EXTENDED RELEASE ORAL at 08:41

## 2024-06-17 RX ADMIN — METOPROLOL TARTRATE 100 MG: 50 TABLET, FILM COATED ORAL at 21:17

## 2024-06-17 RX ADMIN — METOPROLOL TARTRATE 100 MG: 50 TABLET, FILM COATED ORAL at 08:47

## 2024-06-17 RX ADMIN — FUROSEMIDE 40 MG: 40 TABLET ORAL at 21:16

## 2024-06-17 NOTE — CASE MANAGEMENT/SOCIAL WORK
Discharge Planning Assessment   Daniele     Patient Name: Syl Herrera  MRN: [id number]  Today's Date: 6/17/2024    Admit Date: 6/16/2024  Plan: Return home alone, daughters stay the night with her.   Discharge Needs Assessment       Row Name 06/17/24 1009       Living Environment    People in Home alone    Current Living Arrangements home    Potentially Unsafe Housing Conditions none    In the past 12 months has the electric, gas, oil, or water company threatened to shut off services in your home? No    Primary Care Provided by self    Provides Primary Care For no one    Family Caregiver if Needed child(angela), adult    Family Caregiver Names Daughters Lisandra, Chey, and Janny    Quality of Family Relationships helpful;involved;supportive    Able to Return to Prior Arrangements yes       Resource/Environmental Concerns    Resource/Environmental Concerns none    Transportation Concerns none       Transportation Needs    In the past 12 months, has lack of transportation kept you from medical appointments or from getting medications? no    In the past 12 months, has lack of transportation kept you from meetings, work, or from getting things needed for daily living? No       Food Insecurity    Within the past 12 months, you worried that your food would run out before you got the money to buy more. Never true    Within the past 12 months, the food you bought just didn't last and you didn't have money to get more. Never true       Transition Planning    Patient/Family Anticipates Transition to home    Patient/Family Anticipated Services at Transition none    Transportation Anticipated family or friend will provide       Discharge Needs Assessment    Readmission Within the Last 30 Days no previous admission in last 30 days    Equipment Currently Used at Home rollator    Concerns to be Addressed denies needs/concerns at this time    Anticipated Changes Related to Illness none    Equipment Needed After Discharge none     Provided Post Acute Provider List? N/A    Provided Post Acute Provider Quality & Resource List? N/A                   Discharge Plan       Row Name 06/17/24 1010       Plan    Plan Return home alone, daughters stay the night with her.    Patient/Family in Agreement with Plan yes    Provided Post Acute Provider List? N/A    Provided Post Acute Provider Quality & Resource List? N/A    Plan Comments CM met with patient at bedside. Patient states she lives at home alone, but her daughters take turns staying the night with her. Patient states she is independent with ADLs, no longer drives self, and current DME includes a rollator. Patient confirms PCP Gigi and utilizes [facility] in [facility] for preferred pharmacy. Patient agreeable to enrollment in [facility] [facility] program. Patient denies any current HHC or therapy. Patient denies difficulty affording medications, food, or utilities. Patient states at time of discharge her daughter or niece would provide transportation.               Expected Discharge Date and Time       Expected Discharge Date Expected Discharge Time    Jun 19, 2024            Demographic Summary       Row Name 06/17/24 1008       General Information    Admission Type observation    Arrived From emergency department    Required Notices Provided Observation Status Notice    Referral Source admission list    Reason for Consult discharge planning    Preferred Language English       Contact Information    Permission Granted to Share Info With                    Functional Status       Row Name 06/17/24 1009       Functional Status    Usual Activity Tolerance moderate    Current Activity Tolerance moderate       Functional Status, IADL    Medications independent    Meal Preparation independent    Housekeeping independent    Laundry independent    Shopping independent       Mental Status    General Appearance WDL WDL       Mental Status Summary    Recent Changes in Mental Status/Cognitive  Functioning no changes              Patient Forms       Row Name 06/17/24 1013       Patient Forms    Important Message from Medicare ([facility]) Delivered  STAUFFER per CM 6/17/2024.    Delivered to Support person  Daughter Lisandra.    Method of delivery In person               Reina Owens RN      Office Phone [phone]

## 2024-06-17 NOTE — PROGRESS NOTES
LOS: 0 days   Patient Care Team:  Nava Yu MD as PCP - General  Ivan Martell MD as Consulting Physician (Cardiology)  Greer Shaikh MD as Consulting Physician (Nephrology)  Harshad Landin MD as Consulting Physician (Hematology and Oncology)    Subjective     Interval History: Stable overnight without recurrence of chest pain    Patient Complaints: No specific complaints; fatigued from being up all night    History taken from: patient    Review of Systems   Constitutional:  Positive for activity change and fatigue. Negative for appetite change, chills, diaphoresis and fever.   HENT:  Negative for facial swelling.    Eyes:  Negative for visual disturbance.   Respiratory:  Negative for cough.            Objective     Vital Signs  Temp:  [97.5 °F (36.4 °C)-98 °F (36.7 °C)] 97.5 °F (36.4 °C)  Heart Rate:  [60-67] 67  Resp:  [12-23] 12  BP: (149-181)/(74-87) 181/79    Physical Exam:     General Appearance:    Alert, cooperative, in no acute distress,   Head:    Normocephalic, without obvious abnormality, atraumatic   Eyes:            Lids and lashes normal, conjunctivae and sclerae normal, no   icterus, no pallor, corneas clear, PERRLA   Ears:    Ears appear intact with no abnormalities noted   Throat:   No oral lesions, no thrush, oral mucosa moist   Neck:   No adenopathy, supple, trachea midline, no thyromegaly, no   carotid bruit, no JVD   Lungs:     Clear to auscultation,respirations regular, even and                  unlabored    Heart:    Regular rhythm and normal rate, normal S1 and S2, II/VI systolic murmur   Chest Wall:    No abnormalities observed   Abdomen:     Normal bowel sounds, no masses, no organomegaly, soft        Non-tender non-distended, no guarding,   Extremities:   Moves all extremities well, no edema, no cyanosis, no             Redness   Pulses:   Pulses palpable and equal bilaterally   Skin:   No bleeding, bruising or rash   Lymph nodes:   No palpable adenopathy    Neurologic:   Cranial nerves 2 - 12 grossly intact, sensation intact, DTR       present and equal bilaterally        Results Review:    Lab Results (last 24 hours)       Procedure Component Value Units Date/Time    CK [[id number]]  (Normal) Collected: 06/17/24 0458    Specimen: Blood Updated: 06/17/24 0554     Creatine Kinase 61 U/L     TSH [[id number]]  (Normal) Collected: 06/17/24 0458    Specimen: Blood Updated: 06/17/24 0554     TSH 1.170 uIU/mL     Phosphorus [[id number]]  (Normal) Collected: 06/17/24 0458    Specimen: Blood Updated: 06/17/24 0554     Phosphorus 2.8 mg/dL     Magnesium [[id number]]  (Normal) Collected: 06/17/24 0458    Specimen: Blood Updated: 06/17/24 0554     Magnesium 2.0 mg/dL     High Sensitivity Troponin T [[id number]]  (Normal) Collected: 06/17/24 0458    Specimen: Blood Updated: 06/17/24 0554     HS Troponin T 13 ng/L     Narrative:      High Sensitive Troponin T Reference Range:  <14.0 ng/L- Negative Female for AMI  <22.0 ng/L- Negative Male for AMI  >=14 - Abnormal Female indicating possible myocardial injury.  >=22 - Abnormal Male indicating possible myocardial injury.   Clinicians would have to utilize clinical acumen, EKG, Troponin, and serial changes to determine if it is an Acute Myocardial Infarction or myocardial injury due to an underlying chronic condition.         CBC Auto Differential [[id number]]  (Abnormal) Collected: 06/17/24 0458    Specimen: Blood Updated: 06/17/24 0533     WBC 5.31 10*3/mm3      RBC 4.26 10*6/mm3      Hemoglobin 12.4 g/dL      Hematocrit 38.8 %      MCV 91.1 fL      MCH 29.1 pg      MCHC 32.0 g/dL      RDW 14.6 %      RDW-SD 48.4 fl      MPV 11.1 fL      Platelets 137 10*3/mm3      Neutrophil % 60.7 %      Lymphocyte % 28.2 %      Monocyte % 7.9 %      Eosinophil % 2.4 %      Basophil % 0.4 %      Immature Grans % 0.4 %      Neutrophils, Absolute 3.22 10*3/mm3      Lymphocytes, Absolute 1.50 10*3/mm3      Monocytes, Absolute 0.42 10*3/mm3       Eosinophils, Absolute 0.13 10*3/mm3      Basophils, Absolute 0.02 10*3/mm3      Immature Grans, Absolute 0.02 10*3/mm3      nRBC 0.0 /100 WBC     High Sensitivity Troponin T 2Hr [[id number]]  (Normal) Collected: 06/16/24 2330    Specimen: Blood from Arm, Left Updated: 06/16/24 2349     HS Troponin T 12 ng/L      Troponin T Delta 0 ng/L     Narrative:      High Sensitive Troponin T Reference Range:  <14.0 ng/L- Negative Female for AMI  <22.0 ng/L- Negative Male for AMI  >=14 - Abnormal Female indicating possible myocardial injury.  >=22 - Abnormal Male indicating possible myocardial injury.   Clinicians would have to utilize clinical acumen, EKG, Troponin, and serial changes to determine if it is an Acute Myocardial Infarction or myocardial injury due to an underlying chronic condition.         BNP [[id number]]  (Normal) Collected: 06/16/24 2321    Specimen: Blood Updated: 06/16/24 2342     proBNP 746.0 pg/mL     Narrative:      This assay is used as an aid in the diagnosis of individuals suspected of having heart failure. It can be used as an aid in the diagnosis of acute decompensated heart failure (ADHF) in patients presenting with signs and symptoms of ADHF to the emergency department (ED). In addition, NT-proBNP of <300 pg/mL indicates ADHF is not likely.    Age Range Result Interpretation  NT-proBNP Concentration (pg/mL:      <50             Positive            >450                   Gray                 300-450                    Negative             <300    50-75           Positive            >900                  Gray                300-900                  Negative            <300      >75             Positive            >1800                  Gray                300-1800                  Negative            <300    Comprehensive Metabolic Panel [[id number]]  (Abnormal) Collected: 06/16/24 2119    Specimen: Blood Updated: 06/16/24 2159     Glucose 102 mg/dL      BUN 27 mg/dL      Creatinine 1.15 mg/dL       Sodium 128 mmol/L      Potassium 4.6 mmol/L      Chloride 95 mmol/L      CO2 24.6 mmol/L      Calcium 9.3 mg/dL      Total Protein 6.3 g/dL      Albumin 4.1 g/dL      ALT (SGPT) 19 U/L      AST (SGOT) 24 U/L      Alkaline Phosphatase 146 U/L      Total Bilirubin 0.7 mg/dL      Globulin 2.2 gm/dL      A/G Ratio 1.9 g/dL      BUN/Creatinine Ratio 23.5     Anion Gap 8.4 mmol/L      eGFR 46.5 mL/min/1.73     Narrative:      GFR Normal >60  Chronic Kidney Disease <60  Kidney Failure <15    The GFR formula is only valid for adults with stable renal function between ages 18 and 70.    High Sensitivity Troponin T [[id number]]  (Normal) Collected: 06/16/24 2119    Specimen: Blood Updated: 06/16/24 2159     HS Troponin T 12 ng/L     Narrative:      High Sensitive Troponin T Reference Range:  <14.0 ng/L- Negative Female for AMI  <22.0 ng/L- Negative Male for AMI  >=14 - Abnormal Female indicating possible myocardial injury.  >=22 - Abnormal Male indicating possible myocardial injury.   Clinicians would have to utilize clinical acumen, EKG, Troponin, and serial changes to determine if it is an Acute Myocardial Infarction or myocardial injury due to an underlying chronic condition.         CBC & Differential [[id number]]  (Abnormal) Collected: 06/16/24 2119    Specimen: Blood Updated: 06/16/24 2131    Narrative:      The following orders were created for panel order CBC & Differential.  Procedure                               Abnormality         Status                     ---------                               -----------         ------                     CBC Auto Differential[[id number]]        Abnormal            Final result                 Please view results for these tests on the individual orders.    CBC Auto Differential [[id number]]  (Abnormal) Collected: 06/16/24 2119    Specimen: Blood Updated: 06/16/24 2131     WBC 6.37 10*3/mm3      RBC 4.03 10*6/mm3      Hemoglobin 11.8 g/dL      Hematocrit 37.3 %      MCV 92.6 fL       MCH 29.3 pg      MCHC 31.6 g/dL      RDW 14.5 %      RDW-SD 50.8 fl      MPV 11.0 fL      Platelets 131 10*3/mm3      Neutrophil % 64.4 %      Lymphocyte % 24.5 %      Monocyte % 8.2 %      Eosinophil % 2.4 %      Basophil % 0.3 %      Immature Grans % 0.2 %      Neutrophils, Absolute 4.11 10*3/mm3      Lymphocytes, Absolute 1.56 10*3/mm3      Monocytes, Absolute 0.52 10*3/mm3      Eosinophils, Absolute 0.15 10*3/mm3      Basophils, Absolute 0.02 10*3/mm3      Immature Grans, Absolute 0.01 10*3/mm3     Urinalysis With Culture If Indicated - Urine, Clean Catch [[id number]]  (Normal) Collected: 06/16/24 2119    Specimen: Urine, Clean Catch Updated: 06/16/24 2128     Color, UA Yellow     Appearance, UA Clear     pH, UA 7.0     Specific Gravity, UA 1.010     Glucose, UA Negative     Ketones, UA Negative     Bilirubin, UA Negative     Blood, UA Negative     Protein, UA Negative     Leuk Esterase, UA Negative     Nitrite, UA Negative     Urobilinogen, UA 0.2 E.U./dL    Narrative:      In absence of clinical symptoms, the presence of pyuria, bacteria, and/or nitrites on the urinalysis result does not correlate with infection.  Urine microscopic not indicated.             Imaging Results (Last 24 Hours)       Procedure Component Value Units Date/Time    XR Chest 1 View [[id number]] Collected: 06/16/24 2204     Updated: 06/16/24 2207    Narrative:      XR CHEST 1 VW    Date of Exam: 6/16/2024 9:24 PM EDT    Indication: Chest Pain Triage Protocol    Comparison: 3/27/2024.    Findings:  The heart is enlarged. There is retrocardiac gas density likely reflecting a large hiatal hernia previously seen on CT. There is a stable left-sided ICD and there is stable evidence of prior median sternotomy and CABG. Stable diffuse interstitial   prominence in the lungs reflecting chronic disease. No definite pulmonary edema. No pneumothorax, pleural effusion or focal airspace consolidation.      Impression:      Impression:  1.No acute  cardiopulmonary abnormality.  2.Cardiomegaly, CABG, ICD and large hiatal hernia.  3.Chronic interstitial lung disease.        Electronically Signed: Marbin aBuman MD    6/16/2024 10:05 PM EDT    Workstation ID: [id number]                 I reviewed the patient's new clinical results.    Medication Review:   Scheduled Meds:apixaban, 5 mg, Oral, Q12H  budesonide, 0.5 mg, Nebulization, BID - RT  isosorbide mononitrate, 30 mg, Oral, QAM  metoprolol tartrate, 100 mg, Oral, BID  sodium chloride, 10 mL, Intravenous, Q12H      Continuous Infusions:   PRN Meds:.  senna-docusate sodium **AND** polyethylene glycol **AND** bisacodyl **AND** bisacodyl    Calcium Replacement - Follow Nurse / BPA Driven Protocol    HYDROcodone-acetaminophen    Magnesium Standard Dose Replacement - Follow Nurse / BPA Driven Protocol    nitroglycerin    Phosphorus Replacement - Follow Nurse / BPA Driven Protocol    Potassium Replacement - Follow Nurse / BPA Driven Protocol    sodium chloride    sodium chloride    sodium chloride     Assessment & Plan       Chest pain  - concerning for angina in patient with known severe CAD.  She has ruled out for ACS with non-trending troponins.  Stress test ordered, cardiologist consulted.  Further plans are pending stress test results. Continue    Hyponatremia - chronic, not clinically significant  Presence of permanent pacemaker  CKD stage 3 - creatinine at baseline  Generalized anxiety  HLD - statin  Interstitial lung disease - Symbicort, prn duonebs  ENRIQUE - may use home Cpap  PAF - currently in a paced rhythm, continue diltiazem, metoprolol (after stress test), apixaban  HTN - continue home meds.        Plan for disposition:TBD    Karly Donovan MD  06/17/24  14:17 EDT

## 2024-06-17 NOTE — FSED PROVIDER NOTE
Subjective   History of Present Illness  88-year-old female presents to the ED complaining of chest discomfort started about 2 hours ago she states her symptoms are somewhat improved now patient has extensive cardiac history status post bypass and stents previously she is currently still on Eliquis she has a dual-chamber pacemaker history of A-fib chronic renal insufficiency dyslipidemia honeycombing in her lungs several previous surgeries including stenting and bypass grafting    History provided by:  Patient and relative   used: No        Review of Systems   Respiratory:  Positive for shortness of breath.    Cardiovascular:  Positive for chest pain.   All other systems reviewed and are negative.      Past Medical History:   Diagnosis Date   • Anxiety    • Arthritis    • Asthma    • Atrial fibrillation    • CKD (chronic kidney disease) stage 3, GFR 30-59 ml/min 04/18/2021   • Coronary artery disease    • Dyslipidemia    • Dyslipidemia 01/05/2015   • GERD (gastroesophageal reflux disease)    • History of bone density study 04/2015   • Hypertension    • Sleep apnea    • Wears dentures        Allergies   Allergen Reactions   • Doxycycline Other (See Comments)     Chest pain   • Celebrex [Celecoxib] Other (See Comments)     Chest pain   • Contrast Dye (Echo Or Unknown Ct/Mr) Itching   • Iodinated Contrast Media Itching   • Nsaids Other (See Comments)     convulsion   • Other Itching     Pt states some steroids make her itch and hallucinate- she does not know the names   She said she is allergic to all arthritis medicine   • Sulfa Antibiotics Nausea Only       Past Surgical History:   Procedure Laterality Date   • BLADDER REPAIR  1990   • BREAST LUMPECTOMY  1974    BENIGN   • CARDIAC CATHETERIZATION  05/25/2011   • CARDIAC CATHETERIZATION N/A 04/21/2021    Procedure: Left Heart Cath and coronary angiogram;  Surgeon: Ivan Martell MD;  Location: [facility] INVASIVE LOCATION;  Service:  Cardiovascular;  Laterality: N/A;   • CARDIAC CATHETERIZATION N/A 04/21/2021    Procedure: Left ventriculography;  Surgeon: Ivan Martell MD;  Location: [facility] CATH INVASIVE LOCATION;  Service: Cardiovascular;  Laterality: N/A;   • CARDIAC CATHETERIZATION  04/21/2021    Procedure: Saphenous Vein Graft;  Surgeon: Ivan Martell MD;  Location: [facility] CATH INVASIVE LOCATION;  Service: Cardiovascular;;   • CARDIAC CATHETERIZATION N/A 04/26/2021    Procedure: Percutaneous Coronary Intervention;  Surgeon: Ivan Martell MD;  Location: [facility] CATH INVASIVE LOCATION;  Service: Cardiovascular;  Laterality: N/A;   • CARDIAC CATHETERIZATION N/A 04/26/2021    Procedure: Stent RAY coronary;  Surgeon: Ivan Martell MD;  Location: [facility] CATH INVASIVE LOCATION;  Service: Cardiovascular;  Laterality: N/A;   • CARDIOVASCULAR STRESS TEST  05/04/2015   • CHOLECYSTECTOMY  1990   • CORONARY ARTERY BYPASS GRAFT  08/09/2017    X5  Dr Brandt   • ENDOSCOPY N/A 06/30/2020    Procedure: ESOPHAGOGASTRODUODENOSCOPY with biopsy x 1 area and dilatation (15-18mm balloon) up to 18mm;  Surgeon: Quinton Tapia MD;  Location: [facility] ENDOSCOPY;  Service: Gastroenterology;  Laterality: N/A;  post op: gastritis, large hiatal hernia, esophageal dysmotility   • HYSTERECTOMY  1990   • INSERT / REPLACE / REMOVE PACEMAKER     • JOINT REPLACEMENT Right     knee    • OTHER SURGICAL HISTORY  06/2017    BLOOD CLOT REMOVED FROM LEFT EAR   • PACEMAKER IMPLANTATION  04/18/2016    DUAL CHAMBER ST ALESSIO       Family History   Problem Relation Age of Onset   • Hypertension Mother    • Heart disease Mother    • Heart disease Sister         PACEMAKER   • Heart disease Brother        Social History     Socioeconomic History   • Marital status:    Tobacco Use   • Smoking status: Never   • Smokeless tobacco: Never   Vaping Use   • Vaping status: Never Used   Substance and Sexual Activity   • Alcohol use: No   • Drug use: No   • Sexual activity: Not Currently      Partners: Female     Birth control/protection: Hysterectomy           Objective   Physical Exam  Vitals and nursing note reviewed.   Constitutional:       Appearance: She is well-developed.   HENT:      Head: Normocephalic.      Right Ear: Ear canal normal. Tympanic membrane is erythematous.      Left Ear: Tympanic membrane normal.      Nose: No congestion or rhinorrhea.      Mouth/Throat:      Pharynx: Pharyngeal swelling present. No oropharyngeal exudate, posterior oropharyngeal erythema or uvula swelling.      Tonsils: No tonsillar exudate or tonsillar abscesses.   Eyes:      Extraocular Movements:      Right eye: Abnormal extraocular motion present.      Left eye: Abnormal extraocular motion present.      Conjunctiva/sclera: Conjunctivae normal.   Cardiovascular:      Rate and Rhythm: Normal rate and regular rhythm.      Heart sounds: Normal heart sounds. No murmur heard.     Systolic murmur is present with a grade of 2/6.      No diastolic murmur is present.   Pulmonary:      Effort: Pulmonary effort is normal.      Breath sounds: Normal breath sounds. Examination of the right-lower field reveals decreased breath sounds. Examination of the left-lower field reveals decreased breath sounds.   Abdominal:      General: Bowel sounds are normal.      Palpations: Abdomen is soft.      Tenderness: There is no abdominal tenderness. There is no guarding or rebound.   Musculoskeletal:      Cervical back: Normal range of motion and neck supple.   Lymphadenopathy:      Cervical: No cervical adenopathy.   Skin:     General: Skin is warm and dry.      Capillary Refill: Capillary refill takes less than 2 seconds.   Neurological:      General: No focal deficit present.      Mental Status: She is alert.         ECG 12 Lead      Date/Time: 6/16/2024 9:20 AM    Performed by: Ra Daniel MD  Authorized by: Ra Daniel MD  Comparison: compared with previous ECG from 3/27/2024  Comparison to previous ECG: No change  Rhythm:  paced  Rate: normal  BPM: 60  QRS axis: normal  Comments: No real change from previous EKG               ED Course  ED Course as of 06/16/24 2344   Sun Jun 16, 2024   2322 EKG and troponin are nondiagnostic sodium noted to be 128 likely from Lasix use  H&H is unremarkable awaiting BMP just told the family likely will need to transfer her for observation and cardiac management due to her history and heart score of 6 [SHEBA]      ED Course User Index  [SHEBA] Ra Daniel MD                                           Medical Decision Making  Impression elderly female with significant cardiac history presents with chest discomfort and shortness of breath we will check baseline cardiac labs including a BMP and a troponin EKG is nondiagnostic we will check a chest x-ray as well suspect the patient night may need to be transferred for observation admission due to severe cardiac risk        Amount and/or Complexity of Data Reviewed  Independent Historian: caregiver  External Data Reviewed: labs, radiology and ECG.  Labs: ordered. Decision-making details documented in ED Course.  Radiology: ordered and independent interpretation performed. Decision-making details documented in ED Course.  ECG/medicine tests: ordered and independent interpretation performed. Decision-making details documented in ED Course.    Risk  Prescription drug management.        Final diagnoses:   None       ED Disposition  ED Disposition       None            No follow-up provider specified.       Medication List      No changes were made to your prescriptions during this visit.

## 2024-06-17 NOTE — PLAN OF CARE
Goal Outcome Evaluation:  Plan of Care Reviewed With: patient        Progress: no change  Outcome Evaluation: Patient from [facility]. NPO and complaining of pain and nausea. Provider notified waiting orders.

## 2024-06-17 NOTE — H&P
"[facility] Medicine Services  History & Physical    Patient Name: Syl Herrera  : 1935  MRN: [id number]  Primary Care Physician:  Nava Yu MD  Date of admission: 2024  Date and Time of Service: 2024 at 4:00 am    Subjective      Chief Complaint: \"chest pain\"    History of Present Illness: Syl Herrera is a 88 y.o. female with a PMH of CAD S/P CABG, paroxysmal A-fib, status post pacemaker, hypertension, ENRIQUE, per lipidemia who presented to [facility] on 2024 transferred from [facility] after presenting with chest pain that started around 2 hours prior to her coming to the ER.  While in the ER she had 2 negative troponins, EKG showed atrial paced rhythm, first-degree AV block.  Blood workup in the ER notable for chemistry with sodium 128 which appears chronic, creatinine mild elevated 1.15, CBC labs notable for hemoglobin of 11.8, no leukocytosis, head x-ray showed findings of cardiomegaly but there is no acute chest disease.  She was subsequently transferred here for further evaluation of her chest pain. During my encounter after she arrived here se denies having any chest pain.       Review of Systems   Constitutional:  Positive for fatigue. Negative for chills.   HENT:  Negative for drooling, hearing loss, rhinorrhea, sneezing and sore throat.    Eyes:  Negative for pain.   Respiratory:  Positive for cough, chest tightness and shortness of breath.    Cardiovascular:  Positive for chest pain. Negative for palpitations and leg swelling.   Gastrointestinal:  Negative for abdominal pain and constipation.   Endocrine: Negative for polydipsia.   Genitourinary:  Negative for dyspareunia and hematuria.   Musculoskeletal:  Negative for back pain and myalgias.   Neurological:  Negative for syncope, facial asymmetry and numbness.   Psychiatric/Behavioral:  Negative for behavioral problems.        Personal History     Past Medical History:   Diagnosis Date    Anxiety  "    Arthritis     Asthma     Atrial fibrillation     CKD (chronic kidney disease) stage 3, GFR 30-59 ml/min 04/18/2021    Coronary artery disease     Dyslipidemia     Dyslipidemia 01/05/2015    GERD (gastroesophageal reflux disease)     History of bone density study 04/2015    Hypertension     Sleep apnea     Wears dentures        Past Surgical History:   Procedure Laterality Date    BLADDER REPAIR  1990    BREAST LUMPECTOMY  1974    BENIGN    CARDIAC CATHETERIZATION  05/25/2011    CARDIAC CATHETERIZATION N/A 04/21/2021    Procedure: Left Heart Cath and coronary angiogram;  Surgeon: Ivan Martell MD;  Location: [facility] CATH INVASIVE LOCATION;  Service: Cardiovascular;  Laterality: N/A;    CARDIAC CATHETERIZATION N/A 04/21/2021    Procedure: Left ventriculography;  Surgeon: Ivan Martell MD;  Location: [facility] CATH INVASIVE LOCATION;  Service: Cardiovascular;  Laterality: N/A;    CARDIAC CATHETERIZATION  04/21/2021    Procedure: Saphenous Vein Graft;  Surgeon: Ivan Martell MD;  Location: [facility] CATH INVASIVE LOCATION;  Service: Cardiovascular;;    CARDIAC CATHETERIZATION N/A 04/26/2021    Procedure: Percutaneous Coronary Intervention;  Surgeon: Ivan Martell MD;  Location: [facility] CATH INVASIVE LOCATION;  Service: Cardiovascular;  Laterality: N/A;    CARDIAC CATHETERIZATION N/A 04/26/2021    Procedure: Stent RAY coronary;  Surgeon: Ivan Martell MD;  Location: [facility] CATH INVASIVE LOCATION;  Service: Cardiovascular;  Laterality: N/A;    CARDIOVASCULAR STRESS TEST  05/04/2015    CHOLECYSTECTOMY  1990    CORONARY ARTERY BYPASS GRAFT  08/09/2017    X5  Dr Brandt    ENDOSCOPY N/A 06/30/2020    Procedure: ESOPHAGOGASTRODUODENOSCOPY with biopsy x 1 area and dilatation (15-18mm balloon) up to 18mm;  Surgeon: Quinton Tapia MD;  Location: [facility] ENDOSCOPY;  Service: Gastroenterology;  Laterality: N/A;  post op: gastritis, large hiatal hernia, esophageal dysmotility    HYSTERECTOMY  1990    INSERT / REPLACE / REMOVE  PACEMAKER      JOINT REPLACEMENT Right     knee     OTHER SURGICAL HISTORY  06/2017    BLOOD CLOT REMOVED FROM LEFT EAR    PACEMAKER IMPLANTATION  04/18/2016    DUAL CHAMBER [facility]       Family History: family history includes Heart disease in her brother, mother, and sister; Hypertension in her mother. Otherwise pertinent FHx was reviewed and not pertinent to current issue.    Social History:  reports that she has never smoked. She has never used smokeless tobacco. She reports that she does not drink alcohol and does not use drugs.    Home Medications:  Prior to Admission Medications       Prescriptions Last Dose Informant Patient Reported? Taking?    ALPRAZolam (XANAX) 0.5 MG tablet  Self Yes No    Take 1 tablet by mouth 2 (Two) Times a Day As Needed.    atorvastatin (LIPITOR) 10 MG tablet  Self Yes No    Take 1 tablet by mouth Every Night.    azithromycin (ZITHROMAX) 250 MG tablet   No No    Take 2 tablets the first day, then 1 tablet daily for 4 days.    benzonatate (TESSALON) 200 MG capsule   No No    Take 1 capsule by mouth 3 (Three) Times a Day As Needed for Cough.    budesonide (PULMICORT) 0.5 MG/2ML nebulizer solution   No No    Take 2 mL by nebulization 2 (Two) Times a Day.    carboxymethylcellulose (REFRESH PLUS) 0.5 % solution  Self Yes No    Administer 1 drop to both eyes 3 (Three) Times a Day As Needed for Dry Eyes.    cefdinir (OMNICEF) 300 MG capsule   No No    Take 1 capsule by mouth 2 (Two) Times a Day.    clopidogrel (PLAVIX) 75 MG tablet   No No    TAKE 1 TABLET BY MOUTH DAILY    Diclofenac Sodium (VOLTAREN) 1 % gel gel   Yes No    Apply 4 g topically to the appropriate area as directed 4 (Four) Times a Day As Needed.    diphenhydrAMINE HCl (BENADRYL ALLERGY PO)  Self Yes No    Take 1 tablet by mouth As Needed.    ferrous sulfate 324 (65 Fe) MG tablet delayed-release EC tablet   No No    Take 1 tablet by mouth Daily With Breakfast.    furosemide (LASIX) 40 MG tablet   No No    Take 1 tablet by  mouth 2 (Two) Times a Day.    guaiFENesin (MUCINEX) 600 MG 12 hr tablet   No No    Take 2 tablets by mouth 2 (Two) Times a Day.    HYDROcodone-acetaminophen (NORCO) 7.5-325 MG per tablet  Self Yes No    Take 1 tablet by mouth Every 8 (Eight) Hours As Needed.    ipratropium-albuterol (DUO-NEB) 0.5-2.5 mg/3 ml nebulizer   No No    Take 3 mL by nebulization 4 (Four) Times a Day.    magnesium oxide (MAG-OX) 400 MG tablet  Self Yes No    Take 1 tablet by mouth Daily.    methylPREDNISolone (MEDROL) 4 MG dose pack   No No    Take as directed on package instructions.    Multiple Vitamins-Minerals (VITEYES AREDS FORMULA) capsule  ALEKSANDR Yes No    Take 1 capsule by mouth 2 (two) times a day.    nitrofurantoin (MACRODANTIN) 50 MG capsule   Yes No    Take 1 capsule by mouth 4 (Four) Times a Day.    ondansetron (ZOFRAN) 4 MG tablet  Self Yes No    Take 1 tablet by mouth Daily As Needed for Nausea or Vomiting.    pantoprazole (PROTONIX) 40 MG EC tablet  Self Yes No    TK 1 T PO QD    spironolactone (ALDACTONE) 25 MG tablet   No No    TAKE 1/2 TABLET BY MOUTH DAILY    CALCIUM PO   Yes No    Take  by mouth.    cetirizine (zyrTEC) 10 MG tablet   Yes No    Take 1 tablet by mouth Daily.    Cholecalciferol (VITAMIN D-3 PO)   Yes No    Take  by mouth.    Cyanocobalamin (VITAMIN B-12 PO)   Yes No    Take  by mouth.    dilTIAZem (TIAZAC) 120 MG 24 hr capsule   No No    TAKE 1 CAPSULE BY MOUTH DAILY    Eliquis 5 MG tablet tablet   No No    TAKE 1 TABLET BY MOUTH TWICE DAILY    isosorbide mononitrate (IMDUR) 30 MG 24 hr tablet   No No    TAKE 1 TABLET BY MOUTH EVERY MORNING    metoprolol tartrate (LOPRESSOR) 100 MG tablet   No No    TAKE 1 TABLET BY MOUTH TWICE DAILY              Allergies:  Allergies   Allergen Reactions    Doxycycline Other (See Comments)     Chest pain    Celebrex [Celecoxib] Other (See Comments)     Chest pain    Contrast Dye (Echo Or Unknown Ct/Mr) Itching    Iodinated Contrast Media Itching    Nsaids Other (See  Comments)     convulsion    Other Itching     Pt states some steroids make her itch and hallucinate- she does not know the names   She said she is allergic to all arthritis medicine    Sulfa Antibiotics Nausea Only       Objective      Vitals:   Temp:  [97.9 °F (36.6 °C)-98 °F (36.7 °C)] 98 °F (36.7 °C)  Heart Rate:  [60-63] 63  Resp:  [12-18] 18  BP: (149-166)/(74-87) 163/79  Body mass index is 32.22 kg/m².  Physical Exam  Constitutional:       General: She is not in acute distress.     Appearance: Normal appearance.   HENT:      Head: Normocephalic.      Nose: Nose normal.      Mouth/Throat:      Mouth: Mucous membranes are moist.   Eyes:      Extraocular Movements: Extraocular movements intact.      Pupils: Pupils are equal, round, and reactive to light.   Cardiovascular:      Rate and Rhythm: Normal rate and regular rhythm.      Pulses: Normal pulses.      Heart sounds: Normal heart sounds.   Pulmonary:      Effort: Pulmonary effort is normal.      Breath sounds: Normal breath sounds.   Abdominal:      General: Abdomen is flat. Bowel sounds are normal. There is no distension.      Palpations: Abdomen is soft.      Tenderness: There is no abdominal tenderness. There is no guarding.   Genitourinary:     General: Normal vulva.   Musculoskeletal:         General: Normal range of motion.      Cervical back: Neck supple.   Skin:     General: Skin is warm.      Capillary Refill: Capillary refill takes less than 2 seconds.   Neurological:      General: No focal deficit present.      Mental Status: She is alert and oriented to person, place, and time.   Psychiatric:         Mood and Affect: Mood normal.         Behavior: Behavior normal.         Diagnostic Data:  Lab Results (last 24 hours)       Procedure Component Value Units Date/Time    High Sensitivity Troponin T 2Hr [[id number]]  (Normal) Collected: 06/16/24 2330    Specimen: Blood from Arm, Left Updated: 06/16/24 2349     HS Troponin T 12 ng/L      Troponin T Delta  0 ng/L     Narrative:      High Sensitive Troponin T Reference Range:  <14.0 ng/L- Negative Female for AMI  <22.0 ng/L- Negative Male for AMI  >=14 - Abnormal Female indicating possible myocardial injury.  >=22 - Abnormal Male indicating possible myocardial injury.   Clinicians would have to utilize clinical acumen, EKG, Troponin, and serial changes to determine if it is an Acute Myocardial Infarction or myocardial injury due to an underlying chronic condition.         BNP [[id number]]  (Normal) Collected: 06/16/24 2321    Specimen: Blood Updated: 06/16/24 2342     proBNP 746.0 pg/mL     Narrative:      This assay is used as an aid in the diagnosis of individuals suspected of having heart failure. It can be used as an aid in the diagnosis of acute decompensated heart failure (ADHF) in patients presenting with signs and symptoms of ADHF to the emergency department (ED). In addition, NT-proBNP of <300 pg/mL indicates ADHF is not likely.    Age Range Result Interpretation  NT-proBNP Concentration (pg/mL:      <50             Positive            >450                   Gray                 300-450                    Negative             <300    50-75           Positive            >900                  Gray                300-900                  Negative            <300      >75             Positive            >1800                  Gray                300-1800                  Negative            <300    Comprehensive Metabolic Panel [[id number]]  (Abnormal) Collected: 06/16/24 2119    Specimen: Blood Updated: 06/16/24 2159     Glucose 102 mg/dL      BUN 27 mg/dL      Creatinine 1.15 mg/dL      Sodium 128 mmol/L      Potassium 4.6 mmol/L      Chloride 95 mmol/L      CO2 24.6 mmol/L      Calcium 9.3 mg/dL      Total Protein 6.3 g/dL      Albumin 4.1 g/dL      ALT (SGPT) 19 U/L      AST (SGOT) 24 U/L      Alkaline Phosphatase 146 U/L      Total Bilirubin 0.7 mg/dL      Globulin 2.2 gm/dL      A/G Ratio 1.9 g/dL       BUN/Creatinine Ratio 23.5     Anion Gap 8.4 mmol/L      eGFR 46.5 mL/min/1.73     Narrative:      GFR Normal >60  Chronic Kidney Disease <60  Kidney Failure <15    The GFR formula is only valid for adults with stable renal function between ages 18 and 70.    High Sensitivity Troponin T [[id number]]  (Normal) Collected: 06/16/24 2119    Specimen: Blood Updated: 06/16/24 2159     HS Troponin T 12 ng/L     Narrative:      High Sensitive Troponin T Reference Range:  <14.0 ng/L- Negative Female for AMI  <22.0 ng/L- Negative Male for AMI  >=14 - Abnormal Female indicating possible myocardial injury.  >=22 - Abnormal Male indicating possible myocardial injury.   Clinicians would have to utilize clinical acumen, EKG, Troponin, and serial changes to determine if it is an Acute Myocardial Infarction or myocardial injury due to an underlying chronic condition.         CBC & Differential [[id number]]  (Abnormal) Collected: 06/16/24 2119    Specimen: Blood Updated: 06/16/24 2131    Narrative:      The following orders were created for panel order CBC & Differential.  Procedure                               Abnormality         Status                     ---------                               -----------         ------                     CBC Auto Differential[[id number]]        Abnormal            Final result                 Please view results for these tests on the individual orders.    CBC Auto Differential [[id number]]  (Abnormal) Collected: 06/16/24 2119    Specimen: Blood Updated: 06/16/24 2131     WBC 6.37 10*3/mm3      RBC 4.03 10*6/mm3      Hemoglobin 11.8 g/dL      Hematocrit 37.3 %      MCV 92.6 fL      MCH 29.3 pg      MCHC 31.6 g/dL      RDW 14.5 %      RDW-SD 50.8 fl      MPV 11.0 fL      Platelets 131 10*3/mm3      Neutrophil % 64.4 %      Lymphocyte % 24.5 %      Monocyte % 8.2 %      Eosinophil % 2.4 %      Basophil % 0.3 %      Immature Grans % 0.2 %      Neutrophils, Absolute 4.11 10*3/mm3      Lymphocytes,  Absolute 1.56 10*3/mm3      Monocytes, Absolute 0.52 10*3/mm3      Eosinophils, Absolute 0.15 10*3/mm3      Basophils, Absolute 0.02 10*3/mm3      Immature Grans, Absolute 0.01 10*3/mm3     Urinalysis With Culture If Indicated - Urine, Clean Catch [[id number]]  (Normal) Collected: 06/16/24 2119    Specimen: Urine, Clean Catch Updated: 06/16/24 2128     Color, UA Yellow     Appearance, UA Clear     pH, UA 7.0     Specific Gravity, UA 1.010     Glucose, UA Negative     Ketones, UA Negative     Bilirubin, UA Negative     Blood, UA Negative     Protein, UA Negative     Leuk Esterase, UA Negative     Nitrite, UA Negative     Urobilinogen, UA 0.2 E.U./dL    Narrative:      In absence of clinical symptoms, the presence of pyuria, bacteria, and/or nitrites on the urinalysis result does not correlate with infection.  Urine microscopic not indicated.             Imaging Results (Last 24 Hours)       Procedure Component Value Units Date/Time    XR Chest 1 View [[id number]] Collected: 06/16/24 2204     Updated: 06/16/24 2207    Narrative:      XR CHEST 1 VW    Date of Exam: 6/16/2024 9:24 PM EDT    Indication: Chest Pain Triage Protocol    Comparison: 3/27/2024.    Findings:  The heart is enlarged. There is retrocardiac gas density likely reflecting a large hiatal hernia previously seen on CT. There is a stable left-sided ICD and there is stable evidence of prior median sternotomy and CABG. Stable diffuse interstitial   prominence in the lungs reflecting chronic disease. No definite pulmonary edema. No pneumothorax, pleural effusion or focal airspace consolidation.      Impression:      Impression:  1.No acute cardiopulmonary abnormality.  2.Cardiomegaly, CABG, ICD and large hiatal hernia.  3.Chronic interstitial lung disease.        Electronically Signed: Marbin Bauman MD    6/16/2024 10:05 PM EDT    Workstation ID: [id number]              Assessment & Plan        This is a 88 y.o. female with:    Active and Resolved  Problems  Active Hospital Problems    Diagnosis  POA    **Chest pain [R07.9]  Yes      Resolved Hospital Problems   No resolved problems to display.       Chest pain, rule out ACS  History of CAD status post CABG  Troponin negative x 2 in the ER  Patient is currently chest pain free  Serial EKGs  The patient will be continued on her home dose metoprolol, Imdur , statin, pending medication reconciliation  Will repeat troponin here  Echocardiogram  Cardiology consultation      History of paroxysmal A-fib  History of hypertension  History of sick sinus syndrome status post pacemaker  Continue home dose metoprolol, diltiazem  Continue Eliquis      History of ENRIQUE  Continue CPAP at night and    History of hyperlipidemia   Continue statin    History of asthma  History of Interstitial lung disease  She was recently evaluated by pulmonology on June 12th  She is saturating well on room air  Continue as needed bronchodilators.       History of recurrent UTIs  She is on chronic nitrofurantoin    VTE Prophylaxis:  Pharmacologic & mechanical VTE prophylaxis orders are present.        The patient desires to be as follows:    CODE STATUS:       Full code    Rosa Lazcano, who can be contacted at [phone], is the designated person to make medical decisions on the patient's behalf if She is incapable of doing so. This was clarified with patient and/or next of kin on 6/16/2024 during the course of this H&P.    Admission Status:  I believe this patient meets inpatient status.    Expected Length of Stay: 1-2    PDMP and Medication Dispenses via Sidebar reviewed and consistent with patient reported medications.    I discussed the patient's findings and my recommendations with patient.      Signature:     This document has been electronically signed by Carlos Llanes Alvarez, MD on June 17, 2024 04:17 EDT   [facility] Hospitalist Team

## 2024-06-17 NOTE — PLAN OF CARE
Problem: Adult Inpatient Plan of Care  Goal: Plan of Care Review  Outcome: Ongoing, Progressing  Goal: Patient-Specific Goal (Individualized)  Outcome: Ongoing, Progressing  Goal: Absence of Hospital-Acquired Illness or Injury  Outcome: Ongoing, Progressing  Intervention: Identify and Manage Fall Risk  Recent Flowsheet Documentation  Taken 6/17/2024 1400 by Kathia Hunt, RN  Safety Promotion/Fall Prevention: safety round/check completed  Taken 6/17/2024 1200 by Kathia Hunt, RN  Safety Promotion/Fall Prevention: safety round/check completed  Taken 6/17/2024 1000 by Kathia Hunt, RN  Safety Promotion/Fall Prevention: safety round/check completed  Taken 6/17/2024 0810 by Kathia Hunt, RN  Safety Promotion/Fall Prevention: safety round/check completed  Goal: Optimal Comfort and Wellbeing  Outcome: Ongoing, Progressing  Goal: Readiness for Transition of Care  Outcome: Ongoing, Progressing     Problem: Hypertension Comorbidity  Goal: Blood Pressure in Desired Range  Outcome: Ongoing, Progressing   Goal Outcome Evaluation:

## 2024-06-17 NOTE — CONSULTS
CARDIOLOGY CONSULT:    Syl Herrera  1935  female  [id number]      Referring Provider: Hospitalist  Reason for Consultation: Chest pain and cough    Patient Care Team:  Nava Yu MD as PCP - General  Ivan Martell MD as Consulting Physician (Cardiology)  Greer Shaikh MD as Consulting Physician (Nephrology)  Harshad Landin MD as Consulting Physician (Hematology and Oncology)    Chief complaint chest pain and cough    Subjective .     History of present illness:  Syl Herrera is a 88 y.o. female with history of coronary status post coronary bypass surgery history of hypertension hyperlipidemia sleep apnea chronic renal insufficiency and paroxysmal fibrillation presented to the hospital with complaints of chest pain.  Patient described chest pain as an upper chest discomfort rating to the left side to the right side and associate with some cough.  No complaint of any shortness of breath PND or orthopnea.  No palpitations dizziness syncope or swelling of the feet.  Patient has been taking her medicines regularly.  She does not smoke.  Review of Systems   Constitutional: Negative for fever and malaise/fatigue.   HENT:  Negative for ear pain and nosebleeds.    Eyes:  Negative for blurred vision and double vision.   Cardiovascular:  Positive for chest pain. Negative for dyspnea on exertion and palpitations.   Respiratory:  Positive for cough. Negative for shortness of breath.    Skin:  Negative for rash.   Musculoskeletal:  Negative for joint pain.   Gastrointestinal:  Negative for abdominal pain, nausea and vomiting.   Neurological:  Negative for focal weakness and headaches.   Psychiatric/Behavioral:  Negative for depression. The patient is not nervous/anxious.    All other systems reviewed and are negative.      History  Past Medical History:   Diagnosis Date    Anxiety     Arthritis     Asthma     Atrial fibrillation     CKD (chronic kidney disease) stage 3, GFR 30-59 ml/min 04/18/2021     Coronary artery disease     Dyslipidemia     Dyslipidemia 01/05/2015    GERD (gastroesophageal reflux disease)     History of bone density study 04/2015    Hypertension     Sleep apnea     Wears dentures        Past Surgical History:   Procedure Laterality Date    BLADDER REPAIR  1990    BREAST LUMPECTOMY  1974    BENIGN    CARDIAC CATHETERIZATION  05/25/2011    CARDIAC CATHETERIZATION N/A 04/21/2021    Procedure: Left Heart Cath and coronary angiogram;  Surgeon: Ivan Martell MD;  Location: [facility] CATH INVASIVE LOCATION;  Service: Cardiovascular;  Laterality: N/A;    CARDIAC CATHETERIZATION N/A 04/21/2021    Procedure: Left ventriculography;  Surgeon: Ivan Martell MD;  Location: [facility] CATH INVASIVE LOCATION;  Service: Cardiovascular;  Laterality: N/A;    CARDIAC CATHETERIZATION  04/21/2021    Procedure: Saphenous Vein Graft;  Surgeon: Ivan Martell MD;  Location: [facility] CATH INVASIVE LOCATION;  Service: Cardiovascular;;    CARDIAC CATHETERIZATION N/A 04/26/2021    Procedure: Percutaneous Coronary Intervention;  Surgeon: Ivan Martell MD;  Location: [facility] CATH INVASIVE LOCATION;  Service: Cardiovascular;  Laterality: N/A;    CARDIAC CATHETERIZATION N/A 04/26/2021    Procedure: Stent RAY coronary;  Surgeon: Ivan Martell MD;  Location: [facility] CATH INVASIVE LOCATION;  Service: Cardiovascular;  Laterality: N/A;    CARDIOVASCULAR STRESS TEST  05/04/2015    CHOLECYSTECTOMY  1990    CORONARY ARTERY BYPASS GRAFT  08/09/2017    X5  Dr Brandt    ENDOSCOPY N/A 06/30/2020    Procedure: ESOPHAGOGASTRODUODENOSCOPY with biopsy x 1 area and dilatation (15-18mm balloon) up to 18mm;  Surgeon: Quinton Tapia MD;  Location: [facility] ENDOSCOPY;  Service: Gastroenterology;  Laterality: N/A;  post op: gastritis, large hiatal hernia, esophageal dysmotility    HYSTERECTOMY  1990    INSERT / REPLACE / REMOVE PACEMAKER      JOINT REPLACEMENT Right     knee     OTHER SURGICAL HISTORY  06/2017    BLOOD CLOT REMOVED FROM LEFT EAR     PACEMAKER IMPLANTATION  04/18/2016    DUAL CHAMBER [facility]       Family History   Problem Relation Age of Onset    Hypertension Mother     Heart disease Mother     Heart disease Sister         PACEMAKER    Heart disease Brother        Social History     Tobacco Use    Smoking status: Never    Smokeless tobacco: Never   Vaping Use    Vaping status: Never Used   Substance Use Topics    Alcohol use: No    Drug use: No        Medications Prior to Admission   Medication Sig Dispense Refill Last Dose    budesonide (PULMICORT) 0.5 MG/2ML nebulizer solution Take 2 mL by nebulization 2 (Two) Times a Day. 60 each 1 Past Week    guaiFENesin (MUCINEX) 600 MG 12 hr tablet Take 2 tablets by mouth 2 (Two) Times a Day. 28 tablet 0 Past Week    albuterol sulfate  (90 Base) MCG/ACT inhaler Inhale 2 puffs 2 (Two) Times a Day.       ALPRAZolam (XANAX) 0.5 MG tablet Take 1 tablet by mouth 2 (Two) Times a Day As Needed.  5     atorvastatin (LIPITOR) 10 MG tablet Take 1 tablet by mouth Every Night.       azithromycin (ZITHROMAX) 250 MG tablet Take 2 tablets the first day, then 1 tablet daily for 4 days. 6 tablet 0     benzonatate (TESSALON) 200 MG capsule Take 1 capsule by mouth 3 (Three) Times a Day As Needed for Cough. 30 capsule 0     budesonide-formoterol (SYMBICORT) 160-4.5 MCG/ACT inhaler Inhale 2 puffs 2 (Two) Times a Day.       CALCIUM PO Take  by mouth.       carboxymethylcellulose (REFRESH PLUS) 0.5 % solution Administer 1 drop to both eyes 3 (Three) Times a Day As Needed for Dry Eyes.       cefdinir (OMNICEF) 300 MG capsule Take 1 capsule by mouth 2 (Two) Times a Day. 14 capsule 0 More than a month    cetirizine (zyrTEC) 10 MG tablet Take 1 tablet by mouth Daily.       Cholecalciferol (VITAMIN D-3 PO) Take  by mouth.       clopidogrel (PLAVIX) 75 MG tablet TAKE 1 TABLET BY MOUTH DAILY 90 tablet 1 More than a month    Cyanocobalamin (VITAMIN B-12 PO) Take  by mouth.       Diclofenac Sodium (VOLTAREN) 1 % gel gel Apply 4 g  topically to the appropriate area as directed 4 (Four) Times a Day As Needed.       dilTIAZem (TIAZAC) 120 MG 24 hr capsule TAKE 1 CAPSULE BY MOUTH DAILY 90 capsule 1     diphenhydrAMINE HCl (BENADRYL ALLERGY PO) Take 1 tablet by mouth As Needed.       Eliquis 5 MG tablet tablet TAKE 1 TABLET BY MOUTH TWICE DAILY 180 tablet 3     ferrous sulfate 324 (65 Fe) MG tablet delayed-release EC tablet Take 1 tablet by mouth Daily With Breakfast. 30 tablet 0 More than a month    furosemide (LASIX) 40 MG tablet Take 1 tablet by mouth 2 (Two) Times a Day. 5 tablet 0 More than a month    HYDROcodone-acetaminophen (NORCO) 7.5-325 MG per tablet Take 1 tablet by mouth Every 8 (Eight) Hours As Needed.  0     ipratropium-albuterol (DUO-NEB) 0.5-2.5 mg/3 ml nebulizer Take 3 mL by nebulization 4 (Four) Times a Day. 180 mL 1 More than a month    isosorbide mononitrate (IMDUR) 30 MG 24 hr tablet TAKE 1 TABLET BY MOUTH EVERY MORNING 90 tablet 3     magnesium oxide (MAG-OX) 400 MG tablet Take 1 tablet by mouth Daily.       methylPREDNISolone (MEDROL) 4 MG dose pack Take as directed on package instructions. 21 each 0     metoprolol tartrate (LOPRESSOR) 100 MG tablet TAKE 1 TABLET BY MOUTH TWICE DAILY 180 tablet 1     Multiple Vitamins-Minerals (VITEYES AREDS FORMULA) capsule Take 1 capsule by mouth 2 (two) times a day.       nitrofurantoin (MACRODANTIN) 50 MG capsule Take 1 capsule by mouth 4 (Four) Times a Day.       ondansetron (ZOFRAN) 4 MG tablet Take 1 tablet by mouth Daily As Needed for Nausea or Vomiting.       pantoprazole (PROTONIX) 40 MG EC tablet TK 1 T PO QD  5        Allergies: Doxycycline, Celebrex [celecoxib], Contrast dye (echo or unknown ct/mr), Iodinated contrast media, Nsaids, Other, and Sulfa antibiotics    Scheduled Meds:apixaban, 5 mg, Oral, Q12H  budesonide, 0.5 mg, Nebulization, BID - RT  isosorbide mononitrate, 30 mg, Oral, QAM  metoprolol tartrate, 100 mg, Oral, BID  sodium chloride, 10 mL, Intravenous,  "Q12H      Continuous Infusions:   PRN Meds:.  senna-docusate sodium **AND** polyethylene glycol **AND** bisacodyl **AND** bisacodyl    Calcium Replacement - Follow Nurse / BPA Driven Protocol    HYDROcodone-acetaminophen    Magnesium Standard Dose Replacement - Follow Nurse / BPA Driven Protocol    nitroglycerin    Phosphorus Replacement - Follow Nurse / BPA Driven Protocol    Potassium Replacement - Follow Nurse / BPA Driven Protocol    sodium chloride    sodium chloride    sodium chloride    Objective     VITAL SIGNS  Vitals:    06/17/24 0035 06/17/24 0801 06/17/24 0814 06/17/24 0817   BP: 163/79 (!) 181/79     BP Location:  Right arm     Patient Position:  Sitting     Pulse: 63 67 66 67   Resp: 18 14 23 12   Temp: 98 °F (36.7 °C) 97.5 °F (36.4 °C)     TempSrc:  Oral     SpO2: 95% 96% 95% 95%   Weight:       Height:           Flowsheet Rows      Flowsheet Row First Filed Value   Admission Height 152.4 cm (60\") Documented at 06/16/2024 2122   Admission Weight 74.8 kg (165 lb) Documented at 06/16/2024 2122             TELEMETRY: Sinus rhythm    Physical Exam:  Constitutional:       Appearance: Well-developed.   Eyes:      General: No scleral icterus.     Conjunctiva/sclera: Conjunctivae normal.      Pupils: Pupils are equal, round, and reactive to light.   HENT:      Head: Normocephalic and atraumatic.   Neck:      Vascular: No carotid bruit or JVD.   Pulmonary:      Effort: Pulmonary effort is normal.      Breath sounds: Normal breath sounds. No wheezing. No rales.   Cardiovascular:      Normal rate. Regular rhythm.   Pulses:     Intact distal pulses.   Abdominal:      General: Bowel sounds are normal.      Palpations: Abdomen is soft.   Musculoskeletal: Normal range of motion.      Cervical back: Normal range of motion and neck supple. Skin:     General: Skin is warm and dry.      Findings: No rash.   Neurological:      Mental Status: Alert.      Comments: No focal deficits          Results Review:   I reviewed " the patient's new clinical results.  Lab Results (last 24 hours)       Procedure Component Value Units Date/Time    CK [[id number]]  (Normal) Collected: 06/17/24 0458    Specimen: Blood Updated: 06/17/24 0554     Creatine Kinase 61 U/L     TSH [[id number]]  (Normal) Collected: 06/17/24 0458    Specimen: Blood Updated: 06/17/24 0554     TSH 1.170 uIU/mL     Phosphorus [[id number]]  (Normal) Collected: 06/17/24 0458    Specimen: Blood Updated: 06/17/24 0554     Phosphorus 2.8 mg/dL     Magnesium [[id number]]  (Normal) Collected: 06/17/24 0458    Specimen: Blood Updated: 06/17/24 0554     Magnesium 2.0 mg/dL     High Sensitivity Troponin T [[id number]]  (Normal) Collected: 06/17/24 0458    Specimen: Blood Updated: 06/17/24 0554     HS Troponin T 13 ng/L     Narrative:      High Sensitive Troponin T Reference Range:  <14.0 ng/L- Negative Female for AMI  <22.0 ng/L- Negative Male for AMI  >=14 - Abnormal Female indicating possible myocardial injury.  >=22 - Abnormal Male indicating possible myocardial injury.   Clinicians would have to utilize clinical acumen, EKG, Troponin, and serial changes to determine if it is an Acute Myocardial Infarction or myocardial injury due to an underlying chronic condition.         CBC Auto Differential [[id number]]  (Abnormal) Collected: 06/17/24 0458    Specimen: Blood Updated: 06/17/24 0533     WBC 5.31 10*3/mm3      RBC 4.26 10*6/mm3      Hemoglobin 12.4 g/dL      Hematocrit 38.8 %      MCV 91.1 fL      MCH 29.1 pg      MCHC 32.0 g/dL      RDW 14.6 %      RDW-SD 48.4 fl      MPV 11.1 fL      Platelets 137 10*3/mm3      Neutrophil % 60.7 %      Lymphocyte % 28.2 %      Monocyte % 7.9 %      Eosinophil % 2.4 %      Basophil % 0.4 %      Immature Grans % 0.4 %      Neutrophils, Absolute 3.22 10*3/mm3      Lymphocytes, Absolute 1.50 10*3/mm3      Monocytes, Absolute 0.42 10*3/mm3      Eosinophils, Absolute 0.13 10*3/mm3      Basophils, Absolute 0.02 10*3/mm3      Immature Grans, Absolute 0.02  10*3/mm3      nRBC 0.0 /100 WBC     High Sensitivity Troponin T 2Hr [[id number]]  (Normal) Collected: 06/16/24 2330    Specimen: Blood from Arm, Left Updated: 06/16/24 2349     HS Troponin T 12 ng/L      Troponin T Delta 0 ng/L     Narrative:      High Sensitive Troponin T Reference Range:  <14.0 ng/L- Negative Female for AMI  <22.0 ng/L- Negative Male for AMI  >=14 - Abnormal Female indicating possible myocardial injury.  >=22 - Abnormal Male indicating possible myocardial injury.   Clinicians would have to utilize clinical acumen, EKG, Troponin, and serial changes to determine if it is an Acute Myocardial Infarction or myocardial injury due to an underlying chronic condition.         BNP [[id number]]  (Normal) Collected: 06/16/24 2321    Specimen: Blood Updated: 06/16/24 2342     proBNP 746.0 pg/mL     Narrative:      This assay is used as an aid in the diagnosis of individuals suspected of having heart failure. It can be used as an aid in the diagnosis of acute decompensated heart failure (ADHF) in patients presenting with signs and symptoms of ADHF to the emergency department (ED). In addition, NT-proBNP of <300 pg/mL indicates ADHF is not likely.    Age Range Result Interpretation  NT-proBNP Concentration (pg/mL:      <50             Positive            >450                   Gray                 300-450                    Negative             <300    50-75           Positive            >900                  Gray                300-900                  Negative            <300      >75             Positive            >1800                  Gray                300-1800                  Negative            <300    Comprehensive Metabolic Panel [[id number]]  (Abnormal) Collected: 06/16/24 2119    Specimen: Blood Updated: 06/16/24 2159     Glucose 102 mg/dL      BUN 27 mg/dL      Creatinine 1.15 mg/dL      Sodium 128 mmol/L      Potassium 4.6 mmol/L      Chloride 95 mmol/L      CO2 24.6 mmol/L      Calcium 9.3 mg/dL       Total Protein 6.3 g/dL      Albumin 4.1 g/dL      ALT (SGPT) 19 U/L      AST (SGOT) 24 U/L      Alkaline Phosphatase 146 U/L      Total Bilirubin 0.7 mg/dL      Globulin 2.2 gm/dL      A/G Ratio 1.9 g/dL      BUN/Creatinine Ratio 23.5     Anion Gap 8.4 mmol/L      eGFR 46.5 mL/min/1.73     Narrative:      GFR Normal >60  Chronic Kidney Disease <60  Kidney Failure <15    The GFR formula is only valid for adults with stable renal function between ages 18 and 70.    High Sensitivity Troponin T [[id number]]  (Normal) Collected: 06/16/24 2119    Specimen: Blood Updated: 06/16/24 2159     HS Troponin T 12 ng/L     Narrative:      High Sensitive Troponin T Reference Range:  <14.0 ng/L- Negative Female for AMI  <22.0 ng/L- Negative Male for AMI  >=14 - Abnormal Female indicating possible myocardial injury.  >=22 - Abnormal Male indicating possible myocardial injury.   Clinicians would have to utilize clinical acumen, EKG, Troponin, and serial changes to determine if it is an Acute Myocardial Infarction or myocardial injury due to an underlying chronic condition.         CBC & Differential [[id number]]  (Abnormal) Collected: 06/16/24 2119    Specimen: Blood Updated: 06/16/24 2131    Narrative:      The following orders were created for panel order CBC & Differential.  Procedure                               Abnormality         Status                     ---------                               -----------         ------                     CBC Auto Differential[[id number]]        Abnormal            Final result                 Please view results for these tests on the individual orders.    CBC Auto Differential [[id number]]  (Abnormal) Collected: 06/16/24 2119    Specimen: Blood Updated: 06/16/24 2131     WBC 6.37 10*3/mm3      RBC 4.03 10*6/mm3      Hemoglobin 11.8 g/dL      Hematocrit 37.3 %      MCV 92.6 fL      MCH 29.3 pg      MCHC 31.6 g/dL      RDW 14.5 %      RDW-SD 50.8 fl      MPV 11.0 fL      Platelets 131  10*3/mm3      Neutrophil % 64.4 %      Lymphocyte % 24.5 %      Monocyte % 8.2 %      Eosinophil % 2.4 %      Basophil % 0.3 %      Immature Grans % 0.2 %      Neutrophils, Absolute 4.11 10*3/mm3      Lymphocytes, Absolute 1.56 10*3/mm3      Monocytes, Absolute 0.52 10*3/mm3      Eosinophils, Absolute 0.15 10*3/mm3      Basophils, Absolute 0.02 10*3/mm3      Immature Grans, Absolute 0.01 10*3/mm3     Urinalysis With Culture If Indicated - Urine, Clean Catch [[id number]]  (Normal) Collected: 06/16/24 2119    Specimen: Urine, Clean Catch Updated: 06/16/24 2128     Color, UA Yellow     Appearance, UA Clear     pH, UA 7.0     Specific Gravity, UA 1.010     Glucose, UA Negative     Ketones, UA Negative     Bilirubin, UA Negative     Blood, UA Negative     Protein, UA Negative     Leuk Esterase, UA Negative     Nitrite, UA Negative     Urobilinogen, UA 0.2 E.U./dL    Narrative:      In absence of clinical symptoms, the presence of pyuria, bacteria, and/or nitrites on the urinalysis result does not correlate with infection.  Urine microscopic not indicated.            Imaging Results (Last 24 Hours)       Procedure Component Value Units Date/Time    XR Chest 1 View [[id number]] Collected: 06/16/24 2204     Updated: 06/16/24 2207    Narrative:      XR CHEST 1 VW    Date of Exam: 6/16/2024 9:24 PM EDT    Indication: Chest Pain Triage Protocol    Comparison: 3/27/2024.    Findings:  The heart is enlarged. There is retrocardiac gas density likely reflecting a large hiatal hernia previously seen on CT. There is a stable left-sided ICD and there is stable evidence of prior median sternotomy and CABG. Stable diffuse interstitial   prominence in the lungs reflecting chronic disease. No definite pulmonary edema. No pneumothorax, pleural effusion or focal airspace consolidation.      Impression:      Impression:  1.No acute cardiopulmonary abnormality.  2.Cardiomegaly, CABG, ICD and large hiatal hernia.  3.Chronic interstitial lung  disease.        Electronically Signed: Marbin Bauman MD    6/16/2024 10:05 PM EDT    Workstation ID: [id number]            EKG      I personally viewed and interpreted the patient's EKG/Telemetry data:    ECHOCARDIOGRAM:  Results for orders placed during the hospital encounter of 04/18/21    Adult Transthoracic Echo Complete W/ Cont if Necessary Per Protocol    Interpretation Summary  · Left ventricular ejection fraction appears to be 61 - 65%.  · No pericardial effusion noted         STRESS MYOVIEW:  Results for orders placed during the hospital encounter of 06/16/24    Stress Test With Myocardial Perfusion One Day    Interpretation Summary    Myocardial perfusion imaging indicates a normal myocardial perfusion study with no evidence of ischemia. Impressions are consistent with a low risk study.    Left ventricular ejection fraction is hyperdynamic (Calculated EF > 70%).       CARDIAC CATHETERIZATION:    Results for orders placed during the hospital encounter of 04/24/21    Cardiac Catheterization/Vascular Study    Narrative  Procedures:  #1 PTCA with stent placement to the RCA  #2 PTCA with stent placement to the left circumflex artery    Indication: Unstable angina    Comments:  Informed consent was obtained from the patient after explained risks and benefits.  Patient was brought to cardiac arrest laboratory and placed on table usual fashion.  Left groin was prepped in a sterile fashion.  2% lidocaine was used to administer local anesthesia to the left groin.  A total of 20 cc was used.  A 6 Montserratian sheath was placed in the left femoral artery.  Then a 6 [place]erratian JR4 guide with sideholes was used and it could not engage the right coronary very well and hence a 6 [place]erratian AR-2 guide with sideholes was used and right coronary artery was engaged.  A 190 cm whisper wire was used in place the distal portion of the RCA.  Then a 2.5/12 mm balloon was used and inflated in the proximal and mid RCA at 8 to 10 kalyan for 10  seconds.  Thereafter the balloon was taken out.  Then a 3.5/18 mm Xience stent was used and placed in the mid RCA and inflated at 12 kalyan for 10 to 15 seconds.  Thereafter the balloon was taken out.  Reviewing angiogram showed no dissection or perforation.  Patient tolerated the procedure very well.  No complication noted.  Then a 6 Upper sorbian XB guide was used and left coronary artery was engaged.  Multiple views were taken.  190 cm whisper wire was used in place the distal portion of the proximal artery.  Then a 2.5/12 mm balloon was used and inflated in the proximal and mid segments of the circumflex artery each at 8 kalyan for 8 to 10 seconds.  Thereafter the balloon was taken out.  Then a 2.5/12 mm Xience stent was used and placed in the mid circumflex artery and inflated at 10 to 12 kalyan for 10 to 15 seconds.  Thereafter the balloon was taken out.  Then another 2.5/12 mm Xience stent was used and placed across the lesion inflated territory most views for 10 to 15 seconds.  Thereafter the balloon was taken out.  Reviewing angiogram showed no dissection or perforation.  Patient tolerated the procedure very well.  No complication noted.       OTHER:         MDM      Chest pain/coronary disease  Patient presented with chest pain which is atypical for angina but also has cough which could be secondary to bronchitis  Patient is ruled out for MI by EKG and enzymes  Patient had a stress Myoview study which did not show any ischemia and has normal LV systolic function.  Patient is also having echocardiogram performed.  Will continue medical therapy and no further cardiac workup.  Patient had coronary bypass in the past but also had stent placement to the native right coronary artery and native circumflex artery and is currently stable on medical therapy    Hypertension  Patient blood pressure currently stable on medical therapy with metoprolol isosorbide and diltiazem.    Atrial fibrillation  Patient has paroxysmal fibrillation  but is currently on metoprolol diltiazem and also Eliquis for anticoagulation.    Hyperlipidemia  Patient is currently on statins and the lipid levels are well within normal limits.    Sleep apnea  Patient is history of sleep apnea and is using CPAP machine    History of asthma/interstitial lung disease  Patient has history of interstitial lung disease and asthma and is having cough which could be causing the chest pains and will continue her bronchodilators.        I discussed the patients findings and my recommendations with patient and nurse    Ivan Martell MD  06/17/24  12:14 EDT

## 2024-06-17 NOTE — TELEPHONE ENCOUNTER
Rx Refill Note  Requested Prescriptions     Pending Prescriptions Disp Refills    spironolactone (ALDACTONE) 25 MG tablet [Pharmacy Med Name: SPIRONOLACTONE 25MG TABLETS] 45 tablet 3     Sig: TAKE 1/2 TABLET BY MOUTH DAILY      Last office visit with prescribing clinician: 1/31/2024   Last telemedicine visit with prescribing clinician: Visit date not found   Next office visit with prescribing clinician: 8/14/2024                         Would you like a call back once the refill request has been completed: [] Yes [] No    If the office needs to give you a call back, can they leave a voicemail: [] Yes [] No    Vivek Connell MA  06/17/24, 08:07 EDT

## 2024-06-18 ENCOUNTER — READMISSION MANAGEMENT (OUTPATIENT)
Dept: CALL CENTER | Facility: HOSPITAL | Age: 89
End: 2024-06-18
Payer: MEDICARE

## 2024-06-18 VITALS
OXYGEN SATURATION: 97 % | WEIGHT: 138.89 LBS | TEMPERATURE: 98.6 F | HEIGHT: 60 IN | HEART RATE: 65 BPM | RESPIRATION RATE: 16 BRPM | SYSTOLIC BLOOD PRESSURE: 142 MMHG | BODY MASS INDEX: 27.27 KG/M2 | DIASTOLIC BLOOD PRESSURE: 77 MMHG

## 2024-06-18 PROCEDURE — 99232 SBSQ HOSP IP/OBS MODERATE 35: CPT

## 2024-06-18 PROCEDURE — 94799 UNLISTED PULMONARY SVC/PX: CPT

## 2024-06-18 PROCEDURE — 94761 N-INVAS EAR/PLS OXIMETRY MLT: CPT

## 2024-06-18 PROCEDURE — 94664 DEMO&/EVAL PT USE INHALER: CPT

## 2024-06-18 RX ADMIN — ISOSORBIDE MONONITRATE 30 MG: 30 TABLET, EXTENDED RELEASE ORAL at 08:54

## 2024-06-18 RX ADMIN — Medication 10 ML: at 08:55

## 2024-06-18 RX ADMIN — BUDESONIDE AND FORMOTEROL FUMARATE DIHYDRATE 2 PUFF: 160; 4.5 AEROSOL RESPIRATORY (INHALATION) at 07:35

## 2024-06-18 RX ADMIN — HYDROCODONE BITARTRATE AND ACETAMINOPHEN 0.5 TABLET: 7.5; 325 TABLET ORAL at 09:01

## 2024-06-18 RX ADMIN — APIXABAN 5 MG: 5 TABLET, FILM COATED ORAL at 08:55

## 2024-06-18 RX ADMIN — PANTOPRAZOLE SODIUM 40 MG: 40 TABLET, DELAYED RELEASE ORAL at 05:53

## 2024-06-18 RX ADMIN — METOPROLOL TARTRATE 100 MG: 50 TABLET, FILM COATED ORAL at 08:54

## 2024-06-18 RX ADMIN — DILTIAZEM HYDROCHLORIDE 120 MG: 120 CAPSULE, EXTENDED RELEASE ORAL at 08:54

## 2024-06-18 RX ADMIN — CLOPIDOGREL BISULFATE 75 MG: 75 TABLET ORAL at 08:54

## 2024-06-18 RX ADMIN — FERROUS SULFATE TAB EC 324 MG (65 MG FE EQUIVALENT) 324 MG: 324 (65 FE) TABLET DELAYED RESPONSE at 08:54

## 2024-06-18 RX ADMIN — FUROSEMIDE 40 MG: 40 TABLET ORAL at 08:54

## 2024-06-18 NOTE — DISCHARGE SUMMARY
Date of Discharge:  6/18/2024    Discharge Diagnosis:   Chest pain  Hyponatremia  CKD stage 3  Generalized anxiety  Interstitial lung disease  Hyperlipidemia  ENRIQUE  Paroxsymal atrial fibrillation  Hypertension    Presenting Problem/History of Present Illness  Active Hospital Problems    Diagnosis  POA    **Chest pain [R07.9]  Yes      Resolved Hospital Problems   No resolved problems to display.          Hospital Course    Patient is a 88 y.o. female presented with  PMH of CAD S/P CABG, paroxysmal A-fib, status post pacemaker, hypertension, ENRIQUE, hyperlipidemia who presented to hospital on 6/16/24  with complaints of chest pain that started around 2 hours prior to her coming to the ER.  While in the ER she had 2 negative troponins, EKG showed atrial paced rhythm, first-degree AV block with no st elevation. She was ruled out for MI and underwent stress test with no ischemia and echocardiogram. Patient was noted to have rhinovirus. She will need to follow up with PCP in 2 weeks          Procedures Performed         Consults:   Consults       Date and Time Order Name Status Description    6/17/2024  4:09 AM Inpatient Cardiology Consult Completed             Pertinent Test Results:    Lab Results (most recent)       Procedure Component Value Units Date/Time    Respiratory Panel PCR w/COVID-19(SARS-CoV-2) GERRY/MARQUISE/BENNY/PAD/COR/OLGA In-House, NP Swab in UTM/VTM, 2 HR TAT - Swab, Nasopharynx [[id number]]  (Abnormal) Collected: 06/17/24 1433    Specimen: Swab from Nasopharynx Updated: 06/17/24 1726     ADENOVIRUS, PCR Not Detected     Coronavirus 229E Not Detected     Coronavirus HKU1 Not Detected     Coronavirus NL63 Not Detected     Coronavirus OC43 Not Detected     COVID19 Not Detected     Human Metapneumovirus Not Detected     Human Rhinovirus/Enterovirus Detected     Influenza A PCR Not Detected     Influenza B PCR Not Detected     Parainfluenza Virus 1 Not Detected     Parainfluenza Virus 2 Not Detected     Parainfluenza  "Virus 3 Not Detected     Parainfluenza Virus 4 Not Detected     RSV, PCR Not Detected     Bordetella pertussis pcr Not Detected     Bordetella parapertussis PCR Not Detected     Chlamydophila pneumoniae PCR Not Detected     Mycoplasma pneumo by PCR Not Detected    Narrative:      In the setting of a positive respiratory panel with a viral infection PLUS a negative procalcitonin without other underlying concern for bacterial infection, consider observing off antibiotics or discontinuation of antibiotics and continue supportive care. If the respiratory panel is positive for atypical bacterial infection (Bordetella pertussis, Chlamydophila pneumoniae, or Mycoplasma pneumoniae), consider antibiotic de-escalation to target atypical bacterial infection.    D-dimer, Quantitative [[id number]]  (Normal) Collected: 06/17/24 1456    Specimen: Blood Updated: 06/17/24 1647     D-Dimer, Quantitative 0.36 mg/L (FEU)     Narrative:      According to the assay 's published package insert, a normal (<0.50 mg/L (FEU)) D-dimer result in conjunction with a non-high clinical probability assessment, excludes deep vein thrombosis (DVT) and pulmonary embolism (PE) with high sensitivity.    D-dimer values increase with age and this can make VTE exclusion of an older population difficult. To address this, the [employer], based on best available evidence and recent guidelines, recommends that clinicians use age-adjusted D-dimer thresholds in patients greater than 50 years of age with: a) a low probability of PE who do not meet all Pulmonary Embolism Rule Out Criteria, or b) in those with intermediate probability of PE.   The formula for an age-adjusted D-dimer cut-off is \"age/100\".  For example, a 60 year old patient would have an age-adjusted cut-off of 0.60 mg/L (FEU) and an 80 year old 0.80 mg/L (FEU).    CK [[id number]]  (Normal) Collected: 06/17/24 0458    Specimen: Blood Updated: 06/17/24 0554     Creatine " Kinase 61 U/L     TSH [[id number]]  (Normal) Collected: 06/17/24 0458    Specimen: Blood Updated: 06/17/24 0554     TSH 1.170 uIU/mL     Phosphorus [[id number]]  (Normal) Collected: 06/17/24 0458    Specimen: Blood Updated: 06/17/24 0554     Phosphorus 2.8 mg/dL     Magnesium [[id number]]  (Normal) Collected: 06/17/24 0458    Specimen: Blood Updated: 06/17/24 0554     Magnesium 2.0 mg/dL     High Sensitivity Troponin T [[id number]]  (Normal) Collected: 06/17/24 0458    Specimen: Blood Updated: 06/17/24 0554     HS Troponin T 13 ng/L     Narrative:      High Sensitive Troponin T Reference Range:  <14.0 ng/L- Negative Female for AMI  <22.0 ng/L- Negative Male for AMI  >=14 - Abnormal Female indicating possible myocardial injury.  >=22 - Abnormal Male indicating possible myocardial injury.   Clinicians would have to utilize clinical acumen, EKG, Troponin, and serial changes to determine if it is an Acute Myocardial Infarction or myocardial injury due to an underlying chronic condition.         CBC Auto Differential [[id number]]  (Abnormal) Collected: 06/17/24 0458    Specimen: Blood Updated: 06/17/24 0533     WBC 5.31 10*3/mm3      RBC 4.26 10*6/mm3      Hemoglobin 12.4 g/dL      Hematocrit 38.8 %      MCV 91.1 fL      MCH 29.1 pg      MCHC 32.0 g/dL      RDW 14.6 %      RDW-SD 48.4 fl      MPV 11.1 fL      Platelets 137 10*3/mm3      Neutrophil % 60.7 %      Lymphocyte % 28.2 %      Monocyte % 7.9 %      Eosinophil % 2.4 %      Basophil % 0.4 %      Immature Grans % 0.4 %      Neutrophils, Absolute 3.22 10*3/mm3      Lymphocytes, Absolute 1.50 10*3/mm3      Monocytes, Absolute 0.42 10*3/mm3      Eosinophils, Absolute 0.13 10*3/mm3      Basophils, Absolute 0.02 10*3/mm3      Immature Grans, Absolute 0.02 10*3/mm3      nRBC 0.0 /100 WBC     High Sensitivity Troponin T 2Hr [[id number]]  (Normal) Collected: 06/16/24 2330    Specimen: Blood from Arm, Left Updated: 06/16/24 9049     HS Troponin T 12 ng/L      Troponin T Delta 0  ng/L     Narrative:      High Sensitive Troponin T Reference Range:  <14.0 ng/L- Negative Female for AMI  <22.0 ng/L- Negative Male for AMI  >=14 - Abnormal Female indicating possible myocardial injury.  >=22 - Abnormal Male indicating possible myocardial injury.   Clinicians would have to utilize clinical acumen, EKG, Troponin, and serial changes to determine if it is an Acute Myocardial Infarction or myocardial injury due to an underlying chronic condition.         BNP [[id number]]  (Normal) Collected: 06/16/24 2321    Specimen: Blood Updated: 06/16/24 2342     proBNP 746.0 pg/mL     Narrative:      This assay is used as an aid in the diagnosis of individuals suspected of having heart failure. It can be used as an aid in the diagnosis of acute decompensated heart failure (ADHF) in patients presenting with signs and symptoms of ADHF to the emergency department (ED). In addition, NT-proBNP of <300 pg/mL indicates ADHF is not likely.    Age Range Result Interpretation  NT-proBNP Concentration (pg/mL:      <50             Positive            >450                   Gray                 300-450                    Negative             <300    50-75           Positive            >900                  Gray                300-900                  Negative            <300      >75             Positive            >1800                  Gray                300-1800                  Negative            <300    Comprehensive Metabolic Panel [[id number]]  (Abnormal) Collected: 06/16/24 2119    Specimen: Blood Updated: 06/16/24 2159     Glucose 102 mg/dL      BUN 27 mg/dL      Creatinine 1.15 mg/dL      Sodium 128 mmol/L      Potassium 4.6 mmol/L      Chloride 95 mmol/L      CO2 24.6 mmol/L      Calcium 9.3 mg/dL      Total Protein 6.3 g/dL      Albumin 4.1 g/dL      ALT (SGPT) 19 U/L      AST (SGOT) 24 U/L      Alkaline Phosphatase 146 U/L      Total Bilirubin 0.7 mg/dL      Globulin 2.2 gm/dL      A/G Ratio 1.9 g/dL       BUN/Creatinine Ratio 23.5     Anion Gap 8.4 mmol/L      eGFR 46.5 mL/min/1.73     Narrative:      GFR Normal >60  Chronic Kidney Disease <60  Kidney Failure <15    The GFR formula is only valid for adults with stable renal function between ages 18 and 70.    High Sensitivity Troponin T [[id number]]  (Normal) Collected: 06/16/24 2119    Specimen: Blood Updated: 06/16/24 2159     HS Troponin T 12 ng/L     Narrative:      High Sensitive Troponin T Reference Range:  <14.0 ng/L- Negative Female for AMI  <22.0 ng/L- Negative Male for AMI  >=14 - Abnormal Female indicating possible myocardial injury.  >=22 - Abnormal Male indicating possible myocardial injury.   Clinicians would have to utilize clinical acumen, EKG, Troponin, and serial changes to determine if it is an Acute Myocardial Infarction or myocardial injury due to an underlying chronic condition.         CBC & Differential [[id number]]  (Abnormal) Collected: 06/16/24 2119    Specimen: Blood Updated: 06/16/24 2131    Narrative:      The following orders were created for panel order CBC & Differential.  Procedure                               Abnormality         Status                     ---------                               -----------         ------                     CBC Auto Differential[[id number]]        Abnormal            Final result                 Please view results for these tests on the individual orders.    CBC Auto Differential [[id number]]  (Abnormal) Collected: 06/16/24 2119    Specimen: Blood Updated: 06/16/24 2131     WBC 6.37 10*3/mm3      RBC 4.03 10*6/mm3      Hemoglobin 11.8 g/dL      Hematocrit 37.3 %      MCV 92.6 fL      MCH 29.3 pg      MCHC 31.6 g/dL      RDW 14.5 %      RDW-SD 50.8 fl      MPV 11.0 fL      Platelets 131 10*3/mm3      Neutrophil % 64.4 %      Lymphocyte % 24.5 %      Monocyte % 8.2 %      Eosinophil % 2.4 %      Basophil % 0.3 %      Immature Grans % 0.2 %      Neutrophils, Absolute 4.11 10*3/mm3      Lymphocytes,  Absolute 1.56 10*3/mm3      Monocytes, Absolute 0.52 10*3/mm3      Eosinophils, Absolute 0.15 10*3/mm3      Basophils, Absolute 0.02 10*3/mm3      Immature Grans, Absolute 0.01 10*3/mm3     Urinalysis With Culture If Indicated - Urine, Clean Catch [[id number]]  (Normal) Collected: 06/16/24 2119    Specimen: Urine, Clean Catch Updated: 06/16/24 2128     Color, UA Yellow     Appearance, UA Clear     pH, UA 7.0     Specific Gravity, UA 1.010     Glucose, UA Negative     Ketones, UA Negative     Bilirubin, UA Negative     Blood, UA Negative     Protein, UA Negative     Leuk Esterase, UA Negative     Nitrite, UA Negative     Urobilinogen, UA 0.2 E.U./dL    Narrative:      In absence of clinical symptoms, the presence of pyuria, bacteria, and/or nitrites on the urinalysis result does not correlate with infection.  Urine microscopic not indicated.             Results for orders placed during the hospital encounter of 06/16/24    Adult Transthoracic Echo Complete W/ Cont if Necessary Per Protocol    Interpretation Summary    Left ventricular systolic function is normal. Calculated left ventricular EF = 69%    Left ventricular diastolic function is consistent with (grade II w/high LAP) pseudonormalization.    Estimated right ventricular systolic pressure from tricuspid regurgitation is mildly elevated (35-45 mmHg).    Conclusion      Normal LV size and contractility EF of 65 to 70%  Grade 2 diastolic dysfunction/pseudonormalization pattern seen.  Normal RV size  Normal atrial size, pacer lead seen  Pulmonic valve is not well visualized.  Aortic valve, mitral valve, tricuspid valve appears structurally normal, moderate tricuspid regurgitation seen.  Calculated RV systolic pressure of 42 mmHg consistent with moderate pulmonary hypertension  No pericardial effusion seen.  Proximal aorta appears normal in size.       Condition on Discharge:  Stable    Vital Signs  Temp:  [98.4 °F (36.9 °C)-98.6 °F (37 °C)] 98.6 °F (37 °C)  Heart  Rate:  [61-71] 65  Resp:  [13-20] 16  BP: (127-157)/(69-77) 142/77      Physical Exam  Vitals reviewed.   Constitutional:       Appearance: She is not ill-appearing.   HENT:      Head: Normocephalic and atraumatic.      Right Ear: External ear normal.      Left Ear: External ear normal.      Nose: Nose normal.      Mouth/Throat:      Mouth: Mucous membranes are moist.   Eyes:      General:         Right eye: No discharge.         Left eye: No discharge.   Cardiovascular:      Rate and Rhythm: Normal rate and regular rhythm.      Pulses: Normal pulses.      Heart sounds: Normal heart sounds.   Pulmonary:      Effort: Pulmonary effort is normal.      Breath sounds: Normal breath sounds.   Abdominal:      General: Bowel sounds are normal.      Palpations: Abdomen is soft.   Musculoskeletal:         General: Normal range of motion.      Cervical back: Normal range of motion.   Skin:     General: Skin is warm and dry.   Neurological:      Mental Status: She is alert and oriented to person, place, and time.   Psychiatric:         Behavior: Behavior normal.              Discharge Disposition  Home or Self Care    Discharge Medications     Discharge Medications        Continue These Medications        Instructions Start Date   albuterol sulfate  (90 Base) MCG/ACT inhaler  Commonly known as: PROVENTIL HFA;VENTOLIN HFA;PROAIR HFA   2 puffs, Inhalation, 2 Times Daily      ALPRAZolam 0.5 MG tablet  Commonly known as: XANAX   0.5 mg, Oral, 2 Times Daily PRN      atorvastatin 10 MG tablet  Commonly known as: LIPITOR   10 mg, Oral, Nightly      BENADRYL ALLERGY PO   1 tablet, Oral, As Needed      benzonatate 200 MG capsule  Commonly known as: TESSALON   200 mg, Oral, 3 Times Daily PRN      budesonide 0.5 MG/2ML nebulizer solution  Commonly known as: PULMICORT   0.5 mg, Nebulization, 2 Times Daily - RT      CALCIUM PO   Oral      carboxymethylcellulose 0.5 % solution  Commonly known as: REFRESH PLUS   1 drop, Both Eyes, 3  Times Daily PRN      cefdinir 300 MG capsule  Commonly known as: OMNICEF   300 mg, Oral, 2 Times Daily      cetirizine 10 MG tablet  Commonly known as: zyrTEC   10 mg, Oral, Daily      clopidogrel 75 MG tablet  Commonly known as: PLAVIX   75 mg, Oral, Daily      Diclofenac Sodium 1 % gel gel  Commonly known as: VOLTAREN   4 g, Topical, 4 Times Daily PRN      dilTIAZem 120 MG 24 hr capsule  Commonly known as: TIAZAC   120 mg, Oral, Daily      Eliquis 5 MG tablet tablet  Generic drug: apixaban   5 mg, Oral, 2 Times Daily      ferrous sulfate 324 (65 Fe) MG tablet delayed-release EC tablet   324 mg, Oral, Daily With Breakfast      furosemide 40 MG tablet  Commonly known as: LASIX   40 mg, Oral, 2 Times Daily      guaiFENesin 600 MG 12 hr tablet  Commonly known as: MUCINEX   1,200 mg, Oral, 2 Times Daily      HYDROcodone-acetaminophen 7.5-325 MG per tablet  Commonly known as: NORCO   1 tablet, Oral, Every 8 Hours PRN      ipratropium-albuterol 0.5-2.5 mg/3 ml nebulizer  Commonly known as: DUO-NEB   3 mL, Nebulization, 4 Times Daily - RT      isosorbide mononitrate 30 MG 24 hr tablet  Commonly known as: IMDUR   30 mg, Oral, Every Morning      magnesium oxide 400 MG tablet  Commonly known as: MAG-OX   400 mg, Oral, Daily      metoprolol tartrate 100 MG tablet  Commonly known as: LOPRESSOR   TAKE 1 TABLET BY MOUTH TWICE DAILY      ondansetron 4 MG tablet  Commonly known as: ZOFRAN   4 mg, Oral, Daily PRN      pantoprazole 40 MG EC tablet  Commonly known as: PROTONIX   TK 1 T PO QD      spironolactone 25 MG tablet  Commonly known as: ALDACTONE   TAKE 1/2 TABLET BY MOUTH DAILY      VITAMIN B-12 PO   Oral      VITAMIN D-3 PO   Oral      Viteyes AREDS Formula capsule   1 capsule, Oral, 2 times daily             Stop These Medications      azithromycin 250 MG tablet  Commonly known as: ZITHROMAX     budesonide-formoterol 160-4.5 MCG/ACT inhaler  Commonly known as: SYMBICORT     methylPREDNISolone 4 MG dose pack  Commonly known  as: MEDROL     nitrofurantoin 50 MG capsule  Commonly known as: MACRODANTIN              Discharge Diet:   Diet Instructions       Diet: Regular/House Diet; Regular (IDDSI 7); Thin (IDDSI 0)      Discharge Diet: Regular/House Diet    Texture: Regular (IDDSI 7)    Fluid Consistency: Thin (IDDSI 0)            Activity at Discharge:   Activity Instructions       Gradually Increase Activity Until at Pre-Hospitalization Level              Follow-up Appointments  Future Appointments   Date Time Provider Department Center   8/14/2024  1:30 PM [facility] [place] [facility]   8/14/2024  3:30 PM Ivan Martell MD [facility]     Additional Instructions for the Follow-ups that You Need to Schedule       Discharge Follow-up with PCP   As directed       Currently Documented PCP:    Nava Yu MD    PCP Phone Number:    [phone]     Follow Up Details: follow up in 2 weeks                Test Results Pending at Discharge       SHILA Mccurdy  06/18/24  10:12 EDT    Time: Discharge 25 min

## 2024-06-18 NOTE — CASE MANAGEMENT/SOCIAL WORK
Case Management Discharge Note      Final Note: Routine home.      Selected Continued Care - Discharged on 6/18/2024 Admission date: 6/16/2024 - Discharge disposition: Home or Self Care       Transportation Services  Private: Car    Final Discharge Disposition Code: 01 - home or self-care

## 2024-06-18 NOTE — PROGRESS NOTES
CARDIOLOGY PROGRESS NOTE:    Syl Herrera  88 y.o.  female  1935  [id number]      Referring Provider: Llanes Alvarez, Carlos, MD     Reason for follow-up: chest pain      Patient Care Team:  Nava Yu MD as PCP - [facility]  Ivan Martell MD as Consulting Physician (Cardiology)  Greer Shaikh MD as Consulting Physician (Nephrology)  Harshad Landin MD as Consulting Physician (Hematology and Oncology)    Subjective patient seen and examined.  Labs and chart reviewed.  Patient reports improvement in chest pain.  Continues to have cough.  Echocardiogram and Myoview study results reviewed with patient    Objective  Lying in bed resting comfortably with family at bedside     Review of Systems   Constitutional: Negative for chills and fever.   Cardiovascular:  Negative for chest pain, leg swelling, palpitations and syncope.   Respiratory:  Positive for cough and shortness of breath.    Gastrointestinal:  Negative for abdominal pain, nausea and vomiting.   Neurological:  Negative for dizziness, headaches and light-headedness.       Allergies: Doxycycline, Celebrex [celecoxib], Contrast dye (echo or unknown ct/mr), Iodinated contrast media, Nsaids, Other, and Sulfa antibiotics    Scheduled Meds:apixaban, 5 mg, Oral, Q12H  atorvastatin, 10 mg, Oral, Nightly  budesonide-formoterol, 2 puff, Inhalation, BID - RT  clopidogrel, 75 mg, Oral, Daily  dilTIAZem CD, 120 mg, Oral, Q24H  ferrous sulfate, 324 mg, Oral, Daily With Breakfast  furosemide, 40 mg, Oral, BID  isosorbide mononitrate, 30 mg, Oral, QAM  metoprolol tartrate, 100 mg, Oral, BID  pantoprazole, 40 mg, Oral, Q AM  sodium chloride, 10 mL, Intravenous, Q12H      Continuous Infusions:   PRN Meds:.  ALPRAZolam    senna-docusate sodium **AND** polyethylene glycol **AND** bisacodyl **AND** bisacodyl    Calcium Replacement - Follow Nurse / BPA Driven Protocol    carboxymethylcellulose sod    HYDROcodone-acetaminophen    ipratropium-albuterol     "loperamide    Magnesium Standard Dose Replacement - Follow Nurse / BPA Driven Protocol    nitroglycerin    Phosphorus Replacement - Follow Nurse / BPA Driven Protocol    Potassium Replacement - Follow Nurse / BPA Driven Protocol    sodium chloride    sodium chloride    sodium chloride        VITAL SIGNS  Vitals:    06/18/24 0545 06/18/24 0730 06/18/24 0735 06/18/24 0738   BP: 152/70 142/77     BP Location: Right arm Right arm     Patient Position: Lying Sitting     Pulse: 71 67 63 65   Resp: 20 13 16 16   Temp: 98.6 °F (37 °C)      TempSrc: Oral      SpO2: 97% 96% 98% 97%   Weight:       Height:           Flowsheet Rows      Flowsheet Row First Filed Value   Admission Height 152.4 cm (60\") Documented at 06/16/2024 2122   Admission Weight 74.8 kg (165 lb) Documented at 06/16/2024 2122             TELEMETRY: Atrial paced     Physical Exam:  Vitals reviewed.   Constitutional:       Appearance: Not in distress.   Eyes:      Pupils: Pupils are equal, round, and reactive to light.   HENT:      Nose: Nose normal.    Mouth/Throat:      Pharynx: Oropharynx is clear.   Pulmonary:      Effort: Pulmonary effort is normal.      Breath sounds: Normal breath sounds.   Cardiovascular:      Normal rate. Regular rhythm.   Pulses:     Intact distal pulses.   Edema:     Peripheral edema absent.   Abdominal:      Palpations: Abdomen is soft.   Musculoskeletal:      Cervical back: Normal range of motion and neck supple. Skin:     General: Skin is warm and dry.   Neurological:      General: No focal deficit present.      Mental Status: Alert.         Results Review:   I reviewed the patient's new clinical results.  Lab Results (last 24 hours)       Procedure Component Value Units Date/Time    Respiratory Panel PCR w/COVID-19(SARS-CoV-2) GERRY/MARQUISE/BENNY/PAD/COR/OLGA In-House, NP Swab in UTM/VTM, 2 HR TAT - Swab, Nasopharynx [[id number]]  (Abnormal) Collected: 06/17/24 1433    Specimen: Swab from Nasopharynx Updated: 06/17/24 1726     ADENOVIRUS, " PCR Not Detected     Coronavirus 229E Not Detected     Coronavirus HKU1 Not Detected     Coronavirus NL63 Not Detected     Coronavirus OC43 Not Detected     COVID19 Not Detected     Human Metapneumovirus Not Detected     Human Rhinovirus/Enterovirus Detected     Influenza A PCR Not Detected     Influenza B PCR Not Detected     Parainfluenza Virus 1 Not Detected     Parainfluenza Virus 2 Not Detected     Parainfluenza Virus 3 Not Detected     Parainfluenza Virus 4 Not Detected     RSV, PCR Not Detected     Bordetella pertussis pcr Not Detected     Bordetella parapertussis PCR Not Detected     Chlamydophila pneumoniae PCR Not Detected     Mycoplasma pneumo by PCR Not Detected    Narrative:      In the setting of a positive respiratory panel with a viral infection PLUS a negative procalcitonin without other underlying concern for bacterial infection, consider observing off antibiotics or discontinuation of antibiotics and continue supportive care. If the respiratory panel is positive for atypical bacterial infection (Bordetella pertussis, Chlamydophila pneumoniae, or Mycoplasma pneumoniae), consider antibiotic de-escalation to target atypical bacterial infection.    D-dimer, Quantitative [[id number]]  (Normal) Collected: 06/17/24 1456    Specimen: Blood Updated: 06/17/24 1647     D-Dimer, Quantitative 0.36 mg/L (FEU)     Narrative:      According to the assay 's published package insert, a normal (<0.50 mg/L (FEU)) D-dimer result in conjunction with a non-high clinical probability assessment, excludes deep vein thrombosis (DVT) and pulmonary embolism (PE) with high sensitivity.    D-dimer values increase with age and this can make VTE exclusion of an older population difficult. To address this, the [employer], based on best available evidence and recent guidelines, recommends that clinicians use age-adjusted D-dimer thresholds in patients greater than 50 years of age with: a) a low  "probability of PE who do not meet all Pulmonary Embolism Rule Out Criteria, or b) in those with intermediate probability of PE.   The formula for an age-adjusted D-dimer cut-off is \"age/100\".  For example, a 60 year old patient would have an age-adjusted cut-off of 0.60 mg/L (FEU) and an 80 year old 0.80 mg/L (FEU).            Imaging Results (Last 24 Hours)       ** No results found for the last 24 hours. **            EKG        I personally viewed and interpreted the patient's EKG/Telemetry data:    ECHOCARDIOGRAM:  Results for orders placed during the hospital encounter of 06/16/24    Adult Transthoracic Echo Complete W/ Cont if Necessary Per Protocol    Interpretation Summary    Left ventricular systolic function is normal. Calculated left ventricular EF = 69%    Left ventricular diastolic function is consistent with (grade II w/high LAP) pseudonormalization.    Estimated right ventricular systolic pressure from tricuspid regurgitation is mildly elevated (35-45 mmHg).    Conclusion      Normal LV size and contractility EF of 65 to 70%  Grade 2 diastolic dysfunction/pseudonormalization pattern seen.  Normal RV size  Normal atrial size, pacer lead seen  Pulmonic valve is not well visualized.  Aortic valve, mitral valve, tricuspid valve appears structurally normal, moderate tricuspid regurgitation seen.  Calculated RV systolic pressure of 42 mmHg consistent with moderate pulmonary hypertension  No pericardial effusion seen.  Proximal aorta appears normal in size.       STRESS MYOVIEW:  Results for orders placed during the hospital encounter of 06/16/24    Stress Test With Myocardial Perfusion One Day    Interpretation Summary    Myocardial perfusion imaging indicates a normal myocardial perfusion study with no evidence of ischemia. Impressions are consistent with a low risk study.    Left ventricular ejection fraction is hyperdynamic (Calculated EF > 70%).       CARDIAC CATHETERIZATION:  Results for orders placed " during the hospital encounter of 04/24/21    Cardiac Catheterization/Vascular Study    Narrative  Procedures:  #1 PTCA with stent placement to the RCA  #2 PTCA with stent placement to the left circumflex artery    Indication: Unstable angina    Comments:  Informed consent was obtained from the patient after explained risks and benefits.  Patient was brought to cardiac arrest laboratory and placed on table usual fashion.  Left groin was prepped in a sterile fashion.  2% lidocaine was used to administer local anesthesia to the left groin.  A total of 20 cc was used.  A 6 Bruneian sheath was placed in the left femoral artery.  Then a 6 [place] JR4 guide with sideholes was used and it could not engage the right coronary very well and hence a 6 [place]une[place] AR-2 guide with sideholes was used and right coronary artery was engaged.  A 190 cm whisper wire was used in place the distal portion of the RCA.  Then a 2.5/12 mm balloon was used and inflated in the proximal and mid RCA at 8 to 10 kalyan for 10 seconds.  Thereafter the balloon was taken out.  Then a 3.5/18 mm Xience stent was used and placed in the mid RCA and inflated at 12 kalyan for 10 to 15 seconds.  Thereafter the balloon was taken out.  Reviewing angiogram showed no dissection or perforation.  Patient tolerated the procedure very well.  No complication noted.  Then a 6 [place] XB guide was used and left coronary artery was engaged.  Multiple views were taken.  190 cm whisper wire was used in place the distal portion of the proximal artery.  Then a 2.5/12 mm balloon was used and inflated in the proximal and mid segments of the circumflex artery each at 8 kalyan for 8 to 10 seconds.  Thereafter the balloon was taken out.  Then a 2.5/12 mm Xience stent was used and placed in the mid circumflex artery and inflated at 10 to 12 kalyan for 10 to 15 seconds.  Thereafter the balloon was taken out.  Then another 2.5/12 mm Xience stent was used and placed across the lesion inflated territory  most views for 10 to 15 seconds.  Thereafter the balloon was taken out.  Reviewing angiogram showed no dissection or perforation.  Patient tolerated the procedure very well.  No complication noted.       OTHER:         Assessment & Plan     Principal Problem:    Chest pain       Chest pain  Coronary artery disease  Presented with atypical angina  History of CABG x 5  S/p PCI to RCA and left circumflex (2021)  + Rhinovirus  Ruled out for MI by EKG and enzymes  Troponin negative  Myoview study with no evidence of ischemia, low risk  Echocardiogram with normal LVEF grade 2 diastolic dysfunction  Continue Imdur, Plavix, statin therapy    Hypertension  Blood pressure stable  Continue diltiazem, Imdur     Atrial fibrillation  History of paroxysmal atrial fibrillation  Eliquis for anticoagulation  Diltiazem for rate control    History of tachybradycardia syndrome  S/p dual-chamber pacemaker (2016)  Previous interrogation showed device is working well  1.5 years battery longevity (1/2024)     Hyperlipidemia  Continue statin therapy    History of asthma/interstitial lung disease  Chest pain likely secondary to cough and underlying rhinovirus  Echocardiogram with moderate pulmonary hypertension  Continue diuresis    Sleep apnea  Compliant with home CPAP    No further cardiac workup needed at this time  Patient is okay to discharge from cardiology standpoint and follow-up at next office visit with device interrogation    I discussed the patients findings and my recommendations with patient and nurse    SHILA Moon  06/18/24  10:48 EDT

## 2024-06-18 NOTE — CASE MANAGEMENT/SOCIAL WORK
Continued Stay Note  NEISHA Sanabria     Patient Name: Syl Herrera  MRN: [id number]  Today's Date: 6/18/2024    Admit Date: 6/16/2024    Plan: Return home alone, daughters stay the night with her.   Discharge Plan       Row Name 06/18/24 1362       Plan    Plan Comments CM notified by nursing that family is requesting information for more services at home. CM met with patient and granddaughter Nazia at bedside. Reviewed that pt's daughters Rosa and Lisandra stay the night with pt certain nights of the week, but d/t patient's visual impairment, pt is requiring more help at home. Explained that [facility] services could be arranged but that would only be a visit or 2 per week. Discussed private pay caregiver services or need for assisted living. Lists provided for granddaughter to follow up on. Advised granddaughter to contact patient's secondary insurance to see if she is eligible for any in-home services.             Megan Naegele, RN     Office Phone: [phone]  Office Cell: [phone]

## 2024-06-18 NOTE — PLAN OF CARE
Goal Outcome Evaluation:  Plan of Care Reviewed With: patient        Progress: no change  Outcome Evaluation: Patient has slept off and on throughout the night with some complaints of restlessness. No other complaints at this time.

## 2024-06-19 NOTE — OUTREACH NOTE
Prep Survey      Flowsheet Row Responses   [facility]? [facility]   Is LACE score < 7 ? No   Eligibility Readm Mgmt   Discharge diagnosis Chest pain   Does the patient have one of the following disease processes/diagnoses(primary or secondary)? Other   Does the patient have Home health ordered? No   Is there a DME ordered? No   Prep survey completed? Yes            JUANITO MIRAMONTES - Registered Nurse

## 2024-06-24 RX ORDER — DILTIAZEM HYDROCHLORIDE 120 MG/1
120 CAPSULE, EXTENDED RELEASE ORAL DAILY
Qty: 90 CAPSULE | Refills: 2 | Status: SHIPPED | OUTPATIENT
Start: 2024-06-24

## 2024-06-24 NOTE — TELEPHONE ENCOUNTER
Rx Refill Note  Requested Prescriptions     Signed Prescriptions Disp Refills    dilTIAZem (TIAZAC) 120 MG 24 hr capsule 90 capsule 2     Sig: TAKE 1 CAPSULE BY MOUTH DAILY     Authorizing Provider: CORKY BRAGG     Ordering User: SHANNON LIMA      Last office visit with prescribing clinician: 1/31/2024   Last telemedicine visit with prescribing clinician: Visit date not found   Next office visit with prescribing clinician: 8/14/2024                         Would you like a call back once the refill request has been completed: [] Yes [] No    If the office needs to give you a call back, can they leave a voicemail: [] Yes [] No    Shannon Lima MA  06/24/24, 07:56 EDT

## 2024-06-26 ENCOUNTER — READMISSION MANAGEMENT (OUTPATIENT)
Dept: CALL CENTER | Facility: HOSPITAL | Age: 89
End: 2024-06-26
Payer: MEDICARE

## 2024-06-26 NOTE — OUTREACH NOTE
Medical Week 2 Survey      Flowsheet Row Responses   [facility] patient discharged from? [facility]   Does the patient have one of the following disease processes/diagnoses(primary or secondary)? Other   Week 2 attempt successful? Yes   Call start time 1130   Discharge diagnosis Chest pain   Call end time 1133   Person spoke with today (if not patient) and relationship Nazia Chuas reviewed with patient/caregiver? Yes   Is the patient taking all medications as directed (includes completed medication regime)? Yes   Does the patient have a primary care provider?  Yes   Nursing Interventions Educated patient on importance of making appointment, Advised patient to make appointment   Has the patient kept scheduled appointments due by today? N/A   Comments unsure   Has home health visited the patient within 72 hours of discharge? N/A   Psychosocial issues? No   Did the patient receive a copy of their discharge instructions? Yes   Nursing interventions Reviewed instructions with patient   What is the patient's perception of their health status since discharge? Improving   Is the patient/caregiver able to teach back signs and symptoms related to disease process for when to call PCP? Yes   Is the patient/caregiver able to teach back signs and symptoms related to disease process for when to call 911? Yes   Is the patient/caregiver able to teach back the hierarchy of who to call/visit for symptoms/problems? PCP, Specialist, Home health nurse, Urgent Care, ED, 911 Yes   Week 2 Call Completed? Yes   Graduated Yes   Graduated/Revoked comments Nazia reports pt is doing well. Pt no longer coughing. She will ask to make sure Pt has completed FU with PCP. No needs.   Call end time 1133            TINO WARD - Registered Nurse

## 2024-08-01 ENCOUNTER — OFFICE (AMBULATORY)
Dept: URBAN - METROPOLITAN AREA CLINIC 64 | Facility: CLINIC | Age: 89
End: 2024-08-01
Payer: MEDICARE

## 2024-08-01 VITALS
HEIGHT: 61 IN | SYSTOLIC BLOOD PRESSURE: 123 MMHG | DIASTOLIC BLOOD PRESSURE: 71 MMHG | WEIGHT: 162.4 LBS | HEART RATE: 60 BPM

## 2024-08-01 DIAGNOSIS — K74.3 PRIMARY BILIARY CIRRHOSIS: ICD-10-CM

## 2024-08-01 DIAGNOSIS — R94.5 ABNORMAL RESULTS OF LIVER FUNCTION STUDIES: ICD-10-CM

## 2024-08-01 DIAGNOSIS — R13.10 DYSPHAGIA, UNSPECIFIED: ICD-10-CM

## 2024-08-01 DIAGNOSIS — R19.7 DIARRHEA, UNSPECIFIED: ICD-10-CM

## 2024-08-01 PROCEDURE — 99214 OFFICE O/P EST MOD 30 MIN: CPT | Performed by: INTERNAL MEDICINE

## 2024-08-14 ENCOUNTER — OFFICE VISIT (OUTPATIENT)
Dept: CARDIOLOGY | Facility: CLINIC | Age: 89
End: 2024-08-14
Payer: MEDICARE

## 2024-08-14 ENCOUNTER — TELEPHONE (OUTPATIENT)
Dept: CARDIOLOGY | Facility: CLINIC | Age: 89
End: 2024-08-14

## 2024-08-14 ENCOUNTER — CLINICAL SUPPORT NO REQUIREMENTS (OUTPATIENT)
Dept: CARDIOLOGY | Facility: CLINIC | Age: 89
End: 2024-08-14
Payer: MEDICARE

## 2024-08-14 VITALS
HEIGHT: 60 IN | BODY MASS INDEX: 32.39 KG/M2 | SYSTOLIC BLOOD PRESSURE: 124 MMHG | DIASTOLIC BLOOD PRESSURE: 83 MMHG | HEART RATE: 60 BPM | OXYGEN SATURATION: 98 % | WEIGHT: 165 LBS

## 2024-08-14 DIAGNOSIS — I25.810 CORONARY ARTERY DISEASE INVOLVING CORONARY BYPASS GRAFT OF NATIVE HEART WITHOUT ANGINA PECTORIS: ICD-10-CM

## 2024-08-14 DIAGNOSIS — E78.00 PURE HYPERCHOLESTEROLEMIA: ICD-10-CM

## 2024-08-14 DIAGNOSIS — G47.33 OBSTRUCTIVE SLEEP APNEA: ICD-10-CM

## 2024-08-14 DIAGNOSIS — I48.0 PAROXYSMAL ATRIAL FIBRILLATION: ICD-10-CM

## 2024-08-14 DIAGNOSIS — Z95.0 PRESENCE OF CARDIAC PACEMAKER: ICD-10-CM

## 2024-08-14 DIAGNOSIS — I48.19 PERSISTENT ATRIAL FIBRILLATION: Primary | ICD-10-CM

## 2024-08-14 DIAGNOSIS — R00.1 BRADYCARDIA, SINUS: Primary | ICD-10-CM

## 2024-08-14 DIAGNOSIS — I10 ESSENTIAL HYPERTENSION: ICD-10-CM

## 2024-08-14 PROCEDURE — 1160F RVW MEDS BY RX/DR IN RCRD: CPT | Performed by: INTERNAL MEDICINE

## 2024-08-14 PROCEDURE — 1159F MED LIST DOCD IN RCRD: CPT | Performed by: INTERNAL MEDICINE

## 2024-08-14 PROCEDURE — 99214 OFFICE O/P EST MOD 30 MIN: CPT | Performed by: INTERNAL MEDICINE

## 2024-08-14 RX ORDER — WARFARIN SODIUM 5 MG/1
TABLET ORAL
Qty: 30 TABLET | Refills: 0 | Status: SHIPPED | OUTPATIENT
Start: 2024-08-14 | End: 2024-08-14 | Stop reason: DRUGHIGH

## 2024-08-14 RX ORDER — WARFARIN SODIUM 4 MG/1
TABLET ORAL
Qty: 30 TABLET | Refills: 0 | Status: SHIPPED | OUTPATIENT
Start: 2024-08-14

## 2024-08-14 NOTE — TELEPHONE ENCOUNTER
Pt switching from Eliquis to Warfarin 4mg per Dr Martell. Pt use to take 4mg prior to taking Eliquis. Will Recheck INR in 4 days. Spoke with Karena Pharmacist cancelled previously recommended 5mg of warfarin and Escribed an RX for 4mg apLPN

## 2024-08-14 NOTE — PROGRESS NOTES
Subjective:     Encounter Date:08/14/2024      Patient ID: Syl Herrera is a 89 y.o. female.    Chief Complaint:  Follow-upCurrent symptoms: shortness of breath. Current symptoms include no chest pain, no dizziness, no nausea, no palpitations, no headaches, no focal weakness, no abdominal pain and no cough.   89-year-old white female with history of coronary artery disease sleep apnea paroxysmal fibrillation with sick sinus syndrome status post pacemaker placement hypertension hyperlipidemia presents to the office for a follow-up.  Patient is currently stable without any chest pain but has shortness of breath with exertion radiograms any PND orthopnea.  No palpitation but has occasional dizziness.  No syncope.  She has some swelling of the feet.  She is taking her medicines regularly.    The following portions of the patient's history were reviewed and updated as appropriate: allergies, current medications, past family history, past medical history, past social history, past surgical history, and problem list.  Past Medical History:   Diagnosis Date    Anxiety     Arthritis     Asthma     Atrial fibrillation     CKD (chronic kidney disease) stage 3, GFR 30-59 ml/min 04/18/2021    Coronary artery disease     Dyslipidemia     Dyslipidemia 01/05/2015    GERD (gastroesophageal reflux disease)     History of bone density study 04/2015    Hypertension     Sleep apnea     Wears dentures      Past Surgical History:   Procedure Laterality Date    BLADDER REPAIR  1990    BREAST LUMPECTOMY  1974    BENIGN    CARDIAC CATHETERIZATION  05/25/2011    CARDIAC CATHETERIZATION N/A 04/21/2021    Procedure: Left Heart Cath and coronary angiogram;  Surgeon: Ivan Martell MD;  Location: [facility] CATH INVASIVE LOCATION;  Service: Cardiovascular;  Laterality: N/A;    CARDIAC CATHETERIZATION N/A 04/21/2021    Procedure: Left ventriculography;  Surgeon: Ivan Martell MD;  Location: [facility] CATH INVASIVE LOCATION;  Service: Cardiovascular;   "Laterality: N/A;    CARDIAC CATHETERIZATION  04/21/2021    Procedure: Saphenous Vein Graft;  Surgeon: Ivan Martell MD;  Location: [facility] CATH INVASIVE LOCATION;  Service: Cardiovascular;;    CARDIAC CATHETERIZATION N/A 04/26/2021    Procedure: Percutaneous Coronary Intervention;  Surgeon: Ivan Martell MD;  Location: [facility] CATH INVASIVE LOCATION;  Service: Cardiovascular;  Laterality: N/A;    CARDIAC CATHETERIZATION N/A 04/26/2021    Procedure: Stent RAY coronary;  Surgeon: Ivan Martell MD;  Location: [facility] CATH INVASIVE LOCATION;  Service: Cardiovascular;  Laterality: N/A;    CARDIOVASCULAR STRESS TEST  05/04/2015    CHOLECYSTECTOMY  1990    CORONARY ARTERY BYPASS GRAFT  08/09/2017    X5  Dr Brandt    ENDOSCOPY N/A 06/30/2020    Procedure: ESOPHAGOGASTRODUODENOSCOPY with biopsy x 1 area and dilatation (15-18mm balloon) up to 18mm;  Surgeon: Quinton Tapia MD;  Location: [facility] ENDOSCOPY;  Service: Gastroenterology;  Laterality: N/A;  post op: gastritis, large hiatal hernia, esophageal dysmotility    HYSTERECTOMY  1990    INSERT / REPLACE / REMOVE PACEMAKER      JOINT REPLACEMENT Right     knee     OTHER SURGICAL HISTORY  06/2017    BLOOD CLOT REMOVED FROM LEFT EAR    PACEMAKER IMPLANTATION  04/18/2016    DUAL CHAMBER [facility]ESS[facility]     /83   Pulse 60   Ht 152.4 cm (60\")   Wt 74.8 kg (165 lb)   SpO2 98%   BMI 32.22 kg/m²   Family History   Problem Relation Age of Onset    Hypertension Mother     Heart disease Mother     Heart disease Sister         PACEMAKER    Heart disease Brother        Current Outpatient Medications:     ALPRAZolam (XANAX) 0.5 MG tablet, Take 1 tablet by mouth 2 (Two) Times a Day As Needed., Disp: , Rfl: 5    atorvastatin (LIPITOR) 10 MG tablet, Take 1 tablet by mouth Every Night., Disp: , Rfl:     benzonatate (TESSALON) 200 MG capsule, Take 1 capsule by mouth 3 (Three) Times a Day As Needed for Cough., Disp: 30 capsule, Rfl: 0    budesonide (PULMICORT) 0.5 MG/2ML nebulizer solution, " Take 2 mL by nebulization 2 (Two) Times a Day., Disp: 60 each, Rfl: 1    CALCIUM PO, Take  by mouth., Disp: , Rfl:     carboxymethylcellulose (REFRESH PLUS) 0.5 % solution, Administer 1 drop to both eyes 3 (Three) Times a Day As Needed for Dry Eyes., Disp: , Rfl:     cefdinir (OMNICEF) 300 MG capsule, Take 1 capsule by mouth 2 (Two) Times a Day., Disp: 14 capsule, Rfl: 0    cetirizine (zyrTEC) 10 MG tablet, Take 1 tablet by mouth Daily., Disp: , Rfl:     Cholecalciferol (VITAMIN D-3 PO), Take  by mouth., Disp: , Rfl:     Cyanocobalamin (VITAMIN B-12 PO), Take  by mouth., Disp: , Rfl:     Diclofenac Sodium (VOLTAREN) 1 % gel gel, Apply 4 g topically to the appropriate area as directed 4 (Four) Times a Day As Needed., Disp: , Rfl:     dilTIAZem (TIAZAC) 120 MG 24 hr capsule, TAKE 1 CAPSULE BY MOUTH DAILY, Disp: 90 capsule, Rfl: 2    diphenhydrAMINE HCl (BENADRYL ALLERGY PO), Take 1 tablet by mouth As Needed., Disp: , Rfl:     ferrous sulfate 324 (65 Fe) MG tablet delayed-release EC tablet, Take 1 tablet by mouth Daily With Breakfast., Disp: 30 tablet, Rfl: 0    furosemide (LASIX) 40 MG tablet, Take 1 tablet by mouth 2 (Two) Times a Day., Disp: 5 tablet, Rfl: 0    guaiFENesin (MUCINEX) 600 MG 12 hr tablet, Take 2 tablets by mouth 2 (Two) Times a Day., Disp: 28 tablet, Rfl: 0    HYDROcodone-acetaminophen (NORCO) 7.5-325 MG per tablet, Take 1 tablet by mouth Every 8 (Eight) Hours As Needed., Disp: , Rfl: 0    ipratropium-albuterol (DUO-NEB) 0.5-2.5 mg/3 ml nebulizer, Take 3 mL by nebulization 4 (Four) Times a Day., Disp: 180 mL, Rfl: 1    isosorbide mononitrate (IMDUR) 30 MG 24 hr tablet, TAKE 1 TABLET BY MOUTH EVERY MORNING, Disp: 90 tablet, Rfl: 3    magnesium oxide (MAG-OX) 400 MG tablet, Take 1 tablet by mouth Daily., Disp: , Rfl:     metoprolol tartrate (LOPRESSOR) 100 MG tablet, TAKE 1 TABLET BY MOUTH TWICE DAILY, Disp: 180 tablet, Rfl: 1    Multiple Vitamins-Minerals (VITEYES AREDS FORMULA) capsule, Take 1 capsule  by mouth 2 (two) times a day., Disp: , Rfl:     ondansetron (ZOFRAN) 4 MG tablet, Take 1 tablet by mouth Daily As Needed for Nausea or Vomiting., Disp: , Rfl:     pantoprazole (PROTONIX) 40 MG EC tablet, TK 1 T PO QD, Disp: , Rfl: 5    spironolactone (ALDACTONE) 25 MG tablet, TAKE 1/2 TABLET BY MOUTH DAILY, Disp: 45 tablet, Rfl: 3    albuterol sulfate  (90 Base) MCG/ACT inhaler, Inhale 2 puffs 2 (Two) Times a Day. (Patient not taking: Reported on 8/14/2024), Disp: , Rfl:     Eliquis 5 MG tablet tablet, TAKE 1 TABLET BY MOUTH TWICE DAILY, Disp: 180 tablet, Rfl: 3  Allergies   Allergen Reactions    Doxycycline Other (See Comments)     Chest pain    Celebrex [Celecoxib] Other (See Comments)     Chest pain    Contrast Dye (Echo Or Unknown Ct/Mr) Itching    Iodinated Contrast Media Itching    Nsaids Other (See Comments)     convulsion    Other Itching     Pt states some steroids make her itch and hallucinate- she does not know the names   She said she is allergic to all arthritis medicine    Sulfa Antibiotics Nausea Only     Social History     Socioeconomic History    Marital status:    Tobacco Use    Smoking status: Never    Smokeless tobacco: Never   Vaping Use    Vaping status: Never Used   Substance and Sexual Activity    Alcohol use: No    Drug use: No    Sexual activity: Not Currently     Partners: Female     Birth control/protection: Hysterectomy     Review of Systems   Constitutional: Negative for malaise/fatigue.   Cardiovascular:  Positive for leg swelling. Negative for chest pain, dyspnea on exertion and palpitations.   Respiratory:  Positive for shortness of breath. Negative for cough.    Gastrointestinal:  Negative for abdominal pain, nausea and vomiting.   Neurological:  Positive for light-headedness and numbness. Negative for dizziness, focal weakness and headaches.   All other systems reviewed and are negative.             Objective:     Constitutional:       Appearance: Well-developed.    Eyes:      General: No scleral icterus.     Conjunctiva/sclera: Conjunctivae normal.   HENT:      Head: Normocephalic and atraumatic.   Neck:      Vascular: No carotid bruit or JVD.   Pulmonary:      Effort: Pulmonary effort is normal.      Breath sounds: Normal breath sounds. No wheezing. No rales.   Cardiovascular:      Normal rate. Irregularly irregular rhythm.      Murmurs: There is a systolic murmur.   Pulses:     Intact distal pulses.   Edema:     Peripheral edema present.  Abdominal:      General: Bowel sounds are normal.      Palpations: Abdomen is soft.   Musculoskeletal:      Cervical back: Normal range of motion and neck supple. Skin:     General: Skin is warm and dry.      Findings: No rash.   Neurological:      Mental Status: Alert.       Procedures    Lab Review:         MDM  #1 coronary disease  Patient has nonobstructive disease in the past and is currently stable without any angina  2.  Atrial fibrillation  Patient has history of atrial fibrillation will take emergency room and is status post pacemaker placement and is currently on diltiazem and Eliquis for anticoagulation along with metoprolol.  Patient cannot afford Eliquis and hence will be switched to warfarin.  3.  Hypertension  Patient blood pressure currently stable on metoprolol isosorbide and diltiazem  4.  Hyperlipidemia  Patient on atorvastatin the lipid levels are well within normal limits  5.  Pacemaker placement  Patient's pacemaker is working very well.      Patient's previous medical records, labs, and EKG were reviewed and discussed with the patient at today's visit.

## 2024-08-19 ENCOUNTER — ANTICOAGULATION VISIT (OUTPATIENT)
Dept: CARDIOLOGY | Facility: CLINIC | Age: 89
End: 2024-08-19
Payer: MEDICARE

## 2024-08-19 VITALS
WEIGHT: 163 LBS | SYSTOLIC BLOOD PRESSURE: 138 MMHG | DIASTOLIC BLOOD PRESSURE: 70 MMHG | BODY MASS INDEX: 31.83 KG/M2 | HEART RATE: 60 BPM

## 2024-08-19 DIAGNOSIS — I48.19 PERSISTENT ATRIAL FIBRILLATION: ICD-10-CM

## 2024-08-19 DIAGNOSIS — Z79.01 LONG TERM (CURRENT) USE OF ANTICOAGULANTS: Primary | ICD-10-CM

## 2024-08-19 LAB — INR PPP: 2.1 (ref 2–3)

## 2024-08-19 PROCEDURE — 36416 COLLJ CAPILLARY BLOOD SPEC: CPT | Performed by: INTERNAL MEDICINE

## 2024-08-19 PROCEDURE — 85610 PROTHROMBIN TIME: CPT | Performed by: INTERNAL MEDICINE

## 2024-08-19 NOTE — PROGRESS NOTES
Pt new to warfarin started 4 days ago. Pt states she is having diarrhea for the last 4 days. Spoke with Dr Martell Watery diarrhea is generally indicative of infection not likely medication induced. Pt took warfarin in the past and did not have any issues. Advised per Dr Martell follow up with PCP for stomach. Reduced INR slightly. INR is in range after 4 doses. Recheck On Thursday due to diarrhea and lack of appetite. Advised if stools become tarry or have a fowl odor to go to the ER for possible GI bleed pt and dtr verbalize understanding. Pt dtr states she has this issue from time to time apLPN

## 2024-08-22 ENCOUNTER — ANTICOAGULATION VISIT (OUTPATIENT)
Dept: CARDIOLOGY | Facility: CLINIC | Age: 89
End: 2024-08-22
Payer: MEDICARE

## 2024-08-22 VITALS
SYSTOLIC BLOOD PRESSURE: 133 MMHG | DIASTOLIC BLOOD PRESSURE: 69 MMHG | BODY MASS INDEX: 32.03 KG/M2 | HEART RATE: 60 BPM | WEIGHT: 164 LBS

## 2024-08-22 DIAGNOSIS — I48.19 PERSISTENT ATRIAL FIBRILLATION: ICD-10-CM

## 2024-08-22 DIAGNOSIS — Z79.01 LONG TERM (CURRENT) USE OF ANTICOAGULANTS: Primary | ICD-10-CM

## 2024-08-22 LAB — INR PPP: 2.7 (ref 2–3)

## 2024-08-22 PROCEDURE — 36416 COLLJ CAPILLARY BLOOD SPEC: CPT | Performed by: INTERNAL MEDICINE

## 2024-08-22 PROCEDURE — 85610 PROTHROMBIN TIME: CPT | Performed by: INTERNAL MEDICINE

## 2024-08-26 ENCOUNTER — ANTICOAGULATION VISIT (OUTPATIENT)
Dept: CARDIOLOGY | Facility: CLINIC | Age: 89
End: 2024-08-26
Payer: MEDICARE

## 2024-08-26 VITALS
HEART RATE: 61 BPM | BODY MASS INDEX: 32.22 KG/M2 | DIASTOLIC BLOOD PRESSURE: 74 MMHG | SYSTOLIC BLOOD PRESSURE: 144 MMHG | WEIGHT: 165 LBS

## 2024-08-26 DIAGNOSIS — I48.19 PERSISTENT ATRIAL FIBRILLATION: ICD-10-CM

## 2024-08-26 DIAGNOSIS — Z79.01 LONG TERM (CURRENT) USE OF ANTICOAGULANTS: Primary | ICD-10-CM

## 2024-08-26 LAB — INR PPP: 4 (ref 0.9–1.1)

## 2024-08-26 PROCEDURE — 85610 PROTHROMBIN TIME: CPT | Performed by: INTERNAL MEDICINE

## 2024-08-26 PROCEDURE — 36416 COLLJ CAPILLARY BLOOD SPEC: CPT | Performed by: INTERNAL MEDICINE

## 2024-09-09 ENCOUNTER — ANTICOAGULATION VISIT (OUTPATIENT)
Dept: CARDIOLOGY | Facility: CLINIC | Age: 89
End: 2024-09-09
Payer: MEDICARE

## 2024-09-09 ENCOUNTER — TELEPHONE (OUTPATIENT)
Dept: CARDIOLOGY | Facility: CLINIC | Age: 89
End: 2024-09-09
Payer: MEDICARE

## 2024-09-09 VITALS
DIASTOLIC BLOOD PRESSURE: 62 MMHG | SYSTOLIC BLOOD PRESSURE: 122 MMHG | WEIGHT: 162 LBS | HEART RATE: 62 BPM | BODY MASS INDEX: 31.64 KG/M2

## 2024-09-09 DIAGNOSIS — I48.19 PERSISTENT ATRIAL FIBRILLATION: ICD-10-CM

## 2024-09-09 DIAGNOSIS — Z79.01 LONG TERM (CURRENT) USE OF ANTICOAGULANTS: Primary | ICD-10-CM

## 2024-09-09 LAB — INR PPP: 3.2 (ref 2–3)

## 2024-09-09 PROCEDURE — 85610 PROTHROMBIN TIME: CPT | Performed by: INTERNAL MEDICINE

## 2024-09-09 PROCEDURE — 36416 COLLJ CAPILLARY BLOOD SPEC: CPT | Performed by: INTERNAL MEDICINE

## 2024-09-09 NOTE — TELEPHONE ENCOUNTER
DOES PATIENT NEED INR PRIOR TO 9/26? JANICE THOUGHT INR WAS SCHEDULED TODAY.  I CAN CALL HER BACK.

## 2024-09-11 DIAGNOSIS — I48.19 PERSISTENT ATRIAL FIBRILLATION: ICD-10-CM

## 2024-09-11 RX ORDER — WARFARIN SODIUM 4 MG/1
TABLET ORAL
Qty: 64 TABLET | Refills: 0 | Status: SHIPPED | OUTPATIENT
Start: 2024-09-11

## 2024-09-26 ENCOUNTER — ANTICOAGULATION VISIT (OUTPATIENT)
Dept: CARDIOLOGY | Facility: CLINIC | Age: 89
End: 2024-09-26
Payer: MEDICARE

## 2024-09-26 DIAGNOSIS — Z79.01 LONG TERM (CURRENT) USE OF ANTICOAGULANTS: Primary | ICD-10-CM

## 2024-09-26 DIAGNOSIS — I48.19 PERSISTENT ATRIAL FIBRILLATION: ICD-10-CM

## 2024-09-26 LAB — INR PPP: 1.6 (ref 0.9–1.1)

## 2024-09-26 PROCEDURE — 85610 PROTHROMBIN TIME: CPT | Performed by: INTERNAL MEDICINE

## 2024-09-26 PROCEDURE — 36416 COLLJ CAPILLARY BLOOD SPEC: CPT | Performed by: INTERNAL MEDICINE

## 2024-10-03 ENCOUNTER — TELEPHONE (OUTPATIENT)
Dept: CARDIOLOGY | Facility: CLINIC | Age: 89
End: 2024-10-03
Payer: MEDICARE

## 2024-10-03 NOTE — TELEPHONE ENCOUNTER
Called [facility] back for pat can't get in touch still on hold. Please if she calls transfer over to please

## 2024-10-03 NOTE — TELEPHONE ENCOUNTER
FACILITY: [facility]   JESS ESTRADA  PHONE: [phone]  FAX: [phone]  PROCEDURE: EGD  SCHEDULED: 10/24/24  MEDS TO HOLD: WARFARIN

## 2024-10-14 RX ORDER — METOPROLOL TARTRATE 100 MG
TABLET ORAL
Qty: 180 TABLET | Refills: 1 | Status: SHIPPED | OUTPATIENT
Start: 2024-10-14

## 2024-10-14 NOTE — TELEPHONE ENCOUNTER
HISTORY OF PRESENT ILLNESS  Esha Love is a 82 y.o. female. HPI   Follow up on chronic medical problems. Overall feeling well. Doing the precautionary measures at home to reduce risks of exposure COVID19. Also wearing mask when she is going out. No known sick contacts or known exposure to [address]. Cardiovascular Review:  The patient has hypertension, hyperlipidemia, and atrial tachycardia. Diet and Lifestyle: generally follows a low fat low cholesterol diet, generally follows a low sodium diet, exercises sporadically  Home BP Monitoring: is not measured at home. Pertinent ROS: taking medications as instructed, no medication side effects noted, no TIA's, no swelling of ankles, no palpitations. Denies chest pain. No pressure or chest tightness. Has upcoming cardiology appt for next month. Thyroid Review:  Patient is seen for followup of hypothyroidism. Followed by prateek. Thyroid ROS: denies weight changes, heat/cold intolerance, bowel/skin changes or CVS symptoms. Osteoarthritis and Chronic Pain:  Patient has osteoarthritis and fibromyalgia. Has aches and pain that comes and goes. On gabapentin which does help with some of the pain. Rheumatological ROS: Has burning and pain in the legs at feet that is worse at night. Aches all the time. Has not been exercising. Controlled by PRN meds. Response to treatment plan: stable. Headache:  Has chronic headache almost every days. She had follow up with neurology. Ordered MRI but was not able to get tests done d/t increase anxiety in the MRI scanner. Generally will go away if she take the Fioricet. Overall she believes that headaches are better since having a \"bad tooth\" pull and thinks that it was causing some of the headache pain. Also thinks that some of the headaches is related to her sinuses and allergies. Associated with light sensitivity, slight nausea but no vomiting, no fever/chills, no neck stiffness.   Denies any Rx Refill Note  Requested Prescriptions     Pending Prescriptions Disp Refills    metoprolol tartrate (LOPRESSOR) 100 MG tablet [Pharmacy Med Name: METOPROLOL TARTRATE 100MG TABLETS] 180 tablet 1     Sig: TAKE 1 TABLET BY MOUTH TWICE DAILY      Last office visit with prescribing clinician: 8/14/2024   Last telemedicine visit with prescribing clinician: Visit date not found   Next office visit with prescribing clinician: 2/26/2025                         Would you like a call back once the refill request has been completed: [] Yes [] No    If the office needs to give you a call back, can they leave a voicemail: [] Yes [] No    Vivek Connell MA  10/14/24, 08:48 EDT   known trigger and denies any increase in stress. No focal sx. She is still following up with hematology for IP. Platelet counts have been overall stable. She is on romipostim. HM:  Mammogram 1/21/21  Colonoscopy 8/1/2016 by Dr. Christiana Grimes - no more colonoscopies unless she has a problem. Patient Active Problem List   Diagnosis Code    Hypothyroidism E03.9    HTN (hypertension) I10    OA (osteoarthritis) M19.90    GERD (gastroesophageal reflux disease) K21.9    Fibromyalgia M79.7    Insomnia G47.00    Headache(784.0) R51    Iron deficiency anemia, unspecified D50.9    CARVAJAL (dyspnea on exertion) R06.00    Heart palpitations R00.2    Paroxysmal Inappropriate sinus tachycardia vs possible atrial tachycardia R00.0    Edema leg R60.0    Reactive airway disease without complication F11.759    Incidental lung nodule, greater than or equal to 8mm R91.1    Encounter for medication monitoring Z51.81    Preop cardiovascular exam Z01.810    Pelvic lymphadenopathy R59.0    Chronic ITP (idiopathic thrombocytopenia) ([facility]) D69.3    Hypercholesterolemia E78.00    Nonrheumatic mitral valve regurgitation I34.0    PSVT (paroxysmal supraventricular tachycardia) ([facility]) I47.1    Migraine with aura and without status migrainosus, not intractable G43. 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 1-2 po 30 Tab 5    gabapentin (NEURONTIN) 300 mg capsule TAKE 1 CAPSULE BY MOUTH THREE TIMES DAILY.  MAX DAILY AMOUNT: 900 MG 90 Cap 5    pravastatin (PRAVACHOL) 10 mg tablet TAKE 1 TABLET BY MOUTH EVERY NIGHT 90 Tab 3    Bystolic 5 mg tablet TAKE 1 TABLET BY MOUTH TWICE DAILY. HOLD DOSE IF SYSTOLIC PRESSURE IS LESS THAN 100 OR PULSE IS LESS THAN 50 60 Tab 11    levothyroxine (synthroid) 50 mcg tablet Take 50 mcg by mouth every Monday, Wednesday, Friday.  romiplostim (NPLATE SC) by SubCUTAneous route every fourteen (14) days.  acetaminophen (ARTHRITIS PAIN RELIEF) 650 mg TbER Take 650 mg by mouth every eight (8) hours as needed.  cetirizine (ZYRTEC) 10 mg tablet Take 10 mg by mouth daily as needed for Allergies.          Allergies   Allergen Reactions    Epinephrine Other (comments)     BP drops and pass out    Iodinated Contrast Media Shortness of Breath    Novocain [Procaine] Palpitations     BP drops,passes out    Codeine Itching     Pt does not recall    Cymbalta [Duloxetine] Other (comments)     Head Pain and nausea    Daypro [Oxaprozin] Unknown (comments)     Pt does not recall    Prednisone Shortness of Breath     agitation    Sulfa (Sulfonamide Antibiotics) Unknown (comments)     Pt does not recall    Tetracycline Other (comments)     Low platelet count    Zithromax [Azithromycin] Nausea Only     Diarrhea, headaches         Past Medical History:   Diagnosis Date    Arrhythmia     tachycardia; Dr. Shan Palm Autoimmune disease ([address]     fibromyalgia    Chronic ITP (idiopathic thrombocytopenia) (HCC)     Fibromyalgia     GERD (gastroesophageal reflux disease)     Headache(784.0)     migraine    Heart failure (HCC)     HTN (hypertension) 4/2/2010    Hypothyroidism 4/2/2010    OA (osteoarthritis) 4/2/2010    Seizures ([address]     56 years ago with childbirth    Tachycardia 2013    Thromboembolus ([address]     blood clot on brain 56 years ago w childbirth    Thyroid disease          Past Surgical History:   Procedure Laterality Date    Katya Mishra  2/5/2015         ENDOSCOPY, COLON, DIAGNOSTIC  12/2010    Dr. Erasto Ardon Keturah Aureliaelba- repeat 5 yrs    HX BREAST BIOPSY Right 1999    more than one yrs ago all benign    HX CARPAL TUNNEL RELEASE  9/28/2012    HX CATARACT REMOVAL  9/2014    bilateral     HX HYSTERECTOMY  1977    HX REFRACTIVE SURGERY  07/2018         Family History   Problem Relation Age of Onset    Heart Disease Paternal Aunt     Heart Disease Paternal Uncle     Colon Cancer Maternal Grandmother     Other Mother         murder    No Known Problems Father     Other Son         scooby's disease and UC    Other Daughter         fibromyalgia    Thyroid Disease Daughter     Heart Disease Maternal Grandfather     Other Sister         Raynauds       Social History     Tobacco Use    Smoking status: Never Smoker    Smokeless tobacco: Never Used   Substance Use Topics    Alcohol use: No     Alcohol/week: 0.0 standard drinks        Lab Results   Component Value Date/Time    WBC 5.2 10/27/2020 02:26 PM    HGB 12.8 10/27/2020 02:26 PM    Hemoglobin (POC) 11.6 07/14/2017 07:13 AM    HCT 37.9 10/27/2020 02:26 PM    Hematocrit (POC) 34 (L) 07/14/2017 07:13 AM    PLATELET 694 (L) 73/76/3086 02:26 PM    MCV 88 10/27/2020 02:26 PM     Lab Results   Component Value Date/Time    Cholesterol, total 174 01/27/2021 04:00 PM    HDL Cholesterol 56 01/27/2021 04:00 PM    LDL, calculated 83 01/27/2021 04:00 PM    LDL, calculated 100 (H) 02/25/2020 03:00 PM    Triglyceride 208 (H) 01/27/2021 04:00 PM    CHOL/HDL Ratio 3.7 01/27/2010 05:43 PM     Lab Results   Component Value Date/Time    TSH 0.438 (L) 07/28/2020 03:39 PM    T4, Free 1.18 10/11/2016 03:41 PM      Lab Results   Component Value Date/Time    Sodium 141 01/27/2021 04:00 PM    Potassium 4.7 01/27/2021 04:00 PM    Chloride 103 01/27/2021 04:00 PM    CO2 26 01/27/2021 04:00 PM    Anion gap 12 05/03/2016 01:05 PM    Glucose 99 01/27/2021 04:00 PM    BUN 15 01/27/2021 04:00 PM    Creatinine 0.72 01/27/2021 04:00 PM    BUN/Creatinine ratio 21 01/27/2021 04:00 PM    GFR est AA 90 01/27/2021 04:00 PM    GFR est non-AA 78 01/27/2021 04:00 PM    Calcium 10.0 01/27/2021 04:00 PM    Bilirubin, total 0.2 01/27/2021 04:00 PM    ALT (SGPT) 15 01/27/2021 04:00 PM    Alk. phosphatase 113 01/27/2021 04:00 PM    Protein, total 6.6 01/27/2021 04:00 PM    Albumin 4.7 (H) 01/27/2021 04:00 PM    Globulin 3.2 05/03/2016 01:05 PM    A-G Ratio 2.5 (H) 01/27/2021 04:00 PM      Lab Results   Component Value Date/Time    Hemoglobin A1c 5.9 (H) 08/28/2012 04:22 PM    Hemoglobin A1c (POC) 5.7 11/10/2015 03:16 PM    Hemoglobin A1c, External 11.1 07/22/2015 12:00 AM         Review of Systems   Constitutional: Negative for malaise/fatigue. HENT: Negative for congestion. Eyes: Negative for blurred vision. Respiratory: Negative for cough and shortness of breath. Cardiovascular: Negative for chest pain, palpitations and leg swelling. Gastrointestinal: Negative for abdominal pain, constipation and heartburn. Genitourinary: Negative for dysuria, frequency and urgency. Neurological: Negative for dizziness and tingling. Endo/Heme/Allergies: Negative for environmental allergies. Psychiatric/Behavioral: Negative for depression. The patient does not have insomnia. Physical Exam  Vitals and nursing note reviewed. Constitutional:       Appearance: Normal appearance. She is well-developed. Comments: /74 (BP 1 Location: Left arm, BP Patient Position: Sitting)   Pulse 70   Temp 98.5 °F (36.9 °C) (Oral)   Resp 18   Ht 5' 1\" (1.549 m)   Wt 168 lb (76.2 kg)   SpO2 96%   BMI 31.74 kg/m²      HENT:      Right Ear: Tympanic membrane and ear canal normal.      Left Ear: Tympanic membrane and ear canal normal.      Nose: No mucosal edema. Neck:      Thyroid: No thyromegaly. Cardiovascular:      Rate and Rhythm: Normal rate and regular rhythm. Heart sounds: Normal heart sounds. No gallop. Pulmonary:      Effort: Pulmonary effort is normal.      Breath sounds: Normal breath sounds. Abdominal:      General: Bowel sounds are normal.      Palpations: Abdomen is soft. There is no mass. Tenderness: There is no abdominal tenderness. Musculoskeletal:         General: Normal range of motion. Cervical back: Normal range of motion and neck supple. Right lower leg: No edema. Left lower leg: No edema. Lymphadenopathy:      Cervical: No cervical adenopathy. Skin:     General: Skin is warm and dry. Neurological:      General: No focal deficit present. Mental Status: She is alert and oriented to person, place, and time. Psychiatric:         Mood and Affect: Mood normal.         ASSESSMENT and PLAN  Diagnoses and all orders for this visit:    1. Essential hypertension  Stable BP. Discussed sodium restriction, high k rich diet, maintaining ideal body weight and regular exercise program such as daily walking 30 min perday 4-5 times per week, as physiologic means to achieve blood pressure control. Medication compliance advised. 2. Mixed hyperlipidemia  Continue to monitor. Work on diet and exercise.  -     LIPID PANEL; Future    3. Hypothyroidism due to acquired atrophy of thyroid  Has follow up with endo for next week. -     TSH 3RD GENERATION; Future  -     T4, FREE; Future    4. Chronic ITP (idiopathic thrombocytopenia) (HCC)  -     CBC W/O DIFF; Future  As per hematology    5. Fibromyalgia//  6. Generalized osteoarthritis  Overall stable     7. Chronic paroxysmal hemicrania, not intractable  Will follow up with neurology    8. Primary insomnia  Stable on restoril. Discussed sleep hygiene      9. Encounter for medication monitoring  -     METABOLIC PANEL, COMPREHENSIVE; Future  -     CBC W/O DIFF;  Future      Follow-up and Dispositions    · Return in about 5 months (around 12/13/2021) for medicare wellness exam.       reviewed diet, exercise and weight control  cardiovascular risk and specific lipid/LDL goals reviewed  reviewed medications and side effects in detail      I have discussed diagnosis listed in this note with pt and/or family. I have discussed treatment plans and options and the risk/benefit analysis of those options, including safe use of medications and possible medication side effects. Through the use of shared decision making we have agreed to the above plan. The patient has received an after-visit summary and questions were answered concerning future plans and follow up. Advise pt of any urgent changes then to proceed to the ER.

## 2024-10-23 ENCOUNTER — TRANSCRIBE ORDERS (OUTPATIENT)
Dept: ADMINISTRATIVE | Facility: HOSPITAL | Age: 89
End: 2024-10-23
Payer: MEDICARE

## 2024-10-23 ENCOUNTER — LAB (OUTPATIENT)
Dept: LAB | Facility: HOSPITAL | Age: 89
End: 2024-10-23
Payer: MEDICARE

## 2024-10-23 ENCOUNTER — ANTICOAGULATION VISIT (OUTPATIENT)
Dept: CARDIOLOGY | Facility: CLINIC | Age: 89
End: 2024-10-23
Payer: MEDICARE

## 2024-10-23 DIAGNOSIS — Z79.01 LONG TERM (CURRENT) USE OF ANTICOAGULANTS: ICD-10-CM

## 2024-10-23 DIAGNOSIS — I48.19 PERSISTENT ATRIAL FIBRILLATION: ICD-10-CM

## 2024-10-23 DIAGNOSIS — Z79.01 LONG TERM (CURRENT) USE OF ANTICOAGULANTS: Primary | ICD-10-CM

## 2024-10-23 LAB
INR PPP: 1.1 (ref 2–3)
PROTHROMBIN TIME: 11.9 SECONDS (ref 19.4–28.5)

## 2024-10-23 PROCEDURE — 36415 COLL VENOUS BLD VENIPUNCTURE: CPT

## 2024-10-23 PROCEDURE — 85610 PROTHROMBIN TIME: CPT

## 2024-10-24 ENCOUNTER — ON CAMPUS - OUTPATIENT (AMBULATORY)
Age: 89
End: 2024-10-24
Payer: COMMERCIAL

## 2024-10-24 ENCOUNTER — ANESTHESIA (OUTPATIENT)
Dept: GASTROENTEROLOGY | Facility: HOSPITAL | Age: 89
End: 2024-10-24
Payer: MEDICARE

## 2024-10-24 ENCOUNTER — ON CAMPUS - OUTPATIENT (AMBULATORY)
Dept: URBAN - METROPOLITAN AREA HOSPITAL 85 | Facility: HOSPITAL | Age: 89
End: 2024-10-24
Payer: MEDICARE

## 2024-10-24 ENCOUNTER — ANESTHESIA EVENT (OUTPATIENT)
Dept: GASTROENTEROLOGY | Facility: HOSPITAL | Age: 89
End: 2024-10-24
Payer: MEDICARE

## 2024-10-24 ENCOUNTER — HOSPITAL ENCOUNTER (OUTPATIENT)
Facility: HOSPITAL | Age: 89
Setting detail: HOSPITAL OUTPATIENT SURGERY
Discharge: HOME | End: 2024-10-24
Attending: INTERNAL MEDICINE | Admitting: INTERNAL MEDICINE
Payer: MEDICARE

## 2024-10-24 VITALS
HEIGHT: 60 IN | BODY MASS INDEX: 31.42 KG/M2 | RESPIRATION RATE: 17 BRPM | SYSTOLIC BLOOD PRESSURE: 158 MMHG | TEMPERATURE: 97.6 F | WEIGHT: 160.05 LBS | OXYGEN SATURATION: 96 % | DIASTOLIC BLOOD PRESSURE: 77 MMHG | HEART RATE: 61 BPM

## 2024-10-24 DIAGNOSIS — K44.9 DIAPHRAGMATIC HERNIA WITHOUT OBSTRUCTION OR GANGRENE: ICD-10-CM

## 2024-10-24 DIAGNOSIS — K22.2 ESOPHAGEAL OBSTRUCTION: ICD-10-CM

## 2024-10-24 DIAGNOSIS — B37.81 CANDIDAL ESOPHAGITIS: ICD-10-CM

## 2024-10-24 DIAGNOSIS — R13.10 DYSPHAGIA, UNSPECIFIED: ICD-10-CM

## 2024-10-24 DIAGNOSIS — D50.9 IDA (IRON DEFICIENCY ANEMIA): ICD-10-CM

## 2024-10-24 DIAGNOSIS — D50.9 IRON DEFICIENCY ANEMIA, UNSPECIFIED: ICD-10-CM

## 2024-10-24 DIAGNOSIS — K29.50 UNSPECIFIED CHRONIC GASTRITIS WITHOUT BLEEDING: ICD-10-CM

## 2024-10-24 DIAGNOSIS — R13.10 DYSPHAGIA: ICD-10-CM

## 2024-10-24 PROCEDURE — 25010000002 PROPOFOL 200 MG/20ML EMULSION: Performed by: NURSE ANESTHETIST, CERTIFIED REGISTERED

## 2024-10-24 PROCEDURE — 43249 ESOPH EGD DILATION <30 MM: CPT | Performed by: INTERNAL MEDICINE

## 2024-10-24 PROCEDURE — 43239 EGD BIOPSY SINGLE/MULTIPLE: CPT | Mod: 59 | Performed by: INTERNAL MEDICINE

## 2024-10-24 PROCEDURE — C1726 CATH, BAL DIL, NON-VASCULAR: HCPCS | Performed by: INTERNAL MEDICINE

## 2024-10-24 PROCEDURE — 25810000003 SODIUM CHLORIDE 0.9 % SOLUTION: Performed by: NURSE ANESTHETIST, CERTIFIED REGISTERED

## 2024-10-24 PROCEDURE — 88305 TISSUE EXAM BY PATHOLOGIST: CPT | Performed by: INTERNAL MEDICINE

## 2024-10-24 PROCEDURE — 25010000002 LIDOCAINE PF 2% 2 % SOLUTION: Performed by: NURSE ANESTHETIST, CERTIFIED REGISTERED

## 2024-10-24 RX ORDER — LIDOCAINE HYDROCHLORIDE 20 MG/ML
INJECTION, SOLUTION EPIDURAL; INFILTRATION; INTRACAUDAL; PERINEURAL AS NEEDED
Status: DISCONTINUED | OUTPATIENT
Start: 2024-10-24 | End: 2024-10-24 | Stop reason: SURG

## 2024-10-24 RX ORDER — PROPOFOL 10 MG/ML
INJECTION, EMULSION INTRAVENOUS AS NEEDED
Status: DISCONTINUED | OUTPATIENT
Start: 2024-10-24 | End: 2024-10-24 | Stop reason: SURG

## 2024-10-24 RX ORDER — SODIUM CHLORIDE 9 MG/ML
INJECTION, SOLUTION INTRAVENOUS CONTINUOUS PRN
Status: DISCONTINUED | OUTPATIENT
Start: 2024-10-24 | End: 2024-10-24 | Stop reason: SURG

## 2024-10-24 RX ORDER — ONDANSETRON 2 MG/ML
4 INJECTION INTRAMUSCULAR; INTRAVENOUS ONCE AS NEEDED
Status: DISCONTINUED | OUTPATIENT
Start: 2024-10-24 | End: 2024-10-24 | Stop reason: HOSPADM

## 2024-10-24 RX ADMIN — SODIUM CHLORIDE: 9 INJECTION, SOLUTION INTRAVENOUS at 09:27

## 2024-10-24 RX ADMIN — PROPOFOL 20 MG: 10 INJECTION, EMULSION INTRAVENOUS at 09:51

## 2024-10-24 RX ADMIN — PROPOFOL 60 MG: 10 INJECTION, EMULSION INTRAVENOUS at 09:37

## 2024-10-24 RX ADMIN — PROPOFOL 40 MG: 10 INJECTION, EMULSION INTRAVENOUS at 09:44

## 2024-10-24 RX ADMIN — LIDOCAINE HYDROCHLORIDE 60 MG: 20 INJECTION, SOLUTION EPIDURAL; INFILTRATION; INTRACAUDAL; PERINEURAL at 09:37

## 2024-10-24 NOTE — OP NOTE
ESOPHAGOGASTRODUODENOSCOPY Procedure Report    Patient Name:  Syl Herrera  YOB: 1935    Date of Surgery:  10/24/2024     Pre-Op Diagnosis:  Dysphagia [R13.10]  DEE (iron deficiency anemia) [D50.9]       Post-Op Diagnosis Codes:     * Dysphagia [R13.10]     * DEE (iron deficiency anemia) [D50.9]  Stricture at the GE junction dilated with balloon 18 to 19 mm with effective break in mucosa  Large hiatal hernia  A few white plaques in the esophagus suggesting Candida which were biopsied  Moderate antritis biopsied    Procedure/CPT® Codes:      Procedure(s):  ESOPHAGOGASTRODUODENOSCOPY WITH GASTRIC BIOPSY, BALLOON DILATION (18MM-20MM) UP TO 19MM,BIOPSY OF ESOPHAGUS    Staff:  Surgeon(s):  Quinton Tapia MD      Anesthesia: Monitored Anesthesia Care    Description of Procedure:  A description of the procedure as well as risks, benefits and alternative methods were explained to the patient who voiced understanding and signed the corresponding consent form. A physical exam was performed and vital signs were monitored throughout the procedure.    An upper GI endoscope was placed into the mouth and proceeded through the esophagus, stomach and second portion of the duodenum without difficulty. The scope was then retroflexed and the fundus was visualized. The procedure was not difficult and there were no immediate complications.    Specimen:        See Below    Estimated blood loss: none    Complications:  None    Findings:  Stricture at the GE junction dilated with balloon 18 to 19 mm with effective break in mucosa  Large hiatal hernia  A few white plaques in the esophagus suggesting Candida which were biopsied  Moderate antritis biopsied    Impression:  Dysphagia due to esophageal stricture dilated up to 19 mm with a balloon  Likely candidal esophagitis  Gastritis    Recommendations:  Follow-up biopsy results  Continue pantoprazole  Nystatin was sent to her pharmacy      Quinton Tapia MD     Date:  10/24/2024    Time: 09:56 EDT

## 2024-10-24 NOTE — ANESTHESIA POSTPROCEDURE EVALUATION
Patient: Syl Herrera    Procedure Summary       Date: 10/24/24 Room / Location: [facility] ENDOSCOPY 1 / [facility] ENDOSCOPY    Anesthesia Start: 0928 Anesthesia Stop: 0953    Procedure: ESOPHAGOGASTRODUODENOSCOPY WITH GASTRIC BIOPSY, BALLOON DILATION (18MM-20MM) UP TO 19MM,BIOPSY OF ESOPHAGUS Diagnosis:       Dysphagia      DEE (iron deficiency anemia)      (Dysphagia [R13.10])      (DEE (iron deficiency anemia) [D50.9])    Surgeons: Quinton Tapia MD Provider: Ra Muhammad MD    Anesthesia Type: general ASA Status: 3            Anesthesia Type: general    Vitals  Vitals Value Taken Time   /64 10/24/24 0954   Temp     Pulse 65 10/24/24 0959   Resp     SpO2 100 % 10/24/24 0959   Vitals shown include unfiled device data.        Post Anesthesia Care and Evaluation    Patient location during evaluation: PACU  Patient participation: complete - patient participated  Level of consciousness: awake  Pain score: 0  Pain management: adequate  Anesthetic complications: No anesthetic complications  PONV Status: none  Cardiovascular status: acceptable  Respiratory status: acceptable  Hydration status: acceptable

## 2024-10-24 NOTE — DISCHARGE INSTRUCTIONS
A responsible adult should stay with you and you should rest quietly for the rest of the day.    Do not drink alcohol, drive, operate any heavy machinery or power tools or make any legal/important decisions for the next 24 hours.    Progress your diet as tolerated.  If you begin to experience severe pain, increased shortness of breath, racing heartbeat or a fever above 101 F, seek immediate medical attention.    Follow up with MD as instructed. Call office for results in 3 to 5 days if needed.     Dr. Tapia's Office [phone]

## 2024-10-24 NOTE — ANESTHESIA PREPROCEDURE EVALUATION
Anesthesia Evaluation     Patient summary reviewed   no history of anesthetic complications:   NPO Solid Status: > 8 hours  NPO Liquid Status: > 8 hours           Airway   Mallampati: II  TM distance: >3 FB  Neck ROM: full  No difficulty expected  Dental    (+) lower dentures and upper dentures    Pulmonary - normal exam   (+) asthma,sleep apnea  Cardiovascular - normal exam    (+) pacemaker pacemaker, hypertension, CAD, CABG >6 Months, dysrhythmias Atrial Fib      Neuro/Psych  (+) psychiatric history Anxiety and Depression  GI/Hepatic/Renal/Endo    (+) hiatal hernia, GERD, renal disease- CRI    Musculoskeletal     Abdominal  - normal exam    Bowel sounds: normal.   Substance History      OB/GYN          Other   arthritis,                   Anesthesia Plan    ASA 3     general     intravenous induction     Anesthetic plan, risks, benefits, and alternatives have been provided, discussed and informed consent has been obtained with: patient.    Plan discussed with CRNA.

## 2024-10-24 NOTE — H&P
GI Procedure H&P:    Referring Provider:    Nava Yu MD Harrell, Steven, MD    Chief complaint: <principal problem not specified>    Subjective .  Dysphagia    History of present illness:      Syl Herrera is a 89 y.o. female who presents today for Procedure(s):  ESOPHAGOGASTRODUODENOSCOPY WITH GASTRIC BIOPSY, BALLOON DILATION (18MM-20MM) UP TO 19MM,BIOPSY OF ESOPHAGUS for the indications listed below.     The updated Patient Profile was reviewed prior to the procedure, in conjunction with the Physical Exam, including medical conditions, surgical procedures, medications, allergies, family history and social history.     Pre-operatively, I reviewed the indication(s) for the procedure, the risks of the procedure [including but not limited to: unexpected bleeding possibly requiring hospitalization and/or unplanned repeat procedures, perforation possibly requiring surgical treatment, missed lesions and complications of sedation/MAC (also explained by anesthesia staff)].     I have evaluated the patient for risks associated with the planned anesthesia and the procedure to be performed and find the patient an acceptable candidate for IV sedation.    Multiple opportunities were provided for any questions or concerns, and all questions were answered satisfactorily before any anesthesia was administered. We will proceed with the planned procedure.    Past Medical History:  Past Medical History:   Diagnosis Date    Anxiety     Arthritis     Asthma     Atrial fibrillation     CKD (chronic kidney disease) stage 3, GFR 30-59 ml/min 04/18/2021    Coronary artery disease     Dyslipidemia     Dyslipidemia 01/05/2015    GERD (gastroesophageal reflux disease)     History of bone density study 04/2015    Hypertension     Sleep apnea     Wears dentures        Past Surgical History:  Past Surgical History:   Procedure Laterality Date    BLADDER REPAIR  1990    BREAST LUMPECTOMY  1974    BENIGN    CARDIAC CATHETERIZATION   05/25/2011    CARDIAC CATHETERIZATION N/A 04/21/2021    Procedure: Left Heart Cath and coronary angiogram;  Surgeon: Ivan Martell MD;  Location: [facility] CATH INVASIVE LOCATION;  Service: Cardiovascular;  Laterality: N/A;    CARDIAC CATHETERIZATION N/A 04/21/2021    Procedure: Left ventriculography;  Surgeon: Ivan Martell MD;  Location: [facility] CATH INVASIVE LOCATION;  Service: Cardiovascular;  Laterality: N/A;    CARDIAC CATHETERIZATION  04/21/2021    Procedure: Saphenous Vein Graft;  Surgeon: Ivan Martell MD;  Location: [facility] CATH INVASIVE LOCATION;  Service: Cardiovascular;;    CARDIAC CATHETERIZATION N/A 04/26/2021    Procedure: Percutaneous Coronary Intervention;  Surgeon: Ivan Martell MD;  Location: [facility] CATH INVASIVE LOCATION;  Service: Cardiovascular;  Laterality: N/A;    CARDIAC CATHETERIZATION N/A 04/26/2021    Procedure: Stent RAY coronary;  Surgeon: Ivan Martell MD;  Location: [facility] CATH INVASIVE LOCATION;  Service: Cardiovascular;  Laterality: N/A;    CARDIOVASCULAR STRESS TEST  05/04/2015    CHOLECYSTECTOMY  1990    CORONARY ARTERY BYPASS GRAFT  08/09/2017    X5  Dr Brandt    ENDOSCOPY N/A 06/30/2020    Procedure: ESOPHAGOGASTRODUODENOSCOPY with biopsy x 1 area and dilatation (15-18mm balloon) up to 18mm;  Surgeon: Quinton Tapia MD;  Location: [facility] ENDOSCOPY;  Service: Gastroenterology;  Laterality: N/A;  post op: gastritis, large hiatal hernia, esophageal dysmotility    HYSTERECTOMY  1990    INSERT / REPLACE / REMOVE PACEMAKER      JOINT REPLACEMENT Right     knee     OTHER SURGICAL HISTORY  06/2017    BLOOD CLOT REMOVED FROM LEFT EAR    PACEMAKER IMPLANTATION  04/18/2016    DUAL CHAMBER [facility]       Social History:  Social History     Tobacco Use    Smoking status: Never    Smokeless tobacco: Never   Vaping Use    Vaping status: Never Used   Substance Use Topics    Alcohol use: No    Drug use: No       Family History:  Family History   Problem Relation Age of Onset    Hypertension  Mother     Heart disease Mother     Heart disease Sister         PACEMAKER    Heart disease Brother        Medications:  Medications Prior to Admission   Medication Sig Dispense Refill Last Dose/Taking    albuterol sulfate  (90 Base) MCG/ACT inhaler Inhale 2 puffs 2 (Two) Times a Day.   Taking    ALPRAZolam (XANAX) 0.5 MG tablet Take 1 tablet by mouth 2 (Two) Times a Day As Needed.  5 Taking As Needed    benzonatate (TESSALON) 200 MG capsule Take 1 capsule by mouth 3 (Three) Times a Day As Needed for Cough. 30 capsule 0 Taking As Needed    CALCIUM PO Take  by mouth.   Taking    carboxymethylcellulose (REFRESH PLUS) 0.5 % solution Administer 1 drop to both eyes 3 (Three) Times a Day As Needed for Dry Eyes.   Taking As Needed    cetirizine (zyrTEC) 10 MG tablet Take 1 tablet by mouth Daily.   Taking    Cholecalciferol (VITAMIN D-3 PO) Take  by mouth.   Taking    Cyanocobalamin (VITAMIN B-12 PO) Take  by mouth.   Taking    guaiFENesin (MUCINEX) 600 MG 12 hr tablet Take 2 tablets by mouth 2 (Two) Times a Day. 28 tablet 0 Taking    HYDROcodone-acetaminophen (NORCO) 7.5-325 MG per tablet Take 1 tablet by mouth Every 8 (Eight) Hours As Needed.  0 Taking As Needed    isosorbide mononitrate (IMDUR) 30 MG 24 hr tablet TAKE 1 TABLET BY MOUTH EVERY MORNING 90 tablet 3 Taking    magnesium oxide (MAG-OX) 400 MG tablet Take 1 tablet by mouth Daily.   Taking    Multiple Vitamins-Minerals (VITEYES AREDS FORMULA) capsule Take 1 capsule by mouth 2 (two) times a day.   Taking    ondansetron (ZOFRAN) 4 MG tablet Take 1 tablet by mouth Daily As Needed for Nausea or Vomiting.   Taking As Needed    pantoprazole (PROTONIX) 40 MG EC tablet TK 1 T PO QD  5 Taking    spironolactone (ALDACTONE) 25 MG tablet TAKE 1/2 TABLET BY MOUTH DAILY 45 tablet 3 Taking    warfarin (COUMADIN) 4 MG tablet Take one tablet by mouth daily on Tuesday Thursday and Saturday and one half tablet by mouth all other days or as directed 64 tablet 0 10/18/2024     atorvastatin (LIPITOR) 10 MG tablet Take 1 tablet by mouth Every Night.       budesonide (PULMICORT) 0.5 MG/2ML nebulizer solution Take 2 mL by nebulization 2 (Two) Times a Day. 60 each 1     cefdinir (OMNICEF) 300 MG capsule Take 1 capsule by mouth 2 (Two) Times a Day. 14 capsule 0     Diclofenac Sodium (VOLTAREN) 1 % gel gel Apply 4 g topically to the appropriate area as directed 4 (Four) Times a Day As Needed.       dilTIAZem (TIAZAC) 120 MG 24 hr capsule TAKE 1 CAPSULE BY MOUTH DAILY 90 capsule 2     diphenhydrAMINE HCl (BENADRYL ALLERGY PO) Take 1 tablet by mouth As Needed.       ferrous sulfate 324 (65 Fe) MG tablet delayed-release EC tablet Take 1 tablet by mouth Daily With Breakfast. 30 tablet 0     furosemide (LASIX) 40 MG tablet Take 1 tablet by mouth 2 (Two) Times a Day. 5 tablet 0     ipratropium-albuterol (DUO-NEB) 0.5-2.5 mg/3 ml nebulizer Take 3 mL by nebulization 4 (Four) Times a Day. 180 mL 1     metoprolol tartrate (LOPRESSOR) 100 MG tablet TAKE 1 TABLET BY MOUTH TWICE DAILY 180 tablet 1        Scheduled Meds:  Continuous Infusions:No current facility-administered medications for this encounter.    PRN Meds:.    ALLERGIES:  Doxycycline, Celebrex [celecoxib], Contrast dye (echo or unknown ct/mr), Iodinated contrast media, Nsaids, Other, and Sulfa antibiotics    ROS:  The following systems were reviewed and negative;   Constitution:  No fevers, chills, no unintentional weight loss  Skin: no rash, no jaundice  Eyes:  No blurry vision, no eye pain  HENT:  No change in hearing or smell  Resp:  No dyspnea or cough  CV:  No chest pain or palpitations  :  No dysuria, hematuria  Musculoskeletal:  No leg cramps or arthralgias  Neuro:  No tremor, no numbness  Psych:  No depression or confsuion    Objective     Vital Signs:   Vitals:    10/15/24 1641 10/24/24 0810   BP:  158/77   BP Location:  Left arm   Patient Position:  Lying   Pulse:  61   Resp:  17   Temp:  97.6 °F (36.4 °C)   TempSrc:  Oral   SpO2:   "96%   Weight: 73.9 kg (163 lb) 72.6 kg (160 lb 0.9 oz)   Height: 154.9 cm (61\") 152.4 cm (60\")       Physical Exam:       General Appearance:    Awake and alert, in no acute distress   Head:    Normocephalic, without obvious abnormality, atraumatic   Throat:   No oral lesions, no thrush, oral mucosa moist   Lungs:     respirations regular, even and unlabored   Skin:   No rash, no jaundice       Results Review:  Lab Results (last 24 hours)       ** No results found for the last 24 hours. **            Imaging Results (Last 24 Hours)       ** No results found for the last 24 hours. **             I reviewed the patient's labs and imaging.    ASSESSMENT AND PLAN:  Dysphagia    Active Problems:    * No active hospital problems. *       Procedure(s):  ESOPHAGOGASTRODUODENOSCOPY WITH GASTRIC BIOPSY, BALLOON DILATION (18MM-20MM) UP TO 19MM,BIOPSY OF ESOPHAGUS      I discussed the patients findings and my recommendations with the patient.    Quinton Tapia MD  10/24/24  09:55 EDT    "

## 2024-10-25 LAB
LAB AP CASE REPORT: NORMAL
PATH REPORT.FINAL DX SPEC: NORMAL
PATH REPORT.GROSS SPEC: NORMAL

## 2024-10-29 ENCOUNTER — ANTICOAGULATION VISIT (OUTPATIENT)
Dept: CARDIOLOGY | Facility: CLINIC | Age: 89
End: 2024-10-29
Payer: MEDICARE

## 2024-10-29 VITALS
BODY MASS INDEX: 32.22 KG/M2 | DIASTOLIC BLOOD PRESSURE: 71 MMHG | WEIGHT: 165 LBS | HEART RATE: 63 BPM | SYSTOLIC BLOOD PRESSURE: 142 MMHG

## 2024-10-29 DIAGNOSIS — Z79.01 LONG TERM (CURRENT) USE OF ANTICOAGULANTS: Primary | ICD-10-CM

## 2024-10-29 DIAGNOSIS — I48.19 PERSISTENT ATRIAL FIBRILLATION: ICD-10-CM

## 2024-10-29 LAB — INR PPP: 1.5 (ref 2–3)

## 2024-10-29 PROCEDURE — 85610 PROTHROMBIN TIME: CPT | Performed by: INTERNAL MEDICINE

## 2024-10-29 PROCEDURE — 36416 COLLJ CAPILLARY BLOOD SPEC: CPT | Performed by: INTERNAL MEDICINE

## 2024-11-02 DIAGNOSIS — I48.19 PERSISTENT ATRIAL FIBRILLATION: ICD-10-CM

## 2024-11-04 RX ORDER — WARFARIN SODIUM 4 MG/1
TABLET ORAL
Qty: 80 TABLET | Refills: 0 | Status: SHIPPED | OUTPATIENT
Start: 2024-11-04

## 2024-11-04 NOTE — TELEPHONE ENCOUNTER
Rx Refill Note  Requested Prescriptions     Pending Prescriptions Disp Refills    warfarin (COUMADIN) 4 MG tablet [Pharmacy Med Name: WARFARIN SOD 4MG TABLETS] 80 tablet 0     Sig: Take 1 tab, by mouth, daily except 1/2 tab on Mon and Fri or as directed.   Last INR 10/29/24   Last office visit with prescribing clinician: 8/14/2024   Last telemedicine visit with prescribing clinician: Visit date not found   Next office visit with prescribing clinician: 2/26/2025                         Would you like a call back once the refill request has been completed: [] Yes [] No    If the office needs to give you a call back, can they leave a voicemail: [] Yes [] No    Sade Ch RN  11/04/24, 09:27 EST

## 2024-11-12 ENCOUNTER — ANTICOAGULATION VISIT (OUTPATIENT)
Dept: CARDIOLOGY | Facility: CLINIC | Age: 89
End: 2024-11-12
Payer: MEDICARE

## 2024-11-12 VITALS
SYSTOLIC BLOOD PRESSURE: 139 MMHG | WEIGHT: 160 LBS | DIASTOLIC BLOOD PRESSURE: 71 MMHG | HEART RATE: 63 BPM | BODY MASS INDEX: 31.25 KG/M2

## 2024-11-12 DIAGNOSIS — Z79.01 LONG TERM (CURRENT) USE OF ANTICOAGULANTS: Primary | ICD-10-CM

## 2024-11-12 DIAGNOSIS — I48.19 PERSISTENT ATRIAL FIBRILLATION: ICD-10-CM

## 2024-11-12 LAB — INR PPP: 2.3 (ref 0.9–1.1)

## 2024-11-12 PROCEDURE — 85610 PROTHROMBIN TIME: CPT | Performed by: INTERNAL MEDICINE

## 2024-11-12 PROCEDURE — 36416 COLLJ CAPILLARY BLOOD SPEC: CPT | Performed by: INTERNAL MEDICINE

## 2024-12-04 ENCOUNTER — APPOINTMENT (OUTPATIENT)
Dept: CT IMAGING | Facility: HOSPITAL | Age: 89
End: 2024-12-04
Payer: MEDICARE

## 2024-12-04 ENCOUNTER — HOSPITAL ENCOUNTER (EMERGENCY)
Facility: HOSPITAL | Age: 89
Discharge: HOME | End: 2024-12-05
Payer: MEDICARE

## 2024-12-04 ENCOUNTER — APPOINTMENT (OUTPATIENT)
Dept: GENERAL RADIOLOGY | Facility: HOSPITAL | Age: 89
End: 2024-12-04
Payer: MEDICARE

## 2024-12-04 DIAGNOSIS — S09.90XA INJURY OF HEAD, INITIAL ENCOUNTER: ICD-10-CM
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
## 2024-12-04 DIAGNOSIS — M25.561 ACUTE PAIN OF BOTH KNEES: ICD-10-CM

## 2024-12-04 DIAGNOSIS — M79.642 LEFT HAND PAIN: ICD-10-CM

## 2024-12-04 DIAGNOSIS — S51.812A SKIN TEAR OF FOREARM WITHOUT COMPLICATION, LEFT, INITIAL ENCOUNTER: ICD-10-CM

## 2024-12-04 DIAGNOSIS — S61.412A SKIN TEAR OF HAND WITHOUT COMPLICATION, LEFT, INITIAL ENCOUNTER: ICD-10-CM

## 2024-12-04 DIAGNOSIS — S80.02XA CONTUSION OF LEFT KNEE, INITIAL ENCOUNTER: ICD-10-CM

## 2024-12-04 DIAGNOSIS — M25.562 ACUTE PAIN OF BOTH KNEES: ICD-10-CM

## 2024-12-04 DIAGNOSIS — S80.01XA CONTUSION OF RIGHT KNEE, INITIAL ENCOUNTER: ICD-10-CM

## 2024-12-04 PROCEDURE — 70450 CT HEAD/BRAIN W/O DYE: CPT

## 2024-12-04 PROCEDURE — 73562 X-RAY EXAM OF KNEE 3: CPT

## 2024-12-04 PROCEDURE — 99284 EMERGENCY DEPT VISIT MOD MDM: CPT

## 2024-12-05 ENCOUNTER — APPOINTMENT (OUTPATIENT)
Dept: GENERAL RADIOLOGY | Facility: HOSPITAL | Age: 89
End: 2024-12-05
Payer: MEDICARE

## 2024-12-05 VITALS
BODY MASS INDEX: 32.46 KG/M2 | DIASTOLIC BLOOD PRESSURE: 81 MMHG | TEMPERATURE: 98.1 F | SYSTOLIC BLOOD PRESSURE: 156 MMHG | WEIGHT: 165.34 LBS | OXYGEN SATURATION: 98 % | RESPIRATION RATE: 18 BRPM | HEIGHT: 60 IN | HEART RATE: 65 BPM

## 2024-12-05 LAB
INR PPP: 2.08 (ref 2–3)
PROTHROMBIN TIME: 23.3 SECONDS (ref 19.4–28.5)

## 2024-12-05 PROCEDURE — 36415 COLL VENOUS BLD VENIPUNCTURE: CPT

## 2024-12-05 PROCEDURE — 73090 X-RAY EXAM OF FOREARM: CPT

## 2024-12-05 PROCEDURE — 85610 PROTHROMBIN TIME: CPT | Performed by: NURSE PRACTITIONER

## 2024-12-05 PROCEDURE — 73130 X-RAY EXAM OF HAND: CPT

## 2024-12-05 NOTE — DISCHARGE INSTRUCTIONS
Skin tears clean and dry, apply bacitracin twice a day  Follow up with any specialist as indicated and discussed  Return to the ED for new or worsening symptoms  Take any medications as prescribed

## 2024-12-05 NOTE — ED PROVIDER NOTES
Subjective   Chief Complaint   Patient presents with    Fall       History of Present Illness  Patient is an 89-year-old female presents the ED after a fall.  Patient reports that she fell down 2 steps she does report she had hit her head, but no loss of consciousness.  No neck pain or back pain    Patient has multiple skin tears to the left forearm, left hand    Complains of bilateral knee pain with swelling noted to the right knee, as well as the left knee    Denies any hip or pelvic pain.  Review of Systems    Past Medical History:   Diagnosis Date    Anxiety     Arthritis     Asthma     Atrial fibrillation     CKD (chronic kidney disease) stage 3, GFR 30-59 ml/min 04/18/2021    Coronary artery disease     Dyslipidemia     Dyslipidemia 01/05/2015    GERD (gastroesophageal reflux disease)     History of bone density study 04/2015    Hypertension     Sleep apnea     Wears dentures        Allergies   Allergen Reactions    Doxycycline Other (See Comments)     Chest pain    Celebrex [Celecoxib] Other (See Comments)     Chest pain    Contrast Dye (Echo Or Unknown Ct/Mr) Itching    Iodinated Contrast Media Itching    Nsaids Other (See Comments)     convulsion    Other Itching     Pt states some steroids make her itch and hallucinate- she does not know the names   She said she is allergic to all arthritis medicine    Sulfa Antibiotics Nausea Only       Past Surgical History:   Procedure Laterality Date    BLADDER REPAIR  1990    BREAST LUMPECTOMY  1974    BENIGN    CARDIAC CATHETERIZATION  05/25/2011    CARDIAC CATHETERIZATION N/A 04/21/2021    Procedure: Left Heart Cath and coronary angiogram;  Surgeon: Ivan Martell MD;  Location: [facility] CATH INVASIVE LOCATION;  Service: Cardiovascular;  Laterality: N/A;    CARDIAC CATHETERIZATION N/A 04/21/2021    Procedure: Left ventriculography;  Surgeon: Ivan Martell MD;  Location: [facility] CATH INVASIVE LOCATION;  Service: Cardiovascular;  Laterality: N/A;    CARDIAC  CATHETERIZATION  04/21/2021    Procedure: Saphenous Vein Graft;  Surgeon: Ivan Martell MD;  Location: [facility] CATH INVASIVE LOCATION;  Service: Cardiovascular;;    CARDIAC CATHETERIZATION N/A 04/26/2021    Procedure: Percutaneous Coronary Intervention;  Surgeon: Ivan Martell MD;  Location: [facility] CATH INVASIVE LOCATION;  Service: Cardiovascular;  Laterality: N/A;    CARDIAC CATHETERIZATION N/A 04/26/2021    Procedure: Stent RAY coronary;  Surgeon: Ivan Martell MD;  Location: [facility] CATH INVASIVE LOCATION;  Service: Cardiovascular;  Laterality: N/A;    CARDIOVASCULAR STRESS TEST  05/04/2015    CHOLECYSTECTOMY  1990    CORONARY ARTERY BYPASS GRAFT  08/09/2017    X5  Dr Brandt    ENDOSCOPY N/A 06/30/2020    Procedure: ESOPHAGOGASTRODUODENOSCOPY with biopsy x 1 area and dilatation (15-18mm balloon) up to 18mm;  Surgeon: Quinton Tapia MD;  Location: [facility] ENDOSCOPY;  Service: Gastroenterology;  Laterality: N/A;  post op: gastritis, large hiatal hernia, esophageal dysmotility    ENDOSCOPY N/A 10/24/2024    Procedure: ESOPHAGOGASTRODUODENOSCOPY WITH GASTRIC BIOPSY, BALLOON DILATION (18MM-20MM) UP TO 19MM,BIOPSY OF ESOPHAGUS;  Surgeon: Quinton Tapia MD;  Location: [facility] ENDOSCOPY;  Service: Gastroenterology;  Laterality: N/A;  GASTRITIS, ESOPHAGITIS, HIATEL HERNIA    HYSTERECTOMY  1990    INSERT / REPLACE / REMOVE PACEMAKER      JOINT REPLACEMENT Right     knee     OTHER SURGICAL HISTORY  06/2017    BLOOD CLOT REMOVED FROM LEFT EAR    PACEMAKER IMPLANTATION  04/18/2016    DUAL CHAMBER [facility]       Family History   Problem Relation Age of Onset    Hypertension Mother     Heart disease Mother     Heart disease Sister         PACEMAKER    Heart disease Brother        Social History     Socioeconomic History    Marital status:    Tobacco Use    Smoking status: Never    Smokeless tobacco: Never   Vaping Use    Vaping status: Never Used   Substance and Sexual Activity    Alcohol use: No    Drug use: No     Sexual activity: Not Currently     Partners: Female     Birth control/protection: Hysterectomy           Objective   Physical Exam  Vitals and nursing note reviewed.   Constitutional:       Appearance: Normal appearance.   HENT:      Head: Normocephalic and atraumatic.      Nose: Nose normal.      Mouth/Throat:      Mouth: Mucous membranes are moist.      Pharynx: Oropharynx is clear.   Eyes:      Extraocular Movements: Extraocular movements intact.      Conjunctiva/sclera: Conjunctivae normal.      Pupils: Pupils are equal, round, and reactive to light.   Cardiovascular:      Rate and Rhythm: Normal rate and regular rhythm.      Heart sounds: Normal heart sounds. No murmur heard.     No friction rub. No gallop.   Pulmonary:      Effort: Pulmonary effort is normal.      Breath sounds: Normal breath sounds.   Musculoskeletal:      Cervical back: Normal range of motion and neck supple.      Right knee: Swelling and ecchymosis present. No erythema, lacerations, bony tenderness or crepitus. Tenderness present. Normal pulse.      Left knee: Swelling and ecchymosis present. No erythema, lacerations, bony tenderness or crepitus. Tenderness present. Normal pulse.      Comments: Muiltiple skin tears to left forearm.   Abrasion to hypothenar area of left hand  Radial pulses intact bilaterally  Compartments soft.   No cole tenderness to hand, wrist or forearm. No snuffbox tenderness.  No vertebral point tenderness noted to the C-spine T-spine or L-spine.  No palpable step-offs.  No soft tissue tenderness is noted.  No skin abnormalities are appreciated.  No saddle anesthesia   Skin:     General: Skin is warm and dry.      Capillary Refill: Capillary refill takes less than 2 seconds.   Neurological:      Mental Status: She is alert and oriented to person, place, and time.         Procedures           ED Course                                                       Medical Decision Making    Pt was Placed on appropriate  monitoring.  Differential diagnoses considered for patient presentation, this list is not all inclusive of diagnoses considered:, Contusion fracture, strain, intercranial hemorrhage  Patient presents to the ED for the above complaint, underwent the above, exam and workup.  Patient is on warfarin, her INR is 2.0.  Skin tears were cleansed, dressed per nursing staff.  No suturable laceration.  Patient has no bony tenderness to the arm or hand.  Bilateral lower extremity pulses are intact, all compartments extremities are soft.  X-ray imaging reveals no acute fractures.  Her CT head is negative  Patient reports she is ready to go home, will discharge home with family    Considertion was given for admission, however patient has reassuring exam and workup in the ed and appears appropriate for discharge home and continued outpatient care.     We discussed my findings, plan of care, discharge instructions, the importance of follow up with their PCP/ and or specialist for repeat evaluation and to discuss any abnormal findings in labs or imaging that warrant further outpatient evaluation. We discussed that although a definitive diagnosis is not always found in the ED, it is believed emergent conditions have been ruled out, and patient is safe for discharge at this time.  We discussed return precautions for the emergency department.  Patient verbalizes understandings, and agrees with current plan of care.      Note Disclaimer: At [facility], we believe that sharing information builds trust and better relationships. You are receiving this note because you recently visited [facility]. It is possible you will see health information before a provider has talked with you about it. This kind of information can be easy to misunderstand. To help you fully understand what it means for your health, we urge you to discuss this note with your provider  Note dictated utilizing Dragon Dictation.  Appropriate PPE worn during patient  interactions.        Final diagnoses:   Fall, initial encounter   Skin tear of forearm without complication, left, initial encounter   Acute pain of both knees   Contusion of left knee, initial encounter   Contusion of right knee, initial encounter   Skin tear of hand without complication, left, initial encounter   Left hand pain   Injury of head, initial encounter       ED Disposition  ED Disposition       ED Disposition   Discharge    Condition   Stable    Comment   --               Nava Yu MD  [address] [facility] [address] [phone]          [facility] EMERGENCY DEPARTMENT  [address] [address] [phone]             Medication List      No changes were made to your prescriptions during this visit.            Karla Obregon, SHILA  12/05/24 1941       Karla Obregon, SHILA  12/05/24 1941

## 2024-12-20 ENCOUNTER — ANTICOAGULATION VISIT (OUTPATIENT)
Dept: CARDIOLOGY | Facility: CLINIC | Age: 89
End: 2024-12-20
Payer: MEDICARE

## 2024-12-20 VITALS
SYSTOLIC BLOOD PRESSURE: 143 MMHG | BODY MASS INDEX: 31.44 KG/M2 | WEIGHT: 161 LBS | DIASTOLIC BLOOD PRESSURE: 62 MMHG | HEART RATE: 64 BPM

## 2024-12-20 DIAGNOSIS — Z79.01 LONG TERM (CURRENT) USE OF ANTICOAGULANTS: Primary | ICD-10-CM

## 2024-12-20 DIAGNOSIS — I48.19 PERSISTENT ATRIAL FIBRILLATION: ICD-10-CM

## 2024-12-20 LAB — INR PPP: 2.7 (ref 0.9–1.1)

## 2024-12-20 PROCEDURE — 36416 COLLJ CAPILLARY BLOOD SPEC: CPT | Performed by: INTERNAL MEDICINE

## 2024-12-20 PROCEDURE — 85610 PROTHROMBIN TIME: CPT | Performed by: INTERNAL MEDICINE

## 2025-01-10 RX ORDER — ISOSORBIDE MONONITRATE 30 MG/1
30 TABLET, EXTENDED RELEASE ORAL EVERY MORNING
Qty: 90 TABLET | Refills: 3 | Status: SHIPPED | OUTPATIENT
Start: 2025-01-10

## 2025-01-10 NOTE — TELEPHONE ENCOUNTER
Rx Refill Note  Requested Prescriptions     Pending Prescriptions Disp Refills    isosorbide mononitrate (IMDUR) 30 MG 24 hr tablet [Pharmacy Med Name: ISOSORBIDE MONONITRATE 30MG ER TABS] 90 tablet 3     Sig: TAKE 1 TABLET BY MOUTH EVERY MORNING      Last office visit with prescribing clinician: 8/14/2024   Last telemedicine visit with prescribing clinician: Visit date not found   Next office visit with prescribing clinician: 2/26/2025                         Would you like a call back once the refill request has been completed: [] Yes [] No    If the office needs to give you a call back, can they leave a voicemail: [] Yes [] No    Vivek Connell MA  01/10/25, 08:04 EST

## 2025-01-22 ENCOUNTER — ANTICOAGULATION VISIT (OUTPATIENT)
Dept: CARDIOLOGY | Facility: CLINIC | Age: 89
End: 2025-01-22
Payer: MEDICARE

## 2025-01-22 VITALS
SYSTOLIC BLOOD PRESSURE: 152 MMHG | BODY MASS INDEX: 32.03 KG/M2 | DIASTOLIC BLOOD PRESSURE: 74 MMHG | HEART RATE: 61 BPM | WEIGHT: 164 LBS

## 2025-01-22 DIAGNOSIS — I48.19 PERSISTENT ATRIAL FIBRILLATION: ICD-10-CM

## 2025-01-22 DIAGNOSIS — Z79.01 LONG TERM (CURRENT) USE OF ANTICOAGULANTS: Primary | ICD-10-CM

## 2025-01-22 LAB — INR PPP: 3.1 (ref 0.9–1.1)

## 2025-01-22 PROCEDURE — 36416 COLLJ CAPILLARY BLOOD SPEC: CPT | Performed by: INTERNAL MEDICINE

## 2025-01-22 PROCEDURE — 85610 PROTHROMBIN TIME: CPT | Performed by: INTERNAL MEDICINE

## 2025-01-22 NOTE — PROGRESS NOTES
Pts INR is borderline high at 3.1. No changes in diet or meds. Pt has occasional blood on tissues, but has started using nasal saline spray. Pt would prefer to continue present dosage and increase leafy greens in diet. Recheck in a month unless she has any symptoms of new bruising or bleeding. Joe

## 2025-01-27 DIAGNOSIS — I48.19 PERSISTENT ATRIAL FIBRILLATION: ICD-10-CM

## 2025-01-27 RX ORDER — WARFARIN SODIUM 4 MG/1
TABLET ORAL
Qty: 80 TABLET | Refills: 0 | Status: SHIPPED | OUTPATIENT
Start: 2025-01-27

## 2025-01-27 NOTE — TELEPHONE ENCOUNTER
Rx Refill Note  Requested Prescriptions     Pending Prescriptions Disp Refills    warfarin (COUMADIN) 4 MG tablet [Pharmacy Med Name: WARFARIN SOD 4MG TABLETS] 80 tablet 0     Sig: TAKE 1 TABLET BY MOUTH DAILY. EXCEPT 1/2 TABLET ON MONDAY AND FRIDAY OR AS DIRECTED    Last INR 1/22/25  Last office visit with prescribing clinician: 8/14/2024   Last telemedicine visit with prescribing clinician: Visit date not found   Next office visit with prescribing clinician: 2/26/2025                         Would you like a call back once the refill request has been completed: [] Yes [] No    If the office needs to give you a call back, can they leave a voicemail: [] Yes [] No    Sade Ch RN  01/27/25, 18:24 EST

## 2025-02-08 ENCOUNTER — APPOINTMENT (OUTPATIENT)
Dept: CT IMAGING | Facility: HOSPITAL | Age: 89
End: 2025-02-08
Payer: MEDICARE

## 2025-02-08 ENCOUNTER — APPOINTMENT (OUTPATIENT)
Dept: GENERAL RADIOLOGY | Facility: HOSPITAL | Age: 89
End: 2025-02-08
Payer: MEDICARE

## 2025-02-08 ENCOUNTER — HOSPITAL ENCOUNTER (EMERGENCY)
Facility: HOSPITAL | Age: 89
Discharge: HOME | End: 2025-02-08
Attending: EMERGENCY MEDICINE
Payer: MEDICARE

## 2025-02-08 VITALS
OXYGEN SATURATION: 94 % | WEIGHT: 160.3 LBS | HEART RATE: 61 BPM | SYSTOLIC BLOOD PRESSURE: 104 MMHG | HEIGHT: 60 IN | TEMPERATURE: 98.3 F | DIASTOLIC BLOOD PRESSURE: 58 MMHG | RESPIRATION RATE: 18 BRPM | BODY MASS INDEX: 31.47 KG/M2

## 2025-02-08 DIAGNOSIS — M54.50 CHRONIC MIDLINE LOW BACK PAIN WITHOUT SCIATICA: ICD-10-CM

## 2025-02-08 DIAGNOSIS — G89.29 CHRONIC MIDLINE LOW BACK PAIN WITHOUT SCIATICA: ICD-10-CM

## 2025-02-08 DIAGNOSIS — J10.1 INFLUENZA A: Primary | ICD-10-CM

## 2025-02-08 LAB
BILIRUB UR QL STRIP: NEGATIVE
CLARITY UR: CLEAR
COLOR UR: YELLOW
FLUAV SUBTYP SPEC NAA+PROBE: DETECTED
FLUBV RNA ISLT QL NAA+PROBE: NOT DETECTED
GLUCOSE UR STRIP-MCNC: NEGATIVE MG/DL
HGB UR QL STRIP.AUTO: NEGATIVE
KETONES UR QL STRIP: NEGATIVE
LEUKOCYTE ESTERASE UR QL STRIP.AUTO: NEGATIVE
NITRITE UR QL STRIP: NEGATIVE
PH UR STRIP.AUTO: 5.5 [PH] (ref 5–8)
PROT UR QL STRIP: NEGATIVE
RSV RNA NPH QL NAA+NON-PROBE: NOT DETECTED
SARS-COV-2 RNA RESP QL NAA+PROBE: NOT DETECTED
SP GR UR STRIP: 1.01 (ref 1–1.03)
UROBILINOGEN UR QL STRIP: NORMAL

## 2025-02-08 PROCEDURE — 81003 URINALYSIS AUTO W/O SCOPE: CPT | Performed by: EMERGENCY MEDICINE

## 2025-02-08 PROCEDURE — 72131 CT LUMBAR SPINE W/O DYE: CPT

## 2025-02-08 PROCEDURE — 87637 SARSCOV2&INF A&B&RSV AMP PRB: CPT

## 2025-02-08 PROCEDURE — 71046 X-RAY EXAM CHEST 2 VIEWS: CPT

## 2025-02-08 PROCEDURE — 99284 EMERGENCY DEPT VISIT MOD MDM: CPT

## 2025-02-08 RX ORDER — ALBUTEROL SULFATE 90 UG/1
2 INHALANT RESPIRATORY (INHALATION) EVERY 4 HOURS PRN
Qty: 8 G | Refills: 0 | Status: SHIPPED | OUTPATIENT
Start: 2025-02-08

## 2025-02-08 RX ORDER — LIDOCAINE 50 MG/G
1 PATCH TOPICAL EVERY 24 HOURS
Qty: 30 EACH | Refills: 0 | Status: SHIPPED | OUTPATIENT
Start: 2025-02-08

## 2025-02-08 RX ORDER — DEXTROMETHORPHAN HYDROBROMIDE AND PROMETHAZINE HYDROCHLORIDE 15; 6.25 MG/5ML; MG/5ML
5 SYRUP ORAL 4 TIMES DAILY PRN
Qty: 473 ML | Refills: 0 | Status: SHIPPED | OUTPATIENT
Start: 2025-02-08

## 2025-02-08 RX ORDER — ACETAMINOPHEN 500 MG
500 TABLET ORAL EVERY 6 HOURS PRN
Qty: 30 TABLET | Refills: 0 | Status: SHIPPED | OUTPATIENT
Start: 2025-02-08

## 2025-02-08 NOTE — FSED PROVIDER NOTE
Subjective   History of Present Illness  Patient with complaint of cough, malaise and low back pain. Ongoing for several days. No pattern to symptoms. No precipitating or rlieeving factors. No vomiting, diarrhea. No numbness, tingling or weakness. No shortness of breath,chest pain or pressure. No other complaints. Patient did fall a couple of weeks ago.     History provided by:  Patient and relative   used: No        Review of Systems   All other systems reviewed and are negative.      Past Medical History:   Diagnosis Date    Anxiety     Arthritis     Asthma     Atrial fibrillation     CKD (chronic kidney disease) stage 3, GFR 30-59 ml/min 04/18/2021    Coronary artery disease     Dyslipidemia     Dyslipidemia 01/05/2015    GERD (gastroesophageal reflux disease)     History of bone density study 04/2015    Hypertension     Sleep apnea     Wears dentures        Allergies   Allergen Reactions    Doxycycline Other (See Comments)     Chest pain    Celebrex [Celecoxib] Other (See Comments)     Chest pain    Contrast Dye (Echo Or Unknown Ct/Mr) Itching    Iodinated Contrast Media Itching    Nsaids Other (See Comments)     convulsion    Other Itching     Pt states some steroids make her itch and hallucinate- she does not know the names   She said she is allergic to all arthritis medicine    Sulfa Antibiotics Nausea Only       Past Surgical History:   Procedure Laterality Date    BLADDER REPAIR  1990    BREAST LUMPECTOMY  1974    BENIGN    CARDIAC CATHETERIZATION  05/25/2011    CARDIAC CATHETERIZATION N/A 04/21/2021    Procedure: Left Heart Cath and coronary angiogram;  Surgeon: Ivan Martell MD;  Location: [facility] CATH INVASIVE LOCATION;  Service: Cardiovascular;  Laterality: N/A;    CARDIAC CATHETERIZATION N/A 04/21/2021    Procedure: Left ventriculography;  Surgeon: Ivan Martell MD;  Location: [facility] CATH INVASIVE LOCATION;  Service: Cardiovascular;  Laterality: N/A;    CARDIAC CATHETERIZATION   04/21/2021    Procedure: Saphenous Vein Graft;  Surgeon: Ivan Martell MD;  Location: [facility] CATH INVASIVE LOCATION;  Service: Cardiovascular;;    CARDIAC CATHETERIZATION N/A 04/26/2021    Procedure: Percutaneous Coronary Intervention;  Surgeon: Ivan Martell MD;  Location: [facility] CATH INVASIVE LOCATION;  Service: Cardiovascular;  Laterality: N/A;    CARDIAC CATHETERIZATION N/A 04/26/2021    Procedure: Stent RAY coronary;  Surgeon: Ivan Martell MD;  Location: [facility] CATH INVASIVE LOCATION;  Service: Cardiovascular;  Laterality: N/A;    CARDIOVASCULAR STRESS TEST  05/04/2015    CHOLECYSTECTOMY  1990    CORONARY ARTERY BYPASS GRAFT  08/09/2017    X5  Dr Brandt    ENDOSCOPY N/A 06/30/2020    Procedure: ESOPHAGOGASTRODUODENOSCOPY with biopsy x 1 area and dilatation (15-18mm balloon) up to 18mm;  Surgeon: Quinton Tapia MD;  Location: [facility] ENDOSCOPY;  Service: Gastroenterology;  Laterality: N/A;  post op: gastritis, large hiatal hernia, esophageal dysmotility    ENDOSCOPY N/A 10/24/2024    Procedure: ESOPHAGOGASTRODUODENOSCOPY WITH GASTRIC BIOPSY, BALLOON DILATION (18MM-20MM) UP TO 19MM,BIOPSY OF ESOPHAGUS;  Surgeon: Quinton Tapia MD;  Location: [facility] ENDOSCOPY;  Service: Gastroenterology;  Laterality: N/A;  GASTRITIS, ESOPHAGITIS, HIATEL HERNIA    HYSTERECTOMY  1990    INSERT / REPLACE / REMOVE PACEMAKER      JOINT REPLACEMENT Right     knee     OTHER SURGICAL HISTORY  06/2017    BLOOD CLOT REMOVED FROM LEFT EAR    PACEMAKER IMPLANTATION  04/18/2016    DUAL CHAMBER ST ALESSIO       Family History   Problem Relation Age of Onset    Hypertension Mother     Heart disease Mother     Heart disease Sister         PACEMAKER    Heart disease Brother        Social History     Socioeconomic History    Marital status:    Tobacco Use    Smoking status: Never    Smokeless tobacco: Never   Vaping Use    Vaping status: Never Used   Substance and Sexual Activity    Alcohol use: No    Drug use: No    Sexual activity: Not  Currently     Partners: Female     Birth control/protection: Hysterectomy           Objective   Physical Exam  Vitals and nursing note reviewed.   Constitutional:       Appearance: Normal appearance.   HENT:      Head: Normocephalic and atraumatic.      Nose: Nose normal.      Mouth/Throat:      Mouth: Mucous membranes are moist.   Abdominal:      General: Abdomen is flat.      Palpations: There is no mass.      Tenderness: There is no abdominal tenderness. There is no right CVA tenderness or left CVA tenderness.   Musculoskeletal:         General: Tenderness present.      Cervical back: Normal range of motion.      Comments: Midline L3/L4 bony tenderness to palpation. No CVAT bilaterally.    Skin:     General: Skin is warm and dry.      Capillary Refill: Capillary refill takes less than 2 seconds.   Neurological:      General: No focal deficit present.      Mental Status: She is alert and oriented to person, place, and time.         Procedures           ED Course                                           Medical Decision Making  Cocnern for uri, penumonia, lumbar spine fracture/sprain, uti. D/w patient and daughter. Agree wthplan.    Problems Addressed:  Chronic midline low back pain without sciatica: complicated acute illness or injury  Influenza A: complicated acute illness or injury    Amount and/or Complexity of Data Reviewed  Labs: ordered.  Radiology: ordered.    Risk  OTC drugs.  Prescription drug management.        Final diagnoses:   Influenza A   Chronic midline low back pain without sciatica       ED Disposition  ED Disposition       ED Disposition   Discharge    Condition   Stable    Comment   --               Nava Yu MD  [address] [facility] [place] [address]  [phone]    Schedule an appointment as soon as possible for a visit            Medication List        New Prescriptions      acetaminophen 500 MG tablet  Commonly known as: TYLENOL  Take 1 tablet by mouth Every 6 (Six) Hours As  Needed for Mild Pain or Moderate Pain.     lidocaine 5 %  Commonly known as: LIDODERM  Place 1 patch on the skin as directed by provider Daily. Remove & Discard patch within 12 hours or as directed by MD     promethazine-dextromethorphan 6.25-15 MG/5ML syrup  Commonly known as: PROMETHAZINE-DM  Take 5 mL by mouth 4 (Four) Times a Day As Needed for Cough.     tiZANidine 4 MG tablet  Commonly known as: ZANAFLEX  Take 1 tablet by mouth At Night As Needed for Muscle Spasms.            Changed      albuterol sulfate  (90 Base) MCG/ACT inhaler  Commonly known as: PROVENTIL HFA;VENTOLIN HFA;PROAIR HFA  Inhale 2 puffs Every 4 (Four) Hours As Needed for Wheezing or Shortness of Air.  What changed:   when to take this  reasons to take this            Stop      BENADRYL ALLERGY PO     benzonatate 200 MG capsule  Commonly known as: TESSALON     cefdinir 300 MG capsule  Commonly known as: OMNICEF     guaiFENesin 600 MG 12 hr tablet  Commonly known as: MUCINEX     HYDROcodone-acetaminophen 7.5-325 MG per tablet  Commonly known as: NORCO     ipratropium-albuterol 0.5-2.5 mg/3 ml nebulizer  Commonly known as: DUO-NEB               Where to Get Your Medications        These medications were sent to [id number] - [place], [place] - [address] AT SEC OF [place] [address] & [address] - [phone]  - [phone] FX  [address] [place] [address]      Phone: [phone]   acetaminophen 500 MG tablet  albuterol sulfate  (90 Base) MCG/ACT inhaler  lidocaine 5 %  promethazine-dextromethorphan 6.25-15 MG/5ML syrup  tiZANidine 4 MG tablet

## 2025-02-26 ENCOUNTER — CLINICAL SUPPORT NO REQUIREMENTS (OUTPATIENT)
Dept: CARDIOLOGY | Facility: CLINIC | Age: 89
End: 2025-02-26
Payer: MEDICARE

## 2025-02-26 ENCOUNTER — OFFICE VISIT (OUTPATIENT)
Dept: CARDIOLOGY | Facility: CLINIC | Age: 89
End: 2025-02-26
Payer: MEDICARE

## 2025-02-26 ENCOUNTER — ANTICOAGULATION VISIT (OUTPATIENT)
Dept: CARDIOLOGY | Facility: CLINIC | Age: 89
End: 2025-02-26
Payer: MEDICARE

## 2025-02-26 VITALS
BODY MASS INDEX: 30.66 KG/M2 | HEART RATE: 70 BPM | WEIGHT: 157 LBS | SYSTOLIC BLOOD PRESSURE: 121 MMHG | DIASTOLIC BLOOD PRESSURE: 68 MMHG

## 2025-02-26 VITALS
DIASTOLIC BLOOD PRESSURE: 68 MMHG | HEART RATE: 70 BPM | HEIGHT: 60 IN | WEIGHT: 157 LBS | BODY MASS INDEX: 30.82 KG/M2 | SYSTOLIC BLOOD PRESSURE: 121 MMHG

## 2025-02-26 DIAGNOSIS — I48.19 PERSISTENT ATRIAL FIBRILLATION: ICD-10-CM

## 2025-02-26 DIAGNOSIS — E78.00 PURE HYPERCHOLESTEROLEMIA: ICD-10-CM

## 2025-02-26 DIAGNOSIS — G47.33 OBSTRUCTIVE SLEEP APNEA: ICD-10-CM

## 2025-02-26 DIAGNOSIS — Z79.01 LONG TERM (CURRENT) USE OF ANTICOAGULANTS: Primary | ICD-10-CM

## 2025-02-26 DIAGNOSIS — I48.19 PERSISTENT ATRIAL FIBRILLATION: Primary | ICD-10-CM

## 2025-02-26 DIAGNOSIS — I25.810 CORONARY ARTERY DISEASE INVOLVING CORONARY BYPASS GRAFT OF NATIVE HEART WITHOUT ANGINA PECTORIS: ICD-10-CM

## 2025-02-26 DIAGNOSIS — R00.1 BRADYCARDIA, SINUS: Primary | ICD-10-CM

## 2025-02-26 DIAGNOSIS — Z95.0 PRESENCE OF CARDIAC PACEMAKER: ICD-10-CM

## 2025-02-26 DIAGNOSIS — I10 ESSENTIAL HYPERTENSION: ICD-10-CM

## 2025-02-26 LAB — INR PPP: 5.4 (ref 0.9–1.1)

## 2025-02-26 PROCEDURE — 99214 OFFICE O/P EST MOD 30 MIN: CPT | Performed by: INTERNAL MEDICINE

## 2025-02-26 PROCEDURE — 93000 ELECTROCARDIOGRAM COMPLETE: CPT | Performed by: INTERNAL MEDICINE

## 2025-02-26 PROCEDURE — 1159F MED LIST DOCD IN RCRD: CPT | Performed by: INTERNAL MEDICINE

## 2025-02-26 PROCEDURE — 1160F RVW MEDS BY RX/DR IN RCRD: CPT | Performed by: INTERNAL MEDICINE

## 2025-02-26 PROCEDURE — 85610 PROTHROMBIN TIME: CPT | Performed by: INTERNAL MEDICINE

## 2025-02-26 PROCEDURE — 36416 COLLJ CAPILLARY BLOOD SPEC: CPT | Performed by: INTERNAL MEDICINE

## 2025-02-26 RX ORDER — HYDROCODONE BITARTRATE AND ACETAMINOPHEN 7.5; 325 MG/1; MG/1
1 TABLET ORAL EVERY 6 HOURS PRN
COMMUNITY

## 2025-02-26 RX ORDER — NITROFURANTOIN MACROCRYSTALS 100 MG/1
100 CAPSULE ORAL DAILY
COMMUNITY

## 2025-02-26 NOTE — PROGRESS NOTES
Subjective:     Encounter Date:02/26/2025      Patient ID: Syl Herrera is a 89 y.o. female.    Chief Complaint:  History of Present Illness 89-year-old white female with history of coronary disease history of atrial fibrillation with pacemaker placement hypertension hyperlipidemia and sleep apnea presents to my office for a follow-up.  Patient has occasional chest pains which she describes as sharp pain occurring across the chest and mostly at rest without any shortness of breath.  She recently had flu infection and has some cough also.  No PND orthopnea.  No palpitations dizziness syncope or swelling of the feet.  She is taking her medicines regularly.  She does not smoke.    The following portions of the patient's history were reviewed and updated as appropriate: allergies, current medications, past family history, past medical history, past social history, past surgical history, and problem list.  Past Medical History:   Diagnosis Date    Anxiety     Arthritis     Asthma     Atrial fibrillation     CKD (chronic kidney disease) stage 3, GFR 30-59 ml/min 04/18/2021    Coronary artery disease     Dyslipidemia     Dyslipidemia 01/05/2015    GERD (gastroesophageal reflux disease)     History of bone density study 04/2015    Hypertension     Sleep apnea     Wears dentures      Past Surgical History:   Procedure Laterality Date    BLADDER REPAIR  1990    BREAST LUMPECTOMY  1974    BENIGN    CARDIAC CATHETERIZATION  05/25/2011    CARDIAC CATHETERIZATION N/A 04/21/2021    Procedure: Left Heart Cath and coronary angiogram;  Surgeon: Ivan Martell MD;  Location: [facility] CATH INVASIVE LOCATION;  Service: Cardiovascular;  Laterality: N/A;    CARDIAC CATHETERIZATION N/A 04/21/2021    Procedure: Left ventriculography;  Surgeon: Ivan Martell MD;  Location: [facility] CATH INVASIVE LOCATION;  Service: Cardiovascular;  Laterality: N/A;    CARDIAC CATHETERIZATION  04/21/2021    Procedure: Saphenous Vein Graft;  Surgeon: Jayshree  "MD Ivan;  Location: [facility] CATH INVASIVE LOCATION;  Service: Cardiovascular;;    CARDIAC CATHETERIZATION N/A 04/26/2021    Procedure: Percutaneous Coronary Intervention;  Surgeon: Ivan Martell MD;  Location: [facility] CATH INVASIVE LOCATION;  Service: Cardiovascular;  Laterality: N/A;    CARDIAC CATHETERIZATION N/A 04/26/2021    Procedure: Stent RAY coronary;  Surgeon: Ivan Martell MD;  Location: [facility] CATH INVASIVE LOCATION;  Service: Cardiovascular;  Laterality: N/A;    CARDIOVASCULAR STRESS TEST  05/04/2015    CHOLECYSTECTOMY  1990    CORONARY ARTERY BYPASS GRAFT  08/09/2017    X5  Dr Brandt    ENDOSCOPY N/A 06/30/2020    Procedure: ESOPHAGOGASTRODUODENOSCOPY with biopsy x 1 area and dilatation (15-18mm balloon) up to 18mm;  Surgeon: Quinton Tapia MD;  Location: [facility] ENDOSCOPY;  Service: Gastroenterology;  Laterality: N/A;  post op: gastritis, large hiatal hernia, esophageal dysmotility    ENDOSCOPY N/A 10/24/2024    Procedure: ESOPHAGOGASTRODUODENOSCOPY WITH GASTRIC BIOPSY, BALLOON DILATION (18MM-20MM) UP TO 19MM,BIOPSY OF ESOPHAGUS;  Surgeon: Quinton Tapia MD;  Location: [facility] ENDOSCOPY;  Service: Gastroenterology;  Laterality: N/A;  GASTRITIS, ESOPHAGITIS, HIATEL HERNIA    HYSTERECTOMY  1990    INSERT / REPLACE / REMOVE PACEMAKER      JOINT REPLACEMENT Right     knee     OTHER SURGICAL HISTORY  06/2017    BLOOD CLOT REMOVED FROM LEFT EAR    PACEMAKER IMPLANTATION  04/18/2016    DUAL CHAMBER ST ALESSIO     /68   Pulse 70   Ht 152.4 cm (60\")   Wt 71.2 kg (157 lb)   BMI 30.66 kg/m²   Family History   Problem Relation Age of Onset    Hypertension Mother     Heart disease Mother     Heart disease Sister         PACEMAKER    Heart disease Brother        Current Outpatient Medications:     acetaminophen (TYLENOL) 500 MG tablet, Take 1 tablet by mouth Every 6 (Six) Hours As Needed for Mild Pain or Moderate Pain., Disp: 30 tablet, Rfl: 0    albuterol sulfate  (90 Base) MCG/ACT inhaler, Inhale " 2 puffs Every 4 (Four) Hours As Needed for Wheezing or Shortness of Air., Disp: 8 g, Rfl: 0    ALPRAZolam (XANAX) 0.5 MG tablet, Take 1 tablet by mouth 2 (Two) Times a Day As Needed., Disp: , Rfl: 5    atorvastatin (LIPITOR) 10 MG tablet, Take 1 tablet by mouth Every Night., Disp: , Rfl:     CALCIUM PO, Take  by mouth., Disp: , Rfl:     carboxymethylcellulose (REFRESH PLUS) 0.5 % solution, Administer 1 drop to both eyes 3 (Three) Times a Day As Needed for Dry Eyes., Disp: , Rfl:     cetirizine (zyrTEC) 10 MG tablet, Take 1 tablet by mouth Daily., Disp: , Rfl:     dilTIAZem (TIAZAC) 120 MG 24 hr capsule, TAKE 1 CAPSULE BY MOUTH DAILY, Disp: 90 capsule, Rfl: 2    fluticasone (VERAMYST) 27.5 MCG/SPRAY nasal spray, Administer 2 sprays into the nostril(s) as directed by provider Daily., Disp: , Rfl:     HYDROcodone-acetaminophen (NORCO) 7.5-325 MG per tablet, Take 1 tablet by mouth Every 6 (Six) Hours As Needed for Moderate Pain., Disp: , Rfl:     lidocaine (LIDODERM) 5 %, Place 1 patch on the skin as directed by provider Daily. Remove & Discard patch within 12 hours or as directed by MD, Disp: 30 each, Rfl: 0    magnesium oxide (MAG-OX) 400 MG tablet, Take 1 tablet by mouth Daily., Disp: , Rfl:     metoprolol tartrate (LOPRESSOR) 100 MG tablet, TAKE 1 TABLET BY MOUTH TWICE DAILY, Disp: 180 tablet, Rfl: 1    Multiple Vitamins-Minerals (VITEYES AREDS FORMULA) capsule, Take 1 capsule by mouth 2 (two) times a day., Disp: , Rfl:     nitrofurantoin (MACRODANTIN) 100 MG capsule, Take 1 capsule by mouth Daily., Disp: , Rfl:     ondansetron (ZOFRAN) 4 MG tablet, Take 1 tablet by mouth Daily As Needed for Nausea or Vomiting., Disp: , Rfl:     pantoprazole (PROTONIX) 40 MG EC tablet, TK 1 T PO QD, Disp: , Rfl: 5    spironolactone (ALDACTONE) 25 MG tablet, TAKE 1/2 TABLET BY MOUTH DAILY, Disp: 45 tablet, Rfl: 3    warfarin (COUMADIN) 4 MG tablet, TAKE 1 TABLET BY MOUTH DAILY. EXCEPT 1/2 TABLET ON MONDAY AND FRIDAY OR AS DIRECTED,  Disp: 80 tablet, Rfl: 0    budesonide (PULMICORT) 0.5 MG/2ML nebulizer solution, Take 2 mL by nebulization 2 (Two) Times a Day. (Patient not taking: Reported on 2/26/2025), Disp: 60 each, Rfl: 1    Cholecalciferol (VITAMIN D-3 PO), Take  by mouth. (Patient not taking: Reported on 2/26/2025), Disp: , Rfl:     Cyanocobalamin (VITAMIN B-12 PO), Take  by mouth. (Patient not taking: Reported on 2/26/2025), Disp: , Rfl:     furosemide (LASIX) 40 MG tablet, Take 1 tablet by mouth 2 (Two) Times a Day. (Patient not taking: Reported on 2/26/2025), Disp: 5 tablet, Rfl: 0    isosorbide mononitrate (IMDUR) 30 MG 24 hr tablet, TAKE 1 TABLET BY MOUTH EVERY MORNING (Patient not taking: Reported on 2/26/2025), Disp: 90 tablet, Rfl: 3  Allergies   Allergen Reactions    Doxycycline Other (See Comments)     Chest pain    Celebrex [Celecoxib] Other (See Comments)     Chest pain    Contrast Dye (Echo Or Unknown Ct/Mr) Itching    Iodinated Contrast Media Itching    Nsaids Other (See Comments)     convulsion    Other Itching     Pt states some steroids make her itch and hallucinate- she does not know the names   She said she is allergic to all arthritis medicine    Sulfa Antibiotics Nausea Only     Social History     Socioeconomic History    Marital status:    Tobacco Use    Smoking status: Never    Smokeless tobacco: Never   Vaping Use    Vaping status: Never Used   Substance and Sexual Activity    Alcohol use: No    Drug use: No    Sexual activity: Not Currently     Partners: Female     Birth control/protection: Hysterectomy     Review of Systems   Constitutional: Negative for malaise/fatigue.   Cardiovascular:  Positive for chest pain. Negative for dyspnea on exertion, leg swelling and palpitations.   Respiratory:  Negative for cough and shortness of breath.    Gastrointestinal:  Negative for abdominal pain, nausea and vomiting.   Neurological:  Negative for dizziness, focal weakness, headaches, light-headedness and numbness.    All other systems reviewed and are negative.             Objective:     Constitutional:       Appearance: Well-developed.   Eyes:      General: No scleral icterus.     Conjunctiva/sclera: Conjunctivae normal.   HENT:      Head: Normocephalic and atraumatic.   Neck:      Vascular: No carotid bruit or JVD.   Pulmonary:      Effort: Pulmonary effort is normal.      Breath sounds: Normal breath sounds. No wheezing. No rales.   Cardiovascular:      Normal rate. Regular rhythm.   Pulses:     Intact distal pulses.   Abdominal:      General: Bowel sounds are normal.      Palpations: Abdomen is soft.   Musculoskeletal:      Cervical back: Normal range of motion and neck supple. Skin:     General: Skin is warm and dry.      Findings: No rash.   Neurological:      Mental Status: Alert.         ECG 12 Lead    Date/Time: 2/26/2025 2:33 PM  Performed by: Ivan Martell MD    Authorized by: Ivan Martell MD  Comments: Atrial pacing with ventricular sensing  Abnormal EKG  No new changes from previous ECG          Lab Review:         MDM  #1 coronary artery disease  Patient had coronary bypass 3 x 5 vessels with a LIMA to LAD and SVG to the diagonal branch OM1 OM 2 and RCA and is currently stable without any angina although she has atypical sharp chest pain which is not related to her CAD but most likely to recent flu infection  Patient has normal LV function  2.  Atrial fibrillation  Patient has history of atrial fibrillation but is currently stable on metoprolol for rate control and warfarin keep his INR around 2-3  3.  Hypertension  Patient blood pressure currently stable on metoprolol and diltiazem  Before hyperlipidemia  Patient on atorvastatin and the lipid levels are well within normal limits  5.  Sleep apnea  Patient has sleep apnea and uses a CPAP machine      Patient's previous medical records, labs, and EKG were reviewed and discussed with the patient at today's visit.

## 2025-02-26 NOTE — PROGRESS NOTES
Pts INR was high at 5.4. Pt had recently had the flu and had a decrease in appetite. Pt will hold Warfarin for 2 days. Decreased Warfarin dosage to 2 mgs Mon, Weds and Fri with 4 mgs all other days. Also suggested that she supplement her diet with Boost or Ensure to get a little Vit K in her diet. Joe

## 2025-03-26 ENCOUNTER — ANTICOAGULATION VISIT (OUTPATIENT)
Dept: CARDIOLOGY | Facility: CLINIC | Age: 89
End: 2025-03-26
Payer: MEDICARE

## 2025-03-26 VITALS
DIASTOLIC BLOOD PRESSURE: 86 MMHG | SYSTOLIC BLOOD PRESSURE: 176 MMHG | HEART RATE: 67 BPM | BODY MASS INDEX: 31.83 KG/M2 | WEIGHT: 163 LBS

## 2025-03-26 DIAGNOSIS — I48.19 PERSISTENT ATRIAL FIBRILLATION: ICD-10-CM

## 2025-03-26 DIAGNOSIS — Z79.01 LONG TERM (CURRENT) USE OF ANTICOAGULANTS: Primary | ICD-10-CM

## 2025-03-26 LAB — INR PPP: 2.5 (ref 0.9–1.1)

## 2025-03-26 PROCEDURE — 85610 PROTHROMBIN TIME: CPT | Performed by: INTERNAL MEDICINE

## 2025-03-26 PROCEDURE — 36416 COLLJ CAPILLARY BLOOD SPEC: CPT | Performed by: INTERNAL MEDICINE

## 2025-04-03 ENCOUNTER — HOSPITAL ENCOUNTER (OUTPATIENT)
Facility: HOSPITAL | Age: 89
Setting detail: OBSERVATION
Discharge: HOME | End: 2025-04-04
Attending: EMERGENCY MEDICINE | Admitting: STUDENT IN AN ORGANIZED HEALTH CARE EDUCATION/TRAINING PROGRAM
Payer: MEDICARE

## 2025-04-03 ENCOUNTER — APPOINTMENT (OUTPATIENT)
Dept: CT IMAGING | Facility: HOSPITAL | Age: 89
End: 2025-04-03
Payer: MEDICARE

## 2025-04-03 DIAGNOSIS — I61.9 NONTRAUMATIC INTRACEREBRAL HEMORRHAGE, UNSPECIFIED CEREBRAL LOCATION, UNSPECIFIED LATERALITY: ICD-10-CM

## 2025-04-03 DIAGNOSIS — S06.5X0A SUBDURAL HEMATOMA DUE TO CONCUSSION, WITHOUT LOSS OF CONSCIOUSNESS, INITIAL ENCOUNTER: ICD-10-CM

## 2025-04-03 DIAGNOSIS — S06.369A TRAUMATIC INTRACEREBRAL HEMORRHAGE WITH LOSS OF CONSCIOUSNESS, UNSPECIFIED LATERALITY, INITIAL ENCOUNTER: Primary | ICD-10-CM

## 2025-04-03 DIAGNOSIS — I48.19 PERSISTENT ATRIAL FIBRILLATION: ICD-10-CM

## 2025-04-03 LAB
BASOPHILS # BLD AUTO: 0.02 10*3/MM3 (ref 0–0.2)
BASOPHILS NFR BLD AUTO: 0.4 % (ref 0–1.5)
DEPRECATED RDW RBC AUTO: 50.5 FL (ref 37–54)
EOSINOPHIL # BLD AUTO: 0.3 10*3/MM3 (ref 0–0.4)
EOSINOPHIL NFR BLD AUTO: 5.5 % (ref 0.3–6.2)
ERYTHROCYTE [DISTWIDTH] IN BLOOD BY AUTOMATED COUNT: 14.4 % (ref 12.3–15.4)
HCT VFR BLD AUTO: 35.6 % (ref 34–46.6)
HGB BLD-MCNC: 11.1 G/DL (ref 12–15.9)
IMM GRANULOCYTES # BLD AUTO: 0.01 10*3/MM3 (ref 0–0.05)
IMM GRANULOCYTES NFR BLD AUTO: 0.2 % (ref 0–0.5)
INR PPP: 2.7 (ref 0.8–1.2)
LYMPHOCYTES # BLD AUTO: 1.55 10*3/MM3 (ref 0.7–3.1)
LYMPHOCYTES NFR BLD AUTO: 28.5 % (ref 19.6–45.3)
MCH RBC QN AUTO: 29.5 PG (ref 26.6–33)
MCHC RBC AUTO-ENTMCNC: 31.2 G/DL (ref 31.5–35.7)
MCV RBC AUTO: 94.7 FL (ref 79–97)
MONOCYTES # BLD AUTO: 0.52 10*3/MM3 (ref 0.1–0.9)
MONOCYTES NFR BLD AUTO: 9.6 % (ref 5–12)
NEUTROPHILS NFR BLD AUTO: 3.04 10*3/MM3 (ref 1.7–7)
NEUTROPHILS NFR BLD AUTO: 55.8 % (ref 42.7–76)
PLATELET # BLD AUTO: 141 10*3/MM3 (ref 140–450)
PMV BLD AUTO: 10.6 FL (ref 6–12)
PROTHROMBIN TIME: 28.8 SECONDS
RBC # BLD AUTO: 3.76 10*6/MM3 (ref 3.77–5.28)
WBC NRBC COR # BLD AUTO: 5.44 10*3/MM3 (ref 3.4–10.8)

## 2025-04-03 PROCEDURE — 85025 COMPLETE CBC W/AUTO DIFF WBC: CPT | Performed by: EMERGENCY MEDICINE

## 2025-04-03 PROCEDURE — 99291 CRITICAL CARE FIRST HOUR: CPT

## 2025-04-03 PROCEDURE — 80048 BASIC METABOLIC PNL TOTAL CA: CPT | Performed by: EMERGENCY MEDICINE

## 2025-04-03 PROCEDURE — 85610 PROTHROMBIN TIME: CPT | Performed by: EMERGENCY MEDICINE

## 2025-04-03 PROCEDURE — 99285 EMERGENCY DEPT VISIT HI MDM: CPT | Performed by: EMERGENCY MEDICINE

## 2025-04-03 PROCEDURE — 70450 CT HEAD/BRAIN W/O DYE: CPT

## 2025-04-04 ENCOUNTER — READMISSION MANAGEMENT (OUTPATIENT)
Dept: CALL CENTER | Facility: HOSPITAL | Age: 89
End: 2025-04-04
Payer: MEDICARE

## 2025-04-04 ENCOUNTER — APPOINTMENT (OUTPATIENT)
Dept: CT IMAGING | Facility: HOSPITAL | Age: 89
End: 2025-04-04
Payer: MEDICARE

## 2025-04-04 VITALS
WEIGHT: 164.46 LBS | SYSTOLIC BLOOD PRESSURE: 120 MMHG | RESPIRATION RATE: 16 BRPM | TEMPERATURE: 97.5 F | DIASTOLIC BLOOD PRESSURE: 65 MMHG | OXYGEN SATURATION: 97 % | HEIGHT: 60 IN | BODY MASS INDEX: 32.29 KG/M2 | HEART RATE: 70 BPM

## 2025-04-04 LAB
ANION GAP SERPL CALCULATED.3IONS-SCNC: 7.2 MMOL/L (ref 5–15)
BUN SERPL-MCNC: 33 MG/DL (ref 8–23)
BUN/CREAT SERPL: 24.6 (ref 7–25)
CALCIUM SPEC-SCNC: 9.5 MG/DL (ref 8.6–10.5)
CHLORIDE SERPL-SCNC: 99 MMOL/L (ref 98–107)
CO2 SERPL-SCNC: 27.8 MMOL/L (ref 22–29)
CREAT SERPL-MCNC: 1.34 MG/DL (ref 0.57–1)
EGFRCR SERPLBLD CKD-EPI 2021: 38.5 ML/MIN/1.73
GLUCOSE BLDC GLUCOMTR-MCNC: 95 MG/DL (ref 70–105)
GLUCOSE SERPL-MCNC: 117 MG/DL (ref 65–99)
INR PPP: 1.32 (ref 2–3)
POTASSIUM SERPL-SCNC: 4.6 MMOL/L (ref 3.5–5.2)
PROTHROMBIN TIME: 16.3 SECONDS (ref 19.4–28.5)
SODIUM SERPL-SCNC: 134 MMOL/L (ref 136–145)

## 2025-04-04 PROCEDURE — 85610 PROTHROMBIN TIME: CPT | Performed by: EMERGENCY MEDICINE

## 2025-04-04 PROCEDURE — G0378 HOSPITAL OBSERVATION PER HR: HCPCS

## 2025-04-04 PROCEDURE — 92610 EVALUATE SWALLOWING FUNCTION: CPT

## 2025-04-04 PROCEDURE — 25010000002 PHYTONADIONE 10 MG/ML SOLUTION 1 ML AMPULE: Performed by: EMERGENCY MEDICINE

## 2025-04-04 PROCEDURE — 70450 CT HEAD/BRAIN W/O DYE: CPT

## 2025-04-04 PROCEDURE — 97165 OT EVAL LOW COMPLEX 30 MIN: CPT

## 2025-04-04 PROCEDURE — 99214 OFFICE O/P EST MOD 30 MIN: CPT | Performed by: NURSE PRACTITIONER

## 2025-04-04 PROCEDURE — 25010000002 PROTHROMBIN COMPLEX CONC HUMAN 1000 UNITS KIT: Performed by: EMERGENCY MEDICINE

## 2025-04-04 PROCEDURE — 82948 REAGENT STRIP/BLOOD GLUCOSE: CPT

## 2025-04-04 PROCEDURE — 25010000002 PROTHROMBIN COMPLEX CONC HUMAN 500 UNITS KIT: Performed by: EMERGENCY MEDICINE

## 2025-04-04 RX ORDER — SODIUM CHLORIDE 9 MG/ML
40 INJECTION, SOLUTION INTRAVENOUS AS NEEDED
Status: DISCONTINUED | OUTPATIENT
Start: 2025-04-04 | End: 2025-04-04 | Stop reason: HOSPADM

## 2025-04-04 RX ORDER — POLYETHYLENE GLYCOL 3350 17 G/17G
17 POWDER, FOR SOLUTION ORAL DAILY PRN
Status: DISCONTINUED | OUTPATIENT
Start: 2025-04-04 | End: 2025-04-04 | Stop reason: HOSPADM

## 2025-04-04 RX ORDER — BISACODYL 10 MG
10 SUPPOSITORY, RECTAL RECTAL DAILY PRN
Status: DISCONTINUED | OUTPATIENT
Start: 2025-04-04 | End: 2025-04-04 | Stop reason: HOSPADM

## 2025-04-04 RX ORDER — SODIUM CHLORIDE 0.9 % (FLUSH) 0.9 %
10 SYRINGE (ML) INJECTION AS NEEDED
Status: DISCONTINUED | OUTPATIENT
Start: 2025-04-04 | End: 2025-04-04 | Stop reason: HOSPADM

## 2025-04-04 RX ORDER — NITROGLYCERIN 0.4 MG/1
0.4 TABLET SUBLINGUAL
Status: DISCONTINUED | OUTPATIENT
Start: 2025-04-04 | End: 2025-04-04 | Stop reason: HOSPADM

## 2025-04-04 RX ORDER — BISACODYL 5 MG/1
5 TABLET, DELAYED RELEASE ORAL DAILY PRN
Status: DISCONTINUED | OUTPATIENT
Start: 2025-04-04 | End: 2025-04-04 | Stop reason: HOSPADM

## 2025-04-04 RX ORDER — WARFARIN SODIUM 4 MG/1
TABLET ORAL
Qty: 80 TABLET | Refills: 0 | Status: SHIPPED | OUTPATIENT
Start: 2025-04-18

## 2025-04-04 RX ORDER — SODIUM CHLORIDE 0.9 % (FLUSH) 0.9 %
10 SYRINGE (ML) INJECTION EVERY 12 HOURS SCHEDULED
Status: DISCONTINUED | OUTPATIENT
Start: 2025-04-04 | End: 2025-04-04 | Stop reason: HOSPADM

## 2025-04-04 RX ORDER — AMOXICILLIN 250 MG
2 CAPSULE ORAL 2 TIMES DAILY PRN
Status: DISCONTINUED | OUTPATIENT
Start: 2025-04-04 | End: 2025-04-04 | Stop reason: HOSPADM

## 2025-04-04 RX ADMIN — MUPIROCIN 1 APPLICATION: 20 OINTMENT TOPICAL at 02:06

## 2025-04-04 RX ADMIN — PROTHROMBIN, COAGULATION FACTOR VII HUMAN, COAGULATION FACTOR IX HUMAN, COAGULATION FACTOR X HUMAN, PROTEIN C, PROTEIN S HUMAN, AND WATER 2070 UNITS: KIT at 01:35

## 2025-04-04 RX ADMIN — Medication 10 ML: at 01:40

## 2025-04-04 RX ADMIN — PHYTONADIONE 10 MG: 10 INJECTION, EMULSION INTRAMUSCULAR; INTRAVENOUS; SUBCUTANEOUS at 01:45

## 2025-04-04 RX ADMIN — Medication 10 ML: at 09:00

## 2025-04-04 NOTE — FSED PROVIDER NOTE
Subjective   History of Present Illness  Patient is an 89-year-old  female presents emergency room with a head injury.  Patient states that she hit her head on the door prior to arrival.  Patient reports occipital head injury on the right.  She did not lose consciousness.  She denies any other injury or trauma.  She has had no vomiting.  No visual disturbance.  She is on Coumadin for atrial fibrillation.        Review of Systems   Constitutional: Negative.  Negative for chills, fatigue and fever.   Eyes: Negative.    Respiratory:  Negative for cough, chest tightness and shortness of breath.    Cardiovascular:  Negative for chest pain and palpitations.   Gastrointestinal:  Negative for abdominal pain, diarrhea, nausea and vomiting.   Genitourinary: Negative.    Musculoskeletal: Negative.    Skin: Negative.  Positive for wound. Negative for rash.   Neurological: Negative.  Positive for headaches. Negative for syncope, weakness and numbness.   Psychiatric/Behavioral: Negative.     All other systems reviewed and are negative.      Past Medical History:   Diagnosis Date    Anxiety     Arthritis     Asthma     Atrial fibrillation     CKD (chronic kidney disease) stage 3, GFR 30-59 ml/min 04/18/2021    Coronary artery disease     Dyslipidemia     Dyslipidemia 01/05/2015    GERD (gastroesophageal reflux disease)     History of bone density study 04/2015    Hypertension     Sleep apnea     Wears dentures        Allergies   Allergen Reactions    Doxycycline Other (See Comments)     Chest pain    Celebrex [Celecoxib] Other (See Comments)     Chest pain    Contrast Dye (Echo Or Unknown Ct/Mr) Itching    Iodinated Contrast Media Itching    Nsaids Other (See Comments)     convulsion    Other Itching     Pt states some steroids make her itch and hallucinate- she does not know the names   She said she is allergic to all arthritis medicine    Sulfa Antibiotics Nausea Only       Past Surgical History:   Procedure Laterality  Date    BLADDER REPAIR  1990    BREAST LUMPECTOMY  1974    BENIGN    CARDIAC CATHETERIZATION  05/25/2011    CARDIAC CATHETERIZATION N/A 04/21/2021    Procedure: Left Heart Cath and coronary angiogram;  Surgeon: Ivan Martell MD;  Location: [facility] CATH INVASIVE LOCATION;  Service: Cardiovascular;  Laterality: N/A;    CARDIAC CATHETERIZATION N/A 04/21/2021    Procedure: Left ventriculography;  Surgeon: Ivan Martell MD;  Location: [facility] CATH INVASIVE LOCATION;  Service: Cardiovascular;  Laterality: N/A;    CARDIAC CATHETERIZATION  04/21/2021    Procedure: Saphenous Vein Graft;  Surgeon: Ivan Martell MD;  Location: [facility] CATH INVASIVE LOCATION;  Service: Cardiovascular;;    CARDIAC CATHETERIZATION N/A 04/26/2021    Procedure: Percutaneous Coronary Intervention;  Surgeon: Ivan Martell MD;  Location: [facility] CATH INVASIVE LOCATION;  Service: Cardiovascular;  Laterality: N/A;    CARDIAC CATHETERIZATION N/A 04/26/2021    Procedure: Stent RAY coronary;  Surgeon: Ivan Martell MD;  Location: [facility] CATH INVASIVE LOCATION;  Service: Cardiovascular;  Laterality: N/A;    CARDIOVASCULAR STRESS TEST  05/04/2015    CHOLECYSTECTOMY  1990    CORONARY ARTERY BYPASS GRAFT  08/09/2017    X5  Dr Brandt    ENDOSCOPY N/A 06/30/2020    Procedure: ESOPHAGOGASTRODUODENOSCOPY with biopsy x 1 area and dilatation (15-18mm balloon) up to 18mm;  Surgeon: Quinton Tapia MD;  Location: [facility] ENDOSCOPY;  Service: Gastroenterology;  Laterality: N/A;  post op: gastritis, large hiatal hernia, esophageal dysmotility    ENDOSCOPY N/A 10/24/2024    Procedure: ESOPHAGOGASTRODUODENOSCOPY WITH GASTRIC BIOPSY, BALLOON DILATION (18MM-20MM) UP TO 19MM,BIOPSY OF ESOPHAGUS;  Surgeon: Quinton Tapia MD;  Location: [facility] ENDOSCOPY;  Service: Gastroenterology;  Laterality: N/A;  GASTRITIS, ESOPHAGITIS, HIATEL HERNIA    HYSTERECTOMY  1990    INSERT / REPLACE / REMOVE PACEMAKER      JOINT REPLACEMENT Right     knee     OTHER SURGICAL HISTORY  06/2017     BLOOD CLOT REMOVED FROM LEFT EAR    PACEMAKER IMPLANTATION  04/18/2016    DUAL CHAMBER [facility]ESS[facility]       Family History   Problem Relation Age of Onset    Hypertension Mother     Heart disease Mother     Heart disease Sister         PACEMAKER    Heart disease Brother        Social History     Socioeconomic History    Marital status:    Tobacco Use    Smoking status: Never    Smokeless tobacco: Never   Vaping Use    Vaping status: Never Used   Substance and Sexual Activity    Alcohol use: No    Drug use: No    Sexual activity: Not Currently     Birth control/protection: Hysterectomy           Objective   Physical Exam  Vitals and nursing note reviewed.   Constitutional:       General: She is not in acute distress.     Appearance: Normal appearance. She is normal weight.   HENT:      Head:      Comments: Tenderness to occipital scalp on the right without obvious deformity.     Right Ear: External ear normal.      Left Ear: External ear normal.      Nose: Nose normal.   Cardiovascular:      Rate and Rhythm: Normal rate.   Pulmonary:      Effort: Pulmonary effort is normal.   Musculoskeletal:         General: Normal range of motion.      Cervical back: Normal range of motion.   Skin:     Capillary Refill: Capillary refill takes less than 2 seconds.   Neurological:      General: No focal deficit present.      Mental Status: She is alert and oriented to person, place, and time.   Psychiatric:         Mood and Affect: Mood normal.         Procedures           ED Course  ED Course as of 04/04/25 0030   Thu Apr 03, 2025   2253 INR is therapeutic at 2.7 [KZ]   2305 Awaiting head CT. [KZ]   2330 Patient's head CT shows an anterior 3 mm subdural.  No midline shift.  Basic labs requested. [KZ]   2335 Case discussed with Dr. Ren.  Reversal medications for Coumadin requested.  He specifically stated that it was okay that this patient is hospitalized at [facility] in [place].  Patient will be hospitalized at [facility]  under the hospitalist group. [KZ]   4400 Case discussed with the hospitalist, Dr. Lorenzana.  He has accepted the patient for admission. [KZ]   2355 Patient's GCS remains unchanged.  I discussed mode of transportation with the family and the patient as well.  The patient has a 3 mm subdural hematoma on her head CT that I cannot appreciate, but that was read by the radiologist.  The most important thing that the patient needs right now is Kcentra and vitamin K, which we do not have at our stand-alone ED.  I given the patient both options of ambulance transport in private vehicle.  Because of cost and timeliness, the patient and her family have elected to transport themselves by private vehicle.  This avoids the [facility] [place], operated by [facility] ambulance from having to drive from [place] here to [place] and then back to [place], a significantly longer transport than the family just taking her directly to [place]. [KZ]   Fri Apr 04, 2025   0028 The case was discussed with the  on-call Stephen Hanson.  I have explained him the situation with the patient's transport.  I am opting for the least risk for the patient and the greatest benefit with her getting the medication that she needs sooner.  The  was in agreement with my plan. [KZ]      ED Course User Index  [KZ] Ehsan Hernandez MD                                           Medical Decision Making  The patient presents to the emergency room with reported head injury.  The patient reports no loss of consciousness at the time of the injury.  For exact mechanism of injury, see HPI above.  Differential diagnosis includes intracranial hemorrhage, closed head injury, concussion, skull fracture, and/or other head injury.  We used the [place] CT head injury/trauma rule.  Using this to risk stratify the patient, it is determined that the patient will need imaging of the brain because of use of Coumadin and her age.  Shared medical decision making used.  The  patient does not have any evidence for any other traumatic injury.  No torso or extremity trauma.      We will also check her INR while she is here.    Patient has intracranial hemorrhage without any midline shift.  The bleed is very subtle on CT scan.  Case discussed with neurosurgery who will consult on the patient.  Patient will be admitted to [facility] under the hospitalist group.      Problems Addressed:  Subdural hematoma due to concussion, without loss of consciousness, initial encounter: complicated acute illness or injury    Amount and/or Complexity of Data Reviewed  Labs: ordered. Decision-making details documented in ED Course.  Radiology: ordered and independent interpretation performed. Decision-making details documented in ED Course.  Discussion of management or test interpretation with external provider(s): Case discussed with neurosurgery and the hospitalist.    Risk  Prescription drug management.  Drug therapy requiring intensive monitoring for toxicity.  Decision regarding hospitalization.    Critical Care  Total time providing critical care: 35 minutes        Final diagnoses:   Subdural hematoma due to concussion, without loss of consciousness, initial encounter       ED Disposition  ED Disposition       ED Disposition   Decision to Admit    Condition   --    Comment   Level of Care: Progressive Care [20]   Diagnosis: ICH (intracerebral hemorrhage) [864267]   Admitting Physician: TRUNG ALEXANDER [[id number]]   Attending Physician: PRIYANK ELLIS [[id number]]                 No follow-up provider specified.       Medication List      No changes were made to your prescriptions during this visit.

## 2025-04-04 NOTE — PLAN OF CARE
Goal Outcome Evaluation:     Clinical swallow evaluation completed. Pt was awake and alert, able to follow commands, oriented x5. Upon room entry, pt was sitting upright in recliner w/ family at bedside.  Pt was NPO at the time of evaluation. Pt has full set of dentures in place. Pt and pt's family reported regular/thin liquid is baseline diet and denied swallowing difficulty. Oral motor exam revealed some generalized oral motor weakness. Trials assessed: ice chips x1, thin by cup x2, thin by straw x2, puree x2, mixed/mech soft x2, STC x2, and regular x2. Pt had good bolus control of ice chip, able to self-feed. Adequate labial seal w/straw. No anterior loss. Adequate mastication during all trials. Oral transit appeared timely. There was no pocketing or residue. Digital palpation suggests adequate hyolaryngeal excursion. Pt demonstrated no overt s/s of penetration/aspiration throughout the evaluation. Vocal quality remained clear. Pt verbalized agreement to diet recommendations and nursing made aware. Per above, pt presents w/ no dysphagia and swallow is WNL. Recommending regular diet and thin liquids. Meds as tolerated. ST signing off at this time due to no further concerns. Pt is planned to discharge home today.    SLP Plan & Recommendation:      - Regular diet & thin liquids  - Only feed when pt is fully awake and alert  - Meds: as tolerated   - Safe swallow strategies: HOB at a 90 degree angle, slow rate of intake, small bites/sips  -Oral care at least x2 daily     Anticipated Discharge Disposition (SLP): unknown          SLP Swallowing Diagnosis: swallow WFL/no suspected pharyngeal impairment (04/04/25 1100)        Plan for Continued Treatment (SLP): continue treatment per plan of care (04/04/25 1100)

## 2025-04-04 NOTE — ED NOTES
Received call from Vicki HAYES about patient transport. Provider notified & discussion was had between providers.

## 2025-04-04 NOTE — CONSULTS
[facility] NEUROSURGERY CONSULT NOTE    Patient name: Syl Herrera  Referring Provider: Joshua Lorenzana MD   Reason for Consultation: Subdural hematoma    Patient Care Team:  Nava Yu MD as PCP - Ivan Durand MD as Consulting Physician (Cardiology)  Greer Shaikh MD as Consulting Physician (Nephrology)  Harshad Landin MD as Consulting Physician (Hematology and Oncology)    Chief complaint: Fall with head injury    Subjective .     History of present illness:    Patient is a 89 y.o. right handed female chronic Coumadin therapy that suffered a fall yesterday at her home striking the right side/back part of her head.  No loss of consciousness reported.  She presented to the freestanding ER where it was noted that she had an anterior 3 mm subdural hematoma.  On-call neurosurgeon was contacted and orders for Kcentra were given.  Patient was transferred to [facility] and underwent repeat imaging this morning.   Upon my evaluation patient is awake alert and oriented.  She does say that she tripped at her home trying to turn the lights on and fell against the wall and floor hitting her head.  She did not lose consciousness.  She does normally take Coumadin for her atrial fibrillation.  She reports no current headache, dizziness, visual changes, speech issues, focal unilateral sensorimotor deficits.     review of Systems  Review of Systems   All other systems reviewed and are negative.      History  PAST MEDICAL HISTORY  Past Medical History:   Diagnosis Date    Anxiety     Arthritis     Asthma     Atrial fibrillation     CKD (chronic kidney disease) stage 3, GFR 30-59 ml/min 04/18/2021    Coronary artery disease     Dyslipidemia     Dyslipidemia 01/05/2015    GERD (gastroesophageal reflux disease)     History of bone density study 04/2015    Hypertension     Sleep apnea     Wears dentures        PAST SURGICAL HISTORY  Past Surgical History:   Procedure Laterality Date    BLADDER  REPAIR  1990    BREAST LUMPECTOMY  1974    BENIGN    CARDIAC CATHETERIZATION  05/25/2011    CARDIAC CATHETERIZATION N/A 04/21/2021    Procedure: Left Heart Cath and coronary angiogram;  Surgeon: Ivan Martell MD;  Location: [facility] CATH INVASIVE LOCATION;  Service: Cardiovascular;  Laterality: N/A;    CARDIAC CATHETERIZATION N/A 04/21/2021    Procedure: Left ventriculography;  Surgeon: Ivan Martell MD;  Location: [facility] CATH INVASIVE LOCATION;  Service: Cardiovascular;  Laterality: N/A;    CARDIAC CATHETERIZATION  04/21/2021    Procedure: Saphenous Vein Graft;  Surgeon: Ivan Martell MD;  Location: [facility] CATH INVASIVE LOCATION;  Service: Cardiovascular;;    CARDIAC CATHETERIZATION N/A 04/26/2021    Procedure: Percutaneous Coronary Intervention;  Surgeon: Ivan Martell MD;  Location: [facility] CATH INVASIVE LOCATION;  Service: Cardiovascular;  Laterality: N/A;    CARDIAC CATHETERIZATION N/A 04/26/2021    Procedure: Stent RAY coronary;  Surgeon: Ivan Martell MD;  Location: [facility] CATH INVASIVE LOCATION;  Service: Cardiovascular;  Laterality: N/A;    CARDIOVASCULAR STRESS TEST  05/04/2015    CHOLECYSTECTOMY  1990    CORONARY ARTERY BYPASS GRAFT  08/09/2017    X5  Dr Brandt    ENDOSCOPY N/A 06/30/2020    Procedure: ESOPHAGOGASTRODUODENOSCOPY with biopsy x 1 area and dilatation (15-18mm balloon) up to 18mm;  Surgeon: Quinton Tapia MD;  Location: [facility] ENDOSCOPY;  Service: Gastroenterology;  Laterality: N/A;  post op: gastritis, large hiatal hernia, esophageal dysmotility    ENDOSCOPY N/A 10/24/2024    Procedure: ESOPHAGOGASTRODUODENOSCOPY WITH GASTRIC BIOPSY, BALLOON DILATION (18MM-20MM) UP TO 19MM,BIOPSY OF ESOPHAGUS;  Surgeon: Quinton Tapia MD;  Location: [facility] ENDOSCOPY;  Service: Gastroenterology;  Laterality: N/A;  GASTRITIS, ESOPHAGITIS, HIATEL HERNIA    HYSTERECTOMY  1990    INSERT / REPLACE / REMOVE PACEMAKER      JOINT REPLACEMENT Right     knee     OTHER SURGICAL HISTORY  06/2017    BLOOD CLOT  REMOVED FROM LEFT EAR    PACEMAKER IMPLANTATION  04/18/2016    DUAL CHAMBER ST ALESSIO       FAMILY HISTORY  Family History   Problem Relation Age of Onset    Hypertension Mother     Heart disease Mother     Heart disease Sister         PACEMAKER    Heart disease Brother        SOCIAL HISTORY  Social History     Tobacco Use    Smoking status: Never    Smokeless tobacco: Never   Vaping Use    Vaping status: Never Used   Substance Use Topics    Alcohol use: No    Drug use: No     not   retired      Allergies:  Doxycycline, Celebrex [celecoxib], Contrast dye (echo or unknown ct/mr), Iodinated contrast media, Nsaids, Other, and Sulfa antibiotics    MEDICATIONS:  Medications Prior to Admission   Medication Sig Dispense Refill Last Dose/Taking    acetaminophen (TYLENOL) 500 MG tablet Take 1 tablet by mouth Every 6 (Six) Hours As Needed for Mild Pain or Moderate Pain. 30 tablet 0 Past Month    albuterol sulfate  (90 Base) MCG/ACT inhaler Inhale 2 puffs Every 4 (Four) Hours As Needed for Wheezing or Shortness of Air. 8 g 0 4/4/2025 Morning    ALPRAZolam (XANAX) 0.5 MG tablet Take 1 tablet by mouth 2 (Two) Times a Day As Needed.  5 4/4/2025 Morning    CALCIUM PO Take  by mouth.   4/4/2025 Morning    carboxymethylcellulose (REFRESH PLUS) 0.5 % solution Administer 1 drop to both eyes 3 (Three) Times a Day As Needed for Dry Eyes.   4/4/2025 Morning    cetirizine (zyrTEC) 10 MG tablet Take 1 tablet by mouth Daily.   4/4/2025 Morning    Cholecalciferol (VITAMIN D-3 PO) Take  by mouth.   4/4/2025 Morning    dilTIAZem (TIAZAC) 120 MG 24 hr capsule TAKE 1 CAPSULE BY MOUTH DAILY 90 capsule 2 4/4/2025 Morning    fluticasone (VERAMYST) 27.5 MCG/SPRAY nasal spray Administer 2 sprays into the nostril(s) as directed by provider Daily.   4/4/2025 Morning    magnesium oxide (MAG-OX) 400 MG tablet Take 1 tablet by mouth Daily.   4/4/2025 Morning    metoprolol tartrate (LOPRESSOR) 100 MG tablet TAKE 1 TABLET BY MOUTH TWICE DAILY  180 tablet 1 4/4/2025 Morning    Multiple Vitamins-Minerals (VITEYES AREDS FORMULA) capsule Take 1 capsule by mouth 2 (two) times a day.   4/4/2025 Morning    pantoprazole (PROTONIX) 40 MG EC tablet TK 1 T PO QD  5 4/4/2025 Morning    warfarin (COUMADIN) 4 MG tablet TAKE 1 TABLET BY MOUTH DAILY. EXCEPT 1/2 TABLET ON MONDAY AND FRIDAY OR AS DIRECTED 80 tablet 0 4/4/2025 Morning    atorvastatin (LIPITOR) 10 MG tablet Take 1 tablet by mouth Every Night.   Unknown    budesonide (PULMICORT) 0.5 MG/2ML nebulizer solution Take 2 mL by nebulization 2 (Two) Times a Day. (Patient not taking: Reported on 2/26/2025) 60 each 1     Cyanocobalamin (VITAMIN B-12 PO) Take  by mouth. (Patient not taking: Reported on 2/26/2025)   Unknown    furosemide (LASIX) 40 MG tablet Take 1 tablet by mouth 2 (Two) Times a Day. (Patient not taking: Reported on 2/26/2025) 5 tablet 0     HYDROcodone-acetaminophen (NORCO) 7.5-325 MG per tablet Take 1 tablet by mouth Every 6 (Six) Hours As Needed for Moderate Pain.   Unknown    isosorbide mononitrate (IMDUR) 30 MG 24 hr tablet TAKE 1 TABLET BY MOUTH EVERY MORNING 90 tablet 3 More than a month    lidocaine (LIDODERM) 5 % Place 1 patch on the skin as directed by provider Daily. Remove & Discard patch within 12 hours or as directed by MD 30 each 0     nitrofurantoin (MACRODANTIN) 100 MG capsule Take 1 capsule by mouth Daily.   Unknown    ondansetron (ZOFRAN) 4 MG tablet Take 1 tablet by mouth Daily As Needed for Nausea or Vomiting.   Unknown    spironolactone (ALDACTONE) 25 MG tablet TAKE 1/2 TABLET BY MOUTH DAILY 45 tablet 3 Unknown         Current Facility-Administered Medications:     sennosides-docusate (PERICOLACE) 8.6-50 MG per tablet 2 tablet, 2 tablet, Oral, BID PRN **AND** polyethylene glycol (MIRALAX) packet 17 g, 17 g, Oral, Daily PRN **AND** bisacodyl (DULCOLAX) EC tablet 5 mg, 5 mg, Oral, Daily PRN **AND** bisacodyl (DULCOLAX) suppository 10 mg, 10 mg, Rectal, Daily PRN, Joshua Lorenzana  MD MARIPOSA    Calcium Replacement - Follow Nurse / BPA Driven Protocol, , Not Applicable, Harriett LANTIGUA Sukhjinder S, MD    Magnesium Standard Dose Replacement - Follow Nurse / BPA Driven Protocol, , Not Applicable, Harriett LANTIGUA Sukhjinder S, MD    mupirocin (BACTROBAN) 2 % nasal ointment 1 Application, 1 Application, Each Nare, BID, Joshua Lorenzana MD, 1 Application at 04/04/25 0206    nitroglycerin (NITROSTAT) SL tablet 0.4 mg, 0.4 mg, Sublingual, Q5 Min PRHarriett IVAN Sukhjinder S, MD    Phosphorus Replacement - Follow Nurse / BPA Driven Protocol, , Not Applicable, Harriett LANTIGUA Sukhjinder S, MD    Potassium Replacement - Follow Nurse / BPA Driven Protocol, , Not Applicable, Harriett LANTIGUA Sukhjinder S, MD    sodium chloride 0.9 % flush 10 mL, 10 mL, Intravenous, Q12H, Joshua Lorenzana MD, 10 mL at 04/04/25 0140    sodium chloride 0.9 % flush 10 mL, 10 mL, Intravenous, Harriett LANTIGUA Sukhjinder S, MD    sodium chloride 0.9 % infusion 40 mL, 40 mL, Intravenous, Harriett LANTIGUA Sukhjinder S, MD      Objective     Results Review:  LABS:  Results from last 7 days   Lab Units 04/03/25  2347   WBC 10*3/mm3 5.44   HEMOGLOBIN g/dL 11.1*   HEMATOCRIT % 35.6   PLATELETS 10*3/mm3 141     Results from last 7 days   Lab Units 04/03/25  2347   SODIUM mmol/L 134*   POTASSIUM mmol/L 4.6   CHLORIDE mmol/L 99   CO2 mmol/L 27.8   BUN mg/dL 33*   CREATININE mg/dL 1.34*   CALCIUM mg/dL 9.5   GLUCOSE mg/dL 117*         DIAGNOSTICS:  CT head 4 /3/2025 and 4/4/2025    Results Review:   I reviewed the patient's new clinical results.  I personally viewed and interpreted the patient's initial CT head and follow up CT head imaging demonstrating a small acute subdural hematoma along the anterior falx.  No mass effect or midline shift.    Vital Signs   Temp:  [96.1 °F (35.6 °C)-97.6 °F (36.4 °C)] 97.4 °F (36.3 °C)  Heart Rate:  [60-63] 60  Resp:  [14-20] 14  BP: (132-166)/(57-88) 151/78    Physical Exam:  Physical Exam  Neurological  Exam    Alert and oriented by 3  Speech is intact and coherent articulate with good content and production  Cranial nerves III through XII are grossly intact with pupils symmetric and reactive and no gaze paresis or nystagmus  Sensation is intact to soft touch   in both upper and lower extremities  Proprioception is intact in both upper and lower extremities symmetric  Motor strength is 5/5 in both upper and lower extremities with no focal motor deficits  Reflexes are 2+ in both upper and lower extremities with no upper motor neuron signs  No dysmetria or dysdiadochokinesia    Assessment & Plan       ICH (intracerebral hemorrhage)      Problem List Items Addressed This Visit    None  Visit Diagnoses         Subdural hematoma due to concussion, without loss of consciousness, initial encounter    -  Primary             COMORBID CONDITIONS:  chronic kidney disease including stage and Hypertension    Patient is an 89-year-old female suffered ground-level fall yesterday striking her head.  Her subdural along the anterior falx is stable with no mass effect or midline shift.  She is neurologically intact.  I discussed plans with her including holding her Coumadin therapy for at least 2 weeks and following up in clinic with repeat CT head prior to restart.  Worsening neurologic symptoms were discussed with patient necessitating presenting back to the ER.  Patient verbalized understanding.  Patient okay to discharge per the neurosurgical standpoint.    PLAN:     Subdural hematoma    -Hold Coumadin   -CTH in 2 weeks with follow-up in clinic to establish progressive resolution prior to restart Coumadin  -Okay to discharge per the neurosurgical standpoint  - Please call for any questions or concerns    I discussed the patient's findings and my recommendations with patient, nursing staff, and consulting provider Dr. Ren who agrees with plan of care.    During patient visit,Appropriate PPE was worn during the entire visit.  " Hand hygiene was completed before and after.     Rula Piña, APRN  04/04/25  08:49 EDT    \"Dictated utilizing Dragon dictation\".      "

## 2025-04-04 NOTE — DISCHARGE INSTR - ACTIVITY
Continue to use your walker or any other assistive devices at home - it is important to be safe at home with movement.

## 2025-04-04 NOTE — DISCHARGE SUMMARY
[facility] Medicine Services  Discharge Summary    Date of Service: 2025  Patient Name: Syl Herrera  : 1935  MRN: [id number]    Date of Admission: 4/3/2025  Discharge Diagnosis: ICH (intracerebral hemorrhage)  Date of Discharge: 2025  Primary Care Physician: Nava Yu MD      Presenting Problem:   ICH (intracerebral hemorrhage) [I61.9]  Subdural hematoma due to concussion, without loss of consciousness, initial encounter [S06.5X0A]    Active and Resolved Hospital Problems:  Active Hospital Problems    Diagnosis POA    **ICH (intracerebral hemorrhage) [I61.9] Yes      Resolved Hospital Problems   No resolved problems to display.         Hospital Course     HPI & Hospital Course-    89-year-old female with history of atrial fibrillation on anticoagulation with Coumadin, coronary artery disease status post balloon dilatation, hypertension, dyslipidemia.  Patient admitted after he hitting her head on the door.  No loss of consciousness.  CT showed small intracranial hemorrhage measuring 3 mm.  Neurosurgery evaluated.  INR 2.7.  CT head without contrast showing 3 mm thick subdural hematoma along the anterior falx cerebri.  No mass effect.  Patient was given dose of vitamin K and Kcentra.  Repeat INR 1.32.    Patient remained asymptomatic.  Patient was reevaluated by neurosurgery.  Patient had repeat CT scan done which was stable.  Neurosurgery recommended discharge with the suggestion, to resume anticoagulation after 2 weeks.  Patient does not have any neurological deficit and fully alert.  Patient is being discharged accordingly.            DISCHARGE Follow Up Recommendations for labs and diagnostics: FU with pcp        Day of Discharge     Vital Signs:  Temp:  [96.1 °F (35.6 °C)-97.6 °F (36.4 °C)] 97.4 °F (36.3 °C)  Heart Rate:  [60-63] 60  Resp:  [14-20] 14  BP: (132-166)/(57-88) 151/78    Physical Exam:    The patient is alert,  Vital signs as noted above.  Head and neck-  JVP not raised  LungB/L equal air entry  Heart: Normal first and second heart sounds. No murmur.    Abdomen: Soft and nontender.    Extremities with good peripheral pulses without any pedal edema.  Skin: Warm and dry.  Musculoskeletal system is grossly normal.  CNS grossly normal.     Pertinent  and/or Most Recent Results     LAB RESULTS:      Lab 04/04/25  0259 04/03/25  2347 04/03/25  2244   WBC  --  5.44  --    HEMOGLOBIN  --  11.1*  --    HEMATOCRIT  --  35.6  --    PLATELETS  --  141  --    NEUTROS ABS  --  3.04  --    IMMATURE GRANS (ABS)  --  0.01  --    LYMPHS ABS  --  1.55  --    MONOS ABS  --  0.52  --    EOS ABS  --  0.30  --    MCV  --  94.7  --    PROTIME 16.3*  --  28.8         Lab 04/03/25 2347   SODIUM 134*   POTASSIUM 4.6   CHLORIDE 99   CO2 27.8   ANION GAP 7.2   BUN 33*   CREATININE 1.34*   EGFR 38.5*   GLUCOSE 117*   CALCIUM 9.5             Lab 04/04/25 0259 04/03/25 2244   PROTIME 16.3* 28.8   INR 1.32* 2.7*                 Brief Urine Lab Results  (Last result in the past 365 days)        Color   Clarity   Blood   Leuk Est   Nitrite   Protein   CREAT   Urine HCG        02/08/25 1149 Yellow   Clear   Negative   Negative   Negative   Negative                 Microbiology Results (last 10 days)       ** No results found for the last 240 hours. **            CT Head Without Contrast  Result Date: 4/4/2025  Impression: Impression: Stable appearance of the head CT. There is a small subdural hematoma along the anterior falx cerebri. There is no mass effect or midline shift. Electronically Signed: Ehsan Huang MD  4/4/2025 5:09 AM EDT  Workstation ID: [id number]    CT Head Without Contrast  Result Date: 4/3/2025  Impression: Impression: 3 mm thick subdural hematoma along the anterior falx cerebri. There is no mass effect or midline shift. There is diffuse generalized atrophy and chronic microvascular ischemia. Electronically Signed: Ehsan Huang MD  4/3/2025 11:22 PM EDT  Workstation ID:  [id number]      Results for orders placed during the hospital encounter of 05/07/21    Duplex Venous Upper Extremity - Left CAR    Interpretation Summary  · Acute left upper extremity superficial thrombophlebitis noted in the basilic (forearm) and median cubital.  · All other left sided vessels appear normal.      Results for orders placed during the hospital encounter of 05/07/21    Duplex Venous Upper Extremity - Left CAR    Interpretation Summary  · Acute left upper extremity superficial thrombophlebitis noted in the basilic (forearm) and median cubital.  · All other left sided vessels appear normal.      Results for orders placed during the hospital encounter of 06/16/24    Adult Transthoracic Echo Complete W/ Cont if Necessary Per Protocol    Interpretation Summary    Left ventricular systolic function is normal. Calculated left ventricular EF = 69%    Left ventricular diastolic function is consistent with (grade II w/high LAP) pseudonormalization.    Estimated right ventricular systolic pressure from tricuspid regurgitation is mildly elevated (35-45 mmHg).    Conclusion      Normal LV size and contractility EF of 65 to 70%  Grade 2 diastolic dysfunction/pseudonormalization pattern seen.  Normal RV size  Normal atrial size, pacer lead seen  Pulmonic valve is not well visualized.  Aortic valve, mitral valve, tricuspid valve appears structurally normal, moderate tricuspid regurgitation seen.  Calculated RV systolic pressure of 42 mmHg consistent with moderate pulmonary hypertension  No pericardial effusion seen.  Proximal aorta appears normal in size.      Labs Pending at Discharge:  Pending Results       None            Procedures Performed           Consults:   Consults       Date and Time Order Name Status Description    4/4/2025  4:36 AM Inpatient Neurosurgery Consult Completed               Discharge Details        Discharge Medications        Changes to Medications        Instructions Start Date   warfarin 4  MG tablet  Commonly known as: COUMADIN  What changed: These instructions start on April 18, 2025. If you are unsure what to do until then, ask your doctor or other care provider.   TAKE 1 TABLET BY MOUTH DAILY. EXCEPT 1/2 TABLET ON MONDAY AND FRIDAY OR AS DIRECTED   Start Date: April 18, 2025            Continue These Medications        Instructions Start Date   acetaminophen 500 MG tablet  Commonly known as: TYLENOL   500 mg, Oral, Every 6 Hours PRN      albuterol sulfate  (90 Base) MCG/ACT inhaler  Commonly known as: PROVENTIL HFA;VENTOLIN HFA;PROAIR HFA   2 puffs, Inhalation, Every 4 Hours PRN      ALPRAZolam 0.5 MG tablet  Commonly known as: XANAX   0.5 mg, 2 Times Daily PRN      atorvastatin 10 MG tablet  Commonly known as: LIPITOR   10 mg, Nightly      CALCIUM PO   Take  by mouth.      carboxymethylcellulose 0.5 % solution  Commonly known as: REFRESH PLUS   1 drop, 3 Times Daily PRN      cetirizine 10 MG tablet  Commonly known as: zyrTEC   10 mg, Daily      dilTIAZem 120 MG 24 hr capsule  Commonly known as: TIAZAC   120 mg, Oral, Daily      fluticasone 27.5 MCG/SPRAY nasal spray  Commonly known as: VERAMYST   2 sprays, Daily      HYDROcodone-acetaminophen 7.5-325 MG per tablet  Commonly known as: NORCO   1 tablet, Every 6 Hours PRN      isosorbide mononitrate 30 MG 24 hr tablet  Commonly known as: IMDUR   30 mg, Oral, Every Morning      magnesium oxide 400 MG tablet  Commonly known as: MAG-OX   400 mg, Daily      metoprolol tartrate 100 MG tablet  Commonly known as: LOPRESSOR   TAKE 1 TABLET BY MOUTH TWICE DAILY      nitrofurantoin 100 MG capsule  Commonly known as: MACRODANTIN   100 mg, Daily      ondansetron 4 MG tablet  Commonly known as: ZOFRAN   4 mg, Daily PRN      pantoprazole 40 MG EC tablet  Commonly known as: PROTONIX   TK 1 T PO QD      spironolactone 25 MG tablet  Commonly known as: ALDACTONE   TAKE 1/2 TABLET BY MOUTH DAILY      VITAMIN B-12 PO   Take  by mouth.      VITAMIN D-3 PO   Take   by mouth.      Viteyes AREDS Formula capsule   1 capsule, 2 times daily             ASK your doctor about these medications        Instructions Start Date   budesonide 0.5 MG/2ML nebulizer solution  Commonly known as: PULMICORT   0.5 mg, Nebulization, 2 Times Daily - RT      furosemide 40 MG tablet  Commonly known as: LASIX   40 mg, Oral, 2 Times Daily      lidocaine 5 %  Commonly known as: LIDODERM   1 patch, Transdermal, Every 24 Hours, Remove & Discard patch within 12 hours or as directed by MD               Allergies   Allergen Reactions    Doxycycline Other (See Comments)     Chest pain    Celebrex [Celecoxib] Other (See Comments)     Chest pain    Contrast Dye (Echo Or Unknown Ct/Mr) Itching    Iodinated Contrast Media Itching    Nsaids Other (See Comments)     convulsion    Other Itching     Pt states some steroids make her itch and hallucinate- she does not know the names   She said she is allergic to all arthritis medicine    Sulfa Antibiotics Nausea Only         Discharge Diagnosis     Acute traumatic subdural hematoma with no neurological deficit-patient has been evaluated by neurosurgery.  Serial CT scans stable.  Neurosurgery has recommended discharge.  Neurosurgery has recommended to resume anticoagulation after 2 weeks    Chronic atrial fibrillation-rate controlled.  On anticoagulation.  Coumadin is on hold for 2 weeks    Hypertension    Dyslipidemia    Disposition-  Home or Self Care    Diet:  Hospital:  Diet Order   Procedures    NPO Diet NPO Type: Strict NPO         Discharge Activity:   As tolerated      CODE STATUS:  Code Status and Medical Interventions: CPR (Attempt to Resuscitate); Full Support   Ordered at: 04/04/25 0142     Code Status (Patient has no pulse and is not breathing):    CPR (Attempt to Resuscitate)     Medical Interventions (Patient has pulse or is breathing):    Full Support     Level Of Support Discussed With:    Patient         Future Appointments   Date Time Provider  Department Center   4/30/2025  1:15 PM [facility]   9/10/2025  2:00 PM [facility]   9/10/2025  2:30 PM [facility]   9/10/2025  2:45 PM Ivan Martell MD [facility]       Additional Instructions for the Follow-ups that You Need to Schedule       Discharge Follow-up with PCP   As directed       Currently Documented PCP:    Nava Yu MD    PCP Phone Number:    [phone]     Follow Up Details: 7-10 days        CT Head Without Contrast   Apr 18, 2025      Release to patient: Routine Release   Exam reason: Follow-up anterior falx subdural hematoma                Time spent on Discharge including face to face service: 30 minutes    Signature: Electronically signed by Nilson Veloz MD, 04/04/25, 10:09 EDT.  [facility]ist Team

## 2025-04-04 NOTE — PLAN OF CARE
Goal Outcome Evaluation:  Plan of Care Reviewed With: patient        Progress: no change  Outcome Evaluation: Pt. is an 89 y/o female admit s/p fall w/ subdural hematoma, not stable per imaging and cleared for therapy. Pt. states she lives at home alone but has family thats lives close and stays with her at night. Pt. maintains ADL independence at baseline w/ IADL support, not steps to enter her apt. Pt. family reports fall in November last year going up steps. Pt. utilizes rollator at baseline. Pt. completes ambulatory transfers w/ mod I this date, setup assist for all LB ADLs and nsg reports independent toileting. Pt. close to baseline functional independence, impacts to dynamic standing balance and pt. would benefit from  PT evaluation to improve safety and mitigate falls risk following traumatic fall. Pt. does not require furthur skilled IP OT at this time.    Anticipated Discharge Disposition (OT): home with assist

## 2025-04-04 NOTE — CASE MANAGEMENT/SOCIAL WORK
Discharge Planning Assessment   Daniele     Patient Name: Syl Herrera  MRN: [id number]  Today's Date: 4/4/2025    Admit Date: 4/3/2025    Plan: Plan to return home alone, 6 adult children live nearby and check on frequently. [facility] PT, accepted.   Discharge Needs Assessment       Row Name 04/04/25 1144       Living Environment    People in Home alone    Unique Family Situation 6 adult children live near-by and check on regularly    Current Living Arrangements home    Potentially Unsafe Housing Conditions none    In the past 12 months has the electric, gas, oil, or water company threatened to shut off services in your home? No    Primary Care Provided by self    Provides Primary Care For no one    Family Caregiver if Needed child(angela), adult    Family Caregiver Names Daughters Lisandra, Chey, and Janny    Quality of Family Relationships helpful;involved;supportive    Able to Return to Prior Arrangements yes       Resource/Environmental Concerns    Resource/Environmental Concerns none    Transportation Concerns none       Transportation Needs    In the past 12 months, has lack of transportation kept you from medical appointments or from getting medications? no    In the past 12 months, has lack of transportation kept you from meetings, work, or from getting things needed for daily living? No       Food Insecurity    Within the past 12 months, you worried that your food would run out before you got the money to buy more. Never true    Within the past 12 months, the food you bought just didn't last and you didn't have money to get more. Never true       Transition Planning    Patient/Family Anticipates Transition to home    Patient/Family Anticipated Services at Transition none    Transportation Anticipated car, drives self;family or friend will provide       Discharge Needs Assessment    Readmission Within the Last 30 Days no previous admission in last 30 days    Equipment Currently Used at Home walker, standard;walker,  rolling;bp cuff;shower chair    Concerns to be Addressed discharge planning    Anticipated Changes Related to Illness none    Equipment Needed After Discharge none                   Discharge Plan       Row Name 04/04/25 1147       Plan    Plan Plan to return home alone, 6 adult children live nearby and check on frequently. [facility] PT, accepted.    Patient/Family in Agreement with Plan yes    Plan Comments CM met with patient at bedside. Patient A&Ox4. Patient lives at home alone but has 6 adult children that checkon her frequently. Family will transport at discharge. Patient performs ADLs, has a Walker, RW, SC, b/p cuff at home. PCP and pharmacy confirmed. Agreeable to M2B. Sgreeable to [facility] PT, referral in basket, liaison notified, pending accepted. Denies financial assistance needs for medication and/or food. Denies any current HH, Caregiver, or rehab services. Plan to dc today after PT/OT eval clearance.                  Continued Care and Services - Admitted Since 4/3/2025       Home Medical Care Coordination complete.      Service Provider Request Status Services Address Phone Fax Patient Preferred    [facility] [address] [facility] [place] [address] [phone] --                  Expected Discharge Date and Time       Expected Discharge Date Expected Discharge Time    Apr 4, 2025            Demographic Summary       Row Name 04/04/25 1143       General Information    Admission Type inpatient    Arrived From emergency department    Required Notices Provided Observation Status Notice    Referral Source admission list    Reason for Consult discharge planning    Preferred Language English                   Functional Status       Row Name 04/04/25 1143       Functional Status    Usual Activity Tolerance moderate    Current Activity Tolerance moderate       Functional Status, IADL    Medications independent    Meal Preparation  independent    Housekeeping independent    Laundry independent    Shopping independent       Mental Status    General Appearance WDL WDL       Mental Status Summary    Recent Changes in Mental Status/Cognitive Functioning no changes             O. Kathy Wells RN  ICU/CVU   O: [phone]  C: [phone]  [email or web address]

## 2025-04-04 NOTE — DISCHARGE INSTR - APPOINTMENTS
Schedule a follow-up appointment with Dr. Archer (Neurosurgery) in two weeks for a follow-up CT scan of your head PRIOR to restarting blood thinners (Coumadin). Schedule repeat CT Head scan around Friday, April 18, 2025.  Phone Number: [phone]

## 2025-04-04 NOTE — H&P
[facility] Medicine Services  History & Physical    Patient Name: Syl Herrera  : 1935  MRN: [id number]  Primary Care Physician:  Nava Yu MD  Date of admission: 4/3/2025  Date and Time of Service: 4/3/2025 at     Subjective      Chief Complaint:     History of Present Illness: Syl Herrera is a 89 y.o. female with a CMH of A-fib on anticoagulation, CAD, GERD, status post balloon dilatation, hypertension, hyperlipidemia, who initially presented to [place] ER after hitting her head on the door, denies any loss of consciousness CT there showing small intracranial hemorrhage measuring 3 mm, neurosurgery evaluated the patient recommending monitoring in PCU level, therefore patient transferred to [facility] on 4/3/2025 for further evaluation.    Patient is awake alert [place] x 3, uses walker to get around, reported she was trying to get from one room to another, however was found to find buttons to turn on the light however was unable to, which led to fall, reported hit her head, denies any loss of consciousness, chest pain, shortness of breath, fever or chills or dysuria.  Patient is compliant with her Coumadin for A-fib, denies any chest pain shortness of breath fever chills or falls or loss of consciousness.    At [facility] ER, her vitals were stable, afebrile, labs remarkable for sodium 134, creatinine 1.34, hemoglobin 11.1, WBC 5.44.  INR of 2.7. CT head without contrast showing 3 mm thick subdural hematoma along the anterior falx cerebri, no mass effect or midline shift noted, otherwise diffuse generalized atrophy and chronic microvascular ischemia noted.  Patient was given dose of vitamin K 10 mg as well as Kcentra was ordered. Neurosurgery consulted, recommending to repeat CT in 6 hours, no further surgical intervention at this time.  Patient now admitted to medicine team for further inpatient managemen      Review of Systems negative unless mentioned above    Personal  History     Past Medical History:   Diagnosis Date    Anxiety     Arthritis     Asthma     Atrial fibrillation     CKD (chronic kidney disease) stage 3, GFR 30-59 ml/min 04/18/2021    Coronary artery disease     Dyslipidemia     Dyslipidemia 01/05/2015    GERD (gastroesophageal reflux disease)     History of bone density study 04/2015    Hypertension     Sleep apnea     Wears dentures        Past Surgical History:   Procedure Laterality Date    BLADDER REPAIR  1990    BREAST LUMPECTOMY  1974    BENIGN    CARDIAC CATHETERIZATION  05/25/2011    CARDIAC CATHETERIZATION N/A 04/21/2021    Procedure: Left Heart Cath and coronary angiogram;  Surgeon: Ivan Martell MD;  Location: [facility] CATH INVASIVE LOCATION;  Service: Cardiovascular;  Laterality: N/A;    CARDIAC CATHETERIZATION N/A 04/21/2021    Procedure: Left ventriculography;  Surgeon: Ivan Martell MD;  Location: [facility] CATH INVASIVE LOCATION;  Service: Cardiovascular;  Laterality: N/A;    CARDIAC CATHETERIZATION  04/21/2021    Procedure: Saphenous Vein Graft;  Surgeon: Ivan Martell MD;  Location: [facility] CATH INVASIVE LOCATION;  Service: Cardiovascular;;    CARDIAC CATHETERIZATION N/A 04/26/2021    Procedure: Percutaneous Coronary Intervention;  Surgeon: Ivan Martell MD;  Location: [facility] CATH INVASIVE LOCATION;  Service: Cardiovascular;  Laterality: N/A;    CARDIAC CATHETERIZATION N/A 04/26/2021    Procedure: Stent RAY coronary;  Surgeon: Ivan Martell MD;  Location: [facility] CATH INVASIVE LOCATION;  Service: Cardiovascular;  Laterality: N/A;    CARDIOVASCULAR STRESS TEST  05/04/2015    CHOLECYSTECTOMY  1990    CORONARY ARTERY BYPASS GRAFT  08/09/2017    X5  Dr Brandt    ENDOSCOPY N/A 06/30/2020    Procedure: ESOPHAGOGASTRODUODENOSCOPY with biopsy x 1 area and dilatation (15-18mm balloon) up to 18mm;  Surgeon: Quinton Tapia MD;  Location: [facility] ENDOSCOPY;  Service: Gastroenterology;  Laterality: N/A;  post op: gastritis, large hiatal hernia, esophageal  dysmotility    ENDOSCOPY N/A 10/24/2024    Procedure: ESOPHAGOGASTRODUODENOSCOPY WITH GASTRIC BIOPSY, BALLOON DILATION (18MM-20MM) UP TO 19MM,BIOPSY OF ESOPHAGUS;  Surgeon: Quinton Tapia MD;  Location: [facility] ENDOSCOPY;  Service: Gastroenterology;  Laterality: N/A;  GASTRITIS, ESOPHAGITIS, HIATEL HERNIA    HYSTERECTOMY  1990    INSERT / REPLACE / REMOVE PACEMAKER      JOINT REPLACEMENT Right     knee     OTHER SURGICAL HISTORY  06/2017    BLOOD CLOT REMOVED FROM LEFT EAR    PACEMAKER IMPLANTATION  04/18/2016    DUAL CHAMBER [facility]       Family History: family history includes Heart disease in her brother, mother, and sister; Hypertension in her mother. Otherwise pertinent FHx was reviewed and not pertinent to current issue.    Social History:  reports that she has never smoked. She has never used smokeless tobacco. She reports that she does not drink alcohol and does not use drugs.    Home Medications:  Prior to Admission Medications       Prescriptions Last Dose Informant Patient Reported? Taking?    acetaminophen (TYLENOL) 500 MG tablet   No No    Take 1 tablet by mouth Every 6 (Six) Hours As Needed for Mild Pain or Moderate Pain.    albuterol sulfate  (90 Base) MCG/ACT inhaler   No No    Inhale 2 puffs Every 4 (Four) Hours As Needed for Wheezing or Shortness of Air.    ALPRAZolam (XANAX) 0.5 MG tablet  Self Yes No    Take 1 tablet by mouth 2 (Two) Times a Day As Needed.    atorvastatin (LIPITOR) 10 MG tablet  Self Yes No    Take 1 tablet by mouth Every Night.    budesonide (PULMICORT) 0.5 MG/2ML nebulizer solution   No No    Take 2 mL by nebulization 2 (Two) Times a Day.    Patient not taking:  Reported on 2/26/2025    CALCIUM PO   Yes No    Take  by mouth.    carboxymethylcellulose (REFRESH PLUS) 0.5 % solution  Self Yes No    Administer 1 drop to both eyes 3 (Three) Times a Day As Needed for Dry Eyes.    cetirizine (zyrTEC) 10 MG tablet   Yes No    Take 1 tablet by mouth Daily.    Cholecalciferol  (VITAMIN D-3 PO)   Yes No    Take  by mouth.    Patient not taking:  Reported on 2/26/2025    Cyanocobalamin (VITAMIN B-12 PO)   Yes No    Take  by mouth.    Patient not taking:  Reported on 2/26/2025    dilTIAZem (TIAZAC) 120 MG 24 hr capsule   No No    TAKE 1 CAPSULE BY MOUTH DAILY    fluticasone (VERAMYST) 27.5 MCG/SPRAY nasal spray   Yes No    Administer 2 sprays into the nostril(s) as directed by provider Daily.    furosemide (LASIX) 40 MG tablet   No No    Take 1 tablet by mouth 2 (Two) Times a Day.    Patient not taking:  Reported on 2/26/2025    HYDROcodone-acetaminophen (NORCO) 7.5-325 MG per tablet   Yes No    Take 1 tablet by mouth Every 6 (Six) Hours As Needed for Moderate Pain.    isosorbide mononitrate (IMDUR) 30 MG 24 hr tablet   No No    TAKE 1 TABLET BY MOUTH EVERY MORNING    Patient not taking:  Reported on 2/26/2025    lidocaine (LIDODERM) 5 %   No No    Place 1 patch on the skin as directed by provider Daily. Remove & Discard patch within 12 hours or as directed by MD    magnesium oxide (MAG-OX) 400 MG tablet  Self Yes No    Take 1 tablet by mouth Daily.    metoprolol tartrate (LOPRESSOR) 100 MG tablet   No No    TAKE 1 TABLET BY MOUTH TWICE DAILY    Multiple Vitamins-Minerals (VITEYES AREDS FORMULA) capsule  ALEKSANDR Yes No    Take 1 capsule by mouth 2 (two) times a day.    nitrofurantoin (MACRODANTIN) 100 MG capsule   Yes No    Take 1 capsule by mouth Daily.    ondansetron (ZOFRAN) 4 MG tablet  Self Yes No    Take 1 tablet by mouth Daily As Needed for Nausea or Vomiting.    pantoprazole (PROTONIX) 40 MG EC tablet  Self Yes No    TK 1 T PO QD    spironolactone (ALDACTONE) 25 MG tablet   No No    TAKE 1/2 TABLET BY MOUTH DAILY    warfarin (COUMADIN) 4 MG tablet   No No    TAKE 1 TABLET BY MOUTH DAILY. EXCEPT 1/2 TABLET ON MONDAY AND FRIDAY OR AS DIRECTED              Allergies:  Allergies   Allergen Reactions    Doxycycline Other (See Comments)     Chest pain    Celebrex [Celecoxib] Other (See  Comments)     Chest pain    Contrast Dye (Echo Or Unknown Ct/Mr) Itching    Iodinated Contrast Media Itching    Nsaids Other (See Comments)     convulsion    Other Itching     Pt states some steroids make her itch and hallucinate- she does not know the names   She said she is allergic to all arthritis medicine    Sulfa Antibiotics Nausea Only       Objective      Vitals:   Temp:  [97.4 °F (36.3 °C)-97.6 °F (36.4 °C)] 97.6 °F (36.4 °C)  Heart Rate:  [60-61] 61  Resp:  [20] 20  BP: (132-139)/(62-67) 132/62  Body mass index is 31.82 kg/m².  Physical Exam  General: Awake alert , Oriented x3, conversational  HEENT: No scleral icterus normocephalic  Respiratory: Clear to auscultation my exam  Cardio: Heart rate normal, rhythm regular  Abdomen: Soft, nontender, no rebound or guarding  Extremities: No remarkable edema on the bilateral extremities  Neuro: Awake alert [place] x 3, normal speech, moves all extremities      Diagnostic Data:  Lab Results (last 24 hours)       Procedure Component Value Units Date/Time    Basic Metabolic Panel [[id number]]  (Abnormal) Collected: 04/03/25 2347    Specimen: Blood Updated: 04/04/25 0011     Glucose 117 mg/dL      BUN 33 mg/dL      Creatinine 1.34 mg/dL      Sodium 134 mmol/L      Potassium 4.6 mmol/L      Chloride 99 mmol/L      CO2 27.8 mmol/L      Calcium 9.5 mg/dL      BUN/Creatinine Ratio 24.6     Anion Gap 7.2 mmol/L      eGFR 38.5 mL/min/1.73     Narrative:      GFR Categories in Chronic Kidney Disease (CKD)      GFR Category          GFR (mL/min/1.73)    Interpretation  G1                     90 or greater         Normal or high (1)  G2                      60-89                Mild decrease (1)  G3a                   45-59                Mild to moderate decrease  G3b                   30-44                Moderate to severe decrease  G4                    15-29                Severe decrease  G5                    14 or less           Kidney failure          (1)In the absence  of evidence of kidney disease, neither GFR category G1 or G2 fulfill the criteria for CKD.    eGFR calculation 2021 CKD-EPI creatinine equation, which does not include race as a factor    CBC & Differential [[id number]]  (Abnormal) Collected: 04/03/25 2347    Specimen: Blood Updated: 04/03/25 2357    Narrative:      The following orders were created for panel order CBC & Differential.  Procedure                               Abnormality         Status                     ---------                               -----------         ------                     CBC Auto Differential[[id number]]        Abnormal            Final result                 Please view results for these tests on the individual orders.    CBC Auto Differential [[id number]]  (Abnormal) Collected: 04/03/25 2347    Specimen: Blood Updated: 04/03/25 2357     WBC 5.44 10*3/mm3      RBC 3.76 10*6/mm3      Hemoglobin 11.1 g/dL      Hematocrit 35.6 %      MCV 94.7 fL      MCH 29.5 pg      MCHC 31.2 g/dL      RDW 14.4 %      RDW-SD 50.5 fl      MPV 10.6 fL      Platelets 141 10*3/mm3      Neutrophil % 55.8 %      Lymphocyte % 28.5 %      Monocyte % 9.6 %      Eosinophil % 5.5 %      Basophil % 0.4 %      Immature Grans % 0.2 %      Neutrophils, Absolute 3.04 10*3/mm3      Lymphocytes, Absolute 1.55 10*3/mm3      Monocytes, Absolute 0.52 10*3/mm3      Eosinophils, Absolute 0.30 10*3/mm3      Basophils, Absolute 0.02 10*3/mm3      Immature Grans, Absolute 0.01 10*3/mm3     POC Protime / INR [[id number]]  (Abnormal) Collected: 04/03/25 2244    Specimen: Blood Updated: 04/03/25 2252     Protime 28.8 seconds      INR 2.7             Imaging Results (Last 24 Hours)       Procedure Component Value Units Date/Time    CT Head Without Contrast [[id number]] Collected: 04/03/25 2316     Updated: 04/03/25 2324    Narrative:      CT HEAD WO CONTRAST    Date of Exam: 4/3/2025 10:52 PM EDT    Indication: head injury on coumadin.    Comparison: CT head 12/4/2024    Technique:  Axial CT images were obtained of the head without contrast administration.  Coronal reconstructions were performed.  Automated exposure control and iterative reconstruction methods were used.      Findings:  There is diffuse generalized atrophy. There is no mass, mass effect or midline shift. There are no parenchymal hemorrhages. There are areas of diminished attenuation throughout the bilateral periventricular white matter consistent with chronic   microvascular ischemia.    There is subtle thickening of the anterior falx cerebri consistent with subdural hematoma. Maximum thickness is 3 mm. There is no midline shift. The paranasal sinuses and mastoid air cells are clear.      Impression:      Impression:  3 mm thick subdural hematoma along the anterior falx cerebri. There is no mass effect or midline shift. There is diffuse generalized atrophy and chronic microvascular ischemia.            Electronically Signed: Ehsan Huang MD    4/3/2025 11:22 PM EDT    Workstation ID: [id number]              Assessment & Plan    Syl Herrera is a 89 y.o. female with a CMH of A-fib on anticoagulation, CAD, GERD, status post balloon dilatation, hypertension, hyperlipidemia, who initially presented to [place] ER after hitting her head on the door, denies any loss of consciousness CT there showing small intracranial hemorrhage measuring 3 mm, neurosurgery evaluated the patient recommending monitoring in PCU level, therefore patient transferred to [facility] on 4/3/2025 for further evaluation.    Assessments  Subdural hematoma, measuring 3 mm with no midline shift  A-fib on anticoagulation currently Coumadin on hold  Hypertension  Hyperlipidemia  GERD    Plan  -Neurosurgery has been consulted, further recs to follow, repeat CT in 6 hours from initial CT to monitor for subdural hematoma  -Has been given vitamin K, consenter is ordered per ED  -Hold anticoagulation for now  -Resume other home medication once reconciled by  pharmacy or medically appropriate  -Hold anticoagulation / antiplatelets given subdural bleed  -AM labs    VTE Prophylaxis:  No VTE prophylaxis order currently exists.      CODE STATUS:  Full      I discussed the patient's findings and my recommendations with patient.      This document has been electronically signed by Joshua Lorenzana MD on April 4, 2025 01:21 EDT   [facility]

## 2025-04-04 NOTE — DISCHARGE PLACEMENT REQUEST
"James Herrera P \"James or Grisel\" (89 y.o. Female)       Date of Birth   1935    Social Security Number       Address   [address] [place] [facility]    Home Phone   [phone]    MRN   [id number]       Hindu   Hoahaoism    Marital Status                               Admission Date   4/3/2025    Admission Type   Emergency    Admitting Provider   Joshua Lorenzana MD    Attending Provider   Nilson Vleoz MD    Department, Room/Bed   [facility] INTENSIVE CARE UNIT, 2316/1       Discharge Date       Discharge Disposition   Home or Self Care    Discharge Destination                                 Attending Provider: Nilson Veloz MD    Allergies: Doxycycline, Celebrex [Celecoxib], Contrast Dye (Echo Or Unknown Ct/mr), Iodinated Contrast Media, Nsaids, Other, Sulfa Antibiotics    Isolation: None   Infection: None   Code Status: CPR    Ht: 152.4 cm (60\")   Wt: 74.6 kg (164 lb 7.4 oz)    Admission Cmt: None   Principal Problem: ICH (intracerebral hemorrhage) [I61.9]                   Active Insurance as of 4/3/2025       Primary Coverage       Payor Plan Insurance Group Employer/Plan Group    MEDICARE MEDICARE A & B        Payor Plan Address Payor Plan Phone Number Payor Plan Fax Number Effective Dates    [address] [phone]  6/1/2002 - None Entered    [facility] [id number]         Subscriber Name Subscriber Birth Date Member ID       JAMES HERRERA P 1935 [id number]               Secondary Coverage       Payor Plan Insurance Group Employer/Plan Group    [employer] SUP AAR HEALTH CARE OPTIONS        Payor Plan Address Payor Plan Phone Number Payor Plan Fax Number Effective Dates    [facility] [phone]  1/1/2017 - None Entered    [address]       [facility] [id number]         Subscriber Name Subscriber Birth Date Member ID       JAMES HERRERA P 1935 [id number]                     Emergency Contacts        (Rel.) Home Phone Work Phone " Mobile Phone    GuadalupeJanny (Daughter) -- -- [phone]    Rosa Lazcano (Daughter) [phone] -- [phone]    Lisandra Bell (Daughter) [phone] -- [phone]    chantelleNazia -- -- [phone]

## 2025-04-04 NOTE — THERAPY EVALUATION
Patient Name: Syl Herrera  : 1935    MRN: [id number]                              Today's Date: 2025       Admit Date: 4/3/2025    Visit Dx:     ICD-10-CM ICD-9-CM   1. Traumatic intracerebral hemorrhage with loss of consciousness, unspecified laterality, initial encounter  S06.369A 853.06   2. Subdural hematoma due to concussion, without loss of consciousness, initial encounter  S06.5X0A 852.21   3. Persistent atrial fibrillation  I48.19 427.31   4. Nontraumatic intracerebral hemorrhage, unspecified cerebral location, unspecified laterality  I61.9 431     Patient Active Problem List   Diagnosis    Long term (current) use of anticoagulants [Z79.01]    Atrial fibrillation    Atherosclerosis of coronary artery bypass graft    Coronary artery disease    Dyslipidemia    Hypertension    Presence of cardiac pacemaker    Bradycardia, sinus    Chest pain    Anxiety associated with depression    Obesity (BMI 30-39.9)    Seasonal allergies    GERD without esophagitis    CKD (chronic kidney disease) stage 3, GFR 30-59 ml/min    Chronic pain    Angina at rest    Abnormal nuclear stress test    Arthritis    Shortness of breath    Positive D dimer    Hiatal hernia    ICH (intracerebral hemorrhage)     Past Medical History:   Diagnosis Date    Anxiety     Arthritis     Asthma     Atrial fibrillation     CKD (chronic kidney disease) stage 3, GFR 30-59 ml/min 2021    Coronary artery disease     Dyslipidemia     Dyslipidemia 2015    GERD (gastroesophageal reflux disease)     History of bone density study 2015    Hypertension     Sleep apnea     Wears dentures      Past Surgical History:   Procedure Laterality Date    BLADDER REPAIR      BREAST LUMPECTOMY  1974    BENIGN    CARDIAC CATHETERIZATION  2011    CARDIAC CATHETERIZATION N/A 2021    Procedure: Left Heart Cath and coronary angiogram;  Surgeon: Ivan Matrell MD;  Location: [facility] CATH INVASIVE LOCATION;  Service: Cardiovascular;   Laterality: N/A;    CARDIAC CATHETERIZATION N/A 04/21/2021    Procedure: Left ventriculography;  Surgeon: Ivan Martell MD;  Location: [facility] CATH INVASIVE LOCATION;  Service: Cardiovascular;  Laterality: N/A;    CARDIAC CATHETERIZATION  04/21/2021    Procedure: Saphenous Vein Graft;  Surgeon: Ivan Martell MD;  Location: [facility] CATH INVASIVE LOCATION;  Service: Cardiovascular;;    CARDIAC CATHETERIZATION N/A 04/26/2021    Procedure: Percutaneous Coronary Intervention;  Surgeon: Ivan Martell MD;  Location: [facility] CATH INVASIVE LOCATION;  Service: Cardiovascular;  Laterality: N/A;    CARDIAC CATHETERIZATION N/A 04/26/2021    Procedure: Stent RAY coronary;  Surgeon: Ivan Martell MD;  Location: [facility] CATH INVASIVE LOCATION;  Service: Cardiovascular;  Laterality: N/A;    CARDIOVASCULAR STRESS TEST  05/04/2015    CHOLECYSTECTOMY  1990    CORONARY ARTERY BYPASS GRAFT  08/09/2017    X5  Dr Brandt    ENDOSCOPY N/A 06/30/2020    Procedure: ESOPHAGOGASTRODUODENOSCOPY with biopsy x 1 area and dilatation (15-18mm balloon) up to 18mm;  Surgeon: Quinton Tapia MD;  Location: [facility] ENDOSCOPY;  Service: Gastroenterology;  Laterality: N/A;  post op: gastritis, large hiatal hernia, esophageal dysmotility    ENDOSCOPY N/A 10/24/2024    Procedure: ESOPHAGOGASTRODUODENOSCOPY WITH GASTRIC BIOPSY, BALLOON DILATION (18MM-20MM) UP TO 19MM,BIOPSY OF ESOPHAGUS;  Surgeon: Quinton Tapia MD;  Location: [facility] ENDOSCOPY;  Service: Gastroenterology;  Laterality: N/A;  GASTRITIS, ESOPHAGITIS, HIATEL HERNIA    HYSTERECTOMY  1990    INSERT / REPLACE / REMOVE PACEMAKER      JOINT REPLACEMENT Right     knee     OTHER SURGICAL HISTORY  06/2017    BLOOD CLOT REMOVED FROM LEFT EAR    PACEMAKER IMPLANTATION  04/18/2016    DUAL CHAMBER ST ALESSIO      General Information       Row Name 04/04/25 1718          OT Time and Intention    Document Type evaluation  -MP     Mode of Treatment individual therapy  -MP     Patient Effort excellent  -MP        Row Name 04/04/25 1718          General Information    Patient Profile Reviewed yes  -MP     Prior Level of Function independent:;ADL's  -MP       Row Name 04/04/25 1718          Living Environment    Current Living Arrangements home  -     People in Home alone  -MP       Row Name 04/04/25 1718          Cognition    Orientation Status (Cognition) oriented x 3  -MP       Row Name 04/04/25 1718          Safety Issues/Impairments Affecting Functional Mobility    Impairments Affecting Function (Mobility) balance  -MP               User Key  (r) = Recorded By, (t) = Taken By, (c) = Cosigned By      Initials Name Provider Type    Doe Michel OT Occupational Therapist                     Mobility/ADL's       Row Name 04/04/25 1724          Transfers    Transfers sit-stand transfer  -MP       Row Name 04/04/25 1724          Sit-Stand Transfer    Sit-Stand Tamms (Transfers) modified independence  -       Row Name 04/04/25 1724          Functional Mobility    Functional Mobility- Ind. Level conditional independence  -       Row Name 04/04/25 1724          Activities of Daily Living    BADL Assessment/Intervention lower body dressing  -       Row Name 04/04/25 1724          Lower Body Dressing Assessment/Training    Tamms Level (Lower Body Dressing) lower body dressing skills;set up  -MP               User Key  (r) = Recorded By, (t) = Taken By, (c) = Cosigned By      Initials Name Provider Type    Doe Michel OT Occupational Therapist                   Obj/Interventions       Row Name 04/04/25 1725          Range of Motion Comprehensive    Comment, General Range of Motion BUE WFL  -MP       Row Name 04/04/25 1725          Strength Comprehensive (MMT)    Comment, General Manual Muscle Testing (MMT) Assessment BUE WFL  -MP       Row Name 04/04/25 1725          Balance    Balance Interventions sitting;standing;sit to stand;dynamic;static;supported  -MP               User Key  (r) =  Recorded By, (t) = Taken By, (c) = Cosigned By      Initials Name Provider Type    Doe Michel, ALVARO Occupational Therapist                   Goals/Plan    No documentation.                  Clinical Impression       Row Name 04/04/25 1727          Pain Assessment    Pretreatment Pain Rating 0/10 - no pain  -MP     Posttreatment Pain Rating 0/10 - no pain  -MP       Row Name 04/04/25 1727          Plan of Care Review    Plan of Care Reviewed With patient  -MP     Progress no change  -MP     Outcome Evaluation Pt. is an 88 y/o female admit s/p fall w/ subdural hematoma, not stable per imaging and cleared for therapy. Pt. states she lives at home alone but has family thats lives close and stays with her at night. Pt. maintains ADL independence at baseline w/ IADL support, not steps to enter her apt. Pt. family reports fall in November last year going up steps. Pt. utilizes rollator at baseline. Pt. completes ambulatory transfers w/ mod I this date, setup assist for all LB ADLs and nsg reports independent toileting. Pt. close to baseline functional independence, impacts to dynamic standing balance and pt. would benefit from  PT evaluation to improve safety and mitigate falls risk following traumatic fall. Pt. does not require furthur skilled IP OT at this time.  -MP       Row Name 04/04/25 1727          Therapy Assessment/Plan (OT)    Rehab Potential (OT) good  -MP     Criteria for Skilled Therapeutic Interventions Met (OT) no;no problems identified which require skilled intervention;does not meet criteria for skilled intervention  -MP     Therapy Frequency (OT) evaluation only  -MP       Row Name 04/04/25 1727          Therapy Plan Review/Discharge Plan (OT)    Anticipated Discharge Disposition (OT) home with assist  -MP       Row Name 04/04/25 1727          Vital Signs    Pre Patient Position Sitting  -MP     Intra Patient Position Standing  -MP     Post Patient Position Sitting  -MP       Row Name 04/04/25  172          Positioning and Restraints    Pre-Treatment Position sitting in chair/recliner  -MP     Post Treatment Position chair  -MP     In Chair sitting;call light within reach;encouraged to call for assist;exit alarm on  -MP               User Key  (r) = Recorded By, (t) = Taken By, (c) = Cosigned By      Initials Name Provider Type    Doe Michel OT Occupational Therapist                   Outcome Measures    No documentation.                     OT Recommendation and Plan  Therapy Frequency (OT): evaluation only  Plan of Care Review  Plan of Care Reviewed With: patient  Progress: no change  Outcome Evaluation: Pt. is an 89 y/o female admit s/p fall w/ subdural hematoma, not stable per imaging and cleared for therapy. Pt. states she lives at home alone but has family thats lives close and stays with her at night. Pt. maintains ADL independence at baseline w/ IADL support, not steps to enter her apt. Pt. family reports fall in November last year going up steps. Pt. utilizes rollator at baseline. Pt. completes ambulatory transfers w/ mod I this date, setup assist for all LB ADLs and nsg reports independent toileting. Pt. close to baseline functional independence, impacts to dynamic standing balance and pt. would benefit from  PT evaluation to improve safety and mitigate falls risk following traumatic fall. Pt. does not require furthur skilled IP OT at this time.     Time Calculation:         Time Calculation- OT       Row Name 04/04/25 173             Time Calculation- OT    OT Start Time 1230  -MP      OT Stop Time 1252  -MP      OT Time Calculation (min) 22 min  -MP      Total Timed Code Minutes- OT 0 minute(s)  -MP      OT Received On 04/04/25  -                User Key  (r) = Recorded By, (t) = Taken By, (c) = Cosigned By      Initials Name Provider Type    Doe Michel OT Occupational Therapist                  Therapy Charges for Today       Code Description Service Date Service  Provider Modifiers Qty    [id number] HC OT EVAL LOW COMPLEXITY 4 4/4/2025 Doe Ritchie, ALVARO GO 1                 Doe Ritchie OT  4/4/2025

## 2025-04-04 NOTE — PLAN OF CARE
Goal Outcome Evaluation:   Pt arrived from [facility] around 0130. Pt status and vital signs stable entire shift. Pt has remained neurologically intact throughout the shift, alert and oriented x4. On room air. Blood INR high upon arrival, Kcentra and Vitamin K given, INR went from 2.7 to 1.3, no sign of bleeding noted. CT scan upon arrival showed a 3mm subdural hematoma on right side, morning CT scan stable. Neurosurgery consulted per order. Pt had minimal headache this shift, no complaints otherwise this shift. Good urine output, no BM.

## 2025-04-04 NOTE — NURSING NOTE
Upon removing O2 sensor, pad of finger was removed from patient and started to bleed - Vaseline gauze, 4x4s, and foam tape applied along with firm pressure for around 2 minutes prior to being placed into wheelchair to main lobby.     Patient DC'd home with daughter without further issue. Educated well on need for further outpatient testing PRIOR to restarting coumadin.

## 2025-04-04 NOTE — THERAPY EVALUATION
Acute Care - Speech Language Pathology   Swallow Initial Evaluation [facility]     Patient Name: Syl Herrera  : 1935  MRN: [id number]  Today's Date: 2025               Admit Date: 4/3/2025    Visit Dx:     ICD-10-CM ICD-9-CM   1. Subdural hematoma due to concussion, without loss of consciousness, initial encounter  S06.5X0A 852.21   2. Persistent atrial fibrillation  I48.19 427.31     Patient Active Problem List   Diagnosis    Long term (current) use of anticoagulants [Z79.01]    Atrial fibrillation    Atherosclerosis of coronary artery bypass graft    Coronary artery disease    Dyslipidemia    Hypertension    Presence of cardiac pacemaker    Bradycardia, sinus    Chest pain    Anxiety associated with depression    Obesity (BMI 30-39.9)    Seasonal allergies    GERD without esophagitis    CKD (chronic kidney disease) stage 3, GFR 30-59 ml/min    Chronic pain    Angina at rest    Abnormal nuclear stress test    Arthritis    Shortness of breath    Positive D dimer    Hiatal hernia    ICH (intracerebral hemorrhage)     Past Medical History:   Diagnosis Date    Anxiety     Arthritis     Asthma     Atrial fibrillation     CKD (chronic kidney disease) stage 3, GFR 30-59 ml/min 2021    Coronary artery disease     Dyslipidemia     Dyslipidemia 2015    GERD (gastroesophageal reflux disease)     History of bone density study 2015    Hypertension     Sleep apnea     Wears dentures      Past Surgical History:   Procedure Laterality Date    BLADDER REPAIR      BREAST LUMPECTOMY  1974    BENIGN    CARDIAC CATHETERIZATION  2011    CARDIAC CATHETERIZATION N/A 2021    Procedure: Left Heart Cath and coronary angiogram;  Surgeon: Ivan Martell MD;  Location: [facility] CATH INVASIVE LOCATION;  Service: Cardiovascular;  Laterality: N/A;    CARDIAC CATHETERIZATION N/A 2021    Procedure: Left ventriculography;  Surgeon: Ivan Martell MD;  Location: [facility] CATH INVASIVE LOCATION;  Service:  Cardiovascular;  Laterality: N/A;    CARDIAC CATHETERIZATION  04/21/2021    Procedure: Saphenous Vein Graft;  Surgeon: Ivan Martell MD;  Location: [facility] CATH INVASIVE LOCATION;  Service: Cardiovascular;;    CARDIAC CATHETERIZATION N/A 04/26/2021    Procedure: Percutaneous Coronary Intervention;  Surgeon: Ivan Martell MD;  Location: [facility] CATH INVASIVE LOCATION;  Service: Cardiovascular;  Laterality: N/A;    CARDIAC CATHETERIZATION N/A 04/26/2021    Procedure: Stent RAY coronary;  Surgeon: Ivan Martell MD;  Location: [facility] CATH INVASIVE LOCATION;  Service: Cardiovascular;  Laterality: N/A;    CARDIOVASCULAR STRESS TEST  05/04/2015    CHOLECYSTECTOMY  1990    CORONARY ARTERY BYPASS GRAFT  08/09/2017    X5  Dr Brandt    ENDOSCOPY N/A 06/30/2020    Procedure: ESOPHAGOGASTRODUODENOSCOPY with biopsy x 1 area and dilatation (15-18mm balloon) up to 18mm;  Surgeon: Quinton Tapia MD;  Location: [facility] ENDOSCOPY;  Service: Gastroenterology;  Laterality: N/A;  post op: gastritis, large hiatal hernia, esophageal dysmotility    ENDOSCOPY N/A 10/24/2024    Procedure: ESOPHAGOGASTRODUODENOSCOPY WITH GASTRIC BIOPSY, BALLOON DILATION (18MM-20MM) UP TO 19MM,BIOPSY OF ESOPHAGUS;  Surgeon: Quinton Tapia MD;  Location: [facility] ENDOSCOPY;  Service: Gastroenterology;  Laterality: N/A;  GASTRITIS, ESOPHAGITIS, HIATEL HERNIA    HYSTERECTOMY  1990    INSERT / REPLACE / REMOVE PACEMAKER      JOINT REPLACEMENT Right     knee     OTHER SURGICAL HISTORY  06/2017    BLOOD CLOT REMOVED FROM LEFT EAR    PACEMAKER IMPLANTATION  04/18/2016    DUAL CHAMBER ST ALESSIO       SLP Recommendation and Plan  SLP Swallowing Diagnosis: swallow WFL/no suspected pharyngeal impairment (04/04/25 1100)  SLP Diet Recommendation: regular textures, thin liquids (04/04/25 1100)  Recommended Precautions and Strategies: general aspiration precautions, upright posture during/after eating (04/04/25 1100)  SLP Rec. for Method of Medication Administration: as  tolerated (04/04/25 1100)           Swallow Criteria for Skilled Therapeutic Interventions Met: baseline status (04/04/25 1100)  Anticipated Discharge Disposition (SLP): unknown (04/04/25 1100)     Therapy Frequency (Swallow): evaluation only (04/04/25 1100)     Oral Care Recommendations: Oral Care before breakfast, after meals and PRN (04/04/25 1100)        Plan for Continued Treatment (SLP): continue treatment per plan of care (04/04/25 1100)         SWALLOW EVALUATION (Last 72 Hours)       SLP Adult Swallow Evaluation       Row Name 04/04/25 1100       Rehab Evaluation    Document Type evaluation  -MS    Subjective Information no complaints  -MS    Patient Observations alert;cooperative;agree to therapy  -MS    Patient/Family/Caregiver Comments/Observations Family at bedside  -MS    Patient Effort good  -MS    Symptoms Noted During/After Treatment none  -MS       General Information    Patient Profile Reviewed yes  -MS    Pertinent History Of Current Problem 89-year-old female with history of atrial fibrillation on anticoagulation with Coumadin, coronary artery disease status post balloon dilatation, hypertension, dyslipidemia.  Patient admitted after he hitting her head on the door.  No loss of consciousness.  CT showed small intracranial hemorrhage measuring 3 mm.  Neurosurgery evaluated.  INR 2.7.  CT head without contrast showing 3 mm thick subdural hematoma along the anterior falx cerebri.  No mass effect.  Patient was given dose of vitamin K and Kcentra.  Repeat INR 1.32.     Patient remained asymptomatic.  Patient was reevaluated by neurosurgery.  Patient had repeat CT scan done which was stable.  Neurosurgery recommended discharge with the suggestion, to resume anticoagulation after 2 weeks.  Patient does not have any neurological deficit and fully alert.  Patient is being discharged accordingly. ST consulted for dysphagia evaluation.                      -MS    Current Method of Nutrition NPO  -MS     Precautions/Limitations, Vision WFL with corrective lenses  -MS    Precautions/Limitations, Hearing WFL  -MS    Prior Level of Function-Communication WFL  -MS    Prior Level of Function-Swallowing no diet consistency restrictions;safe, efficient swallowing in all situations  -MS    Plans/Goals Discussed with patient  -MS       Oral Motor Structure and Function    Dentition Assessment upper dentures/partial in place;lower dentures/partial in place  -MS    Secretion Management WNL/WFL  -MS    Mucosal Quality dry  -MS    Volitional Swallow WFL  -MS    Volitional Cough WFL  -MS       Oral Musculature and Cranial Nerve Assessment    Oral Motor General Assessment WFL  -MS       General Eating/Swallowing Observations    Respiratory Support Currently in Use room air  -MS    Eating/Swallowing Skills self-fed  -MS    Positioning During Eating upright 90 degree;upright in chair  -MS    Utensils Used spoon;cup;straw  -MS    Consistencies Trialed regular textures;soft to chew textures;mechanical ground textures;mixed consistency;thin liquids;ice chips;pureed  -MS       Respiratory    Respiratory Status WFL  -MS       Clinical Swallow Eval    Oral Prep Phase WFL  -MS    Oral Transit WFL  -MS    Oral Residue WFL  -MS    Pharyngeal Phase no overt signs/symptoms of pharyngeal impairment  -MS    Esophageal Phase unremarkable  -MS    Clinical Swallow Evaluation Summary Clinical swallow evaluation completed. Pt was awake and alert, able to follow commands, oriented x5. Upon room entry, pt was sitting upright in recliner w/ family at bedside.  Pt was NPO at the time of evaluation. Pt has full set of dentures in place. Pt and pt's family reported regular/thin liquid is baseline diet and denied swallowing difficulty. Oral motor exam revealed some generalized oral motor weakness. Trials assessed: ice chips x1, thin by cup x2, thin by straw x2, puree x2, mixed/mech soft x2, STC x2, and regular x2. Pt had good bolus control of ice chip, able  to self-feed. Adequate labial seal w/straw. No anterior loss. Adequate mastication during all trials. Oral transit appeared timely. There was no pocketing or residue. Digital palpation suggests adequate hyolaryngeal excursion. Pt demonstrated no overt s/s of penetration/aspiration throughout the evaluation. Vocal quality remained clear. Pt verbalized agreement to diet recommendations and nursing made aware. Per above, pt presents w/ no dysphagia and swallow is WNL. Recommending regular diet and thin liquids. Meds as tolerated. ST signing off at this time due to no further concerns. Pt is planned to discharge home today.    SLP Plan & Recommendation:      - Regular diet & thin liquids  - Only feed when pt is fully awake and alert  - Meds: as tolerated   - Safe swallow strategies: HOB at a 90 degree angle, slow rate of intake, small bites/sips  -Oral care at least x2 daily             -MS       SLP Evaluation Clinical Impression    SLP Swallowing Diagnosis swallow WFL/no suspected pharyngeal impairment  -MS    Functional Impact no impact on function  -MS    Swallow Criteria for Skilled Therapeutic Interventions Met baseline status  -MS       SLP Treatment Clinical Impressions    Plan for Continued Treatment (SLP) continue treatment per plan of care  -MS    Care Plan Review evaluation/treatment results reviewed  -MS       Recommendations    Therapy Frequency (Swallow) evaluation only  -MS    SLP Diet Recommendation regular textures;thin liquids  -MS    Recommended Precautions and Strategies general aspiration precautions;upright posture during/after eating  -MS    Oral Care Recommendations Oral Care before breakfast, after meals and PRN  -MS    SLP Rec. for Method of Medication Administration as tolerated  -MS    Anticipated Discharge Disposition (SLP) unknown  -MS              User Key  (r) = Recorded By, (t) = Taken By, (c) = Cosigned By      Initials Name Effective Dates    Dorian Foy, MARCIANO 04/22/24 -                      Dorian Poole, SLP  4/4/2025

## 2025-04-05 ENCOUNTER — DOCUMENTATION (OUTPATIENT)
Dept: SOCIAL WORK | Facility: HOSPITAL | Age: 89
End: 2025-04-05
Payer: MEDICARE

## 2025-04-05 NOTE — OUTREACH NOTE
Prep Survey      Flowsheet Row Responses   [facility] facility patient discharged from? [facility]   Is LACE score < 7 ? Yes   Eligibility Readm Mgmt   Discharge diagnosis ICH (intracerebral hemorrhage)   Does the patient have one of the following disease processes/diagnoses(primary or secondary)? Other   Prep survey completed? Yes            Morena OMNGE - Registered Nurse

## 2025-04-05 NOTE — PROGRESS NOTES
Start PACC Note    Home Health Referral    Evaluated patient on Home Care and services available. Patient offered choice of available HHC and agreeable to RN/PT services with [facility].    Care Types:   Isolation Precautions: [unfilled]    Social Determinants of Health:  Tobacco Use: Low Risk  (4/3/2025)    Patient History     Smoking Tobacco Use: Never     Smokeless Tobacco Use: Never     Passive Exposure: Not on file     Social History     Substance and Sexual Activity   Alcohol Use No     Social History     Substance and Sexual Activity   Drug Use No       Does the patient have any financial resource strain?   Does the patient have any food insecurities?   Does the patient have any housing instabilities?     If any of the above is noted as yes - consider a MSW evaluation once the patient returns home.    START PATIENT REGISTRATION INFORMATION  Order Information  Order Signing Physician:Dr. Nava Yu  Service Ordered RN?: Yes  Service Ordered PT?: Yes  Service Ordered OT?: No  Service Ordered ST?: No  Service Ordered MSW?: No  Service Ordered HHA?: No  Following Physician: Nava Yu MD  Following Physician Phone: [phone]  Overseeing Physician: Nava Yu MD  (Required for Residents Only)  Agreeable to Follow? Yes  Date/Time of Call 04/05/25 08:27 EDT, Spoke with: permd    Care Coordination  Same Day SOC?: No  Primary Care Physician: Nava Yu MD  Primary Care Physician Phone: [phone]  Primary Care Physician Address: [address] [place] [address] [place] [address]  Visit Instructions: N/A  Service Discharge Location Type: Home  Service Facility Name: N/A  Service Floor Facility: N/A  Service Room No: N/A    Demographics  Patient Last Name: Sharon  Patient First Name: Syl  Language/Communication Barrier: none  Service Address: [address] [place] [address]  Service City: [place]  Service State: [place]  Service Zip: [address]  Service Home Phone: [phone]  Other Phone Numbers:    Telephone Information:   Mobile [phone]     Emergency Contact:   Extended Emergency Contact Information  Primary Emergency Contact: Janny Butcher   [place]  Mobile Phone: [phone]  Relation: Daughter  Secondary Emergency Contact: Rosa Lazcano  Address: [address] [place] [address] [facility]  Home Phone: [phone]  Mobile Phone: [phone]  Relation: Daughter    Admission Information  Admit Date: 04/03/2025  Patient Status at Discharge:   Admitting Diagnosis: Intracerebral hemorrhage    Caregiver Information  Caregiver First Name:   Caregiver Last Name:   Caregiver Relationship to Patient:   Caregiver Phone Number:   Caregiver Notes:     HITECH  [employer] List  No      END PATIENT REGISTRATION INFORMATION    Start PACC Summary    Additional Comments: none    END PACC Summary    Discharge Date: Pending    Referral Source: NEISHA Sanabria    Signed By: Nathalie Gaxiola RN, 4/5/2025, 08:27 EDT     Date/Time: 04/05/25 08:27 EDT    End PACC Note

## 2025-04-07 ENCOUNTER — HOSPITAL ENCOUNTER (EMERGENCY)
Facility: HOSPITAL | Age: 89
Discharge: HOME | End: 2025-04-08
Attending: EMERGENCY MEDICINE
Payer: MEDICARE

## 2025-04-07 ENCOUNTER — APPOINTMENT (OUTPATIENT)
Dept: CT IMAGING | Facility: HOSPITAL | Age: 89
End: 2025-04-07
Payer: MEDICARE

## 2025-04-07 DIAGNOSIS — S06.5XAA SUBDURAL HEMATOMA: Primary | ICD-10-CM

## 2025-04-07 DIAGNOSIS — S61.217A LACERATION OF LEFT LITTLE FINGER WITHOUT FOREIGN BODY WITHOUT DAMAGE TO NAIL, INITIAL ENCOUNTER: ICD-10-CM

## 2025-04-07 PROCEDURE — 99284 EMERGENCY DEPT VISIT MOD MDM: CPT

## 2025-04-07 PROCEDURE — 70450 CT HEAD/BRAIN W/O DYE: CPT

## 2025-04-07 RX ORDER — CEPHALEXIN 500 MG/1
500 CAPSULE ORAL ONCE
Status: COMPLETED | OUTPATIENT
Start: 2025-04-07 | End: 2025-04-07

## 2025-04-07 RX ORDER — CEPHALEXIN 500 MG/1
500 CAPSULE ORAL 4 TIMES DAILY
Qty: 20 CAPSULE | Refills: 0 | Status: SHIPPED | OUTPATIENT
Start: 2025-04-07

## 2025-04-07 RX ORDER — DIAPER,BRIEF,INFANT-TODD,DISP
1 EACH MISCELLANEOUS ONCE
Status: COMPLETED | OUTPATIENT
Start: 2025-04-07 | End: 2025-04-07

## 2025-04-07 RX ADMIN — BACITRACIN 0.9 G: 500 OINTMENT TOPICAL at 22:24

## 2025-04-07 RX ADMIN — CEPHALEXIN 500 MG: 500 CAPSULE ORAL at 22:24

## 2025-04-07 NOTE — CASE MANAGEMENT/SOCIAL WORK
Case Management Discharge Note      Final Note: Home - F HH PT         Selected Continued Care - Discharged on 4/4/2025 Admission date: 4/3/2025 - Discharge disposition: Home or Self Care        Home Medical Care Coordination complete.      Service Provider Services Address Phone Fax Patient Preferred    [facility] [address] [facility] [id number] [phone] --               Transportation Services  Private: Car    Final Discharge Disposition Code: 06 - home with home health care    JASMINA Wells RN  ICU/CVU   O: [phone]  C: [phone]  [email or web address]

## 2025-04-08 ENCOUNTER — TELEPHONE (OUTPATIENT)
Dept: NEUROSURGERY | Facility: CLINIC | Age: 89
End: 2025-04-08
Payer: MEDICARE

## 2025-04-08 VITALS
SYSTOLIC BLOOD PRESSURE: 149 MMHG | DIASTOLIC BLOOD PRESSURE: 71 MMHG | TEMPERATURE: 97.7 F | RESPIRATION RATE: 16 BRPM | OXYGEN SATURATION: 92 % | HEIGHT: 60 IN | HEART RATE: 63 BPM | BODY MASS INDEX: 32.38 KG/M2 | WEIGHT: 164.9 LBS

## 2025-04-08 NOTE — ED PROVIDER NOTES
Subjective   History of Present Illness  89-year-old female presents after fell last week.  She had admission for closed head injury parafalcine subdural.  She had O2 sensor on finger of hand.  When this was removed the skin was avulsed with with removal of sensor.  They have been care taking care of this at home with local wound care.  Were concerned that this was becoming infected.  She has had some mild confusion at times not sleeping well.  No new complaints of injury was noted.  She had been on warfarin but this has been discontinued.  She has had no fever chest pain shortness of breath urinary symptoms.  Review of Systems    Past Medical History:   Diagnosis Date    Anxiety     Arthritis     Asthma     Atrial fibrillation     CKD (chronic kidney disease) stage 3, GFR 30-59 ml/min 04/18/2021    Coronary artery disease     Dyslipidemia     Dyslipidemia 01/05/2015    GERD (gastroesophageal reflux disease)     History of bone density study 04/2015    Hypertension     Sleep apnea     Wears dentures        Allergies   Allergen Reactions    Doxycycline Other (See Comments)     Chest pain    Celebrex [Celecoxib] Other (See Comments)     Chest pain    Contrast Dye (Echo Or Unknown Ct/Mr) Itching    Iodinated Contrast Media Itching    Nsaids Other (See Comments)     convulsion    Other Itching     Pt states some steroids make her itch and hallucinate- she does not know the names   She said she is allergic to all arthritis medicine    Sulfa Antibiotics Nausea Only       Past Surgical History:   Procedure Laterality Date    BLADDER REPAIR  1990    BREAST LUMPECTOMY  1974    BENIGN    CARDIAC CATHETERIZATION  05/25/2011    CARDIAC CATHETERIZATION N/A 04/21/2021    Procedure: Left Heart Cath and coronary angiogram;  Surgeon: Ivan Martell MD;  Location: [facility] CATH INVASIVE LOCATION;  Service: Cardiovascular;  Laterality: N/A;    CARDIAC CATHETERIZATION N/A 04/21/2021    Procedure: Left ventriculography;  Surgeon: Jayshree  MD Ivan;  Location: [facility] CATH INVASIVE LOCATION;  Service: Cardiovascular;  Laterality: N/A;    CARDIAC CATHETERIZATION  04/21/2021    Procedure: Saphenous Vein Graft;  Surgeon: Ivan Martell MD;  Location: [facility] CATH INVASIVE LOCATION;  Service: Cardiovascular;;    CARDIAC CATHETERIZATION N/A 04/26/2021    Procedure: Percutaneous Coronary Intervention;  Surgeon: Ivan Martell MD;  Location: [facility] CATH INVASIVE LOCATION;  Service: Cardiovascular;  Laterality: N/A;    CARDIAC CATHETERIZATION N/A 04/26/2021    Procedure: Stent RAY coronary;  Surgeon: Ivan Martell MD;  Location: [facility] CATH INVASIVE LOCATION;  Service: Cardiovascular;  Laterality: N/A;    CARDIOVASCULAR STRESS TEST  05/04/2015    CHOLECYSTECTOMY  1990    CORONARY ARTERY BYPASS GRAFT  08/09/2017    X5  Dr Brandt    ENDOSCOPY N/A 06/30/2020    Procedure: ESOPHAGOGASTRODUODENOSCOPY with biopsy x 1 area and dilatation (15-18mm balloon) up to 18mm;  Surgeon: Quinton Tapia MD;  Location: [facility] ENDOSCOPY;  Service: Gastroenterology;  Laterality: N/A;  post op: gastritis, large hiatal hernia, esophageal dysmotility    ENDOSCOPY N/A 10/24/2024    Procedure: ESOPHAGOGASTRODUODENOSCOPY WITH GASTRIC BIOPSY, BALLOON DILATION (18MM-20MM) UP TO 19MM,BIOPSY OF ESOPHAGUS;  Surgeon: Quinton Tapia MD;  Location: [facility] ENDOSCOPY;  Service: Gastroenterology;  Laterality: N/A;  GASTRITIS, ESOPHAGITIS, HIATEL HERNIA    HYSTERECTOMY  1990    INSERT / REPLACE / REMOVE PACEMAKER      JOINT REPLACEMENT Right     knee     OTHER SURGICAL HISTORY  06/2017    BLOOD CLOT REMOVED FROM LEFT EAR    PACEMAKER IMPLANTATION  04/18/2016    DUAL CHAMBER ST ALESSIO       Family History   Problem Relation Age of Onset    Hypertension Mother     Heart disease Mother     Heart disease Sister         PACEMAKER    Heart disease Brother        Social History     Socioeconomic History    Marital status:    Tobacco Use    Smoking status: Never    Smokeless tobacco: Never   Vaping  Use    Vaping status: Never Used   Substance and Sexual Activity    Alcohol use: No    Drug use: No    Sexual activity: Not Currently     Birth control/protection: Hysterectomy     Prior to Admission medications    Medication Sig Start Date End Date Taking? Authorizing Provider   acetaminophen (TYLENOL) 500 MG tablet Take 1 tablet by mouth Every 6 (Six) Hours As Needed for Mild Pain or Moderate Pain. 2/8/25   Edenilson Smith MD   albuterol sulfate  (90 Base) MCG/ACT inhaler Inhale 2 puffs Every 4 (Four) Hours As Needed for Wheezing or Shortness of Air. 2/8/25   Edenilson Smith MD   ALPRAZolam (XANAX) 0.5 MG tablet Take 1 tablet by mouth 2 (Two) Times a Day As Needed. 9/6/17   Dayne Mensah MD   atorvastatin (LIPITOR) 10 MG tablet Take 1 tablet by mouth Every Night. 9/20/17   Dayne Mensah MD   budesonide (PULMICORT) 0.5 MG/2ML nebulizer solution Take 2 mL by nebulization 2 (Two) Times a Day.  Patient not taking: Reported on 2/26/2025 5/11/21   Karly Donovan MD   CALCIUM PO Take  by mouth.    Dayne Mensah MD   carboxymethylcellulose (REFRESH PLUS) 0.5 % solution Administer 1 drop to both eyes 3 (Three) Times a Day As Needed for Dry Eyes.    Dayne Mensah MD   cephalexin (KEFLEX) 500 MG capsule Take 1 capsule by mouth 4 (Four) Times a Day. 4/7/25   Quinton Mcgarry MD   cetirizine (zyrTEC) 10 MG tablet Take 1 tablet by mouth Daily.    Dayne Mensah MD   Cholecalciferol (VITAMIN D-3 PO) Take  by mouth.    Dayne Mensah MD   Cyanocobalamin (VITAMIN B-12 PO) Take  by mouth.  Patient not taking: Reported on 2/26/2025    Dayne Mensah MD   dilTIAZem (TIAZAC) 120 MG 24 hr capsule TAKE 1 CAPSULE BY MOUTH DAILY 6/24/24   Ivan Martell MD   fluticasone (VERAMYST) 27.5 MCG/SPRAY nasal spray Administer 2 sprays into the nostril(s) as directed by provider Daily.    Dayne Mensah MD   furosemide (LASIX) 40 MG tablet Take 1 tablet by mouth 2 (Two) Times a  Day.  Patient not taking: Reported on 2/26/2025 6/7/23   Karla Weldon APRN   HYDROcodone-acetaminophen (NORCO) 7.5-325 MG per tablet Take 1 tablet by mouth Every 6 (Six) Hours As Needed for Moderate Pain.    Dayne Mensah MD   isosorbide mononitrate (IMDUR) 30 MG 24 hr tablet TAKE 1 TABLET BY MOUTH EVERY MORNING 1/10/25   Ivan Martell MD   lidocaine (LIDODERM) 5 % Place 1 patch on the skin as directed by provider Daily. Remove & Discard patch within 12 hours or as directed by MD 2/8/25   Edenilson Smith MD   magnesium oxide (MAG-OX) 400 MG tablet Take 1 tablet by mouth Daily.    Dayne Mensah MD   metoprolol tartrate (LOPRESSOR) 100 MG tablet TAKE 1 TABLET BY MOUTH TWICE DAILY 10/14/24   Ivan Martell MD   Multiple Vitamins-Minerals (VITEYES AREDS FORMULA) capsule Take 1 capsule by mouth 2 (two) times a day. 3/31/14   Dayne Mensah MD   nitrofurantoin (MACRODANTIN) 100 MG capsule Take 1 capsule by mouth Daily.    Dayne Mensah MD   ondansetron (ZOFRAN) 4 MG tablet Take 1 tablet by mouth Daily As Needed for Nausea or Vomiting.    Dayne Mensah MD   pantoprazole (PROTONIX) 40 MG EC tablet TK 1 T PO QD 7/24/17   Dayne Mensah MD   spironolactone (ALDACTONE) 25 MG tablet TAKE 1/2 TABLET BY MOUTH DAILY 6/17/24   Ivan Martell MD   warfarin (COUMADIN) 4 MG tablet TAKE 1 TABLET BY MOUTH DAILY. EXCEPT 1/2 TABLET ON MONDAY AND FRIDAY OR AS DIRECTED 4/18/25   Nilson Veloz MD   Warfarin has been discontinued for 2 weeks        Objective   Physical Exam  89-year-old female awake alert.  Generally well-developed well-nourished.  Pupils equal round react to light.  Neck supple chest clear cardiovascular regular in rhythm abdomen soft nontender examination extremities she has healing skin avulsion over the volar aspect of left small finger.  There is some involving the finger.  The wound appears to be healing well otherwise.  The area of redness does not extend past the  "finger.  Neurologic exam GCS 15 no focal findings noted motor or sensory grossly intact to extremities.  Procedures           ED Course        CT Head Without Contrast   Final Result   Stable tiny parafalcine subdural hematoma. No evidence of increased or additional acute hemorrhage.      Redemonstrated generalized volume loss and chronic white matter changes similar to prior.            Electronically Signed: Cj Mejia MD     4/7/2025 11:01 PM EDT     Workstation ID: [id number]        Medications   bacitracin ointment 0.9 g (0.9 g Topical Given 4/7/25 2224)   cephalexin (KEFLEX) capsule 500 mg (500 mg Oral Given 4/7/25 2224)     /68   Pulse 62   Temp 97.7 °F (36.5 °C) (Oral)   Resp 16   Ht 152.4 cm (60\")   Wt 74.8 kg (164 lb 14.5 oz)   SpO2 97%   BMI 32.21 kg/m²                                                    Medical Decision Making  Amount and/or Complexity of Data Reviewed  Radiology: ordered.    Risk  OTC drugs.  Prescription drug management.    Chart review: Patient had admission on the third of this month for traumatic intracerebral hemorrhage.  Comorbidity: As per past history   Differential: Postconcussive syndrome, worsening hemorrhage,  My EKG interpretation: Not indicated  Lab: Not indicated  My Radiology review and interpretation: CT scan of head shows stable very small    Falcine subdural with no evidence of increased or new hemorrhage.  There is generalized volume loss and chronic white matter changes that appear unchanged.  Discussion/treatment: Patient's wound was redressed with bacitracin.  She was given dose of Keflex.  Findings were discussed with her and family.  She was discharged placed on Keflex.  To continue with local wound care.  To follow-up with primary provider, return new or worsening symptoms  Patient was evaluated using appropriate PPE      Final diagnoses:   Subdural hematoma   Laceration of left little finger without foreign body without damage to nail, initial " encounter       ED Disposition  ED Disposition       ED Disposition   Discharge    Condition   Stable    Comment   --               Nava Yu MD  [address]  [place] [address] [phone]               Medication List        New Prescriptions      cephalexin 500 MG capsule  Commonly known as: KEFLEX  Take 1 capsule by mouth 4 (Four) Times a Day.               Where to Get Your Medications        These medications were sent to [id number] [place] - [address] AT SEC OF [place] [address] & [address] - [phone]  - [phone]   [address] [place] [address]      Phone: [phone]   cephalexin 500 MG capsule            Quinton Mcgarry MD  04/07/25 4293

## 2025-04-08 NOTE — ED TRIAGE NOTES
PT arrived via PV with family. Family states pt has been alerted since she was discharged from the hospital patient was admitted for a brain bleed. Pt also has a wound on her left 5th finger that family is concerned is infected.

## 2025-04-08 NOTE — TELEPHONE ENCOUNTER
Caller: JANICE    Relationship: DAUGHTER    Best call back number: [phone]    What was the call regarding: PATIENTS DAUGHTER CALLED STATING PATIENT WAS SEEN IN THE ER LAST NIGHT AND HAD ANOTHER CT HEAD COMPLETED     CT HEAD 04.07.25 @  [facility]    PATIENT WAS SEEN BY [facility] IN ER ON 04.04.25     DOES PATIENT NEED TO BE SCHEDULED FOR FOLLOW UP?    PLEASE ADVISE  THANK YOU

## 2025-04-08 NOTE — DISCHARGE INSTRUCTIONS
Apply antibiotic to wound twice a day watch for infection increasing redness swelling or pain, return as needed

## 2025-04-14 RX ORDER — METOPROLOL TARTRATE 100 MG/1
100 TABLET ORAL EVERY 12 HOURS SCHEDULED
Qty: 180 TABLET | Refills: 1 | Status: SHIPPED | OUTPATIENT
Start: 2025-04-14

## 2025-04-14 NOTE — TELEPHONE ENCOUNTER
Rx Refill Note  Requested Prescriptions     Pending Prescriptions Disp Refills    metoprolol tartrate (LOPRESSOR) 100 MG tablet [Pharmacy Med Name: METOPROLOL TARTRATE 100MG TABLETS] 180 tablet 1     Sig: TAKE 1 TABLET BY MOUTH TWICE DAILY      Last office visit with prescribing clinician: 2/26/2025   Last telemedicine visit with prescribing clinician: Visit date not found   Next office visit with prescribing clinician: 9/10/2025                         Would you like a call back once the refill request has been completed: [] Yes [] No    If the office needs to give you a call back, can they leave a voicemail: [] Yes [] No    Vivek Connell MA  04/14/25, 08:24 EDT

## 2025-04-17 ENCOUNTER — TELEPHONE (OUTPATIENT)
Dept: NEUROSURGERY | Facility: CLINIC | Age: 89
End: 2025-04-17
Payer: MEDICARE

## 2025-04-17 NOTE — TELEPHONE ENCOUNTER
CALLED PATIENT RE: HER HOSPITAL FOLLOW UP APT ON 04/22/2025, SPOKE TO HER DAUGHTER PAXTON, HER LAST CT WAS ON 04/07/2025 PATIENT HAS NOT HAD A MORE RECENT CT HEAD MS WAS WAITING ON A ORDER.

## 2025-04-18 DIAGNOSIS — S06.5XAA SDH (SUBDURAL HEMATOMA): Primary | ICD-10-CM

## 2025-04-18 NOTE — TELEPHONE ENCOUNTER
Patients daughter called she said she was tod to get the imaging today but she got a notification it is Monday. I let patient know it is Monday at 2:15

## 2025-04-18 NOTE — TELEPHONE ENCOUNTER
Called patient's daughter Rosa and told her that I called scheduling and I was able to get the patient a Stat Appointment today at 2:15 pm for a CT Head WO. She stated that she will take her to that appointment today.

## 2025-04-19 DIAGNOSIS — I48.19 PERSISTENT ATRIAL FIBRILLATION: ICD-10-CM

## 2025-04-21 ENCOUNTER — HOSPITAL ENCOUNTER (OUTPATIENT)
Dept: CT IMAGING | Facility: HOSPITAL | Age: 89
Discharge: HOME | End: 2025-04-21
Admitting: NURSE PRACTITIONER
Payer: MEDICARE

## 2025-04-21 DIAGNOSIS — S06.5XAA SDH (SUBDURAL HEMATOMA): ICD-10-CM

## 2025-04-21 PROCEDURE — 70450 CT HEAD/BRAIN W/O DYE: CPT

## 2025-04-21 RX ORDER — WARFARIN SODIUM 4 MG/1
TABLET ORAL
Qty: 80 TABLET | Refills: 0 | Status: SHIPPED | OUTPATIENT
Start: 2025-04-21

## 2025-04-21 RX ORDER — DILTIAZEM HYDROCHLORIDE 120 MG/1
120 CAPSULE, EXTENDED RELEASE ORAL DAILY
Qty: 90 CAPSULE | Refills: 2 | Status: SHIPPED | OUTPATIENT
Start: 2025-04-21

## 2025-04-21 NOTE — TELEPHONE ENCOUNTER
Rx Refill Note  Requested Prescriptions     Pending Prescriptions Disp Refills    warfarin (COUMADIN) 4 MG tablet [Pharmacy Med Name: WARFARIN SOD 4MG TABLETS] 80 tablet 0     Sig: Take 1 tab, by mouth, daily except 1/2 tab on Mon, Weds and Fri or as directed.     Signed Prescriptions Disp Refills    dilTIAZem (TIAZAC) 120 MG 24 hr capsule 90 capsule 2     Sig: TAKE 1 CAPSULE BY MOUTH DAILY     Authorizing Provider: CORKY BRAGG     Ordering User: KEELY SHERMAN   Last INR 3/26/25   Last office visit with prescribing clinician: 2/26/2025   Last telemedicine visit with prescribing clinician: Visit date not found   Next office visit with prescribing clinician: 9/10/2025                         Would you like a call back once the refill request has been completed: [] Yes [] No    If the office needs to give you a call back, can they leave a voicemail: [] Yes [] No    Sade Ch RN  04/21/25, 09:30 EDT

## 2025-04-23 NOTE — PROGRESS NOTES
"Subjective   History of Present Illness: Syl Herrera is a 89 y.o. female is here today for follow-up on SDH with imaging.  Patient was initially seen and evaluated in the hospital on 4/4/2025 after he suffered a ground-level fall with resultant small anterior falx subdural hematoma with no mass effect or midline shift.  She remained neurologically intact.  Plans were to hold her Coumadin therapy for at least 2 weeks and follow-up in clinic with repeat CTh before resuming.  Today patient present with daughter.  Patient reports no acute neurologic complaints or concerns.  She does have some soreness on her scalp but reports no headaches, dizziness, worsening cognitive issues, visual changes, speech issues unilateral focal sensorimotor deficits.  She does have left hand injury that she got when she left the hospital and is bandaged but doing okay.  She has been working with physical therapy with her ambulation.  She has not been taking her Coumadin therapy.    History of Present Illness    The following portions of the patient's history were reviewed and updated as appropriate: allergies, current medications, past family history, past medical history, past social history, past surgical history, and problem list.    Review of Systems   Constitutional:  Positive for activity change.   Eyes: Negative.    Respiratory: Negative.     Cardiovascular: Negative.    Gastrointestinal: Negative.    Endocrine: Negative.    Genitourinary: Negative.    Musculoskeletal:         Head is sore on right side in the back   Pt fell on the 3rd   Skin: Negative.    Allergic/Immunologic: Negative.    Neurological:  Positive for headaches (sometimes).   Hematological: Negative.    Psychiatric/Behavioral:  Positive for sleep disturbance (sometimes).    All other systems reviewed and are negative.      Objective     /70   Pulse 60   Resp 18   Ht 152.4 cm (60\")   Wt 75.3 kg (166 lb)   SpO2 97%   BMI 32.42 kg/m²    Body mass index is " 32.42 kg/m².    Vitals:    04/24/25 1354   PainSc: 0-No pain   PainLoc: Head          Physical Exam  Alert and oriented by 3  Speech is intact and coherent articulate with good content and production  Cranial nerves III through XII are grossly intact with pupils symmetric and reactive and no gaze paresis or nystagmus  Sensation is intact to soft touchin both upper and lower extremities  Proprioception is intact in both upper and lower extremities symmetric  Motor strength is 5/5 in both upper and lower extremities with no focal motor deficits  Gait is nonantalgic  Heel toe walking is intact  No dysmetria or dysdiadochokinesia        Assessment & Plan   Independent Review of Radiographic Studies:      I personally reviewed the images from the following studies.    CT head 4/21/2025    Previously described parafalcine subdural hematoma no longer felt to be visualized suggesting interval resolution.    Medical Decision Making:      Syl Waggoners a 89 y.o. female following up today on her subdural hematoma and review of serial imaging.  Patient doing well with no neurologic concerns or complaints.  Her imaging has been reviewed demonstrating over resolution of prior previously described parafalcine subdural hematoma.  No other acute hemorrhage noted.  Neurosurgery feels that it is okay for patient to resume her Coumadin therapy and will follow-up with Dr. Martell regarding dosing and any other further recommendations.  No further neurosurgical workup or follow-up needed at this time.  Patient/family agreeable to plan of care and wishes to proceed.  I encouraged him to call the office with any questions or concerns.          Diagnoses and all orders for this visit:    1. Subdural hematoma (Primary)      Return if symptoms worsen or fail to improve.    This patient was examined wearing appropriate personal protective equipment.     Syl Herrera  reports that she has never smoked. She has never used smokeless tobacco.      Body mass index is 32.42 kg/m².  BMI is >= 30 and <35. (Class 1 Obesity). Recommendations for exercise counseling/recommendations and nutrition counseling/recommendations    Patient's blood pressure was reviewed.  Recommendations for  a low-salt diet and exercise to maintain/improve BP in addition to taking any presribed medications.    Advance Care Planning   ACP discussion was held with the patient during this visit. Patient has an advance directive (not in EMR), copy requested.             Rula Piña DNP, APRN    04/24/25  14:20 EDT

## 2025-04-23 NOTE — DISCHARGE INSTRUCTIONS
Please follow-up with her primary care provider and pulmonologist in 1 week's time for symptom follow-up.  Please take Medrol Dosepak to completion.  Please take antibiotic for UTI to completion, after you complete this antibiotic please take your maintenance antibiotic thereafter.  Please take Tessalon Perles and Mucinex as needed for cough and congestion.  Please come back to the ER if you spike a high fever or have severe chest pain or shortness of breath as you will need reevaluation the time.  
dentures/eyeglasses

## 2025-04-24 ENCOUNTER — TELEPHONE (OUTPATIENT)
Dept: CARDIOLOGY | Facility: CLINIC | Age: 89
End: 2025-04-24
Payer: MEDICARE

## 2025-04-24 ENCOUNTER — OFFICE VISIT (OUTPATIENT)
Dept: NEUROSURGERY | Facility: CLINIC | Age: 89
End: 2025-04-24
Payer: MEDICARE

## 2025-04-24 VITALS
OXYGEN SATURATION: 97 % | BODY MASS INDEX: 32.59 KG/M2 | SYSTOLIC BLOOD PRESSURE: 122 MMHG | DIASTOLIC BLOOD PRESSURE: 70 MMHG | RESPIRATION RATE: 18 BRPM | HEART RATE: 60 BPM | WEIGHT: 166 LBS | HEIGHT: 60 IN

## 2025-04-24 DIAGNOSIS — S06.5XAA SUBDURAL HEMATOMA: Primary | ICD-10-CM

## 2025-04-24 RX ORDER — CIPROFLOXACIN 500 MG/1
1 TABLET, FILM COATED ORAL EVERY 12 HOURS SCHEDULED
COMMUNITY
Start: 2025-03-20

## 2025-04-24 RX ORDER — CLOBETASOL PROPIONATE 0.5 MG/G
EMULSION TOPICAL
COMMUNITY
Start: 2025-03-26

## 2025-04-24 RX ORDER — NITROFURANTOIN 25; 75 MG/1; MG/1
100 CAPSULE ORAL
COMMUNITY
Start: 2025-04-16

## 2025-04-24 RX ORDER — RISPERIDONE 0.25 MG/1
1 TABLET ORAL EVERY 12 HOURS SCHEDULED
COMMUNITY
Start: 2025-04-16

## 2025-04-24 RX ORDER — ONDANSETRON 4 MG/1
TABLET, ORALLY DISINTEGRATING ORAL AS NEEDED
COMMUNITY
Start: 2025-03-05

## 2025-04-24 RX ORDER — FLUTICASONE PROPIONATE 50 MCG
1 SPRAY, SUSPENSION (ML) NASAL AS NEEDED
COMMUNITY
Start: 2025-03-31

## 2025-04-24 NOTE — TELEPHONE ENCOUNTER
Spoke to pts daughter, pt has been cleared to resume Warfarin by Neurologist. Pt to resume Warfarin 2 mgs, qd and have visiting nurse check INR next week and call office. Joe

## 2025-04-29 ENCOUNTER — ANTICOAGULATION VISIT (OUTPATIENT)
Dept: CARDIOLOGY | Facility: CLINIC | Age: 89
End: 2025-04-29
Payer: MEDICARE

## 2025-04-29 ENCOUNTER — TELEPHONE (OUTPATIENT)
Dept: CARDIOLOGY | Facility: CLINIC | Age: 89
End: 2025-04-29
Payer: MEDICARE

## 2025-04-29 DIAGNOSIS — Z79.01 LONG TERM (CURRENT) USE OF ANTICOAGULANTS: Primary | ICD-10-CM

## 2025-04-29 DIAGNOSIS — I48.19 PERSISTENT ATRIAL FIBRILLATION: ICD-10-CM

## 2025-04-29 LAB — INR PPP: 0.8

## 2025-04-29 NOTE — TELEPHONE ENCOUNTER
I do not know Nicki with Home Health, did she leave her number for call back? We always need to get their phone number. Thanks. Joe

## 2025-04-29 NOTE — TELEPHONE ENCOUNTER
ABIGAIL Caceres called back to discuss INR result. Her phone number was [phone]. She will call pt and advise her to take 4 mgs for next 3 days and recheck INR on Fri. Joe

## 2025-04-29 NOTE — PROGRESS NOTES
Spoke to ABIGAIL Pizarro, she reports a low INR of 0.8. The pt was supposed to be on 2mgs daily since 4/24/25. Unsure if any doses were missed. Advised her to have pt take 4 mgs daily and recheck on Friday. Joe

## 2025-05-02 ENCOUNTER — ANTICOAGULATION VISIT (OUTPATIENT)
Dept: CARDIOLOGY | Facility: CLINIC | Age: 89
End: 2025-05-02
Payer: MEDICARE

## 2025-05-02 DIAGNOSIS — Z79.01 LONG TERM (CURRENT) USE OF ANTICOAGULANTS: Primary | ICD-10-CM

## 2025-05-02 DIAGNOSIS — I48.19 PERSISTENT ATRIAL FIBRILLATION: ICD-10-CM

## 2025-05-02 LAB — INR PPP: 1.3 (ref 2–3)

## 2025-05-02 NOTE — PROGRESS NOTES
Pts INR is still running slightly low at 1.3. Will continue 4 mgs daily until recheck on Monday. Spoke to visiting nurse. Joe

## 2025-05-05 ENCOUNTER — ANTICOAGULATION VISIT (OUTPATIENT)
Dept: CARDIOLOGY | Facility: CLINIC | Age: 89
End: 2025-05-05
Payer: MEDICARE

## 2025-05-05 DIAGNOSIS — I48.19 PERSISTENT ATRIAL FIBRILLATION: ICD-10-CM

## 2025-05-05 DIAGNOSIS — Z79.01 LONG TERM (CURRENT) USE OF ANTICOAGULANTS: Primary | ICD-10-CM

## 2025-05-05 LAB — INR PPP: 1.8

## 2025-05-05 NOTE — PROGRESS NOTES
ABIGAIL Price [facility], called in INR of 1.8. Pt take 4 mgs 5 days with 2 mgs on Tues and Thurs and recheck in a week. Joe

## 2025-05-12 ENCOUNTER — ANTICOAGULATION VISIT (OUTPATIENT)
Dept: CARDIOLOGY | Facility: CLINIC | Age: 89
End: 2025-05-12
Payer: MEDICARE

## 2025-05-12 DIAGNOSIS — Z79.01 LONG TERM (CURRENT) USE OF ANTICOAGULANTS: Primary | ICD-10-CM

## 2025-05-12 DIAGNOSIS — I48.19 PERSISTENT ATRIAL FIBRILLATION: ICD-10-CM

## 2025-05-12 LAB — INR PPP: 2.8

## 2025-05-12 NOTE — PROGRESS NOTES
Spoke to ABIGAIL Price , pts INR was WNL at 2.8. CPM and recheck in a week. Advised pt to include green leafy vegs in diet to help stabilize INR results. Joe

## 2025-05-17 ENCOUNTER — HOSPITAL ENCOUNTER (INPATIENT)
Facility: HOSPITAL | Age: 89
LOS: 3 days | Discharge: HOME | End: 2025-05-20
Attending: EMERGENCY MEDICINE | Admitting: STUDENT IN AN ORGANIZED HEALTH CARE EDUCATION/TRAINING PROGRAM
Payer: MEDICARE

## 2025-05-17 ENCOUNTER — APPOINTMENT (OUTPATIENT)
Dept: CT IMAGING | Facility: HOSPITAL | Age: 89
End: 2025-05-17
Payer: MEDICARE

## 2025-05-17 ENCOUNTER — APPOINTMENT (OUTPATIENT)
Dept: GENERAL RADIOLOGY | Facility: HOSPITAL | Age: 89
End: 2025-05-17
Payer: MEDICARE

## 2025-05-17 DIAGNOSIS — R07.9 CHEST PAIN, UNSPECIFIED TYPE: ICD-10-CM

## 2025-05-17 DIAGNOSIS — Z79.01 LONG TERM (CURRENT) USE OF ANTICOAGULANTS: ICD-10-CM

## 2025-05-17 DIAGNOSIS — I25.700 CORONARY ARTERY DISEASE INVOLVING CORONARY BYPASS GRAFT OF NATIVE HEART WITH UNSTABLE ANGINA PECTORIS: ICD-10-CM

## 2025-05-17 DIAGNOSIS — I48.11 LONGSTANDING PERSISTENT ATRIAL FIBRILLATION: Primary | ICD-10-CM

## 2025-05-17 LAB
ALBUMIN SERPL-MCNC: 4.2 G/DL (ref 3.5–5.2)
ALBUMIN/GLOB SERPL: 1.8 G/DL
ALP SERPL-CCNC: 132 U/L (ref 39–117)
ALT SERPL W P-5'-P-CCNC: 14 U/L (ref 1–33)
ANION GAP SERPL CALCULATED.3IONS-SCNC: 9.7 MMOL/L (ref 5–15)
AST SERPL-CCNC: 21 U/L (ref 1–32)
BASOPHILS # BLD AUTO: 0.01 10*3/MM3 (ref 0–0.2)
BASOPHILS NFR BLD AUTO: 0.2 % (ref 0–1.5)
BILIRUB SERPL-MCNC: 0.8 MG/DL (ref 0–1.2)
BUN SERPL-MCNC: 20 MG/DL (ref 8–23)
BUN/CREAT SERPL: 14.8 (ref 7–25)
CALCIUM SPEC-SCNC: 9.3 MG/DL (ref 8.6–10.5)
CHLORIDE SERPL-SCNC: 92 MMOL/L (ref 98–107)
CO2 SERPL-SCNC: 27.3 MMOL/L (ref 22–29)
CREAT SERPL-MCNC: 1.35 MG/DL (ref 0.57–1)
D-LACTATE SERPL-SCNC: 0.6 MMOL/L (ref 0.5–2)
DEPRECATED RDW RBC AUTO: 48.2 FL (ref 37–54)
EGFRCR SERPLBLD CKD-EPI 2021: 38.1 ML/MIN/1.73
EOSINOPHIL # BLD AUTO: 0.13 10*3/MM3 (ref 0–0.4)
EOSINOPHIL NFR BLD AUTO: 2.3 % (ref 0.3–6.2)
ERYTHROCYTE [DISTWIDTH] IN BLOOD BY AUTOMATED COUNT: 13.9 % (ref 12.3–15.4)
FLUAV SUBTYP SPEC NAA+PROBE: NOT DETECTED
FLUBV RNA ISLT QL NAA+PROBE: NOT DETECTED
GEN 5 1HR TROPONIN T REFLEX: 11 NG/L
GLOBULIN UR ELPH-MCNC: 2.3 GM/DL
GLUCOSE SERPL-MCNC: 112 MG/DL (ref 65–99)
HCT VFR BLD AUTO: 37 % (ref 34–46.6)
HEMOCCULT STL QL IA: NEGATIVE
HGB BLD-MCNC: 11.9 G/DL (ref 12–15.9)
IMM GRANULOCYTES # BLD AUTO: 0.02 10*3/MM3 (ref 0–0.05)
IMM GRANULOCYTES NFR BLD AUTO: 0.4 % (ref 0–0.5)
INR PPP: 2.5 (ref 0.8–1.2)
LYMPHOCYTES # BLD AUTO: 1.33 10*3/MM3 (ref 0.7–3.1)
LYMPHOCYTES NFR BLD AUTO: 23.3 % (ref 19.6–45.3)
MCH RBC QN AUTO: 29.9 PG (ref 26.6–33)
MCHC RBC AUTO-ENTMCNC: 32.2 G/DL (ref 31.5–35.7)
MCV RBC AUTO: 93 FL (ref 79–97)
MONOCYTES # BLD AUTO: 0.65 10*3/MM3 (ref 0.1–0.9)
MONOCYTES NFR BLD AUTO: 11.4 % (ref 5–12)
NEUTROPHILS NFR BLD AUTO: 3.57 10*3/MM3 (ref 1.7–7)
NEUTROPHILS NFR BLD AUTO: 62.4 % (ref 42.7–76)
NT-PROBNP SERPL-MCNC: 791 PG/ML (ref 0–1800)
PLATELET # BLD AUTO: 151 10*3/MM3 (ref 140–450)
PMV BLD AUTO: 9.7 FL (ref 6–12)
POTASSIUM SERPL-SCNC: 5 MMOL/L (ref 3.5–5.2)
PROT SERPL-MCNC: 6.5 G/DL (ref 6–8.5)
PROTHROMBIN TIME: 26.8 SECONDS
QT INTERVAL: 436 MS
QTC INTERVAL: 469 MS
RBC # BLD AUTO: 3.98 10*6/MM3 (ref 3.77–5.28)
SARS-COV-2 RNA RESP QL NAA+PROBE: NOT DETECTED
SODIUM SERPL-SCNC: 129 MMOL/L (ref 136–145)
TROPONIN T NUMERIC DELTA: -1 NG/L
TROPONIN T SERPL HS-MCNC: 12 NG/L
WBC NRBC COR # BLD AUTO: 5.71 10*3/MM3 (ref 3.4–10.8)

## 2025-05-17 PROCEDURE — 80053 COMPREHEN METABOLIC PANEL: CPT | Performed by: EMERGENCY MEDICINE

## 2025-05-17 PROCEDURE — 82274 ASSAY TEST FOR BLOOD FECAL: CPT | Performed by: EMERGENCY MEDICINE

## 2025-05-17 PROCEDURE — 83605 ASSAY OF LACTIC ACID: CPT | Performed by: EMERGENCY MEDICINE

## 2025-05-17 PROCEDURE — 71045 X-RAY EXAM CHEST 1 VIEW: CPT

## 2025-05-17 PROCEDURE — 99285 EMERGENCY DEPT VISIT HI MDM: CPT | Performed by: EMERGENCY MEDICINE

## 2025-05-17 PROCEDURE — 85610 PROTHROMBIN TIME: CPT | Performed by: EMERGENCY MEDICINE

## 2025-05-17 PROCEDURE — 85025 COMPLETE CBC W/AUTO DIFF WBC: CPT | Performed by: EMERGENCY MEDICINE

## 2025-05-17 PROCEDURE — 87636 SARSCOV2 & INF A&B AMP PRB: CPT | Performed by: EMERGENCY MEDICINE

## 2025-05-17 PROCEDURE — 93010 ELECTROCARDIOGRAM REPORT: CPT | Performed by: EMERGENCY MEDICINE

## 2025-05-17 PROCEDURE — 25010000002 FAMOTIDINE 10 MG/ML SOLUTION: Performed by: EMERGENCY MEDICINE

## 2025-05-17 PROCEDURE — 83880 ASSAY OF NATRIURETIC PEPTIDE: CPT | Performed by: EMERGENCY MEDICINE

## 2025-05-17 PROCEDURE — 36415 COLL VENOUS BLD VENIPUNCTURE: CPT

## 2025-05-17 PROCEDURE — 84484 ASSAY OF TROPONIN QUANT: CPT | Performed by: EMERGENCY MEDICINE

## 2025-05-17 PROCEDURE — 74176 CT ABD & PELVIS W/O CONTRAST: CPT

## 2025-05-17 PROCEDURE — 93005 ELECTROCARDIOGRAM TRACING: CPT | Performed by: EMERGENCY MEDICINE

## 2025-05-17 PROCEDURE — 25010000002 ONDANSETRON PER 1 MG: Performed by: EMERGENCY MEDICINE

## 2025-05-17 PROCEDURE — 99285 EMERGENCY DEPT VISIT HI MDM: CPT

## 2025-05-17 RX ORDER — POLYETHYLENE GLYCOL 3350 17 G/17G
17 POWDER, FOR SOLUTION ORAL DAILY PRN
Status: DISCONTINUED | OUTPATIENT
Start: 2025-05-17 | End: 2025-05-20 | Stop reason: HOSPADM

## 2025-05-17 RX ORDER — SODIUM CHLORIDE 0.9 % (FLUSH) 0.9 %
10 SYRINGE (ML) INJECTION EVERY 12 HOURS SCHEDULED
Status: DISCONTINUED | OUTPATIENT
Start: 2025-05-18 | End: 2025-05-20 | Stop reason: HOSPADM

## 2025-05-17 RX ORDER — SODIUM CHLORIDE 9 MG/ML
40 INJECTION, SOLUTION INTRAVENOUS AS NEEDED
Status: DISCONTINUED | OUTPATIENT
Start: 2025-05-17 | End: 2025-05-20 | Stop reason: HOSPADM

## 2025-05-17 RX ORDER — BISACODYL 5 MG/1
5 TABLET, DELAYED RELEASE ORAL DAILY PRN
Status: DISCONTINUED | OUTPATIENT
Start: 2025-05-17 | End: 2025-05-20 | Stop reason: HOSPADM

## 2025-05-17 RX ORDER — LIDOCAINE HYDROCHLORIDE 20 MG/ML
5 SOLUTION OROPHARYNGEAL ONCE
Status: COMPLETED | OUTPATIENT
Start: 2025-05-17 | End: 2025-05-17

## 2025-05-17 RX ORDER — NITROGLYCERIN 0.4 MG/1
0.4 TABLET SUBLINGUAL
Status: DISCONTINUED | OUTPATIENT
Start: 2025-05-17 | End: 2025-05-20 | Stop reason: HOSPADM

## 2025-05-17 RX ORDER — FAMOTIDINE 10 MG/ML
20 INJECTION, SOLUTION INTRAVENOUS ONCE
Status: COMPLETED | OUTPATIENT
Start: 2025-05-17 | End: 2025-05-17

## 2025-05-17 RX ORDER — ONDANSETRON 2 MG/ML
4 INJECTION INTRAMUSCULAR; INTRAVENOUS ONCE
Status: COMPLETED | OUTPATIENT
Start: 2025-05-17 | End: 2025-05-17

## 2025-05-17 RX ORDER — AMOXICILLIN 250 MG
2 CAPSULE ORAL 2 TIMES DAILY PRN
Status: DISCONTINUED | OUTPATIENT
Start: 2025-05-17 | End: 2025-05-20 | Stop reason: HOSPADM

## 2025-05-17 RX ORDER — BISACODYL 10 MG
10 SUPPOSITORY, RECTAL RECTAL DAILY PRN
Status: DISCONTINUED | OUTPATIENT
Start: 2025-05-17 | End: 2025-05-20 | Stop reason: HOSPADM

## 2025-05-17 RX ORDER — SODIUM CHLORIDE 0.9 % (FLUSH) 0.9 %
10 SYRINGE (ML) INJECTION AS NEEDED
Status: DISCONTINUED | OUTPATIENT
Start: 2025-05-17 | End: 2025-05-20 | Stop reason: HOSPADM

## 2025-05-17 RX ADMIN — ALUMINUM HYDROXIDE, MAGNESIUM HYDROXIDE, AND DIMETHICONE: 400; 400; 40 SUSPENSION ORAL at 21:36

## 2025-05-17 RX ADMIN — FAMOTIDINE 20 MG: 10 INJECTION, SOLUTION INTRAVENOUS at 19:45

## 2025-05-17 RX ADMIN — ONDANSETRON 4 MG: 2 INJECTION, SOLUTION INTRAMUSCULAR; INTRAVENOUS at 19:45

## 2025-05-17 RX ADMIN — LIDOCAINE HYDROCHLORIDE 5 ML: 20 SOLUTION ORAL at 21:36

## 2025-05-17 NOTE — FSED PROVIDER NOTE
Subjective   History of Present Illness  89yof with h/o atrial fibrillation who is on coumadin complains of chest pain, abdominal pain, diarrhea and dark stools for the past few days. She notes the her symptoms would start in the morning and then get better in the afternoon until today. Today her pain has been constant. She denies fever, cough or shortness of breath. She rate her pain 6/10. She took a Tylenol earlier today but it did not help.       Review of Systems   Constitutional: Negative.    Respiratory: Negative.     Cardiovascular:  Positive for chest pain.   Gastrointestinal:  Positive for abdominal pain and diarrhea.   All other systems reviewed and are negative.      Past Medical History:   Diagnosis Date    Anxiety     Arthritis     Asthma     Atrial fibrillation     CHF (congestive heart failure)     CKD (chronic kidney disease) stage 3, GFR 30-59 ml/min 04/18/2021    Coronary artery disease     Dyslipidemia     Dyslipidemia 01/05/2015    GERD (gastroesophageal reflux disease)     History of bone density study 04/2015    Hyperlipidemia     Hypertension     Sleep apnea     Wears dentures        Allergies   Allergen Reactions    Doxycycline Other (See Comments)     Chest pain    Celebrex [Celecoxib] Other (See Comments)     Chest pain    Contrast Dye (Echo Or Unknown Ct/Mr) Itching    Iodinated Contrast Media Itching    Nsaids Other (See Comments)     convulsion    Other Itching     Pt states some steroids make her itch and hallucinate- she does not know the names   She said she is allergic to all arthritis medicine    Sulfa Antibiotics Nausea Only       Past Surgical History:   Procedure Laterality Date    BLADDER REPAIR  1990    BREAST LUMPECTOMY  1974    BENIGN    CARDIAC CATHETERIZATION  05/25/2011    CARDIAC CATHETERIZATION N/A 04/21/2021    Procedure: Left Heart Cath and coronary angiogram;  Surgeon: Ivan Martell MD;  Location: [facility] CATH INVASIVE LOCATION;  Service: Cardiovascular;  Laterality:  N/A;    CARDIAC CATHETERIZATION N/A 04/21/2021    Procedure: Left ventriculography;  Surgeon: Ivan Martell MD;  Location: [facility] CATH INVASIVE LOCATION;  Service: Cardiovascular;  Laterality: N/A;    CARDIAC CATHETERIZATION  04/21/2021    Procedure: Saphenous Vein Graft;  Surgeon: Ivan Martell MD;  Location: [facility] CATH INVASIVE LOCATION;  Service: Cardiovascular;;    CARDIAC CATHETERIZATION N/A 04/26/2021    Procedure: Percutaneous Coronary Intervention;  Surgeon: Ivan Martell MD;  Location: [facility] CATH INVASIVE LOCATION;  Service: Cardiovascular;  Laterality: N/A;    CARDIAC CATHETERIZATION N/A 04/26/2021    Procedure: Stent RAY coronary;  Surgeon: Ivan Martell MD;  Location: [facility] CATH INVASIVE LOCATION;  Service: Cardiovascular;  Laterality: N/A;    CARDIOVASCULAR STRESS TEST  05/04/2015    CHOLECYSTECTOMY  1990    CORONARY ARTERY BYPASS GRAFT  08/09/2017    X5  Dr Brandt    ENDOSCOPY N/A 06/30/2020    Procedure: ESOPHAGOGASTRODUODENOSCOPY with biopsy x 1 area and dilatation (15-18mm balloon) up to 18mm;  Surgeon: Quinton Tapia MD;  Location: [facility] ENDOSCOPY;  Service: Gastroenterology;  Laterality: N/A;  post op: gastritis, large hiatal hernia, esophageal dysmotility    ENDOSCOPY N/A 10/24/2024    Procedure: ESOPHAGOGASTRODUODENOSCOPY WITH GASTRIC BIOPSY, BALLOON DILATION (18MM-20MM) UP TO 19MM,BIOPSY OF ESOPHAGUS;  Surgeon: Quinton Tapia MD;  Location: [facility] ENDOSCOPY;  Service: Gastroenterology;  Laterality: N/A;  GASTRITIS, ESOPHAGITIS, HIATEL HERNIA    HYSTERECTOMY  1990    INSERT / REPLACE / REMOVE PACEMAKER      JOINT REPLACEMENT Right     knee     OTHER SURGICAL HISTORY  06/2017    BLOOD CLOT REMOVED FROM LEFT EAR    PACEMAKER IMPLANTATION  04/18/2016    DUAL CHAMBER ST ALESSIO       Family History   Problem Relation Age of Onset    Hypertension Mother     Heart disease Mother     Heart disease Sister         PACEMAKER    Hypertension Sister     Heart disease Brother     Heart disease  Brother        Social History     Socioeconomic History    Marital status:    Tobacco Use    Smoking status: Never    Smokeless tobacco: Never   Vaping Use    Vaping status: Never Used   Substance and Sexual Activity    Alcohol use: Never    Drug use: Never    Sexual activity: Not Currently     Partners: Male     Birth control/protection: None, Hysterectomy           Objective   Physical Exam  Vitals reviewed.   Constitutional:       General: She is not in acute distress.  HENT:      Head: Normocephalic and atraumatic.   Eyes:      Extraocular Movements: Extraocular movements intact.      Pupils: Pupils are equal, round, and reactive to light.   Cardiovascular:      Rate and Rhythm: Normal rate and regular rhythm.      Heart sounds: Normal heart sounds.   Pulmonary:      Effort: Pulmonary effort is normal.      Breath sounds: Normal breath sounds.   Abdominal:      Palpations: Abdomen is soft.      Tenderness: There is abdominal tenderness.   Musculoskeletal:      Cervical back: Normal range of motion and neck supple.   Skin:     General: Skin is warm and dry.      Capillary Refill: Capillary refill takes less than 2 seconds.   Neurological:      General: No focal deficit present.      Mental Status: She is alert.         ECG 12 Lead      Date/Time: 5/17/2025 7:33 PM    Performed by: Mirna Alvarado MD  Authorized by: Joshua Lorenzana MD  Interpreted by ED physician  Comparison: compared with previous ECG from 6/16/2024  Similar to previous ECG  Rhythm: sinus rhythm  Rate: normal  BPM: 70  Conduction: conduction normal  Clinical impression: non-specific ECG               ED Course                HEART Score: 6                            Medical Decision Making  89 yof presents with chest pain and abdominal pain on and off for 3 days. Pt states the chest pain has been starting in the morning but would go away throughout the day. Today the pain has been constant and had not gone away. She also reports  abdominal pain and diarrhea. She notes when she has diarrhea it is brown but she notices it looks black and bloody sometimes in the toilet. She is on coumadin for atrial fibrillation. EKG is on ischemic. CXR negative for pneumonia, pneumothorax or CHF. Rectal exam brown stool present, hemoccult negative. Pt continued to complain of chest pain despite Zofran, Pepcid and Gi cocktail. Plan to admit for further evaluation and treatment.     Problems Addressed:  Chest pain, unspecified type: complicated acute illness or injury    Amount and/or Complexity of Data Reviewed  Labs: ordered.  Radiology: ordered.  ECG/medicine tests: ordered and independent interpretation performed.    Risk  Prescription drug management.  Decision regarding hospitalization.        Final diagnoses:   Chest pain, unspecified type       ED Disposition  ED Disposition       ED Disposition   Decision to Admit    Condition   --    Comment   Level of Care: Telemetry [5]   Admitting Physician: TRUNG ALEXANDER [[id number]]   Attending Physician: ELLIE ROGERS [[id number]]   Patient Class: Observation [104]                 No follow-up provider specified.       Medication List      No changes were made to your prescriptions during this visit.

## 2025-05-18 LAB
ALBUMIN SERPL-MCNC: 4.3 G/DL (ref 3.5–5.2)
ALBUMIN/GLOB SERPL: 1.5 G/DL
ALP SERPL-CCNC: 124 U/L (ref 39–117)
ALT SERPL W P-5'-P-CCNC: 20 U/L (ref 1–33)
ANION GAP SERPL CALCULATED.3IONS-SCNC: 10.4 MMOL/L (ref 5–15)
AST SERPL-CCNC: 32 U/L (ref 1–32)
BASOPHILS # BLD AUTO: 0.02 10*3/MM3 (ref 0–0.2)
BASOPHILS NFR BLD AUTO: 0.3 % (ref 0–1.5)
BILIRUB SERPL-MCNC: 1.4 MG/DL (ref 0–1.2)
BUN SERPL-MCNC: 16 MG/DL (ref 8–23)
BUN/CREAT SERPL: 13.6 (ref 7–25)
CALCIUM SPEC-SCNC: 9.6 MG/DL (ref 8.6–10.5)
CHLORIDE SERPL-SCNC: 94 MMOL/L (ref 98–107)
CO2 SERPL-SCNC: 26.6 MMOL/L (ref 22–29)
CREAT SERPL-MCNC: 1.18 MG/DL (ref 0.57–1)
DEPRECATED RDW RBC AUTO: 46 FL (ref 37–54)
EGFRCR SERPLBLD CKD-EPI 2021: 44.8 ML/MIN/1.73
EOSINOPHIL # BLD AUTO: 0.09 10*3/MM3 (ref 0–0.4)
EOSINOPHIL NFR BLD AUTO: 1.5 % (ref 0.3–6.2)
ERYTHROCYTE [DISTWIDTH] IN BLOOD BY AUTOMATED COUNT: 13.6 % (ref 12.3–15.4)
GLOBULIN UR ELPH-MCNC: 2.8 GM/DL
GLUCOSE SERPL-MCNC: 106 MG/DL (ref 65–99)
HCT VFR BLD AUTO: 39.7 % (ref 34–46.6)
HGB BLD-MCNC: 12.8 G/DL (ref 12–15.9)
IMM GRANULOCYTES # BLD AUTO: 0.04 10*3/MM3 (ref 0–0.05)
IMM GRANULOCYTES NFR BLD AUTO: 0.7 % (ref 0–0.5)
LYMPHOCYTES # BLD AUTO: 1.37 10*3/MM3 (ref 0.7–3.1)
LYMPHOCYTES NFR BLD AUTO: 22.3 % (ref 19.6–45.3)
MAGNESIUM SERPL-MCNC: 2.3 MG/DL (ref 1.6–2.4)
MCH RBC QN AUTO: 29.4 PG (ref 26.6–33)
MCHC RBC AUTO-ENTMCNC: 32.2 G/DL (ref 31.5–35.7)
MCV RBC AUTO: 91.1 FL (ref 79–97)
MONOCYTES # BLD AUTO: 0.57 10*3/MM3 (ref 0.1–0.9)
MONOCYTES NFR BLD AUTO: 9.3 % (ref 5–12)
NEUTROPHILS NFR BLD AUTO: 4.04 10*3/MM3 (ref 1.7–7)
NEUTROPHILS NFR BLD AUTO: 65.9 % (ref 42.7–76)
NRBC BLD AUTO-RTO: 0 /100 WBC (ref 0–0.2)
PHOSPHATE SERPL-MCNC: 3.2 MG/DL (ref 2.5–4.5)
PLATELET # BLD AUTO: 155 10*3/MM3 (ref 140–450)
PMV BLD AUTO: 10.8 FL (ref 6–12)
POTASSIUM SERPL-SCNC: 4.5 MMOL/L (ref 3.5–5.2)
PROT SERPL-MCNC: 7.1 G/DL (ref 6–8.5)
RBC # BLD AUTO: 4.36 10*6/MM3 (ref 3.77–5.28)
SODIUM SERPL-SCNC: 131 MMOL/L (ref 136–145)
TROPONIN T SERPL HS-MCNC: 11 NG/L
WBC NRBC COR # BLD AUTO: 6.13 10*3/MM3 (ref 3.4–10.8)

## 2025-05-18 PROCEDURE — 84100 ASSAY OF PHOSPHORUS: CPT | Performed by: STUDENT IN AN ORGANIZED HEALTH CARE EDUCATION/TRAINING PROGRAM

## 2025-05-18 PROCEDURE — 99222 1ST HOSP IP/OBS MODERATE 55: CPT | Performed by: INTERNAL MEDICINE

## 2025-05-18 PROCEDURE — 80053 COMPREHEN METABOLIC PANEL: CPT | Performed by: STUDENT IN AN ORGANIZED HEALTH CARE EDUCATION/TRAINING PROGRAM

## 2025-05-18 PROCEDURE — 83735 ASSAY OF MAGNESIUM: CPT | Performed by: STUDENT IN AN ORGANIZED HEALTH CARE EDUCATION/TRAINING PROGRAM

## 2025-05-18 PROCEDURE — 84484 ASSAY OF TROPONIN QUANT: CPT | Performed by: NURSE PRACTITIONER

## 2025-05-18 PROCEDURE — 85025 COMPLETE CBC W/AUTO DIFF WBC: CPT | Performed by: STUDENT IN AN ORGANIZED HEALTH CARE EDUCATION/TRAINING PROGRAM

## 2025-05-18 RX ORDER — PANTOPRAZOLE SODIUM 40 MG/1
40 TABLET, DELAYED RELEASE ORAL DAILY
Status: DISCONTINUED | OUTPATIENT
Start: 2025-05-18 | End: 2025-05-20 | Stop reason: HOSPADM

## 2025-05-18 RX ORDER — ATORVASTATIN CALCIUM 10 MG/1
10 TABLET, FILM COATED ORAL NIGHTLY
Status: DISCONTINUED | OUTPATIENT
Start: 2025-05-18 | End: 2025-05-20 | Stop reason: HOSPADM

## 2025-05-18 RX ORDER — HYDROCODONE BITARTRATE AND ACETAMINOPHEN 7.5; 325 MG/1; MG/1
1 TABLET ORAL EVERY 6 HOURS PRN
Refills: 0 | Status: DISCONTINUED | OUTPATIENT
Start: 2025-05-18 | End: 2025-05-20 | Stop reason: HOSPADM

## 2025-05-18 RX ORDER — ALPRAZOLAM 0.5 MG
0.5 TABLET ORAL 2 TIMES DAILY PRN
Status: DISCONTINUED | OUTPATIENT
Start: 2025-05-18 | End: 2025-05-20 | Stop reason: HOSPADM

## 2025-05-18 RX ORDER — DILTIAZEM HYDROCHLORIDE 120 MG/1
120 CAPSULE, COATED, EXTENDED RELEASE ORAL
Status: DISCONTINUED | OUTPATIENT
Start: 2025-05-18 | End: 2025-05-20 | Stop reason: HOSPADM

## 2025-05-18 RX ORDER — METOPROLOL TARTRATE 50 MG
100 TABLET ORAL EVERY 12 HOURS SCHEDULED
Status: DISCONTINUED | OUTPATIENT
Start: 2025-05-18 | End: 2025-05-20 | Stop reason: HOSPADM

## 2025-05-18 RX ORDER — RISPERIDONE 0.25 MG/1
0.25 TABLET ORAL EVERY 12 HOURS SCHEDULED
Status: DISCONTINUED | OUTPATIENT
Start: 2025-05-18 | End: 2025-05-18

## 2025-05-18 RX ORDER — SPIRONOLACTONE 25 MG/1
12.5 TABLET ORAL DAILY
Status: DISCONTINUED | OUTPATIENT
Start: 2025-05-18 | End: 2025-05-18

## 2025-05-18 RX ORDER — CETIRIZINE HYDROCHLORIDE 10 MG/1
10 TABLET ORAL DAILY
Status: DISCONTINUED | OUTPATIENT
Start: 2025-05-18 | End: 2025-05-20 | Stop reason: HOSPADM

## 2025-05-18 RX ORDER — SPIRONOLACTONE 25 MG/1
12.5 TABLET ORAL DAILY
Status: DISCONTINUED | OUTPATIENT
Start: 2025-05-18 | End: 2025-05-20 | Stop reason: HOSPADM

## 2025-05-18 RX ORDER — ISOSORBIDE MONONITRATE 30 MG/1
30 TABLET, EXTENDED RELEASE ORAL EVERY MORNING
Status: DISCONTINUED | OUTPATIENT
Start: 2025-05-18 | End: 2025-05-20 | Stop reason: HOSPADM

## 2025-05-18 RX ORDER — WARFARIN SODIUM 1 MG/1
2 TABLET ORAL
Status: DISCONTINUED | OUTPATIENT
Start: 2025-05-20 | End: 2025-05-20 | Stop reason: HOSPADM

## 2025-05-18 RX ADMIN — PANTOPRAZOLE SODIUM 40 MG: 40 TABLET, DELAYED RELEASE ORAL at 10:23

## 2025-05-18 RX ADMIN — DILTIAZEM HYDROCHLORIDE 120 MG: 120 CAPSULE, EXTENDED RELEASE ORAL at 10:23

## 2025-05-18 RX ADMIN — HYDROCODONE BITARTRATE AND ACETAMINOPHEN 1 TABLET: 7.5; 325 TABLET ORAL at 14:08

## 2025-05-18 RX ADMIN — SPIRONOLACTONE 12.5 MG: 25 TABLET ORAL at 12:15

## 2025-05-18 RX ADMIN — ISOSORBIDE MONONITRATE 30 MG: 30 TABLET, EXTENDED RELEASE ORAL at 10:23

## 2025-05-18 RX ADMIN — Medication 10 ML: at 00:04

## 2025-05-18 RX ADMIN — Medication 10 ML: at 20:03

## 2025-05-18 RX ADMIN — Medication 10 ML: at 10:24

## 2025-05-18 RX ADMIN — METOPROLOL TARTRATE 100 MG: 50 TABLET, FILM COATED ORAL at 20:01

## 2025-05-18 RX ADMIN — METOPROLOL TARTRATE 100 MG: 50 TABLET, FILM COATED ORAL at 12:15

## 2025-05-18 RX ADMIN — CETIRIZINE HYDROCHLORIDE 10 MG: 10 TABLET, FILM COATED ORAL at 12:15

## 2025-05-18 RX ADMIN — ALPRAZOLAM 0.5 MG: 0.5 TABLET ORAL at 20:01

## 2025-05-18 RX ADMIN — ATORVASTATIN CALCIUM 10 MG: 10 TABLET ORAL at 20:01

## 2025-05-18 RX ADMIN — WARFARIN SODIUM 4 MG: 3 TABLET ORAL at 17:35

## 2025-05-18 NOTE — CONSULTS
[facility] CARDIOLOGY CONSULT  [facility]        Subjective:     Encounter Date:05/17/2025      Patient ID: Syl Herrera is a 89 y.o. female.    Chief Complaint: Chest Pain    Seen and examined, greater than 50% of total encounter time in medical decision making performed by me, scribed findings below as discussed with nurse practitioner    HPI:  Syl Herrera is a high functioning 89 y.o. female known to Dr. Martell with a past cardiac history of PAF (anticoagulated with Coumadin), [place] dual-chamber PPM in 2016, chronic diastolic heart failure, and CAD status post CABG in 2017 and PCI in 2021.  Last nuclear stress test in 6/2024 was negative for ischemia.  Last 2D echo 6/2024 showed an EF of 69% with grade 2 diastolic dysfunction.  PMH includes HTN, HLD, ENRIQUE, and CKD stage III.  Patient presents to the ER with complaints of chest pain and cardiology consulted for further evaluation and management.    Patient complains of intermittent midsternal chest pain radiating to the bilateral arms and beneath the right breast accompanied by shortness of breath at rest.  She received SL nitroglycerin in the ER which helped.  Patient lives at home independently.  Patient can typically walk around her home and mop floors without difficulty.  Patient cannot recall her prior angina.  In the past few days she has also had abdominal pain, dark stools, and observed bright red blood in her stool.  BP is elevated on admit.  She reports BP is controlled at home.  Family believes discomfort of the automatic BP cuff is driving up blood pressures here and she has not had her morning medication.  Of note patient has had caffeine this morning.  Patient and family member at bedside, she is tearful, overall not a great historian losing track train of thought frequently.  She says she has not slept in days.,  Has vague descriptions of her chest pain also having a host of other complaints and concerns  contributing to her presentation to the hospital    Review of systems otherwise negative/14 point review of systems except as mentioned above  Historical data copied forward from previous encounters in EMR is unchanged  Past Medical History:   Diagnosis Date    Anxiety     Arthritis     Asthma     Atrial fibrillation     CHF (congestive heart failure)     CKD (chronic kidney disease) stage 3, GFR 30-59 ml/min 04/18/2021    Coronary artery disease     Dyslipidemia     Dyslipidemia 01/05/2015    GERD (gastroesophageal reflux disease)     History of bone density study 04/2015    Hyperlipidemia     Hypertension     Sleep apnea     Wears dentures          Past Surgical History:   Procedure Laterality Date    BLADDER REPAIR  1990    BREAST LUMPECTOMY  1974    BENIGN    CARDIAC CATHETERIZATION  05/25/2011    CARDIAC CATHETERIZATION N/A 04/21/2021    Procedure: Left Heart Cath and coronary angiogram;  Surgeon: Ivan Martell MD;  Location:  [facility] CATH INVASIVE LOCATION;  Service: Cardiovascular;  Laterality: N/A;    CARDIAC CATHETERIZATION N/A 04/21/2021    Procedure: Left ventriculography;  Surgeon: Ivan Martell MD;  Location:  [facility] CATH INVASIVE LOCATION;  Service: Cardiovascular;  Laterality: N/A;    CARDIAC CATHETERIZATION  04/21/2021    Procedure: Saphenous Vein Graft;  Surgeon: Ivan Martell MD;  Location:  [facility] CATH INVASIVE LOCATION;  Service: Cardiovascular;;    CARDIAC CATHETERIZATION N/A 04/26/2021    Procedure: Percutaneous Coronary Intervention;  Surgeon: Ivan Martell MD;  Location:  [facility] CATH INVASIVE LOCATION;  Service: Cardiovascular;  Laterality: N/A;    CARDIAC CATHETERIZATION N/A 04/26/2021    Procedure: Stent RAY coronary;  Surgeon: Ivan Martell MD;  Location: [facility] CATH INVASIVE LOCATION;  Service: Cardiovascular;  Laterality: N/A;    CARDIOVASCULAR STRESS TEST  05/04/2015    CHOLECYSTECTOMY  1990    CORONARY ARTERY BYPASS GRAFT  08/09/2017    X5  Dr Brandt    ENDOSCOPY N/A 06/30/2020     Procedure: ESOPHAGOGASTRODUODENOSCOPY with biopsy x 1 area and dilatation (15-18mm balloon) up to 18mm;  Surgeon: Quinton Tapia MD;  Location: [facility] ENDOSCOPY;  Service: Gastroenterology;  Laterality: N/A;  post op: gastritis, large hiatal hernia, esophageal dysmotility    ENDOSCOPY N/A 10/24/2024    Procedure: ESOPHAGOGASTRODUODENOSCOPY WITH GASTRIC BIOPSY, BALLOON DILATION (18MM-20MM) UP TO 19MM,BIOPSY OF ESOPHAGUS;  Surgeon: Quinton Tapia MD;  Location: [facility] ENDOSCOPY;  Service: Gastroenterology;  Laterality: N/A;  GASTRITIS, ESOPHAGITIS, HIATEL HERNIA    HYSTERECTOMY  1990    INSERT / REPLACE / REMOVE PACEMAKER      JOINT REPLACEMENT Right     knee     OTHER SURGICAL HISTORY  06/2017    BLOOD CLOT REMOVED FROM LEFT EAR    PACEMAKER IMPLANTATION  04/18/2016    DUAL CHAMBER [facility]         Social History     Socioeconomic History    Marital status:    Tobacco Use    Smoking status: Never    Smokeless tobacco: Never   Vaping Use    Vaping status: Never Used   Substance and Sexual Activity    Alcohol use: Never    Drug use: Never    Sexual activity: Not Currently     Partners: Male     Birth control/protection: None, Hysterectomy       Family History   Problem Relation Age of Onset    Hypertension Mother     Heart disease Mother     Heart disease Sister         PACEMAKER    Hypertension Sister     Heart disease Brother     Heart disease Brother          Allergies   Allergen Reactions    Doxycycline Other (See Comments)     Chest pain    Celebrex [Celecoxib] Other (See Comments)     Chest pain    Contrast Dye (Echo Or Unknown Ct/Mr) Itching    Iodinated Contrast Media Itching    Nsaids Other (See Comments)     convulsion    Other Itching     Pt states some steroids make her itch and hallucinate- she does not know the names   She said she is allergic to all arthritis medicine    Sulfa Antibiotics Nausea Only       Current Medications:   Scheduled Meds:atorvastatin, 10 mg, Oral, Nightly  dilTIAZem CD,  "120 mg, Oral, Q24H  isosorbide mononitrate, 30 mg, Oral, QAM  pantoprazole, 40 mg, Oral, Daily  sodium chloride, 10 mL, Intravenous, Q12H      Continuous Infusions:Pharmacy to dose warfarin,         ROS  All other systems reviewed and are negative.       Objective:         /85 (BP Location: Right arm, Patient Position: Lying)   Pulse 68   Temp 97.8 °F (36.6 °C) (Oral)   Resp 19   Ht 152.4 cm (60\")   Wt 74.9 kg (165 lb 2 oz)   SpO2 95%   BMI 32.25 kg/m²       General: Elderly, in NAD.  Neuro: AAOx3. No gross deficits.  HEENT: Sclerae clear, no xanthelasmas.  CV: S1S2, RRR. No murmurs or gallops.  Resp: Breathing is unlabored. Lungs CTA throughout.  GI: BS+. Abdomen soft and NTTP.  Ext: Pedal pulses are palpable. Extremities are nonedematous.  MS: moves all extremities, no weakness.  Skin: warm, dry.  Psych: Tearful, losing train of thought  Neuro no gross deficits            Lab Review:     Results from last 7 days   Lab Units 05/18/25 0412 05/17/25 1927   SODIUM mmol/L 131* 129*   POTASSIUM mmol/L 4.5 5.0   CHLORIDE mmol/L 94* 92*   CO2 mmol/L 26.6 27.3   BUN mg/dL 16 20   CREATININE mg/dL 1.18* 1.35*   GLUCOSE mg/dL 106* 112*   CALCIUM mg/dL 9.6 9.3   AST (SGOT) U/L 32 21   ALT (SGPT) U/L 20 14     Results from last 7 days   Lab Units 05/17/25 2048 05/17/25 1927   HSTROP T ng/L 11 12     Results from last 7 days   Lab Units 05/18/25 0412 05/17/25 1927   WBC 10*3/mm3 6.13 5.71   HEMOGLOBIN g/dL 12.8 11.9*   HEMATOCRIT % 39.7 37.0   PLATELETS 10*3/mm3 155 151     Results from last 7 days   Lab Units 05/17/25 1951 05/12/25  0000   INR  2.5* 2.80     Results from last 7 days   Lab Units 05/18/25 0412   MAGNESIUM mg/dL 2.3           Invalid input(s): \"LDLCALC\"  Results from last 7 days   Lab Units 05/17/25  1927   PROBNP pg/mL 791.0           Recent Radiology:  Imaging Results (Most Recent)       Procedure Component Value Units Date/Time    CT Abdomen Pelvis Without Contrast [[id number]] Collected: " 05/17/25 2017     Updated: 05/17/25 2029    Narrative:      CT ABDOMEN PELVIS WO CONTRAST    Date of Exam: 5/17/2025 7:52 PM EDT    Indication: Abdominal pain, diarrhea.    Comparison: CT abdomen and pelvis dated 4/13/2023    Technique: Axial CT images were obtained of the abdomen and pelvis without the administration of contrast. Sagittal and coronal reconstructions were performed.  Automated exposure control and iterative reconstruction methods were used.      Findings:  The heart size is normal. There is no pericardial effusion. There is coronary artery atherosclerotic calcification. The lung bases demonstrate bronchiectasis and basilar scarring.    The liver is normal in size and contour. There is no focal liver lesion. The gallbladder is surgically absent. There is no intrahepatic or biliary ductal dilatation. There is mild extrahepatic bile duct dilation likely representing physiologic changes   after cholecystectomy.    The spleen is normal in size. The adrenal glands appear within normal limits. There is pancreatic parenchymal atrophy.    The kidneys are symmetric in size. There is no hydronephrosis. There is a 2.6 cm cyst arising from the lateral aspect of the right kidney. Urinary bladder is fluid-filled without wall thickening. The uterus is surgically absent. There are no adnexal   masses.    There is a moderate hiatal hernia. Small bowel is normal in caliber without evidence of small bowel obstruction. There is no acute colitis or diverticulitis. The aorta is normal in caliber without evidence of aneurysm formation. There is aortoiliac   atherosclerotic calcification. There is no abdominal or pelvic lymphadenopathy. Degenerative disc disease of the lumbar spine.      Impression:      Impression:  1.No acute intra-abdominal or pelvic abnormality.  2.Moderate hiatal hernia.        Electronically Signed: Efren Craig MD    5/17/2025 8:27 PM EDT    Workstation ID: [id number]    XR Chest 1 View [[id number]]  Collected: 05/17/25 1950     Updated: 05/17/25 1953    Narrative:      XR CHEST 1 VW    Date of Exam: 5/17/2025 7:28 PM EDT    Indication: Chest Pain Triage Protocol    Comparison: Chest radiograph dated 2/8/2025    Findings:  There is a left chest wall pacemaker. There are postsurgical changes of prior CABG. There is a small hiatal hernia. The cardiomediastinal silhouette is within normal limits. There is central bronchial wall thickening within the bilateral lower lobes   which can be seen with bronchitis or reactive airway disease. There is no acute consolidation. There is no pleural effusion or pneumothorax. There are degenerative changes of the thoracic spine. Median sternotomy wires are present and intact.      Impression:      Impression:  1.Central bronchial wall thickening within the bilateral lower lobes which can be seen with bronchitis or reactive airway disease. No acute consolidation.  2.Small hiatal hernia.        Electronically Signed: Efren Craig MD    5/17/2025 7:51 PM EDT    Workstation ID: [id number]              ECHOCARDIOGRAM:    Results for orders placed during the hospital encounter of 06/16/24    Adult Transthoracic Echo Complete W/ Cont if Necessary Per Protocol    Interpretation Summary    Left ventricular systolic function is normal. Calculated left ventricular EF = 69%    Left ventricular diastolic function is consistent with (grade II w/high LAP) pseudonormalization.    Estimated right ventricular systolic pressure from tricuspid regurgitation is mildly elevated (35-45 mmHg).    Conclusion      Normal LV size and contractility EF of 65 to 70%  Grade 2 diastolic dysfunction/pseudonormalization pattern seen.  Normal RV size  Normal atrial size, pacer lead seen  Pulmonic valve is not well visualized.  Aortic valve, mitral valve, tricuspid valve appears structurally normal, moderate tricuspid regurgitation seen.  Calculated RV systolic pressure of 42 mmHg consistent with moderate  pulmonary hypertension  No pericardial effusion seen.  Proximal aorta appears normal in size.            Assessment:         Active Hospital Problems    Diagnosis  POA    **Chest pain [R07.9]  Yes     1) Chest Pain  - High-sensitivity troponin negative x 2  - EKG shows no acute ST abnormality  - CXR shows no acute abnormality    2) CAD status post CABG in 2017 and PCI in 2021.    - Last nuclear stress test in 6/2024 was negative for ischemia.     3) anemia / c/o rectal bleeding  - Hgb 11.1 --> 12.8    4) PAF s/p [place] dual-chamber PPM in 2016  - in SR on diltiazem  - anticoagulated with Coumadin    5) chronic diastolic heart failure  - Last 2D echo 6/2024 showed an EF of 69% with grade 2 diastolic dysfunction.      6) HTN    7) HLD    8) ENRIQUE    9) CKD stage III    10) recent SDH 4/2025           Plan:   No evidence of ACS, chest pain-free on my encounter  Last heart cath was 2021  Last nuclear stress was in 2024  With recent stress test in the last 12 months may elect for invasive ischemic evaluation, discussed with primary cardiologist who will assume care tomorrow  Her INR is therapeutic and this will need to be stopped with respect to her Coumadin for invasive ischemic evaluation  Did have some reported of rectal bleeding, consider watchman evaluation for cessation of anticoagulation as appropriate  Blood pressure and heart rates are stable  She is euvolemic and well compensated at bedside on room air for    Recommendations follow findings and clinical course  Jacob Glover MD, PhD

## 2025-05-18 NOTE — PROGRESS NOTES
[facility] MEDICINE SERVICE  DAILY PROGRESS NOTE    NAME: Syl Herrera  : 1935  MRN: [id number]      LOS: 1 day     PROVIDER OF SERVICE: Kenny Coronel MD    Chief Complaint: Chest pain    Subjective:     Interval History:  History taken from: patient chart family RN    Currently chest pain-free  Loose bowel movement not watery  No shortness of breath  Dark-colored stools since 12 years        Review of Systems:   Review of Systems  All negative except as above  Objective:     Vital Signs  Temp:  [97.4 °F (36.3 °C)-98.1 °F (36.7 °C)] 97.8 °F (36.6 °C)  Heart Rate:  [60-68] 68  Resp:  [15-20] 19  BP: (151-173)/(69-96) 167/85   Body mass index is 32.25 kg/m².    Physical Exam  Physical Exam  AO x 3 NAD ENT female  RRR S1-S2 audible  Lungs with fair air entry  Abdomen soft nontender nondistended     Diagnostic Data    Results from last 7 days   Lab Units 25  0412   WBC 10*3/mm3 6.13   HEMOGLOBIN g/dL 12.8   HEMATOCRIT % 39.7   PLATELETS 10*3/mm3 155   GLUCOSE mg/dL 106*   CREATININE mg/dL 1.18*   BUN mg/dL 16   SODIUM mmol/L 131*   POTASSIUM mmol/L 4.5   AST (SGOT) U/L 32   ALT (SGPT) U/L 20   ALK PHOS U/L 124*   BILIRUBIN mg/dL 1.4*   ANION GAP mmol/L 10.4       CT Abdomen Pelvis Without Contrast  Result Date: 2025  Impression: 1.No acute intra-abdominal or pelvic abnormality. 2.Moderate hiatal hernia. Electronically Signed: Efren Craig MD  2025 8:27 PM EDT  Workstation ID: [id number]    XR Chest 1 View  Result Date: 2025  Impression: 1.Central bronchial wall thickening within the bilateral lower lobes which can be seen with bronchitis or reactive airway disease. No acute consolidation. 2.Small hiatal hernia. Electronically Signed: Efren Craig MD  2025 7:51 PM EDT  Workstation ID: [id number]        I reviewed the patient's new clinical results.  I reviewed the patient's new imaging results and agree with the interpretation.    Assessment/Plan:     Active and Resolved  Problems  Active Hospital Problems    Diagnosis  POA    **Chest pain [R07.9]  Yes      Resolved Hospital Problems   No resolved problems to display.       89-year-old female with history of A-fib on Coumadin, CAD status post CABG and PCI, HFpEF, status post PPM, GERD, hypertension, hyperlipidemia, recent history of SDH admitted to [facility] with chest pains    #Chest pain  #History of CAD status post CABG and PCI  ACS has been ruled out  Cardiology following.  Defer stress test to cardiology  Continue Lipitor  Continue Imdur  Telemonitoring    #History of A-fib  Continue home dose of Coumadin pharmacy to dose.  INR therapeutic  Continue Cardizem  Restart metoprolol 100 twice daily    #History of CKD stage III  Creatinine close to baseline  BMP daily    #Loose bowel movements  Soft.  Not watery  CTM low threshold to check stool for C. difficile GI PCR panel    #GERD  Continue home dose of Xanax    #Dark-colored stools  Patient reports very dark-colored stools since 2 years.  She does take over-the-counter iron supplements.  Hemoglobin numbers are stable do not suspect GIB      VTE Prophylaxis:  Pharmacologic VTE prophylaxis orders are present.             Disposition Planning:     Barriers to Discharge: Medical workup  Anticipated Date of Discharge: 1 to 2 days   Place of Discharge: Home      Time: 45 minutes     Code Status and Medical Interventions: CPR (Attempt to Resuscitate); Full Support   Ordered at: 05/18/25 0028     Code Status (Patient has no pulse and is not breathing):    CPR (Attempt to Resuscitate)     Medical Interventions (Patient has pulse or is breathing):    Full Support       Signature: Electronically signed by Kenny Coronel MD, 05/18/25, 11:19 EDT.  [facility] Hospitalist Team

## 2025-05-18 NOTE — H&P
[facility] Medicine Services  History & Physical    Patient Name: Syl Herrera  : 1935  MRN: [id number]  Primary Care Physician:  Nava Yu MD  Date of admission: 2025  Date and Time of Service: 2025 at 2350    Subjective      Chief Complaint: Chest pain    History of Present Illness: Syl Herrera is a 89 y.o. female with a CMH of A-fib on warfarin, CAD status post CABG,, HFpEF with LVEF of 65 to 70%, mildly elevated RVSP 35-45 per echo in , status post pacemaker, GERD, hypertension, hyperlipidemia, recently admitted at our hospital for subdural hematoma Was evaluated by neurosurgery no intervention, initially presented to [place] [facility] chest pain and transferred to [facility] on 2025 for further cardiology evaluation.    Patient is currently awake, alert, oriented x3, and conversational. Reports intermittent, non-radiating retrosternal chest pain ongoing for the past 1-2 weeks. Due to lack of improvement, she presented to the hospital for further evaluation. Denies shortness of breath and is currently on room air. She was diagnosed with an upper respiratory infection a few months ago but is not currently taking any steroids or antibiotics. Recently was admitted for a subdural hematoma in 2025, during which time anticoagulation was held for 2 weeks; she states she has since resumed warfarin and follows up with the INR clinic for monitoring. She follows Dr. Martell for outpatient cardiology. Denies fever, chills, dysuria, or additional shortness of breath.    In the ED, vital stable, afebrile, EKG showing sinus rhythm, QTc 469, normal troponins, proBNP 791, sodium 129, creatinine 1.35, lactic acid 0.6, INR 2.5, WBC normal, hemoglobin 11.9, flu negative, chest x-ray showing central bronchial wall thickening within bilateral lower lobes which can be seen with bronchitis or reactive disease, no acute consolidation, small hiatal hernia noted.  Pelvis was obtained  negative for any acute pathology, moderate hiatal hernia noted.  Patient was given Zofran, GI cocktail, admitted to medicine team further inpatient management.    Review of Systems negative unless mentioned above    Personal History     Past Medical History:   Diagnosis Date    Anxiety     Arthritis     Asthma     Atrial fibrillation     CHF (congestive heart failure)     CKD (chronic kidney disease) stage 3, GFR 30-59 ml/min 04/18/2021    Coronary artery disease     Dyslipidemia     Dyslipidemia 01/05/2015    GERD (gastroesophageal reflux disease)     History of bone density study 04/2015    Hyperlipidemia     Hypertension     Sleep apnea     Wears dentures        Past Surgical History:   Procedure Laterality Date    BLADDER REPAIR  1990    BREAST LUMPECTOMY  1974    BENIGN    CARDIAC CATHETERIZATION  05/25/2011    CARDIAC CATHETERIZATION N/A 04/21/2021    Procedure: Left Heart Cath and coronary angiogram;  Surgeon: Ivan Martell MD;  Location:  [facility] CATH INVASIVE LOCATION;  Service: Cardiovascular;  Laterality: N/A;    CARDIAC CATHETERIZATION N/A 04/21/2021    Procedure: Left ventriculography;  Surgeon: Ivan Martell MD;  Location:  [facility] CATH INVASIVE LOCATION;  Service: Cardiovascular;  Laterality: N/A;    CARDIAC CATHETERIZATION  04/21/2021    Procedure: Saphenous Vein Graft;  Surgeon: Ivan Martell MD;  Location:  [facility] CATH INVASIVE LOCATION;  Service: Cardiovascular;;    CARDIAC CATHETERIZATION N/A 04/26/2021    Procedure: Percutaneous Coronary Intervention;  Surgeon: Ivan Martell MD;  Location:  [facility] CATH INVASIVE LOCATION;  Service: Cardiovascular;  Laterality: N/A;    CARDIAC CATHETERIZATION N/A 04/26/2021    Procedure: Stent RAY coronary;  Surgeon: Ivan Martell MD;  Location: [facility] CATH INVASIVE LOCATION;  Service: Cardiovascular;  Laterality: N/A;    CARDIOVASCULAR STRESS TEST  05/04/2015    CHOLECYSTECTOMY  1990    CORONARY ARTERY BYPASS GRAFT  08/09/2017    X5  Dr Brandt    ENDOSCOPY N/A  06/30/2020    Procedure: ESOPHAGOGASTRODUODENOSCOPY with biopsy x 1 area and dilatation (15-18mm balloon) up to 18mm;  Surgeon: Quinton Tapia MD;  Location: [facility] ENDOSCOPY;  Service: Gastroenterology;  Laterality: N/A;  post op: gastritis, large hiatal hernia, esophageal dysmotility    ENDOSCOPY N/A 10/24/2024    Procedure: ESOPHAGOGASTRODUODENOSCOPY WITH GASTRIC BIOPSY, BALLOON DILATION (18MM-20MM) UP TO 19MM,BIOPSY OF ESOPHAGUS;  Surgeon: Quinton Tapia MD;  Location: [facility] ENDOSCOPY;  Service: Gastroenterology;  Laterality: N/A;  GASTRITIS, ESOPHAGITIS, HIATEL HERNIA    HYSTERECTOMY  1990    INSERT / REPLACE / REMOVE PACEMAKER      JOINT REPLACEMENT Right     knee     OTHER SURGICAL HISTORY  06/2017    BLOOD CLOT REMOVED FROM LEFT EAR    PACEMAKER IMPLANTATION  04/18/2016    DUAL CHAMBER [facility]       Family History: family history includes Heart disease in her brother, brother, mother, and sister; Hypertension in her mother and sister. Otherwise pertinent FHx was reviewed and not pertinent to current issue.    Social History:  reports that she has never smoked. She has never used smokeless tobacco. She reports that she does not drink alcohol and does not use drugs.    Home Medications:  Prior to Admission Medications       Prescriptions Last Dose Informant Patient Reported? Taking?    acetaminophen (TYLENOL) 500 MG tablet   No No    Take 1 tablet by mouth Every 6 (Six) Hours As Needed for Mild Pain or Moderate Pain.    albuterol sulfate  (90 Base) MCG/ACT inhaler   No No    Inhale 2 puffs Every 4 (Four) Hours As Needed for Wheezing or Shortness of Air.    ALPRAZolam (XANAX) 0.5 MG tablet  Self Yes No    Take 1 tablet by mouth 2 (Two) Times a Day As Needed.    atorvastatin (LIPITOR) 10 MG tablet  Self Yes No    Take 1 tablet by mouth Every Night.    budesonide (PULMICORT) 0.5 MG/2ML nebulizer solution   No No    Take 2 mL by nebulization 2 (Two) Times a Day.    Patient not taking:  Reported on  2/26/2025    CALCIUM PO   Yes No    Take  by mouth.    carboxymethylcellulose (REFRESH PLUS) 0.5 % solution  Self Yes No    Administer 1 drop to both eyes 3 (Three) Times a Day As Needed for Dry Eyes.    cephalexin (KEFLEX) 500 MG capsule   No No    Take 1 capsule by mouth 4 (Four) Times a Day.    cetirizine (zyrTEC) 10 MG tablet   Yes No    Take 1 tablet by mouth Daily.    Cholecalciferol (VITAMIN D-3 PO)   Yes No    Take  by mouth.    ciprofloxacin (CIPRO) 500 MG tablet   Yes No    Take 1 tablet by mouth Every 12 (Twelve) Hours.    Clobetasol Propionate E 0.05 % emollient cream   Yes No    APPLY THIN LAYER TOPICALLY TO THE AFFECTED AREA TWICE DAILY    Cyanocobalamin (VITAMIN B-12 PO)   Yes No    Take  by mouth.    Patient not taking:  Reported on 4/24/2025    dilTIAZem (TIAZAC) 120 MG 24 hr capsule   No No    TAKE 1 CAPSULE BY MOUTH DAILY    fluticasone (FLONASE) 50 MCG/ACT nasal spray   Yes No    Administer 1 spray into the nostril(s) as directed by provider As Needed.    fluticasone (VERAMYST) 27.5 MCG/SPRAY nasal spray   Yes No    Administer 2 sprays into the nostril(s) as directed by provider Daily.    furosemide (LASIX) 40 MG tablet   No No    Take 1 tablet by mouth 2 (Two) Times a Day.    Patient not taking:  Reported on 2/26/2025    HYDROcodone-acetaminophen (NORCO) 7.5-325 MG per tablet   Yes No    Take 1 tablet by mouth Every 6 (Six) Hours As Needed for Moderate Pain.    isosorbide mononitrate (IMDUR) 30 MG 24 hr tablet   No No    TAKE 1 TABLET BY MOUTH EVERY MORNING    lidocaine (LIDODERM) 5 %   No No    Place 1 patch on the skin as directed by provider Daily. Remove & Discard patch within 12 hours or as directed by MD    magnesium oxide (MAG-OX) 400 MG tablet  Self Yes No    Take 1 tablet by mouth Daily.    metoprolol tartrate (LOPRESSOR) 100 MG tablet   No No    TAKE 1 TABLET BY MOUTH TWICE DAILY    Multiple Vitamins-Minerals (VITEYES AREDS FORMULA) capsule  ALEKSANDR Yes No    Take 1 capsule by mouth 2  (two) times a day.    nitrofurantoin (MACRODANTIN) 100 MG capsule   Yes No    Take 1 capsule by mouth Daily.    nitrofurantoin, macrocrystal-monohydrate, (MACROBID) 100 MG capsule   Yes No    Take 1 capsule by mouth every night at bedtime.    ondansetron (ZOFRAN) 4 MG tablet  Self Yes No    Take 1 tablet by mouth Daily As Needed for Nausea or Vomiting.    ondansetron ODT (ZOFRAN-ODT) 4 MG disintegrating tablet   Yes No    As Needed for Nausea or Vomiting.    pantoprazole (PROTONIX) 40 MG EC tablet  Self Yes No    TK 1 T PO QD    risperiDONE (risperDAL) 0.25 MG tablet   Yes No    Take 1 tablet by mouth Every 12 (Twelve) Hours.    spironolactone (ALDACTONE) 25 MG tablet   No No    TAKE 1/2 TABLET BY MOUTH DAILY    warfarin (COUMADIN) 4 MG tablet   No No    Take 1 tab, by mouth, daily except 1/2 tab on Mon, Weds and Fri or as directed.              Allergies:  Allergies   Allergen Reactions    Doxycycline Other (See Comments)     Chest pain    Celebrex [Celecoxib] Other (See Comments)     Chest pain    Contrast Dye (Echo Or Unknown Ct/Mr) Itching    Iodinated Contrast Media Itching    Nsaids Other (See Comments)     convulsion    Other Itching     Pt states some steroids make her itch and hallucinate- she does not know the names   She said she is allergic to all arthritis medicine    Sulfa Antibiotics Nausea Only       Objective      Vitals:   Temp:  [97.5 °F (36.4 °C)-97.9 °F (36.6 °C)] 97.5 °F (36.4 °C)  Heart Rate:  [60-65] 65  Resp:  [15-18] 18  BP: (151-173)/(72-96) 173/96  Body mass index is 32.25 kg/m².  Physical Exam  General: Awake alert , Oriented x3, conversational  HEENT: No scleral icterus normocephalic  Respiratory: Clear to auscultation my exam  Cardio: Heart rate normal, rhythm regular  Abdomen: Soft, nontender, no rebound or guarding  Extremities: No remarkable edema on the bilateral extremities  Neuro: Awake alert Amado x 3, normal speech, moves all extremities    Diagnostic Data:  Lab Results (last  24 hours)       Procedure Component Value Units Date/Time    Occult Blood, Fecal By Immunoassay - Stool, Per Rectum [[id number]]  (Normal) Collected: 05/17/25 2112    Specimen: Stool from Per Rectum Updated: 05/17/25 2118     Occult Blood, Fecal by Immunoassay Negative    High Sensitivity Troponin T 1Hr [[id number]]  (Normal) Collected: 05/17/25 2048    Specimen: Blood Updated: 05/17/25 2108     HS Troponin T 11 ng/L      Troponin T Numeric Delta -1 ng/L     Narrative:      High Sensitive Troponin T Reference Range:  <14.0 ng/L- Negative Female for AMI  <22.0 ng/L- Negative Male for AMI  >=14 - Abnormal Female indicating possible myocardial injury.  >=22 - Abnormal Male indicating possible myocardial injury.   Clinicians would have to utilize clinical acumen, EKG, Troponin, and serial changes to determine if it is an Acute Myocardial Infarction or myocardial injury due to an underlying chronic condition.         BNP [[id number]]  (Normal) Collected: 05/17/25 1927    Specimen: Blood Updated: 05/17/25 2006     proBNP 791.0 pg/mL     Narrative:      This assay is used as an aid in the diagnosis of individuals suspected of having heart failure. It can be used as an aid in the diagnosis of acute decompensated heart failure (ADHF) in patients presenting with signs and symptoms of ADHF to the emergency department (ED). In addition, NT-proBNP of <300 pg/mL indicates ADHF is not likely.    Age Range Result Interpretation  NT-proBNP Concentration (pg/mL:      <50             Positive            >450                   Gray                 300-450                    Negative             <300    50-75           Positive            >900                  Gray                300-900                  Negative            <300      >75             Positive            >1800                  Gray                300-1800                  Negative            <300    Lactic Acid, Plasma [[id number]]  (Normal) Collected: 05/17/25 1945     Specimen: Blood Updated: 05/17/25 2004     Lactate 0.6 mmol/L     COVID-19 and FLU A/B PCR, 1 HR TAT - Swab, Nasopharynx [[id number]]  (Normal) Collected: 05/17/25 1936    Specimen: Swab from Nasopharynx Updated: 05/17/25 2001     COVID19 Not Detected     Influenza A PCR Not Detected     Influenza B PCR Not Detected    Narrative:      Fact sheet for providers: https://www.fda.gov/media/961065/download    Fact sheet for patients: https://www.fda.gov/media/104976/download    Test performed by PCR.    Comprehensive Metabolic Panel [[id number]]  (Abnormal) Collected: 05/17/25 1927    Specimen: Blood Updated: 05/17/25 1957     Glucose 112 mg/dL      BUN 20 mg/dL      Creatinine 1.35 mg/dL      Sodium 129 mmol/L      Potassium 5.0 mmol/L      Chloride 92 mmol/L      CO2 27.3 mmol/L      Calcium 9.3 mg/dL      Total Protein 6.5 g/dL      Albumin 4.2 g/dL      ALT (SGPT) 14 U/L      AST (SGOT) 21 U/L      Alkaline Phosphatase 132 U/L      Total Bilirubin 0.8 mg/dL      Globulin 2.3 gm/dL      A/G Ratio 1.8 g/dL      BUN/Creatinine Ratio 14.8     Anion Gap 9.7 mmol/L      eGFR 38.1 mL/min/1.73     Narrative:      GFR Categories in Chronic Kidney Disease (CKD)              GFR Category          GFR (mL/min/1.73)    Interpretation  G1                    90 or greater        Normal or high (1)  G2                    60-89                Mild decrease (1)  G3a                   45-59                Mild to moderate decrease  G3b                   30-44                Moderate to severe decrease  G4                    15-29                Severe decrease  G5                    14 or less           Kidney failure    (1)In the absence of evidence of kidney disease, neither GFR category G1 or G2 fulfill the criteria for CKD.    eGFR calculation 2021 CKD-EPI creatinine equation, which does not include race as a factor    High Sensitivity Troponin T [[id number]]  (Normal) Collected: 05/17/25 1927    Specimen: Blood Updated: 05/17/25 1951      HS Troponin T 12 ng/L     Narrative:      High Sensitive Troponin T Reference Range:  <14.0 ng/L- Negative Female for AMI  <22.0 ng/L- Negative Male for AMI  >=14 - Abnormal Female indicating possible myocardial injury.  >=22 - Abnormal Male indicating possible myocardial injury.   Clinicians would have to utilize clinical acumen, EKG, Troponin, and serial changes to determine if it is an Acute Myocardial Infarction or myocardial injury due to an underlying chronic condition.         POC Protime / INR [[id number]]  (Abnormal) Collected: 05/17/25 1951    Specimen: Blood Updated: 05/17/25 1951     Protime 26.8 seconds      INR 2.5    CBC & Differential [[id number]]  (Abnormal) Collected: 05/17/25 1927    Specimen: Blood Updated: 05/17/25 1934    Narrative:      The following orders were created for panel order CBC & Differential.  Procedure                               Abnormality         Status                     ---------                               -----------         ------                     CBC Auto Differential[[id number]]        Abnormal            Final result                 Please view results for these tests on the individual orders.    CBC Auto Differential [[id number]]  (Abnormal) Collected: 05/17/25 1927    Specimen: Blood Updated: 05/17/25 1934     WBC 5.71 10*3/mm3      RBC 3.98 10*6/mm3      Hemoglobin 11.9 g/dL      Hematocrit 37.0 %      MCV 93.0 fL      MCH 29.9 pg      MCHC 32.2 g/dL      RDW 13.9 %      RDW-SD 48.2 fl      MPV 9.7 fL      Platelets 151 10*3/mm3      Neutrophil % 62.4 %      Lymphocyte % 23.3 %      Monocyte % 11.4 %      Eosinophil % 2.3 %      Basophil % 0.2 %      Immature Grans % 0.4 %      Neutrophils, Absolute 3.57 10*3/mm3      Lymphocytes, Absolute 1.33 10*3/mm3      Monocytes, Absolute 0.65 10*3/mm3      Eosinophils, Absolute 0.13 10*3/mm3      Basophils, Absolute 0.01 10*3/mm3      Immature Grans, Absolute 0.02 10*3/mm3              Imaging Results (Last 24 Hours)        Procedure Component Value Units Date/Time    CT Abdomen Pelvis Without Contrast [[id number]] Collected: 05/17/25 2017     Updated: 05/17/25 2029    Narrative:      CT ABDOMEN PELVIS WO CONTRAST    Date of Exam: 5/17/2025 7:52 PM EDT    Indication: Abdominal pain, diarrhea.    Comparison: CT abdomen and pelvis dated 4/13/2023    Technique: Axial CT images were obtained of the abdomen and pelvis without the administration of contrast. Sagittal and coronal reconstructions were performed.  Automated exposure control and iterative reconstruction methods were used.      Findings:  The heart size is normal. There is no pericardial effusion. There is coronary artery atherosclerotic calcification. The lung bases demonstrate bronchiectasis and basilar scarring.    The liver is normal in size and contour. There is no focal liver lesion. The gallbladder is surgically absent. There is no intrahepatic or biliary ductal dilatation. There is mild extrahepatic bile duct dilation likely representing physiologic changes   after cholecystectomy.    The spleen is normal in size. The adrenal glands appear within normal limits. There is pancreatic parenchymal atrophy.    The kidneys are symmetric in size. There is no hydronephrosis. There is a 2.6 cm cyst arising from the lateral aspect of the right kidney. Urinary bladder is fluid-filled without wall thickening. The uterus is surgically absent. There are no adnexal   masses.    There is a moderate hiatal hernia. Small bowel is normal in caliber without evidence of small bowel obstruction. There is no acute colitis or diverticulitis. The aorta is normal in caliber without evidence of aneurysm formation. There is aortoiliac   atherosclerotic calcification. There is no abdominal or pelvic lymphadenopathy. Degenerative disc disease of the lumbar spine.      Impression:      Impression:  1.No acute intra-abdominal or pelvic abnormality.  2.Moderate hiatal hernia.        Electronically  Signed: Efren Craig MD    5/17/2025 8:27 PM EDT    Workstation ID: [id number]    XR Chest 1 View [[id number]] Collected: 05/17/25 1950     Updated: 05/17/25 1953    Narrative:      XR CHEST 1 VW    Date of Exam: 5/17/2025 7:28 PM EDT    Indication: Chest Pain Triage Protocol    Comparison: Chest radiograph dated 2/8/2025    Findings:  There is a left chest wall pacemaker. There are postsurgical changes of prior CABG. There is a small hiatal hernia. The cardiomediastinal silhouette is within normal limits. There is central bronchial wall thickening within the bilateral lower lobes   which can be seen with bronchitis or reactive airway disease. There is no acute consolidation. There is no pleural effusion or pneumothorax. There are degenerative changes of the thoracic spine. Median sternotomy wires are present and intact.      Impression:      Impression:  1.Central bronchial wall thickening within the bilateral lower lobes which can be seen with bronchitis or reactive airway disease. No acute consolidation.  2.Small hiatal hernia.        Electronically Signed: Efren Craig MD    5/17/2025 7:51 PM EDT    Workstation ID: [id number]              Assessment & Plan    Syl Herrera is a 89 y.o. female with a CMH of A-fib on warfarin, CAD status post CABG,, HFpEF with LVEF of 65 to 70%, mildly elevated RVSP 35-45 per echo in 2024, status post pacemaker, GERD, hypertension, hyperlipidemia, recently admitted at our hospital for subdural hematoma Was evaluated by neurosurgery no intervention, initially presented to [place] ER chest pain and transferred to [facility] on 5/17/2025 for further evaluation.    Assessments  Chest pain  A-fib on warfarin, INR 2.5 on admission  CAD status post CABG  HFpEF, currently not in exacerbation  Status post pacemaker  GERD  Hypertension  Hyperlipidemia  Anxiety disorder  Insomnia  History of subdural hematoma    Plan  -Troponins are within normal limits, and EKG shows no acute  ischemic changes or remarkable changes from last EKG. Will defer to her cardiologist (Dr. Martell) for any additional recommendations.  -Resume home medications including statin, warfarin (dosed per pharmacy), diltiazem, and Imdur.  -Resume other home medications once reconciled by pharmacy or deemed medically appropriate.  -If No further inpatient cardiac workup recommended per cardiology; possible discharge in the morning with outpatient cardiology follow-up.  -Follow a.m. labs.    VTE Prophylaxis:  No VTE prophylaxis order currently exists.    CODE STATUS:     I discussed the patient's findings and my recommendations with patient.    This document has been electronically signed by Joshua Lorenzana MD on May 18, 2025 00:04 EDT   [facility]

## 2025-05-18 NOTE — PROGRESS NOTES
"Pharmacy dosing service  Anticoagulant  Warfarin     Subjective:    Syl Herrera is a 89 y.o.female being continued on warfarin for Atrial Fibrillation / Flutter.    INR Goal: 2 - 3  Home medication?: warfarin 4 mg PO daily, except warfarin 2 mg PO on Tues, Thurs.   Bridge Therapy Present?:  No  Interacting Medications Evaluation (New/Present/Discontinued): None relevant  Additional Contributing Factors: Subdural hematoma in April 2025       Assessment/Plan:    INR is therapeutic today at 2.5. Will order home regimen.      Continue to monitor and adjust based on INR.         Date 5/18           INR 2.5           Dose 4 mg             Diet Order   Procedures    Diet: Cardiac; Healthy Heart (2-3 Na+); Fluid Consistency: Thin (IDDSI 0)      Objective:  [Ht: 152.4 cm (60\"); Wt: 74.9 kg (165 lb 2 oz); BMI: Body mass index is 32.25 kg/m².]    Lab Results   Component Value Date    ALBUMIN 4.3 05/18/2025     Lab Results   Component Value Date    INR 2.5 (H) 05/17/2025    INR 2.80 05/12/2025    INR 1.80 05/05/2025    PROTIME 26.8 05/17/2025    PROTIME 16.3 (L) 04/04/2025    PROTIME 28.8 04/03/2025     Lab Results   Component Value Date    HGB 12.8 05/18/2025    HGB 11.9 (L) 05/17/2025    HGB 11.1 (L) 04/03/2025     Lab Results   Component Value Date    HCT 39.7 05/18/2025    HCT 37.0 05/17/2025    HCT 35.6 04/03/2025       Margie Starks, [facility]  05/18/25 10:23 EDT     "

## 2025-05-18 NOTE — PLAN OF CARE
Problem: Adult Inpatient Plan of Care  Goal: Plan of Care Review  Outcome: Progressing  Flowsheets (Taken 5/18/2025 0319)  Progress: no change  Outcome Evaluation: Patient admitted with complaints of chest pain/epigastric pain. Current cardiac workup negative. Denies pain since admittance to unit. Cardiac telemetry in place. Falls precautions in place. Cardiology consult for today.  Plan of Care Reviewed With: patient   Goal Outcome Evaluation:  Plan of Care Reviewed With: patient        Progress: no change  Outcome Evaluation: Patient admitted with complaints of chest pain/epigastric pain. Current cardiac workup negative. Denies pain since admittance to unit. Cardiac telemetry in place. Falls precautions in place. Cardiology consult for today.

## 2025-05-19 LAB
ANION GAP SERPL CALCULATED.3IONS-SCNC: 10.1 MMOL/L (ref 5–15)
BUN SERPL-MCNC: 14 MG/DL (ref 8–23)
BUN/CREAT SERPL: 12.3 (ref 7–25)
CALCIUM SPEC-SCNC: 9.4 MG/DL (ref 8.6–10.5)
CHLORIDE SERPL-SCNC: 92 MMOL/L (ref 98–107)
CO2 SERPL-SCNC: 25.9 MMOL/L (ref 22–29)
CREAT SERPL-MCNC: 1.14 MG/DL (ref 0.57–1)
DEPRECATED RDW RBC AUTO: 46.1 FL (ref 37–54)
EGFRCR SERPLBLD CKD-EPI 2021: 46.7 ML/MIN/1.73
ERYTHROCYTE [DISTWIDTH] IN BLOOD BY AUTOMATED COUNT: 13.8 % (ref 12.3–15.4)
GLUCOSE SERPL-MCNC: 93 MG/DL (ref 65–99)
HCT VFR BLD AUTO: 40.6 % (ref 34–46.6)
HGB BLD-MCNC: 13.1 G/DL (ref 12–15.9)
INR PPP: 1.72 (ref 2–3)
MCH RBC QN AUTO: 29.3 PG (ref 26.6–33)
MCHC RBC AUTO-ENTMCNC: 32.3 G/DL (ref 31.5–35.7)
MCV RBC AUTO: 90.8 FL (ref 79–97)
PLATELET # BLD AUTO: 171 10*3/MM3 (ref 140–450)
PMV BLD AUTO: 10.4 FL (ref 6–12)
POTASSIUM SERPL-SCNC: 4.2 MMOL/L (ref 3.5–5.2)
PROTHROMBIN TIME: 20.2 SECONDS (ref 19.4–28.5)
RBC # BLD AUTO: 4.47 10*6/MM3 (ref 3.77–5.28)
SODIUM SERPL-SCNC: 128 MMOL/L (ref 136–145)
WBC NRBC COR # BLD AUTO: 6.01 10*3/MM3 (ref 3.4–10.8)

## 2025-05-19 PROCEDURE — 80048 BASIC METABOLIC PNL TOTAL CA: CPT | Performed by: HOSPITALIST

## 2025-05-19 PROCEDURE — 85027 COMPLETE CBC AUTOMATED: CPT | Performed by: HOSPITALIST

## 2025-05-19 PROCEDURE — 85610 PROTHROMBIN TIME: CPT | Performed by: HOSPITALIST

## 2025-05-19 PROCEDURE — 99232 SBSQ HOSP IP/OBS MODERATE 35: CPT | Performed by: INTERNAL MEDICINE

## 2025-05-19 RX ORDER — WARFARIN SODIUM 6 MG/1
6 TABLET ORAL
Status: COMPLETED | OUTPATIENT
Start: 2025-05-19 | End: 2025-05-19

## 2025-05-19 RX ADMIN — METOPROLOL TARTRATE 100 MG: 50 TABLET, FILM COATED ORAL at 08:00

## 2025-05-19 RX ADMIN — DILTIAZEM HYDROCHLORIDE 120 MG: 120 CAPSULE, EXTENDED RELEASE ORAL at 08:00

## 2025-05-19 RX ADMIN — HYDROCODONE BITARTRATE AND ACETAMINOPHEN 1 TABLET: 7.5; 325 TABLET ORAL at 06:31

## 2025-05-19 RX ADMIN — SPIRONOLACTONE 12.5 MG: 25 TABLET ORAL at 08:00

## 2025-05-19 RX ADMIN — ATORVASTATIN CALCIUM 10 MG: 10 TABLET ORAL at 20:58

## 2025-05-19 RX ADMIN — CETIRIZINE HYDROCHLORIDE 10 MG: 10 TABLET, FILM COATED ORAL at 08:00

## 2025-05-19 RX ADMIN — PANTOPRAZOLE SODIUM 40 MG: 40 TABLET, DELAYED RELEASE ORAL at 08:00

## 2025-05-19 RX ADMIN — ISOSORBIDE MONONITRATE 30 MG: 30 TABLET, EXTENDED RELEASE ORAL at 08:00

## 2025-05-19 RX ADMIN — WARFARIN SODIUM 6 MG: 6 TABLET ORAL at 17:51

## 2025-05-19 RX ADMIN — METOPROLOL TARTRATE 100 MG: 50 TABLET, FILM COATED ORAL at 20:58

## 2025-05-19 RX ADMIN — HYDROCODONE BITARTRATE AND ACETAMINOPHEN 1 TABLET: 7.5; 325 TABLET ORAL at 22:32

## 2025-05-19 NOTE — NURSING NOTE
Confusion continues with frequently getting OOB setting off alarm. Reorientation attempted. Patient forgetting time,place, and situation. Removed her IV site. Refusing to leave on O2 sat monitor.

## 2025-05-19 NOTE — PLAN OF CARE
Problem: Adult Inpatient Plan of Care  Goal: Plan of Care Review  Outcome: Progressing  Goal: Patient-Specific Goal (Individualized)  Outcome: Progressing  Goal: Absence of Hospital-Acquired Illness or Injury  Outcome: Progressing  Intervention: Identify and Manage Fall Risk  Recent Flowsheet Documentation  Taken 5/19/2025 1800 by Marina Johnston RN  Safety Promotion/Fall Prevention: safety round/check completed  Taken 5/19/2025 1600 by Marina Johnston RN  Safety Promotion/Fall Prevention:   activity supervised   assistive device/personal items within reach   clutter free environment maintained   fall prevention program maintained   nonskid shoes/slippers when out of bed   safety round/check completed   room organization consistent  Taken 5/19/2025 1400 by Marina Johnston RN  Safety Promotion/Fall Prevention:   assistive device/personal items within reach   activity supervised   clutter free environment maintained   fall prevention program maintained   nonskid shoes/slippers when out of bed   safety round/check completed   room organization consistent  Taken 5/19/2025 1200 by Marina Johnston RN  Safety Promotion/Fall Prevention:   activity supervised   assistive device/personal items within reach   clutter free environment maintained   fall prevention program maintained   nonskid shoes/slippers when out of bed   safety round/check completed   room organization consistent  Taken 5/19/2025 1000 by Marina Johnston, RN  Safety Promotion/Fall Prevention:   activity supervised   assistive device/personal items within reach   clutter free environment maintained   fall prevention program maintained   nonskid shoes/slippers when out of bed   safety round/check completed   room organization consistent  Taken 5/19/2025 0810 by Marina Johnston, RN  Safety Promotion/Fall Prevention:   activity supervised   assistive device/personal items within reach   clutter free environment maintained   fall prevention program  maintained   nonskid shoes/slippers when out of bed   room organization consistent   safety round/check completed  Intervention: Prevent Skin Injury  Recent Flowsheet Documentation  Taken 5/19/2025 1600 by Marina Johnston RN  Body Position: legs elevated  Taken 5/19/2025 1400 by Marina Johnston RN  Body Position: weight shifting  Taken 5/19/2025 1200 by Marina Johnston RN  Body Position: weight shifting  Taken 5/19/2025 1000 by Marina Johnston RN  Body Position: supine  Taken 5/19/2025 0810 by Marina Johnston RN  Body Position: position changed independently  Skin Protection:   transparent dressing maintained   incontinence pads utilized  Intervention: Prevent Infection  Recent Flowsheet Documentation  Taken 5/19/2025 1600 by Marina Johnston RN  Infection Prevention:   environmental surveillance performed   equipment surfaces disinfected   hand hygiene promoted   personal protective equipment utilized   single patient room provided  Taken 5/19/2025 1400 by Marina Johnston RN  Infection Prevention:   environmental surveillance performed   hand hygiene promoted   equipment surfaces disinfected   personal protective equipment utilized   single patient room provided  Taken 5/19/2025 1200 by Marina Johnston RN  Infection Prevention:   environmental surveillance performed   equipment surfaces disinfected   hand hygiene promoted   personal protective equipment utilized   single patient room provided  Taken 5/19/2025 1000 by Marina Johnston RN  Infection Prevention:   environmental surveillance performed   equipment surfaces disinfected   hand hygiene promoted   personal protective equipment utilized   single patient room provided  Taken 5/19/2025 0810 by Marina Johnston RN  Infection Prevention:   environmental surveillance performed   equipment surfaces disinfected   hand hygiene promoted   personal protective equipment utilized   single patient room provided  Goal: Optimal Comfort and Wellbeing  Outcome:  Progressing  Intervention: Monitor Pain and Promote Comfort  Recent Flowsheet Documentation  Taken 5/19/2025 1600 by Marina Johnston RN  Pain Management Interventions: care clustered  Taken 5/19/2025 0810 by Marina Johnston RN  Pain Management Interventions: care clustered  Intervention: Provide Person-Centered Care  Recent Flowsheet Documentation  Taken 5/19/2025 1600 by Marina Johnston RN  Trust Relationship/Rapport:   choices provided   care explained   questions answered   thoughts/feelings acknowledged  Taken 5/19/2025 0810 by Marina Johnston RN  Trust Relationship/Rapport:   care explained   questions answered   thoughts/feelings acknowledged  Goal: Readiness for Transition of Care  Outcome: Progressing     Problem: Fall Injury Risk  Goal: Absence of Fall and Fall-Related Injury  Outcome: Progressing  Intervention: Identify and Manage Contributors  Recent Flowsheet Documentation  Taken 5/19/2025 1200 by Marina Johnston RN  Medication Review/Management: medications reviewed  Intervention: Promote Injury-Free Environment  Recent Flowsheet Documentation  Taken 5/19/2025 1800 by Marina Johnston RN  Safety Promotion/Fall Prevention: safety round/check completed  Taken 5/19/2025 1600 by Marina Johnston RN  Safety Promotion/Fall Prevention:   activity supervised   assistive device/personal items within reach   clutter free environment maintained   fall prevention program maintained   nonskid shoes/slippers when out of bed   safety round/check completed   room organization consistent  Taken 5/19/2025 1400 by Marina Johnston RN  Safety Promotion/Fall Prevention:   assistive device/personal items within reach   activity supervised   clutter free environment maintained   fall prevention program maintained   nonskid shoes/slippers when out of bed   safety round/check completed   room organization consistent  Taken 5/19/2025 1200 by Marina Johnston RN  Safety Promotion/Fall Prevention:   activity supervised    assistive device/personal items within reach   clutter free environment maintained   fall prevention program maintained   nonskid shoes/slippers when out of bed   safety round/check completed   room organization consistent  Taken 5/19/2025 1000 by Marina Johnston, RN  Safety Promotion/Fall Prevention:   activity supervised   assistive device/personal items within reach   clutter free environment maintained   fall prevention program maintained   nonskid shoes/slippers when out of bed   safety round/check completed   room organization consistent  Taken 5/19/2025 0810 by Marina Johnston, RN  Safety Promotion/Fall Prevention:   activity supervised   assistive device/personal items within reach   clutter free environment maintained   fall prevention program maintained   nonskid shoes/slippers when out of bed   room organization consistent   safety round/check completed     Problem: Comorbidity Management  Goal: Maintenance of Heart Failure Symptom Control  Outcome: Progressing  Intervention: Maintain Heart Failure Management  Recent Flowsheet Documentation  Taken 5/19/2025 1200 by Marina Johnston, RN  Medication Review/Management: medications reviewed  Goal: Blood Pressure in Desired Range  Outcome: Progressing  Intervention: Maintain Blood Pressure Management  Recent Flowsheet Documentation  Taken 5/19/2025 1200 by Marina Johnston, RN  Medication Review/Management: medications reviewed     Problem: Chest Pain  Goal: Resolution of Chest Pain Symptoms  Outcome: Progressing   Goal Outcome Evaluation:

## 2025-05-19 NOTE — PROGRESS NOTES
[facility] MEDICINE SERVICE  DAILY PROGRESS NOTE    NAME: Syl Herrera  : 1935  MRN: [id number]      LOS: 2 days     PROVIDER OF SERVICE: Kenny Coronel MD    Chief Complaint: Chest pain    Subjective:     Interval History:  History taken from: patient chart RN    No further chest pains   Breathing stable  No diarrhea       Review of Systems:   Review of Systems  All negative except as above   Objective:     Vital Signs  Temp:  [97.7 °F (36.5 °C)-98.1 °F (36.7 °C)] 98 °F (36.7 °C)  Heart Rate:  [60-64] 60  Resp:  [13-20] 18  BP: (129-180)/(62-95) 137/81   Body mass index is 32.25 kg/m².    Physical Exam  Physical Exam  AO x 3 NAD ENT female  RRR S1-S2 audible  Lungs with fair air entry  Abdomen soft nontender nondistended      Diagnostic Data    Results from last 7 days   Lab Units 25  0322 25  0412   WBC 10*3/mm3 6.01 6.13   HEMOGLOBIN g/dL 13.1 12.8   HEMATOCRIT % 40.6 39.7   PLATELETS 10*3/mm3 171 155   GLUCOSE mg/dL 93 106*   CREATININE mg/dL 1.14* 1.18*   BUN mg/dL 14 16   SODIUM mmol/L 128* 131*   POTASSIUM mmol/L 4.2 4.5   AST (SGOT) U/L  --  32   ALT (SGPT) U/L  --  20   ALK PHOS U/L  --  124*   BILIRUBIN mg/dL  --  1.4*   ANION GAP mmol/L 10.1 10.4       CT Abdomen Pelvis Without Contrast  Result Date: 2025  Impression: 1.No acute intra-abdominal or pelvic abnormality. 2.Moderate hiatal hernia. Electronically Signed: Efren Craig MD  2025 8:27 PM EDT  Workstation ID: [id number]    XR Chest 1 View  Result Date: 2025  Impression: 1.Central bronchial wall thickening within the bilateral lower lobes which can be seen with bronchitis or reactive airway disease. No acute consolidation. 2.Small hiatal hernia. Electronically Signed: Efren Craig MD  2025 7:51 PM EDT  Workstation ID: [id number]        I reviewed the patient's new clinical results.  I reviewed the patient's new imaging results and agree with the interpretation.    Assessment/Plan:     Active and  Resolved Problems  Active Hospital Problems    Diagnosis  POA    **Chest pain [R07.9]  Yes      Resolved Hospital Problems   No resolved problems to display.       89-year-old female with history of A-fib on Coumadin, CAD status post CABG and PCI, HFpEF, status post PPM, GERD, hypertension, hyperlipidemia, recent history of SDH admitted to [facility] 9/17 with chest pains     #Chest pain  #History of CAD status post CABG and PCI  ACS has been ruled out  Cardiology following.  Ischemic workup per cards   Continue Lipitor  Continue Imdur  Telemonitoring     #History of A-fib  Continue home dose of Coumadin pharmacy to dose.  INR therapeutic  Continue Cardizem  Continue metoprolol 100 twice daily     #History of CKD stage III  Creatinine close to baseline  BMP daily     #Loose bowel movements  Soft.  Not watery  Improved CTM      #GERD  Continue home dose of Xanax     #Dark-colored stools  Patient reports very dark-colored stools since 2 years.  She does take over-the-counter iron supplements.  Hemoglobin numbers are stable do not suspect GIB    VTE Prophylaxis:  Pharmacologic VTE prophylaxis orders are present.             Disposition Planning:     Barriers to Discharge:cardiac workup   Anticipated Date of Discharge: 5/20  Place of Discharge: home      Time: 40 minutes     Code Status and Medical Interventions: CPR (Attempt to Resuscitate); Full Support   Ordered at: 05/18/25 0028     Code Status (Patient has no pulse and is not breathing):    CPR (Attempt to Resuscitate)     Medical Interventions (Patient has pulse or is breathing):    Full Support       Signature: Electronically signed by Kenny Coronel MD, 05/19/25, 10:16 EDT.  [facility] Hospitalist Team

## 2025-05-19 NOTE — CASE MANAGEMENT/SOCIAL WORK
Discharge Planning Assessment   Daniele     Patient Name: Syl Herrera  MRN: [id number]  Today's Date: 5/19/2025    Admit Date: 5/17/2025    Plan: Return home alone with assistance from adult children   Discharge Needs Assessment       Row Name 05/19/25 1532       Living Environment    People in Home alone    Unique Family Situation adult daughter    Current Living Arrangements home    Potentially Unsafe Housing Conditions none    In the past 12 months has the electric, gas, oil, or water company threatened to shut off services in your home? No    Primary Care Provided by self    Provides Primary Care For no one    Family Caregiver if Needed child(angela), adult    Family Caregiver Names bruce, Lisandra, Lindsay and Janny    Quality of Family Relationships helpful;involved;supportive    Able to Return to Prior Arrangements yes       Resource/Environmental Concerns    Resource/Environmental Concerns none    Transportation Concerns none       Transportation Needs    In the past 12 months, has lack of transportation kept you from medical appointments or from getting medications? no    In the past 12 months, has lack of transportation kept you from meetings, work, or from getting things needed for daily living? No       Food Insecurity    Within the past 12 months, you worried that your food would run out before you got the money to buy more. Never true    Within the past 12 months, the food you bought just didn't last and you didn't have money to get more. Never true       Transition Planning    Patient/Family Anticipates Transition to home    Patient/Family Anticipated Services at Transition none    Transportation Anticipated car, drives self;family or friend will provide       Discharge Needs Assessment    Readmission Within the Last 30 Days no previous admission in last 30 days    Equipment Currently Used at Home rollator;bp cuff;pulse ox;nebulizer;bath bench    Concerns to be Addressed discharge planning    Do you want help  finding or keeping work or a job? I do not need or want help    Do you want help with school or training? For example, starting or completing job training or getting a high school diploma, GED or equivalent No    Anticipated Changes Related to Illness none    Equipment Needed After Discharge none                   Discharge Plan       Row Name 05/19/25 1538       Plan    Patient/Family in Agreement with Plan yes    Plan Comments Barriers: Cardiology following. INR 1.72 and Coumadin adjusted. CM met with Mrs. Herrera with daughterJanny at bedside. Mrs. Herrera is a/o and said she lives alone and one of her adult daughters take turns staying with her at night.  She does not drive and they provide transportation and help with home duties. She has 2 rollator walkers she uses at home. PCP and Pharmacy confirmed. She denied financial concerns. She is current for [facility] for nursing and PT. CM contacted  Carey with [facility] and added to Epic      Row Name 05/19/25 0128       Plan    Plan Return home alone with assistance from adult children    Patient/Family in Agreement with Plan yes                  Continued Care and Services - Admitted Since 5/17/2025       Home Medical Care       Service Provider Request Status Services Address Phone Fax Patient Preferred    [facility] - Request Sent -- [address] [facility] [place] [address] [phone] --                           Demographic Summary       Row Name 05/19/25 1528       General Information    Admission Type inpatient    Arrived From emergency department    Required Notices Provided Important Message from Medicare    Referral Source admission list    Reason for Consult discharge planning    Preferred Language English       Contact Information    Permission Granted to Share Info With                    Functional Status       Row Name 05/19/25 1535       Functional Status    Usual Activity Tolerance moderate    Current  Activity Tolerance moderate       Functional Status, IADL    Medications assistive person    Meal Preparation assistive person    Housekeeping assistive person    Laundry assistive person    Shopping completely dependent    If for any reason you need help with day-to-day activities such as bathing, preparing meals, shopping, managing finances, etc., do you get the help you need? I get all the help I need    IADL Comments Adult children take turns staying with her at night       Employment/    Employment Status retired                        Eneida FRANCO,RN Case Manager  [facility]  Phone: Desk- [phone] cell- [phone]

## 2025-05-19 NOTE — DISCHARGE PLACEMENT REQUEST
"Sharon James P \"James or Grisel\" (89 y.o. Female)       Date of Birth   1935    Social Security Number       Address   [facility] [address] [facility] [place] [facility]    Home Phone   [phone]    MRN   [id number]       [employer]   Catholic    Marital Status                               Admission Date   5/17/2025    Admission Type   Urgent    Admitting Provider   Joshua Lorenzana MD    Attending Provider   Karly Donovan MD    Department, Room/Bed   [facility] 2E MEDICAL INPATIENT, 213/1       Discharge Date       Discharge Disposition       Discharge Destination                                 Attending Provider: Karly Donovan MD    Allergies: Doxycycline, Celebrex [Celecoxib], Contrast Dye (Echo Or Unknown Ct/mr), Iodinated Contrast Media, Nsaids, Other, Sulfa Antibiotics    Isolation: None   Infection: None   Code Status: CPR    Ht: 152.4 cm (60\")   Wt: 74.9 kg (165 lb 2 oz)    Admission Cmt: None   Principal Problem: Chest pain [R07.9]                   Active Insurance as of 5/17/2025       Primary Coverage       Payor Plan Insurance Group Employer/Plan Group    MEDICARE MEDICARE A & B        Payor Plan Address Payor Plan Phone Number Payor Plan Fax Number Effective Dates    [address] [phone]  6/1/2002 - None Entered    [facility] [id number]         Subscriber Name Subscriber Birth Date Member ID       JAMES HERNADEZ P 1935 [id number]               Secondary Coverage       Payor Plan Insurance Group Employer/Plan Group    [employer] SUP AAR HEALTH CARE OPTIONS        Payor Plan Address Payor Plan Phone Number Payor Plan Fax Number Effective Dates    [facility] [phone]  1/1/2017 - None Entered    [address]       [facility] [address]         Subscriber Name Subscriber Birth Date Member ID       JAMES HERNADEZ P 1935 [id number]                     Emergency Contacts        (Rel.) Home Phone Work Phone Mobile Phone    GuadalupeJanny pak (Daughter) -- -- " [phone]    chantelleRosa (Daughter) [phone] -- [phone]    RayLisandra glover (Daughter) [phone] -- [phone]    Nazia Lazcano (Grandchild) -- -- [phone]              Insurance Information                  MEDICARE/MEDICARE A & B Phone: [phone]    Subscriber: Syl Herrera Subscriber#: [id number]    Group#: -- Precert#: --    Authorization#: npn per  Effective Date: --        [facility] SUP/[facility] HEALTH CARE OPTIONS Phone: [phone]    Subscriber: Syl Herrera Subscriber#: [id number]    Group#: -- Precert#: --    Authorization#: meghan primary Effective Date: --

## 2025-05-19 NOTE — PROGRESS NOTES
"Pharmacy dosing service  Anticoagulant  Warfarin     Subjective:    Syl Herrera is a 89 y.o.female being continued on warfarin for Atrial Fibrillation / Flutter.    INR Goal: 2 - 3  Home medication?: warfarin 4 mg PO daily, except warfarin 2 mg PO on Tues, Thurs.   Bridge Therapy Present?:  No  Interacting Medications Evaluation (New/Present/Discontinued): None relevant  Additional Contributing Factors: Subdural hematoma in April 2025       Assessment/Plan:    INR is subtherapeutic today at 1.72, decreased since yesterday. Will give a higher dose of warfarin 6 mg today.      Continue to monitor and adjust based on INR.         Date 5/18 5/19          INR 2.5 1.72          Dose 4 mg 6 mg            Diet Order   Procedures    Diet: Cardiac; Healthy Heart (2-3 Na+); Fluid Consistency: Thin (IDDSI 0)      Objective:  [Ht: 152.4 cm (60\"); Wt: 74.9 kg (165 lb 2 oz); BMI: Body mass index is 32.25 kg/m².]    Lab Results   Component Value Date    ALBUMIN 4.3 05/18/2025     Lab Results   Component Value Date    INR 1.72 (L) 05/19/2025    INR 2.5 (H) 05/17/2025    INR 2.80 05/12/2025    PROTIME 20.2 05/19/2025    PROTIME 26.8 05/17/2025    PROTIME 16.3 (L) 04/04/2025     Lab Results   Component Value Date    HGB 13.1 05/19/2025    HGB 12.8 05/18/2025    HGB 11.9 (L) 05/17/2025     Lab Results   Component Value Date    HCT 40.6 05/19/2025    HCT 39.7 05/18/2025    HCT 37.0 05/17/2025       Argelia Martin, [facility]  05/19/25 11:08 EDT       "

## 2025-05-19 NOTE — PROGRESS NOTES
LOS: 2 days   Patient Care Team:  Nava Yu MD as PCP - General  Ivan Martell MD as Consulting Physician (Cardiology)  Greer Shaikh MD as Consulting Physician (Nephrology)  Harshad Landin MD as Consulting Physician (Hematology and Oncology)    Subjective     Interval History:Pt transferred to [facility] service, as she is a patient of Dr. Yu's.    Patient Complaints: No chest pain, SOA or other complaints.    History taken from: patient    Review of Systems   Constitutional:  Negative for activity change, appetite change, chills, diaphoresis, fatigue and fever.   HENT:  Negative for congestion.    Eyes:  Negative for visual disturbance.   Respiratory:  Negative for cough, shortness of breath, wheezing and stridor.    Cardiovascular:  Negative for chest pain, palpitations and leg swelling.   Gastrointestinal:  Negative for abdominal pain, nausea, rectal pain and vomiting.   Genitourinary:  Negative for urgency.   Musculoskeletal:  Negative for arthralgias and back pain.   Neurological:  Negative for weakness.   Psychiatric/Behavioral:  Negative for confusion.            Objective     Vital Signs  Temp:  [97.7 °F (36.5 °C)-98.1 °F (36.7 °C)] 97.7 °F (36.5 °C)  Heart Rate:  [60-64] 60  Resp:  [13-20] 16  BP: (119-180)/(62-95) 119/68    Physical Exam:     General Appearance:    Alert, cooperative, in no acute distress,   Head:    Normocephalic, without obvious abnormality, atraumatic   Eyes:            Lids and lashes normal, conjunctivae and sclerae normal, no   icterus, no pallor, corneas clear, PERRLA   Ears:    Ears appear intact with no abnormalities noted   Throat:   No oral lesions, no thrush, oral mucosa moist   Neck:   No adenopathy, supple, trachea midline, no thyromegaly, no   carotid bruit, no JVD   Lungs:     Clear to auscultation,respirations regular, even and                  unlabored    Heart:    Regular rhythm and normal rate, normal S1 and S2, no            murmur, no  gallop, no rub, no click   Chest Wall:    No abnormalities observed   Abdomen:     Normal bowel sounds, no masses, no organomegaly, soft        Non-tender non-distended, no guarding,   Extremities:   Moves all extremities well, no edema, no cyanosis, no             Redness   Pulses:   Pulses palpable and equal bilaterally   Skin:   No bleeding, bruising or rash   Lymph nodes:   No palpable adenopathy   Neurologic:   Cranial nerves 2 - 12 grossly intact, sensation intact, DTR       present and equal bilaterally        Results Review:    Lab Results (last 24 hours)       Procedure Component Value Units Date/Time    Basic Metabolic Panel [[id number]]  (Abnormal) Collected: 05/19/25 0322    Specimen: Blood from Arm, Left Updated: 05/19/25 0401     Glucose 93 mg/dL      BUN 14 mg/dL      Creatinine 1.14 mg/dL      Sodium 128 mmol/L      Potassium 4.2 mmol/L      Chloride 92 mmol/L      CO2 25.9 mmol/L      Calcium 9.4 mg/dL      BUN/Creatinine Ratio 12.3     Anion Gap 10.1 mmol/L      eGFR 46.7 mL/min/1.73     Narrative:      GFR Categories in Chronic Kidney Disease (CKD)              GFR Category          GFR (mL/min/1.73)    Interpretation  G1                    90 or greater        Normal or high (1)  G2                    60-89                Mild decrease (1)  G3a                   45-59                Mild to moderate decrease  G3b                   30-44                Moderate to severe decrease  G4                    15-29                Severe decrease  G5                    14 or less           Kidney failure    (1)In the absence of evidence of kidney disease, neither GFR category G1 or G2 fulfill the criteria for CKD.    eGFR calculation 2021 CKD-EPI creatinine equation, which does not include race as a factor    Protime-INR [[id number]]  (Abnormal) Collected: 05/19/25 0322    Specimen: Blood from Arm, Left Updated: 05/19/25 0351     Protime 20.2 Seconds      INR 1.72    CBC (No Diff) [[id number]]  (Normal)  Collected: 05/19/25 0322    Specimen: Blood from Arm, Left Updated: 05/19/25 0340     WBC 6.01 10*3/mm3      RBC 4.47 10*6/mm3      Hemoglobin 13.1 g/dL      Hematocrit 40.6 %      MCV 90.8 fL      MCH 29.3 pg      MCHC 32.3 g/dL      RDW 13.8 %      RDW-SD 46.1 fl      MPV 10.4 fL      Platelets 171 10*3/mm3              Imaging Results (Last 24 Hours)       ** No results found for the last 24 hours. **                 I reviewed the patient's new clinical results.    Medication Review:   Scheduled Meds:atorvastatin, 10 mg, Oral, Nightly  cetirizine, 10 mg, Oral, Daily  dilTIAZem CD, 120 mg, Oral, Q24H  isosorbide mononitrate, 30 mg, Oral, QAM  metoprolol tartrate, 100 mg, Oral, Q12H  pantoprazole, 40 mg, Oral, Daily  sodium chloride, 10 mL, Intravenous, Q12H  [Held by provider] spironolactone, 12.5 mg, Oral, Daily  [START ON 5/20/2025] warfarin, 2 mg, Oral, Once per day on Tuesday Thursday  [START ON 5/21/2025] warfarin, 4 mg, Oral, Once per day on Sunday Monday Wednesday Friday Saturday  warfarin, 6 mg, Oral, Once      Continuous Infusions:Pharmacy to dose warfarin,       PRN Meds:.  ALPRAZolam    senna-docusate sodium **AND** polyethylene glycol **AND** bisacodyl **AND** bisacodyl    Calcium Replacement - Follow Nurse / BPA Driven Protocol    HYDROcodone-acetaminophen    Magnesium Standard Dose Replacement - Follow Nurse / BPA Driven Protocol    nitroglycerin    Pharmacy to dose warfarin    Phosphorus Replacement - Follow Nurse / BPA Driven Protocol    Potassium Replacement - Follow Nurse / BPA Driven Protocol    sodium chloride    sodium chloride    sodium chloride     Assessment & Plan       Chest pain    Long term (current) use of anticoagulants [Z79.01]    Atrial fibrillation    Dyslipidemia    Hypertension    Anxiety associated with depression    Obesity (BMI 30-39.9)    CKD (chronic kidney disease) stage 3, GFR 30-59 ml/min    - pt is clinically stable with no anginal symptoms.  -continue medical  management  -no further cardiac testing planned  -observe again overnight and discharge tomorrow if stable.    CODE Status:    Code status (Patient has no pulse and is not breathing):  CPR (Attempt to Resuscitate)  Medical Interventions (Patient has pulse or is breathing):  Full support  Level of support discussed with:  Patient    Admission status:  I believe this patient meets inpatient status  Expected length of stay:  2 midnights or greater  I discussed the patient's findings and my recommendations with the patient.    Plan for disposition:Home tomorrow    Karly Donovan MD  05/19/25  14:14 EDT

## 2025-05-19 NOTE — PLAN OF CARE
Goal Outcome Evaluation:         No s/s nor c/o chest pain this evening. Confusion to place and situation at times. Continues to pull off O2 sat and telemetry monitors. Able to reposition self in bed. Bed alarm set.

## 2025-05-19 NOTE — PROGRESS NOTES
CARDIOLOGY PROGRESS NOTE:    Syl Herrera  89 y.o.  female  1935  [id number]      Referring Provider: Hospitalist    Reason for follow-up: Chest pain     Patient Care Team:  Nava Yu MD as PCP - General  Ivan Martell MD as Consulting Physician (Cardiology)  Greer Shaikh MD as Consulting Physician (Nephrology)  Harshad Landin MD as Consulting Physician (Hematology and Oncology)    Subjective .  Patient doing well without any symptoms    Objective lying in bed comfortably     Review of Systems   Constitutional: Negative for malaise/fatigue.   Cardiovascular:  Negative for chest pain, dyspnea on exertion, leg swelling and palpitations.   Respiratory:  Negative for cough and shortness of breath.    Gastrointestinal:  Negative for abdominal pain, nausea and vomiting.   Neurological:  Negative for dizziness, headaches, light-headedness, numbness and weakness.   All other systems reviewed and are negative.      Allergies: Doxycycline, Celebrex [celecoxib], Contrast dye (echo or unknown ct/mr), Iodinated contrast media, Nsaids, Other, and Sulfa antibiotics    Scheduled Meds:atorvastatin, 10 mg, Oral, Nightly  cetirizine, 10 mg, Oral, Daily  dilTIAZem CD, 120 mg, Oral, Q24H  isosorbide mononitrate, 30 mg, Oral, QAM  metoprolol tartrate, 100 mg, Oral, Q12H  pantoprazole, 40 mg, Oral, Daily  sodium chloride, 10 mL, Intravenous, Q12H  [Held by provider] spironolactone, 12.5 mg, Oral, Daily  [START ON 5/20/2025] warfarin, 2 mg, Oral, Once per day on Tuesday Thursday  [START ON 5/21/2025] warfarin, 4 mg, Oral, Once per day on Sunday Monday Wednesday Friday Saturday  warfarin, 6 mg, Oral, Once      Continuous Infusions:Pharmacy to dose warfarin,       PRN Meds:.  ALPRAZolam    senna-docusate sodium **AND** polyethylene glycol **AND** bisacodyl **AND** bisacodyl    Calcium Replacement - Follow Nurse / BPA Driven Protocol    HYDROcodone-acetaminophen    Magnesium Standard Dose Replacement - Follow  "Nurse / BPA Driven Protocol    nitroglycerin    Pharmacy to dose warfarin    Phosphorus Replacement - Follow Nurse / BPA Driven Protocol    Potassium Replacement - Follow Nurse / BPA Driven Protocol    sodium chloride    sodium chloride    sodium chloride        VITAL SIGNS  Vitals:    05/19/25 0440 05/19/25 0724 05/19/25 0911 05/19/25 1109   BP: 167/80 166/82 137/81 119/68   BP Location: Right arm Right arm  Left arm   Patient Position: Lying Sitting  Lying   Pulse:  64 60 60   Resp: 13 18  16   Temp: 97.8 °F (36.6 °C) 98 °F (36.7 °C)  97.7 °F (36.5 °C)   TempSrc: Oral Oral  Oral   SpO2:   95% 94%   Weight:       Height:           Flowsheet Rows      Flowsheet Row First Filed Value   Admission Height 152.4 cm (60\") Documented at 05/17/2025 1928   Admission Weight 74.9 kg (165 lb 2 oz) Documented at 05/17/2025 1928             TELEMETRY: Ventricular pacemaker rhythm    Physical Exam:  Constitutional:       Appearance: Well-developed.   Eyes:      General: No scleral icterus.     Conjunctiva/sclera: Conjunctivae normal.   HENT:      Head: Normocephalic and atraumatic.   Neck:      Vascular: No carotid bruit or JVD.   Pulmonary:      Effort: Pulmonary effort is normal.      Breath sounds: Normal breath sounds. No wheezing. No rales.   Cardiovascular:      Normal rate. Regular rhythm.   Pulses:     Intact distal pulses.   Abdominal:      General: Bowel sounds are normal.      Palpations: Abdomen is soft.   Musculoskeletal:      Cervical back: Normal range of motion and neck supple. Skin:     General: Skin is warm and dry.      Findings: No rash.   Neurological:      Mental Status: Alert.          Results Review:   I reviewed the patient's new clinical results.  Lab Results (last 24 hours)       Procedure Component Value Units Date/Time    Basic Metabolic Panel [[id number]]  (Abnormal) Collected: 05/19/25 0322    Specimen: Blood from Arm, Left Updated: 05/19/25 0401     Glucose 93 mg/dL      BUN 14 mg/dL      Creatinine " 1.14 mg/dL      Sodium 128 mmol/L      Potassium 4.2 mmol/L      Chloride 92 mmol/L      CO2 25.9 mmol/L      Calcium 9.4 mg/dL      BUN/Creatinine Ratio 12.3     Anion Gap 10.1 mmol/L      eGFR 46.7 mL/min/1.73     Narrative:      GFR Categories in Chronic Kidney Disease (CKD)              GFR Category          GFR (mL/min/1.73)    Interpretation  G1                    90 or greater        Normal or high (1)  G2                    60-89                Mild decrease (1)  G3a                   45-59                Mild to moderate decrease  G3b                   30-44                Moderate to severe decrease  G4                    15-29                Severe decrease  G5                    14 or less           Kidney failure    (1)In the absence of evidence of kidney disease, neither GFR category G1 or G2 fulfill the criteria for CKD.    eGFR calculation 2021 CKD-EPI creatinine equation, which does not include race as a factor    Protime-INR [[id number]]  (Abnormal) Collected: 05/19/25 0322    Specimen: Blood from Arm, Left Updated: 05/19/25 0351     Protime 20.2 Seconds      INR 1.72    CBC (No Diff) [[id number]]  (Normal) Collected: 05/19/25 0322    Specimen: Blood from Arm, Left Updated: 05/19/25 0340     WBC 6.01 10*3/mm3      RBC 4.47 10*6/mm3      Hemoglobin 13.1 g/dL      Hematocrit 40.6 %      MCV 90.8 fL      MCH 29.3 pg      MCHC 32.3 g/dL      RDW 13.8 %      RDW-SD 46.1 fl      MPV 10.4 fL      Platelets 171 10*3/mm3             Imaging Results (Last 24 Hours)       ** No results found for the last 24 hours. **            EKG      I personally viewed and interpreted the patient's EKG/Telemetry data:    ECHOCARDIOGRAM:  Results for orders placed during the hospital encounter of 06/16/24    Adult Transthoracic Echo Complete W/ Cont if Necessary Per Protocol    Interpretation Summary    Left ventricular systolic function is normal. Calculated left ventricular EF = 69%    Left ventricular diastolic function  is consistent with (grade II w/high LAP) pseudonormalization.    Estimated right ventricular systolic pressure from tricuspid regurgitation is mildly elevated (35-45 mmHg).    Conclusion      Normal LV size and contractility EF of 65 to 70%  Grade 2 diastolic dysfunction/pseudonormalization pattern seen.  Normal RV size  Normal atrial size, pacer lead seen  Pulmonic valve is not well visualized.  Aortic valve, mitral valve, tricuspid valve appears structurally normal, moderate tricuspid regurgitation seen.  Calculated RV systolic pressure of 42 mmHg consistent with moderate pulmonary hypertension  No pericardial effusion seen.  Proximal aorta appears normal in size.       STRESS MYOVIEW:  Results for orders placed during the hospital encounter of 06/16/24    Stress Test With Myocardial Perfusion One Day    Interpretation Summary    Myocardial perfusion imaging indicates a normal myocardial perfusion study with no evidence of ischemia. Impressions are consistent with a low risk study.    Left ventricular ejection fraction is hyperdynamic (Calculated EF > 70%).       CARDIAC CATHETERIZATION:  Results for orders placed during the hospital encounter of 04/24/21    Cardiac Catheterization/Vascular Study    Narrative  Procedures:  #1 PTCA with stent placement to the RCA  #2 PTCA with stent placement to the left circumflex artery    Indication: Unstable angina    Comments:  Informed consent was obtained from the patient after explained risks and benefits.  Patient was brought to cardiac arrest laboratory and placed on table usual fashion.  Left groin was prepped in a sterile fashion.  2% lidocaine was used to administer local anesthesia to the left groin.  A total of 20 cc was used.  A 6 Mongolian sheath was placed in the left femoral artery.  Then a 6 Mongolian JR4 guide with sideholes was used and it could not engage the right coronary very well and hence a 6 Mongolian AR-2 guide with sideholes was used and right coronary artery  was engaged.  A 190 cm whisper wire was used in place the distal portion of the RCA.  Then a 2.5/12 mm balloon was used and inflated in the proximal and mid RCA at 8 to 10 kalyan for 10 seconds.  Thereafter the balloon was taken out.  Then a 3.5/18 mm Xience stent was used and placed in the mid RCA and inflated at 12 kalyan for 10 to 15 seconds.  Thereafter the balloon was taken out.  Reviewing angiogram showed no dissection or perforation.  Patient tolerated the procedure very well.  No complication noted.  Then a 6 Brazilian XB guide was used and left coronary artery was engaged.  Multiple views were taken.  190 cm whisper wire was used in place the distal portion of the proximal artery.  Then a 2.5/12 mm balloon was used and inflated in the proximal and mid segments of the circumflex artery each at 8 kalyan for 8 to 10 seconds.  Thereafter the balloon was taken out.  Then a 2.5/12 mm Xience stent was used and placed in the mid circumflex artery and inflated at 10 to 12 kalyan for 10 to 15 seconds.  Thereafter the balloon was taken out.  Then another 2.5/12 mm Xience stent was used and placed across the lesion inflated territory most views for 10 to 15 seconds.  Thereafter the balloon was taken out.  Reviewing angiogram showed no dissection or perforation.  Patient tolerated the procedure very well.  No complication noted.       OTHER:         Assessment & Plan     #1 chest pain/CAD  Patient presented with chest pain and is ruled out for MI by EKG and enzymes  Patient had coronary bypass surgery and also stent placement to the RCA and circumflex artery and is currently stable  Will continue medical therapy only  Patient has normal LV function  2.  Paroxysmal afibrillation  Patient has history of paroxysmal fibrillation is currently on diltiazem and Coumadin and keep his INR around 2-2.5  3.  Pacemaker placement  Patient had send due to permanent pacemaker placement and is working very well  4.  Hypertension  Patient blood  pressure currently stable on medical therapy  5.  Hyperlipidemia  Patient is on statins and the lipid levels are followed by the primary care doctor    6.  Chronic renal insufficiency  Patient is followed by the nephrologist.        Ivan Martell MD  05/19/25  13:44 EDT

## 2025-05-20 ENCOUNTER — READMISSION MANAGEMENT (OUTPATIENT)
Dept: CALL CENTER | Facility: HOSPITAL | Age: 89
End: 2025-05-20
Payer: MEDICARE

## 2025-05-20 VITALS
OXYGEN SATURATION: 98 % | SYSTOLIC BLOOD PRESSURE: 141 MMHG | WEIGHT: 165.12 LBS | RESPIRATION RATE: 17 BRPM | HEART RATE: 64 BPM | DIASTOLIC BLOOD PRESSURE: 79 MMHG | HEIGHT: 60 IN | BODY MASS INDEX: 32.42 KG/M2 | TEMPERATURE: 97.7 F

## 2025-05-20 LAB
ANION GAP SERPL CALCULATED.3IONS-SCNC: 12.7 MMOL/L (ref 5–15)
BUN SERPL-MCNC: 18 MG/DL (ref 8–23)
BUN/CREAT SERPL: 15 (ref 7–25)
CALCIUM SPEC-SCNC: 9.3 MG/DL (ref 8.6–10.5)
CHLORIDE SERPL-SCNC: 91 MMOL/L (ref 98–107)
CO2 SERPL-SCNC: 22.3 MMOL/L (ref 22–29)
CREAT SERPL-MCNC: 1.2 MG/DL (ref 0.57–1)
DEPRECATED RDW RBC AUTO: 44.3 FL (ref 37–54)
EGFRCR SERPLBLD CKD-EPI 2021: 43.9 ML/MIN/1.73
ERYTHROCYTE [DISTWIDTH] IN BLOOD BY AUTOMATED COUNT: 13.5 % (ref 12.3–15.4)
GLUCOSE SERPL-MCNC: 107 MG/DL (ref 65–99)
HCT VFR BLD AUTO: 39.9 % (ref 34–46.6)
HGB BLD-MCNC: 13.3 G/DL (ref 12–15.9)
INR PPP: 2.01 (ref 2–3)
MCH RBC QN AUTO: 29.9 PG (ref 26.6–33)
MCHC RBC AUTO-ENTMCNC: 33.3 G/DL (ref 31.5–35.7)
MCV RBC AUTO: 89.7 FL (ref 79–97)
PLATELET # BLD AUTO: 156 10*3/MM3 (ref 140–450)
PMV BLD AUTO: 10.7 FL (ref 6–12)
POTASSIUM SERPL-SCNC: 4.4 MMOL/L (ref 3.5–5.2)
PROTHROMBIN TIME: 22.9 SECONDS (ref 19.4–28.5)
RBC # BLD AUTO: 4.45 10*6/MM3 (ref 3.77–5.28)
SODIUM SERPL-SCNC: 126 MMOL/L (ref 136–145)
WBC NRBC COR # BLD AUTO: 7.21 10*3/MM3 (ref 3.4–10.8)

## 2025-05-20 PROCEDURE — 85610 PROTHROMBIN TIME: CPT | Performed by: HOSPITALIST

## 2025-05-20 PROCEDURE — 80048 BASIC METABOLIC PNL TOTAL CA: CPT | Performed by: HOSPITALIST

## 2025-05-20 PROCEDURE — 85027 COMPLETE CBC AUTOMATED: CPT | Performed by: HOSPITALIST

## 2025-05-20 PROCEDURE — 99232 SBSQ HOSP IP/OBS MODERATE 35: CPT | Performed by: INTERNAL MEDICINE

## 2025-05-20 RX ORDER — HYDRALAZINE HYDROCHLORIDE 20 MG/ML
10 INJECTION INTRAMUSCULAR; INTRAVENOUS EVERY 6 HOURS PRN
Status: DISCONTINUED | OUTPATIENT
Start: 2025-05-20 | End: 2025-05-20 | Stop reason: HOSPADM

## 2025-05-20 RX ADMIN — DILTIAZEM HYDROCHLORIDE 120 MG: 120 CAPSULE, EXTENDED RELEASE ORAL at 09:23

## 2025-05-20 RX ADMIN — CETIRIZINE HYDROCHLORIDE 10 MG: 10 TABLET, FILM COATED ORAL at 09:24

## 2025-05-20 RX ADMIN — PANTOPRAZOLE SODIUM 40 MG: 40 TABLET, DELAYED RELEASE ORAL at 09:23

## 2025-05-20 RX ADMIN — METOPROLOL TARTRATE 100 MG: 50 TABLET, FILM COATED ORAL at 09:23

## 2025-05-20 RX ADMIN — ISOSORBIDE MONONITRATE 30 MG: 30 TABLET, EXTENDED RELEASE ORAL at 06:14

## 2025-05-20 NOTE — PROGRESS NOTES
"Pharmacy dosing service  Anticoagulant  Warfarin     Subjective:    Syl Herrera is a 89 y.o.female being continued on warfarin for Atrial Fibrillation / Flutter.    INR Goal: 2 - 3  Home medication?: warfarin 4 mg PO daily, except warfarin 2 mg PO on Tues, Thurs.   Bridge Therapy Present?:  No  Interacting Medications Evaluation (New/Present/Discontinued): None relevant  Additional Contributing Factors: Subdural hematoma in April 2025       Assessment/Plan:    INR is therapeutic today at 2.01. Will resume home regimen.       Continue to monitor and adjust based on INR.         Date 5/18 5/19 5/20         INR 2.5 1.72 2.01         Dose 4 mg 6 mg 2 mg           Diet Order   Procedures    Diet: Cardiac; Healthy Heart (2-3 Na+); Fluid Consistency: Thin (IDDSI 0)      Objective:  [Ht: 152.4 cm (60\"); Wt: 74.9 kg (165 lb 2 oz); BMI: Body mass index is 32.25 kg/m².]    Lab Results   Component Value Date    ALBUMIN 4.3 05/18/2025     Lab Results   Component Value Date    INR 2.01 05/20/2025    INR 1.72 (L) 05/19/2025    INR 2.5 (H) 05/17/2025    PROTIME 22.9 05/20/2025    PROTIME 20.2 05/19/2025    PROTIME 26.8 05/17/2025     Lab Results   Component Value Date    HGB 13.3 05/20/2025    HGB 13.1 05/19/2025    HGB 12.8 05/18/2025     Lab Results   Component Value Date    HCT 39.9 05/20/2025    HCT 40.6 05/19/2025    HCT 39.7 05/18/2025       Argelia Martin, [facility]  05/20/25 08:59 EDT         "

## 2025-05-20 NOTE — DISCHARGE SUMMARY
Date of Discharge:  5/20/2025    Discharge Diagnosis:     Chest pain    Long term (current) use of anticoagulants [Z79.01]    Atrial fibrillation    Dyslipidemia    Hypertension    Anxiety associated with depression    Obesity (BMI 30-39.9)    CKD (chronic kidney disease) stage 3, GFR 30-59 ml/min    Presenting Problem/History of Present Illness  Active Hospital Problems    Diagnosis  POA    **Chest pain [R07.9]  Yes    Obesity (BMI 30-39.9) [E66.9]  Yes    CKD (chronic kidney disease) stage 3, GFR 30-59 ml/min [N18.30]  Yes    Anxiety associated with depression [F41.8]  Yes    Hypertension [I10]  Yes    Long term (current) use of anticoagulants [Z79.01] [Z79.01]  Not Applicable    Atrial fibrillation [I48.91]  Yes    Dyslipidemia [E78.5]  Yes      Resolved Hospital Problems   No resolved problems to display.          Hospital Course    Syl Herrera is an 89-year-old female who presented to [facility] ED on 5/17/2025 will discharge home with family on 5/20/2025.  Patient has a significant cardiac history and follows with Dr. Thomas.  She presented to ER with intermittent midsternal chest pain radiating to the bilateral arms underneath right breast associated with shortness of breath.  Cardiology was consulted.  She was ruled out for MI by EKG and enzymes.  Cardiology recommend continuing medical management.  Patient has not had chest pain since being in hospital.  INR goal is 2-2.5.  She is eager to discharge home with family.  She has remained hemodynamically stable.  She will follow-up with PCP in 1 to 2 weeks, as well as cardiology in 2 to 3 weeks    Procedures Performed         Consults:   Consults       Date and Time Order Name Status Description    5/18/2025 12:37 AM Inpatient Cardiology Consult Completed             Pertinent Test Results:    Lab Results (most recent)       Procedure Component Value Units Date/Time    Basic Metabolic Panel [[id number]]  (Abnormal) Collected: 05/20/25 0046    Specimen:  Blood from Arm, Right Updated: 05/20/25 0142     Glucose 107 mg/dL      BUN 18 mg/dL      Creatinine 1.20 mg/dL      Sodium 126 mmol/L      Potassium 4.4 mmol/L      Chloride 91 mmol/L      CO2 22.3 mmol/L      Calcium 9.3 mg/dL      BUN/Creatinine Ratio 15.0     Anion Gap 12.7 mmol/L      eGFR 43.9 mL/min/1.73     Narrative:      GFR Categories in Chronic Kidney Disease (CKD)              GFR Category          GFR (mL/min/1.73)    Interpretation  G1                    90 or greater        Normal or high (1)  G2                    60-89                Mild decrease (1)  G3a                   45-59                Mild to moderate decrease  G3b                   30-44                Moderate to severe decrease  G4                    15-29                Severe decrease  G5                    14 or less           Kidney failure    (1)In the absence of evidence of kidney disease, neither GFR category G1 or G2 fulfill the criteria for CKD.    eGFR calculation 2021 CKD-EPI creatinine equation, which does not include race as a factor    Protime-INR [[id number]]  (Normal) Collected: 05/20/25 0046    Specimen: Blood from Arm, Right Updated: 05/20/25 0122     Protime 22.9 Seconds      INR 2.01    CBC (No Diff) [[id number]]  (Normal) Collected: 05/20/25 0046    Specimen: Blood from Arm, Right Updated: 05/20/25 0113     WBC 7.21 10*3/mm3      RBC 4.45 10*6/mm3      Hemoglobin 13.3 g/dL      Hematocrit 39.9 %      MCV 89.7 fL      MCH 29.9 pg      MCHC 33.3 g/dL      RDW 13.5 %      RDW-SD 44.3 fl      MPV 10.7 fL      Platelets 156 10*3/mm3     Basic Metabolic Panel [[id number]]  (Abnormal) Collected: 05/19/25 0322    Specimen: Blood from Arm, Left Updated: 05/19/25 0401     Glucose 93 mg/dL      BUN 14 mg/dL      Creatinine 1.14 mg/dL      Sodium 128 mmol/L      Potassium 4.2 mmol/L      Chloride 92 mmol/L      CO2 25.9 mmol/L      Calcium 9.4 mg/dL      BUN/Creatinine Ratio 12.3     Anion Gap 10.1 mmol/L      eGFR 46.7  mL/min/1.73     Narrative:      GFR Categories in Chronic Kidney Disease (CKD)              GFR Category          GFR (mL/min/1.73)    Interpretation  G1                    90 or greater        Normal or high (1)  G2                    60-89                Mild decrease (1)  G3a                   45-59                Mild to moderate decrease  G3b                   30-44                Moderate to severe decrease  G4                    15-29                Severe decrease  G5                    14 or less           Kidney failure    (1)In the absence of evidence of kidney disease, neither GFR category G1 or G2 fulfill the criteria for CKD.    eGFR calculation 2021 CKD-EPI creatinine equation, which does not include race as a factor    Protime-INR [[id number]]  (Abnormal) Collected: 05/19/25 0322    Specimen: Blood from Arm, Left Updated: 05/19/25 0351     Protime 20.2 Seconds      INR 1.72    CBC (No Diff) [[id number]]  (Normal) Collected: 05/19/25 0322    Specimen: Blood from Arm, Left Updated: 05/19/25 0340     WBC 6.01 10*3/mm3      RBC 4.47 10*6/mm3      Hemoglobin 13.1 g/dL      Hematocrit 40.6 %      MCV 90.8 fL      MCH 29.3 pg      MCHC 32.3 g/dL      RDW 13.8 %      RDW-SD 46.1 fl      MPV 10.4 fL      Platelets 171 10*3/mm3     High Sensitivity Troponin T [[id number]]  (Normal) Collected: 05/18/25 0412    Specimen: Blood from Arm, Right Updated: 05/18/25 1139     HS Troponin T 11 ng/L     Narrative:      High Sensitive Troponin T Reference Range:  <14.0 ng/L- Negative Female for AMI  <22.0 ng/L- Negative Male for AMI  >=14 - Abnormal Female indicating possible myocardial injury.  >=22 - Abnormal Male indicating possible myocardial injury.   Clinicians would have to utilize clinical acumen, EKG, Troponin, and serial changes to determine if it is an Acute Myocardial Infarction or myocardial injury due to an underlying chronic condition.         Comprehensive Metabolic Panel [[id number]]  (Abnormal) Collected:  05/18/25 0412    Specimen: Blood from Arm, Right Updated: 05/18/25 0507     Glucose 106 mg/dL      BUN 16 mg/dL      Creatinine 1.18 mg/dL      Sodium 131 mmol/L      Potassium 4.5 mmol/L      Chloride 94 mmol/L      CO2 26.6 mmol/L      Calcium 9.6 mg/dL      Total Protein 7.1 g/dL      Albumin 4.3 g/dL      ALT (SGPT) 20 U/L      AST (SGOT) 32 U/L      Alkaline Phosphatase 124 U/L      Total Bilirubin 1.4 mg/dL      Globulin 2.8 gm/dL      A/G Ratio 1.5 g/dL      BUN/Creatinine Ratio 13.6     Anion Gap 10.4 mmol/L      eGFR 44.8 mL/min/1.73     Narrative:      GFR Categories in Chronic Kidney Disease (CKD)              GFR Category          GFR (mL/min/1.73)    Interpretation  G1                    90 or greater        Normal or high (1)  G2                    60-89                Mild decrease (1)  G3a                   45-59                Mild to moderate decrease  G3b                   30-44                Moderate to severe decrease  G4                    15-29                Severe decrease  G5                    14 or less           Kidney failure    (1)In the absence of evidence of kidney disease, neither GFR category G1 or G2 fulfill the criteria for CKD.    eGFR calculation 2021 CKD-EPI creatinine equation, which does not include race as a factor    Magnesium [[id number]]  (Normal) Collected: 05/18/25 0412    Specimen: Blood from Arm, Right Updated: 05/18/25 0507     Magnesium 2.3 mg/dL     Phosphorus [[id number]]  (Normal) Collected: 05/18/25 0412    Specimen: Blood from Arm, Right Updated: 05/18/25 0507     Phosphorus 3.2 mg/dL     CBC & Differential [[id number]]  (Abnormal) Collected: 05/18/25 0412    Specimen: Blood from Arm, Right Updated: 05/18/25 0447    Narrative:      The following orders were created for panel order CBC & Differential.  Procedure                               Abnormality         Status                     ---------                               -----------         ------                      CBC Auto Differential[[id number]]        Abnormal            Final result                 Please view results for these tests on the individual orders.    CBC Auto Differential [[id number]]  (Abnormal) Collected: 05/18/25 0412    Specimen: Blood from Arm, Right Updated: 05/18/25 0447     WBC 6.13 10*3/mm3      RBC 4.36 10*6/mm3      Hemoglobin 12.8 g/dL      Hematocrit 39.7 %      MCV 91.1 fL      MCH 29.4 pg      MCHC 32.2 g/dL      RDW 13.6 %      RDW-SD 46.0 fl      MPV 10.8 fL      Platelets 155 10*3/mm3      Neutrophil % 65.9 %      Lymphocyte % 22.3 %      Monocyte % 9.3 %      Eosinophil % 1.5 %      Basophil % 0.3 %      Immature Grans % 0.7 %      Neutrophils, Absolute 4.04 10*3/mm3      Lymphocytes, Absolute 1.37 10*3/mm3      Monocytes, Absolute 0.57 10*3/mm3      Eosinophils, Absolute 0.09 10*3/mm3      Basophils, Absolute 0.02 10*3/mm3      Immature Grans, Absolute 0.04 10*3/mm3      nRBC 0.0 /100 WBC     Occult Blood, Fecal By Immunoassay - Stool, Per Rectum [[id number]]  (Normal) Collected: 05/17/25 2112    Specimen: Stool from Per Rectum Updated: 05/17/25 2118     Occult Blood, Fecal by Immunoassay Negative    High Sensitivity Troponin T 1Hr [[id number]]  (Normal) Collected: 05/17/25 2048    Specimen: Blood Updated: 05/17/25 2108     HS Troponin T 11 ng/L      Troponin T Numeric Delta -1 ng/L     Narrative:      High Sensitive Troponin T Reference Range:  <14.0 ng/L- Negative Female for AMI  <22.0 ng/L- Negative Male for AMI  >=14 - Abnormal Female indicating possible myocardial injury.  >=22 - Abnormal Male indicating possible myocardial injury.   Clinicians would have to utilize clinical acumen, EKG, Troponin, and serial changes to determine if it is an Acute Myocardial Infarction or myocardial injury due to an underlying chronic condition.         BNP [[id number]]  (Normal) Collected: 05/17/25 1927    Specimen: Blood Updated: 05/17/25 2006     proBNP 791.0 pg/mL     Narrative:      This assay  is used as an aid in the diagnosis of individuals suspected of having heart failure. It can be used as an aid in the diagnosis of acute decompensated heart failure (ADHF) in patients presenting with signs and symptoms of ADHF to the emergency department (ED). In addition, NT-proBNP of <300 pg/mL indicates ADHF is not likely.    Age Range Result Interpretation  NT-proBNP Concentration (pg/mL:      <50             Positive            >450                   Gray                 300-450                    Negative             <300    50-75           Positive            >900                  Gray                300-900                  Negative            <300      >75             Positive            >1800                  Gray                300-1800                  Negative            <300    Lactic Acid, Plasma [[id number]]  (Normal) Collected: 05/17/25 1945    Specimen: Blood Updated: 05/17/25 2004     Lactate 0.6 mmol/L     COVID-19 and FLU A/B PCR, 1 HR TAT - Swab, Nasopharynx [[id number]]  (Normal) Collected: 05/17/25 1936    Specimen: Swab from Nasopharynx Updated: 05/17/25 2001     COVID19 Not Detected     Influenza A PCR Not Detected     Influenza B PCR Not Detected    Narrative:      Fact sheet for providers: https://www.fda.gov/media/038398/download    Fact sheet for patients: https://www.fda.gov/media/956459/download    Test performed by PCR.    Comprehensive Metabolic Panel [[id number]]  (Abnormal) Collected: 05/17/25 1927    Specimen: Blood Updated: 05/17/25 1957     Glucose 112 mg/dL      BUN 20 mg/dL      Creatinine 1.35 mg/dL      Sodium 129 mmol/L      Potassium 5.0 mmol/L      Chloride 92 mmol/L      CO2 27.3 mmol/L      Calcium 9.3 mg/dL      Total Protein 6.5 g/dL      Albumin 4.2 g/dL      ALT (SGPT) 14 U/L      AST (SGOT) 21 U/L      Alkaline Phosphatase 132 U/L      Total Bilirubin 0.8 mg/dL      Globulin 2.3 gm/dL      A/G Ratio 1.8 g/dL      BUN/Creatinine Ratio 14.8     Anion Gap 9.7 mmol/L       eGFR 38.1 mL/min/1.73     Narrative:      GFR Categories in Chronic Kidney Disease (CKD)              GFR Category          GFR (mL/min/1.73)    Interpretation  G1                    90 or greater        Normal or high (1)  G2                    60-89                Mild decrease (1)  G3a                   45-59                Mild to moderate decrease  G3b                   30-44                Moderate to severe decrease  G4                    15-29                Severe decrease  G5                    14 or less           Kidney failure    (1)In the absence of evidence of kidney disease, neither GFR category G1 or G2 fulfill the criteria for CKD.    eGFR calculation 2021 CKD-EPI creatinine equation, which does not include race as a factor    High Sensitivity Troponin T [[id number]]  (Normal) Collected: 05/17/25 1927    Specimen: Blood Updated: 05/17/25 1951     HS Troponin T 12 ng/L     Narrative:      High Sensitive Troponin T Reference Range:  <14.0 ng/L- Negative Female for AMI  <22.0 ng/L- Negative Male for AMI  >=14 - Abnormal Female indicating possible myocardial injury.  >=22 - Abnormal Male indicating possible myocardial injury.   Clinicians would have to utilize clinical acumen, EKG, Troponin, and serial changes to determine if it is an Acute Myocardial Infarction or myocardial injury due to an underlying chronic condition.         POC Protime / INR [[id number]]  (Abnormal) Collected: 05/17/25 1951    Specimen: Blood Updated: 05/17/25 1951     Protime 26.8 seconds      INR 2.5    CBC & Differential [[id number]]  (Abnormal) Collected: 05/17/25 1927    Specimen: Blood Updated: 05/17/25 1934    Narrative:      The following orders were created for panel order CBC & Differential.  Procedure                               Abnormality         Status                     ---------                               -----------         ------                     CBC Auto Differential[[id number]]        Abnormal             Final result                 Please view results for these tests on the individual orders.    CBC Auto Differential [[id number]]  (Abnormal) Collected: 05/17/25 1927    Specimen: Blood Updated: 05/17/25 1934     WBC 5.71 10*3/mm3      RBC 3.98 10*6/mm3      Hemoglobin 11.9 g/dL      Hematocrit 37.0 %      MCV 93.0 fL      MCH 29.9 pg      MCHC 32.2 g/dL      RDW 13.9 %      RDW-SD 48.2 fl      MPV 9.7 fL      Platelets 151 10*3/mm3      Neutrophil % 62.4 %      Lymphocyte % 23.3 %      Monocyte % 11.4 %      Eosinophil % 2.3 %      Basophil % 0.2 %      Immature Grans % 0.4 %      Neutrophils, Absolute 3.57 10*3/mm3      Lymphocytes, Absolute 1.33 10*3/mm3      Monocytes, Absolute 0.65 10*3/mm3      Eosinophils, Absolute 0.13 10*3/mm3      Basophils, Absolute 0.01 10*3/mm3      Immature Grans, Absolute 0.02 10*3/mm3              Results for orders placed during the hospital encounter of 06/16/24    Adult Transthoracic Echo Complete W/ Cont if Necessary Per Protocol    Interpretation Summary    Left ventricular systolic function is normal. Calculated left ventricular EF = 69%    Left ventricular diastolic function is consistent with (grade II w/high LAP) pseudonormalization.    Estimated right ventricular systolic pressure from tricuspid regurgitation is mildly elevated (35-45 mmHg).    Conclusion      Normal LV size and contractility EF of 65 to 70%  Grade 2 diastolic dysfunction/pseudonormalization pattern seen.  Normal RV size  Normal atrial size, pacer lead seen  Pulmonic valve is not well visualized.  Aortic valve, mitral valve, tricuspid valve appears structurally normal, moderate tricuspid regurgitation seen.  Calculated RV systolic pressure of 42 mmHg consistent with moderate pulmonary hypertension  No pericardial effusion seen.  Proximal aorta appears normal in size.          Condition on Discharge:  stable    Vital Signs  Temp:  [97.5 °F (36.4 °C)-98.1 °F (36.7 °C)] 97.7 °F (36.5 °C)  Heart Rate:  [60-74]  64  Resp:  [11-17] 17  BP: (141-166)/(79-92) 141/79      Physical Exam  Vitals reviewed.   HENT:      Head: Normocephalic.      Right Ear: External ear normal.      Left Ear: External ear normal.      Mouth/Throat:      Mouth: Mucous membranes are moist.   Eyes:      General:         Right eye: No discharge.         Left eye: No discharge.   Cardiovascular:      Rate and Rhythm: Normal rate.   Abdominal:      Palpations: Abdomen is soft.   Skin:     General: Skin is warm.   Neurological:      Mental Status: She is alert and oriented to person, place, and time.   Psychiatric:         Behavior: Behavior normal.              Discharge Disposition  Home or Self Care    Discharge Medications     Discharge Medications        Continue These Medications        Instructions Start Date   acetaminophen 500 MG tablet  Commonly known as: TYLENOL   500 mg, Oral, Every 6 Hours PRN      albuterol sulfate  (90 Base) MCG/ACT inhaler  Commonly known as: PROVENTIL HFA;VENTOLIN HFA;PROAIR HFA   2 puffs, Inhalation, Every 4 Hours PRN      ALPRAZolam 0.5 MG tablet  Commonly known as: XANAX   0.5 mg, 2 Times Daily PRN      atorvastatin 10 MG tablet  Commonly known as: LIPITOR   10 mg, Nightly      CALCIUM PO   Take  by mouth.      carboxymethylcellulose 0.5 % solution  Commonly known as: REFRESH PLUS   1 drop, 3 Times Daily PRN      cetirizine 10 MG tablet  Commonly known as: zyrTEC   10 mg, Daily      Clobetasol Propionate E 0.05 % emollient cream  Generic drug: clobetasol prop emollient base   APPLY THIN LAYER TOPICALLY TO THE AFFECTED AREA TWICE DAILY      dilTIAZem 120 MG 24 hr capsule  Commonly known as: TIAZAC   120 mg, Oral, Daily      fluticasone 50 MCG/ACT nasal spray  Commonly known as: FLONASE   1 spray, As Needed      HYDROcodone-acetaminophen 7.5-325 MG per tablet  Commonly known as: NORCO   1 tablet, Every 6 Hours PRN      isosorbide mononitrate 30 MG 24 hr tablet  Commonly known as: IMDUR   30 mg, Oral, Every  Morning      magnesium oxide 400 MG tablet  Commonly known as: MAG-OX   400 mg, Daily      metoprolol tartrate 100 MG tablet  Commonly known as: LOPRESSOR   100 mg, Oral, Every 12 Hours Scheduled      nitrofurantoin 100 MG capsule  Commonly known as: MACRODANTIN   100 mg, Daily      ondansetron 4 MG tablet  Commonly known as: ZOFRAN   4 mg, Daily PRN      pantoprazole 40 MG EC tablet  Commonly known as: PROTONIX   TK 1 T PO QD      VITAMIN D-3 PO   Take  by mouth.      Viteyes AREDS Formula capsule   1 capsule, 2 times daily      warfarin 4 MG tablet  Commonly known as: COUMADIN   Take 1 tab, by mouth, daily except 1/2 tab on Mon, Weds and Fri or as directed.             Stop These Medications      budesonide 0.5 MG/2ML nebulizer solution  Commonly known as: PULMICORT     cephalexin 500 MG capsule  Commonly known as: KEFLEX     ciprofloxacin 500 MG tablet  Commonly known as: CIPRO     fluticasone 27.5 MCG/SPRAY nasal spray  Commonly known as: VERAMYST     furosemide 40 MG tablet  Commonly known as: LASIX     lidocaine 5 %  Commonly known as: LIDODERM     nitrofurantoin (macrocrystal-monohydrate) 100 MG capsule  Commonly known as: MACROBID     ondansetron ODT 4 MG disintegrating tablet  Commonly known as: ZOFRAN-ODT     spironolactone 25 MG tablet  Commonly known as: ALDACTONE     VITAMIN B-12 PO              Discharge Diet:   Diet Instructions       Diet: Cardiac Diets; Healthy Heart (2-3 Na+); Regular (IDDSI 7); Thin (IDDSI 0)      Discharge Diet: Cardiac Diets    Cardiac Diet: Healthy Heart (2-3 Na+)    Texture: Regular (IDDSI 7)    Fluid Consistency: Thin (IDDSI 0)            Activity at Discharge:   Activity Instructions       Activity as Tolerated              Follow-up Appointments  Future Appointments   Date Time Provider Department Center   9/10/2025  2:00 PM [facility]   9/10/2025  2:30 PM [facility]   9/10/2025  2:45 PM  Ivan Martell MD [facility]     Additional Instructions for the Follow-ups that You Need to Schedule       Discharge Follow-up with PCP   As directed       Currently Documented PCP:    Nava Yu MD    PCP Phone Number:    [phone]     Follow Up Details: 1-2 weeks        Discharge Follow-up with Specified Provider: Cardiology; 3 Weeks   As directed      To: Cardiology   Follow Up: 3 Weeks                Test Results Pending at Discharge  Pending Results       None             SHILA Sloan  05/20/25  12:43 EDT    Time: Discharge 29 min

## 2025-05-20 NOTE — NURSING NOTE
Pt is confused and tried several to get out of bed by herself setting off bed alarm, each time saying she was going somewhere that made no sense, store, work, etc. Pt was easily redirected and laid back down in bed

## 2025-05-20 NOTE — PLAN OF CARE
Goal Outcome Evaluation:  Plan of Care Reviewed With: patient        Progress: improving  Outcome Evaluation: pt had no c/o chest or abdomen pain, did c/o right leg pain prn med given and ace bandage applied, pt is confused and seems to be worse at night, she rested off and on during the night and remains in falls precautions, d/c plan is to go home with family

## 2025-05-20 NOTE — PROGRESS NOTES
CARDIOLOGY PROGRESS NOTE:    Syl Herrera  89 y.o.  female  1935  [id number]      Referring Provider: Dr. Donovan    Reason for follow-up: Chest pain and shortness of breath     Patient Care Team:  Nava Yu MD as PCP - [facility]  Ivan Martell MD as Consulting Physician (Cardiology)  Greer Shaikh MD as Consulting Physician (Nephrology)  Harshad Landin MD as Consulting Physician (Hematology and Oncology)    Subjective .  Patient doing well without any symptoms    Objective lying in bed comfortably     Review of Systems   Constitutional: Negative for malaise/fatigue.   Cardiovascular:  Negative for chest pain, dyspnea on exertion, leg swelling and palpitations.   Respiratory:  Negative for cough and shortness of breath.    Gastrointestinal:  Negative for abdominal pain, nausea and vomiting.   Neurological:  Negative for dizziness, headaches, light-headedness, numbness and weakness.   All other systems reviewed and are negative.      Allergies: Doxycycline, Celebrex [celecoxib], Contrast dye (echo or unknown ct/mr), Iodinated contrast media, Nsaids, Other, and Sulfa antibiotics    Scheduled Meds:atorvastatin, 10 mg, Oral, Nightly  cetirizine, 10 mg, Oral, Daily  dilTIAZem CD, 120 mg, Oral, Q24H  isosorbide mononitrate, 30 mg, Oral, QAM  metoprolol tartrate, 100 mg, Oral, Q12H  pantoprazole, 40 mg, Oral, Daily  sodium chloride, 10 mL, Intravenous, Q12H  [Held by provider] spironolactone, 12.5 mg, Oral, Daily  warfarin, 2 mg, Oral, Once per day on Tuesday Thursday  [START ON 5/21/2025] warfarin, 4 mg, Oral, Once per day on Sunday Monday Wednesday Friday Saturday      Continuous Infusions:Pharmacy to dose warfarin,       PRN Meds:.  ALPRAZolam    senna-docusate sodium **AND** polyethylene glycol **AND** bisacodyl **AND** bisacodyl    Calcium Replacement - Follow Nurse / BPA Driven Protocol    hydrALAZINE    HYDROcodone-acetaminophen    Magnesium Standard Dose Replacement - Follow Nurse / BPA  "Driven Protocol    nitroglycerin    Pharmacy to dose warfarin    Phosphorus Replacement - Follow Nurse / BPA Driven Protocol    Potassium Replacement - Follow Nurse / BPA Driven Protocol    sodium chloride    sodium chloride    sodium chloride        VITAL SIGNS  Vitals:    05/20/25 0400 05/20/25 0431 05/20/25 0536 05/20/25 0801   BP:   166/85 151/84   BP Location:   Left arm Left arm   Patient Position:   Lying Lying   Pulse: 60  64    Resp: 12 15 12 11   Temp:  98 °F (36.7 °C)  97.8 °F (36.6 °C)   TempSrc:  Oral  Oral   SpO2: 93%  97%    Weight:       Height:           Flowsheet Rows      Flowsheet Row First Filed Value   Admission Height 152.4 cm (60\") Documented at 05/17/2025 1928   Admission Weight 74.9 kg (165 lb 2 oz) Documented at 05/17/2025 1928             TELEMETRY: Atrial fibrillation    Physical Exam:  Constitutional:       Appearance: Well-developed.   Eyes:      General: No scleral icterus.     Conjunctiva/sclera: Conjunctivae normal.   HENT:      Head: Normocephalic and atraumatic.   Neck:      Vascular: No carotid bruit or JVD.   Pulmonary:      Effort: Pulmonary effort is normal.      Breath sounds: Normal breath sounds. No wheezing. No rales.   Cardiovascular:      Normal rate. Regular rhythm.   Pulses:     Intact distal pulses.   Abdominal:      General: Bowel sounds are normal.      Palpations: Abdomen is soft.   Musculoskeletal:      Cervical back: Normal range of motion and neck supple. Skin:     General: Skin is warm and dry.      Findings: No rash.   Neurological:      Mental Status: Alert.          Results Review:   I reviewed the patient's new clinical results.  Lab Results (last 24 hours)       Procedure Component Value Units Date/Time    Basic Metabolic Panel [[id number]]  (Abnormal) Collected: 05/20/25 0046    Specimen: Blood from Arm, Right Updated: 05/20/25 0142     Glucose 107 mg/dL      BUN 18 mg/dL      Creatinine 1.20 mg/dL      Sodium 126 mmol/L      Potassium 4.4 mmol/L      " Chloride 91 mmol/L      CO2 22.3 mmol/L      Calcium 9.3 mg/dL      BUN/Creatinine Ratio 15.0     Anion Gap 12.7 mmol/L      eGFR 43.9 mL/min/1.73     Narrative:      GFR Categories in Chronic Kidney Disease (CKD)              GFR Category          GFR (mL/min/1.73)    Interpretation  G1                    90 or greater        Normal or high (1)  G2                    60-89                Mild decrease (1)  G3a                   45-59                Mild to moderate decrease  G3b                   30-44                Moderate to severe decrease  G4                    15-29                Severe decrease  G5                    14 or less           Kidney failure    (1)In the absence of evidence of kidney disease, neither GFR category G1 or G2 fulfill the criteria for CKD.    eGFR calculation 2021 CKD-EPI creatinine equation, which does not include race as a factor    Protime-INR [[id number]]  (Normal) Collected: 05/20/25 0046    Specimen: Blood from Arm, Right Updated: 05/20/25 0122     Protime 22.9 Seconds      INR 2.01    CBC (No Diff) [[id number]]  (Normal) Collected: 05/20/25 0046    Specimen: Blood from Arm, Right Updated: 05/20/25 0113     WBC 7.21 10*3/mm3      RBC 4.45 10*6/mm3      Hemoglobin 13.3 g/dL      Hematocrit 39.9 %      MCV 89.7 fL      MCH 29.9 pg      MCHC 33.3 g/dL      RDW 13.5 %      RDW-SD 44.3 fl      MPV 10.7 fL      Platelets 156 10*3/mm3             Imaging Results (Last 24 Hours)       ** No results found for the last 24 hours. **            EKG      I personally viewed and interpreted the patient's EKG/Telemetry data:    ECHOCARDIOGRAM:  Results for orders placed during the hospital encounter of 06/16/24    Adult Transthoracic Echo Complete W/ Cont if Necessary Per Protocol    Interpretation Summary    Left ventricular systolic function is normal. Calculated left ventricular EF = 69%    Left ventricular diastolic function is consistent with (grade II w/high LAP) pseudonormalization.     Estimated right ventricular systolic pressure from tricuspid regurgitation is mildly elevated (35-45 mmHg).    Conclusion      Normal LV size and contractility EF of 65 to 70%  Grade 2 diastolic dysfunction/pseudonormalization pattern seen.  Normal RV size  Normal atrial size, pacer lead seen  Pulmonic valve is not well visualized.  Aortic valve, mitral valve, tricuspid valve appears structurally normal, moderate tricuspid regurgitation seen.  Calculated RV systolic pressure of 42 mmHg consistent with moderate pulmonary hypertension  No pericardial effusion seen.  Proximal aorta appears normal in size.       STRESS MYOVIEW:  Results for orders placed during the hospital encounter of 06/16/24    Stress Test With Myocardial Perfusion One Day    Interpretation Summary    Myocardial perfusion imaging indicates a normal myocardial perfusion study with no evidence of ischemia. Impressions are consistent with a low risk study.    Left ventricular ejection fraction is hyperdynamic (Calculated EF > 70%).       CARDIAC CATHETERIZATION:  Results for orders placed during the hospital encounter of 04/24/21    Cardiac Catheterization/Vascular Study    Narrative  Procedures:  #1 PTCA with stent placement to the RCA  #2 PTCA with stent placement to the left circumflex artery    Indication: Unstable angina    Comments:  Informed consent was obtained from the patient after explained risks and benefits.  Patient was brought to cardiac arrest laboratory and placed on table usual fashion.  Left groin was prepped in a sterile fashion.  2% lidocaine was used to administer local anesthesia to the left groin.  A total of 20 cc was used.  A 6 Iraqi sheath was placed in the left femoral artery.  Then a 6 Iraqi JR4 guide with sideholes was used and it could not engage the right coronary very well and hence a 6 Iraqi AR-2 guide with sideholes was used and right coronary artery was engaged.  A 190 cm whisper wire was used in place the distal  portion of the RCA.  Then a 2.5/12 mm balloon was used and inflated in the proximal and mid RCA at 8 to 10 kalyan for 10 seconds.  Thereafter the balloon was taken out.  Then a 3.5/18 mm Xience stent was used and placed in the mid RCA and inflated at 12 kalyan for 10 to 15 seconds.  Thereafter the balloon was taken out.  Reviewing angiogram showed no dissection or perforation.  Patient tolerated the procedure very well.  No complication noted.  Then a 6 Tanzanian XB guide was used and left coronary artery was engaged.  Multiple views were taken.  190 cm whisper wire was used in place the distal portion of the proximal artery.  Then a 2.5/12 mm balloon was used and inflated in the proximal and mid segments of the circumflex artery each at 8 kalyan for 8 to 10 seconds.  Thereafter the balloon was taken out.  Then a 2.5/12 mm Xience stent was used and placed in the mid circumflex artery and inflated at 10 to 12 kalyan for 10 to 15 seconds.  Thereafter the balloon was taken out.  Then another 2.5/12 mm Xience stent was used and placed across the lesion inflated territory most views for 10 to 15 seconds.  Thereafter the balloon was taken out.  Reviewing angiogram showed no dissection or perforation.  Patient tolerated the procedure very well.  No complication noted.       OTHER:         Assessment & Plan     #1 chest pain/CAD  Patient brought with chest pain is ruled out for MI by EKG and enzymes  Patient had a coronary artery bypass surgery in the past but also had stent placement to the RCA and circumflex artery and is currently stable without any angina  No further cardiac workup    Paroxysmal afibrillation  Patient is currently on medical therapy with diltiazem and Coumadin and will keep the INR around 2-2.5    Permanent pacemaker placement  Patient has permanent pacemaker placement done and the pacemaker is working very well    Hypertension  Patient blood pressure currently stable on medications    Hyperlipidemia  Patient is on  statins and the lipid levels are well within normal limits.    Patient can be discharged to home and followed as an outpatient        Ivan Martell MD  05/20/25  11:14 EDT

## 2025-05-20 NOTE — CASE MANAGEMENT/SOCIAL WORK
Case Management Discharge Note      Final Note: Home with [facility]         Selected Continued Care - Discharged on 5/20/2025 Admission date: 5/17/2025 - Discharge disposition: Home or Self Care          Home Medical Care Coordination complete.      Service Provider Services Address Phone Fax Patient Preferred    [facility], Home Nursing [address], [place] [address] [phone] --                      Transportation Services  Private: Car    Final Discharge Disposition Code: 06 - home with home health care

## 2025-05-20 NOTE — PLAN OF CARE
Problem: Adult Inpatient Plan of Care  Goal: Plan of Care Review  Outcome: Progressing  Goal: Patient-Specific Goal (Individualized)  Outcome: Progressing  Goal: Absence of Hospital-Acquired Illness or Injury  Outcome: Progressing  Intervention: Identify and Manage Fall Risk  Recent Flowsheet Documentation  Taken 5/20/2025 1000 by Vicki Tomlinson RN  Safety Promotion/Fall Prevention:   activity supervised   assistive device/personal items within reach   clutter free environment maintained   fall prevention program maintained   nonskid shoes/slippers when out of bed   room organization consistent   safety round/check completed  Taken 5/20/2025 0926 by Vicki Tomlinson RN  Safety Promotion/Fall Prevention:   activity supervised   assistive device/personal items within reach   clutter free environment maintained   fall prevention program maintained   nonskid shoes/slippers when out of bed   room organization consistent   safety round/check completed  Intervention: Prevent Skin Injury  Recent Flowsheet Documentation  Taken 5/20/2025 0926 by Vicki Tomlinson RN  Body Position: position changed independently  Skin Protection: transparent dressing maintained  Intervention: Prevent and Manage VTE (Venous Thromboembolism) Risk  Recent Flowsheet Documentation  Taken 5/20/2025 0926 by Vicki Tomlinson RN  VTE Prevention/Management:   bilateral   SCDs (sequential compression devices) off   patient refused intervention  Intervention: Prevent Infection  Recent Flowsheet Documentation  Taken 5/20/2025 1000 by Vicki Tomlinson RN  Infection Prevention: hand hygiene promoted  Taken 5/20/2025 0926 by Vicki Tomlinson RN  Infection Prevention: hand hygiene promoted  Goal: Optimal Comfort and Wellbeing  Outcome: Progressing  Intervention: Provide Person-Centered Care  Recent Flowsheet Documentation  Taken 5/20/2025 0926 by Vicki Tomlinson RN  Trust Relationship/Rapport:   care explained   thoughts/feelings  acknowledged  Goal: Readiness for Transition of Care  Outcome: Progressing     Problem: Fall Injury Risk  Goal: Absence of Fall and Fall-Related Injury  Outcome: Progressing  Intervention: Identify and Manage Contributors  Recent Flowsheet Documentation  Taken 5/20/2025 1000 by Vicki Tomlinson RN  Medication Review/Management: medications reviewed  Taken 5/20/2025 0926 by Vicki Tomlinson RN  Medication Review/Management: medications reviewed  Intervention: Promote Injury-Free Environment  Recent Flowsheet Documentation  Taken 5/20/2025 1000 by Vicki Tomlinson RN  Safety Promotion/Fall Prevention:   activity supervised   assistive device/personal items within reach   clutter free environment maintained   fall prevention program maintained   nonskid shoes/slippers when out of bed   room organization consistent   safety round/check completed  Taken 5/20/2025 0926 by Vicki Tomlinson RN  Safety Promotion/Fall Prevention:   activity supervised   assistive device/personal items within reach   clutter free environment maintained   fall prevention program maintained   nonskid shoes/slippers when out of bed   room organization consistent   safety round/check completed     Problem: Comorbidity Management  Goal: Maintenance of Heart Failure Symptom Control  Outcome: Progressing  Intervention: Maintain Heart Failure Management  Recent Flowsheet Documentation  Taken 5/20/2025 1000 by Vicki Tomlinson RN  Medication Review/Management: medications reviewed  Taken 5/20/2025 0926 by Vicki Tomlinson RN  Medication Review/Management: medications reviewed  Goal: Blood Pressure in Desired Range  Outcome: Progressing  Intervention: Maintain Blood Pressure Management  Recent Flowsheet Documentation  Taken 5/20/2025 1000 by Vicki Tomlinson RN  Medication Review/Management: medications reviewed  Taken 5/20/2025 0926 by Vicki Tomlinson RN  Medication Review/Management: medications reviewed     Problem: Chest  Pain  Goal: Resolution of Chest Pain Symptoms  Outcome: Progressing   Goal Outcome Evaluation:

## 2025-05-21 ENCOUNTER — TELEPHONE (OUTPATIENT)
Dept: CARDIOLOGY | Facility: CLINIC | Age: 89
End: 2025-05-21
Payer: MEDICARE

## 2025-05-21 ENCOUNTER — ANTICOAGULATION VISIT (OUTPATIENT)
Dept: CARDIOLOGY | Facility: CLINIC | Age: 89
End: 2025-05-21
Payer: MEDICARE

## 2025-05-21 DIAGNOSIS — Z79.01 LONG TERM (CURRENT) USE OF ANTICOAGULANTS: Primary | ICD-10-CM

## 2025-05-21 NOTE — TELEPHONE ENCOUNTER
Caller: JaleesaRosa S    Relationship: Emergency Contact    Best call back number: [phone]    What is the best time to reach you: ANY    What was the call regarding: PATIENT'S DAUGHTER ROSA STATED THAT SHE NEEDS SOMEONE TO CALL HER BACK TO ADVISE ABOUT PATIENT'S INR AND HOW THE WARFARIN MEDICATION SHOULD BE GIVEN.    ROSA WAS ALSO CALLING TO SCHEDULE 1 MONTH HOSPITAL F/U FOR PATIENT. PATIENT WAS DISCHARGED FROM [facility] 05.20.25 YESTERDAY FOR CHEST PAINS UNSPECIFIED TYPE. DR. BRAGG DOESN'T HAVE ANYTHING UNTIL AUGUST. PLEASE CONTACT ROSA TO ADVISE ABOUT MEDICATION AND TO SCHEDULE HOSPITAL F/U. THANK YOU.

## 2025-05-21 NOTE — CASE MANAGEMENT/SOCIAL WORK
Start PACC Note    Home Health Referral    Evaluated patient on Home Care and services available. Patient offered choice of available HHC and agreeable to RN/OT services with [facility].    Isolation Precautions: No active isolations    START PATIENT REGISTRATION INFORMATION  Order Information  Order Signing Physician: No att. providers found  Service Ordered RN?: Yes  Service Ordered PT?: No  Service Ordered OT?: Yes  Service Ordered ST?: No  Service Ordered MSW?: No  Service Ordered HHA?: No  Following Physician: Nava Yu MD  Following Physician Phone: [phone]  Overseeing Physician: Nava Yu MD  (Required for Residents Only)  Agreeable to Follow ? Yes  Date/Time of Call 05/21/25 11:15 EDT, Spoke with: per md order    Care Coordination  Same Day SOC?: No  Primary Care Physician: Nava Yu MD  Primary Care Physician Phone: [phone]  Primary Care Physician Address: [address] [place] [address] [place] [address]  Visit Instructions: N/A  Service Discharge Location Type: Home  Service Facility Name: N/A  Service Floor Facility: N/A  Service Room No: N/A    Demographics  Patient Last Name: Sharon  Patient First Name: Syl  Language/Communication Barrier: none  Service Address: [address] [place] [address] City: [place]  Service State: [place]  Service Zip: [id number] [place] [address] Home Phone: [phone]  Other Phone Numbers:   Telephone Information:   Mobile [phone]     Emergency Contact:   Extended Emergency Contact Information  Primary Emergency Contact: Janny Butcher   [place]  Mobile Phone: [phone]  Relation: Daughter  Secondary Emergency Contact: Rosa Lazcano  Address: [address] [place] [address] [facility]  Home Phone: [phone]  Mobile Phone: [phone]  Relation: Daughter    Admission Information  Admit Date: 5/17/2025  Patient Status at Discharge: Inpatient  Admitting Diagnosis: Chest pain, unspecified type  [R07.9]  Chest pain [R07.9]    Caregiver Information  Caregiver First Name:   Caregiver Last Name:   Caregiver Relationship to Patient:   Caregiver Phone Number:   Caregiver Notes:     [employer]  [employer]  No      END PATIENT REGISTRATION INFORMATION    Start PACC Summary    Additional Comments: none    END PACC Summary    Discharge Date: Pending    Referral Source: [facility] Sanabria    Signed By: Nathalie Gaxiola RN, 5/21/2025, 11:15 EDT     Date/Time: 05/21/25 11:15 EDT    End PACC Note

## 2025-05-21 NOTE — PROGRESS NOTES
Spoke to daughter, INR on low side at discharge 2.01. Patient d/c instructions has her taking warfarin 4 mg 1/2 tab M-W-F and 1 tablet the other 4 days a week.  Advised to resume warfarin 4 mg 1/2 tablet on Tuesday and Thursday and 1 tablet the other 5 days a week. No antibiotics or steroids give to patient while in hospital or at discharge. Check INR 6/19 at [facility].

## 2025-05-21 NOTE — OUTREACH NOTE
Prep Survey      Flowsheet Row Responses   [facility] facility patient discharged from? [facility]   Is LACE score < 7 ? No   Eligibility Readm Mgmt   Discharge diagnosis Chest pain   Does the patient have one of the following disease processes/diagnoses(primary or secondary)? Other   Does the patient have Home health ordered? Yes   What is the Home health agency?  [facility]   Is there a DME ordered? No   Prep survey completed? Yes            JUANITO MIRAMONTES - Registered Nurse

## 2025-05-23 ENCOUNTER — TELEPHONE (OUTPATIENT)
Dept: CARDIOLOGY | Facility: CLINIC | Age: 89
End: 2025-05-23
Payer: MEDICARE

## 2025-05-23 NOTE — TELEPHONE ENCOUNTER
When should pt have next PT/INR?  Recently hospitalized and has followup with [facility]don and INR on 6/19/25.  Told her you will call on Tuesday to discuss.

## 2025-06-09 RX ORDER — SPIRONOLACTONE 25 MG/1
12.5 TABLET ORAL DAILY
Qty: 45 TABLET | Refills: 3 | Status: SHIPPED | OUTPATIENT
Start: 2025-06-09

## 2025-06-09 NOTE — TELEPHONE ENCOUNTER
Rx Refill Note  Requested Prescriptions     Signed Prescriptions Disp Refills    spironolactone (ALDACTONE) 25 MG tablet 45 tablet 3     Sig: TAKE 1/2 TABLET BY MOUTH DAILY     Authorizing Provider: CORKY BRAGG     Ordering User: KEELY BURT      Last office visit with prescribing clinician: 2/26/2025   Last telemedicine visit with prescribing clinician: Visit date not found   Next office visit with prescribing clinician: 9/10/2025                         Would you like a call back once the refill request has been completed: [] Yes [] No    If the office needs to give you a call back, can they leave a voicemail: [] Yes [] No    Keely Burt MA  06/09/25, 08:47 EDT

## 2025-06-11 ENCOUNTER — READMISSION MANAGEMENT (OUTPATIENT)
Dept: CALL CENTER | Facility: HOSPITAL | Age: 89
End: 2025-06-11
Payer: MEDICARE

## 2025-06-11 NOTE — OUTREACH NOTE
Medical Week 3 Survey      Flowsheet Row Responses   [facility] patient discharged from? [facility]   Does the patient have one of the following disease processes/diagnoses(primary or secondary)? Other   Week 3 attempt successful? Yes   Call start time 1502   Call end time 1504   Discharge diagnosis Chest pain   Has the patient kept scheduled appointments due by today? Yes   What is the Home health agency?  [facility]   Has home health visited the patient within 72 hours of discharge? Yes   Comments Pt reports no further chest pain with associated SOA or arm pain.   What is the patient's perception of their health status since discharge? Returned to baseline/stable  [stable]   Is the patient/caregiver able to teach back signs and symptoms related to disease process for when to call PCP? Yes   Week 3 Call Completed? Yes   Graduated Yes   Call end time 1504            Lidia ROTH - Registered Nurse

## 2025-06-16 NOTE — PROGRESS NOTES
Subjective:     Encounter Date:06/19/2025      Patient ID: Syl Herrera is a 89 y.o. female.    Chief Complaint:  History of Present Illness    Syl Herrera is a pleasant 89 y.o. female with a past medical history of CAD status post CABG and PCI, dyslipidemia, hypertension, atrial fibrillation on warfarin, bradycardia status post permanent pacemaker, CKD, sleep apnea who presents to the office today for hospital discharge follow-up for which she was evaluated by cardiology for chest pain.  Troponin negative, EKG without acute ST-T segment changes.  No further ischemic workup was done during hospitalization as the patient had Myoview study from June 2024 which was negative for ischemia, low risk.  Echocardiogram showed preserved LVEF with grade 2 diastolic dysfunction and mild pulmonary hypertension.  Patient seen and examined, labs and chart reviewed. Patient states doing well from cardiology standpoint as she denies chest pain, fatigue, weakness, palpitations, lightheadedness/dizziness, nausea, diaphoresis, syncope.  Her only symptoms today are lower extremity edema and shortness of breath with exertion which she reports have been ongoing.  She was recently taken off of her spironolactone and Lasix during recent hospitalization secondary to electrolyte abnormalities and worsening renal function.  She is following with nephrology and we will defer to them for diuretic management.  Patient's blood pressure is elevated today.  However she reports it runs well-controlled at home as she notes she has whitecoat syndrome.  INR elevated.  Patient takes all medications as prescribed.  She has never been a smoker.      The following portions of the patient's history were reviewed and updated as appropriate: allergies, current medications, past family history, past medical history, past social history, past surgical history, and problem list.    Past Medical History:   Diagnosis Date    Anxiety     Arthritis     Asthma      Atrial fibrillation     CHF (congestive heart failure)     CKD (chronic kidney disease) stage 3, GFR 30-59 ml/min 04/18/2021    Coronary artery disease     Dyslipidemia     Dyslipidemia 01/05/2015    GERD (gastroesophageal reflux disease)     History of bone density study 04/2015    Hyperlipidemia     Hypertension     Sleep apnea     Wears dentures      Past Surgical History:   Procedure Laterality Date    BLADDER REPAIR  1990    BREAST LUMPECTOMY  1974    BENIGN    CARDIAC CATHETERIZATION  05/25/2011    CARDIAC CATHETERIZATION N/A 04/21/2021    Procedure: Left Heart Cath and coronary angiogram;  Surgeon: Ivan Martell MD;  Location: [facility] CATH INVASIVE LOCATION;  Service: Cardiovascular;  Laterality: N/A;    CARDIAC CATHETERIZATION N/A 04/21/2021    Procedure: Left ventriculography;  Surgeon: Ivan Martell MD;  Location: [facility] CATH INVASIVE LOCATION;  Service: Cardiovascular;  Laterality: N/A;    CARDIAC CATHETERIZATION  04/21/2021    Procedure: Saphenous Vein Graft;  Surgeon: Ivan Martell MD;  Location: [facility] CATH INVASIVE LOCATION;  Service: Cardiovascular;;    CARDIAC CATHETERIZATION N/A 04/26/2021    Procedure: Percutaneous Coronary Intervention;  Surgeon: Ivan Martell MD;  Location: [facility] CATH INVASIVE LOCATION;  Service: Cardiovascular;  Laterality: N/A;    CARDIAC CATHETERIZATION N/A 04/26/2021    Procedure: Stent RAY coronary;  Surgeon: Ivan Martell MD;  Location: [facility] CATH INVASIVE LOCATION;  Service: Cardiovascular;  Laterality: N/A;    CARDIOVASCULAR STRESS TEST  05/04/2015    CHOLECYSTECTOMY  1990    CORONARY ARTERY BYPASS GRAFT  08/09/2017    X5  Dr Brandt    ENDOSCOPY N/A 06/30/2020    Procedure: ESOPHAGOGASTRODUODENOSCOPY with biopsy x 1 area and dilatation (15-18mm balloon) up to 18mm;  Surgeon: Quinton Tapia MD;  Location: [facility] ENDOSCOPY;  Service: Gastroenterology;  Laterality: N/A;  post op: gastritis, large hiatal hernia, esophageal dysmotility    ENDOSCOPY N/A 10/24/2024     "Procedure: ESOPHAGOGASTRODUODENOSCOPY WITH GASTRIC BIOPSY, BALLOON DILATION (18MM-20MM) UP TO 19MM,BIOPSY OF ESOPHAGUS;  Surgeon: Quinton Tapia MD;  Location: [facility] ENDOSCOPY;  Service: Gastroenterology;  Laterality: N/A;  GASTRITIS, ESOPHAGITIS, HIATEL HERNIA    HYSTERECTOMY  1990    INSERT / REPLACE / REMOVE PACEMAKER      JOINT REPLACEMENT Right     knee     OTHER SURGICAL HISTORY  06/2017    BLOOD CLOT REMOVED FROM LEFT EAR    PACEMAKER IMPLANTATION  04/18/2016    DUAL CHAMBER ST ALESSIO     /95 (BP Location: Right arm, Patient Position: Sitting, Cuff Size: Adult)   Pulse 80   Ht 152.4 cm (60\")   Wt 73.5 kg (162 lb)   SpO2 96%   BMI 31.64 kg/m²   Family History   Problem Relation Age of Onset    Hypertension Mother     Heart disease Mother     Heart disease Sister         PACEMAKER    Hypertension Sister     Heart disease Brother     Heart disease Brother        Current Outpatient Medications:     acetaminophen (TYLENOL) 500 MG tablet, Take 1 tablet by mouth Every 6 (Six) Hours As Needed for Mild Pain or Moderate Pain., Disp: 30 tablet, Rfl: 0    albuterol sulfate  (90 Base) MCG/ACT inhaler, Inhale 2 puffs Every 4 (Four) Hours As Needed for Wheezing or Shortness of Air., Disp: 8 g, Rfl: 0    atorvastatin (LIPITOR) 10 MG tablet, Take 1 tablet by mouth Every Night., Disp: , Rfl:     CALCIUM PO, Take  by mouth., Disp: , Rfl:     cetirizine (zyrTEC) 10 MG tablet, Take 1 tablet by mouth Daily., Disp: , Rfl:     dilTIAZem (TIAZAC) 120 MG 24 hr capsule, TAKE 1 CAPSULE BY MOUTH DAILY, Disp: 90 capsule, Rfl: 2    isosorbide mononitrate (IMDUR) 30 MG 24 hr tablet, TAKE 1 TABLET BY MOUTH EVERY MORNING, Disp: 90 tablet, Rfl: 3    magnesium oxide (MAG-OX) 400 MG tablet, Take 1 tablet by mouth Daily., Disp: , Rfl:     metoprolol tartrate (LOPRESSOR) 100 MG tablet, TAKE 1 TABLET BY MOUTH TWICE DAILY, Disp: 180 tablet, Rfl: 1    Multiple Vitamins-Minerals (VITEYES AREDS FORMULA) capsule, Take 1 capsule by " mouth 2 (two) times a day., Disp: , Rfl:     pantoprazole (PROTONIX) 40 MG EC tablet, TK 1 T PO QD, Disp: , Rfl: 5    warfarin (COUMADIN) 4 MG tablet, Take 1 tab, by mouth, daily except 1/2 tab on Mon, Weds and Fri or as directed., Disp: 80 tablet, Rfl: 0    ALPRAZolam (XANAX) 0.5 MG tablet, Take 1 tablet by mouth 2 (Two) Times a Day As Needed. (Patient not taking: Reported on 6/19/2025), Disp: , Rfl: 5    carboxymethylcellulose (REFRESH PLUS) 0.5 % solution, Administer 1 drop to both eyes 3 (Three) Times a Day As Needed for Dry Eyes. (Patient not taking: Reported on 6/19/2025), Disp: , Rfl:     cephalexin (KEFLEX) 250 MG capsule, Take 1 capsule by mouth 2 (Two) Times a Day. (Patient not taking: Reported on 6/19/2025), Disp: , Rfl:     Cholecalciferol (VITAMIN D-3 PO), Take  by mouth. (Patient not taking: Reported on 6/19/2025), Disp: , Rfl:     Clobetasol Propionate E 0.05 % emollient cream, APPLY THIN LAYER TOPICALLY TO THE AFFECTED AREA TWICE DAILY (Patient not taking: Reported on 6/19/2025), Disp: , Rfl:     fluticasone (FLONASE) 50 MCG/ACT nasal spray, Administer 1 spray into the nostril(s) as directed by provider As Needed. (Patient not taking: Reported on 6/19/2025), Disp: , Rfl:     HYDROcodone-acetaminophen (NORCO) 7.5-325 MG per tablet, Take 1 tablet by mouth Every 6 (Six) Hours As Needed for Moderate Pain. (Patient not taking: Reported on 6/19/2025), Disp: , Rfl:     ondansetron (ZOFRAN) 4 MG tablet, Take 1 tablet by mouth Daily As Needed for Nausea or Vomiting. (Patient not taking: Reported on 6/19/2025), Disp: , Rfl:     spironolactone (ALDACTONE) 25 MG tablet, TAKE 1/2 TABLET BY MOUTH DAILY (Patient not taking: Reported on 6/19/2025), Disp: 45 tablet, Rfl: 3  Allergies   Allergen Reactions    Doxycycline Other (See Comments)     Chest pain    Celebrex [Celecoxib] Other (See Comments)     Chest pain    Contrast Dye (Echo Or Unknown Ct/Mr) Itching    Iodinated Contrast Media Itching    Nsaids Other (See  Comments)     convulsion    Other Itching     Pt states some steroids make her itch and hallucinate- she does not know the names   She said she is allergic to all arthritis medicine    Sulfa Antibiotics Nausea Only     Social History     Socioeconomic History    Marital status:    Tobacco Use    Smoking status: Never     Passive exposure: Past    Smokeless tobacco: Never   Vaping Use    Vaping status: Never Used   Substance and Sexual Activity    Alcohol use: Not Currently    Drug use: Never    Sexual activity: Not Currently     Partners: Male     Birth control/protection: None, Hysterectomy     Review of Systems   Constitutional: Negative for malaise/fatigue.   Cardiovascular:  Positive for dyspnea on exertion. Negative for chest pain, leg swelling and palpitations.   Respiratory:  Negative for cough and shortness of breath.    Gastrointestinal:  Negative for abdominal pain, nausea and vomiting.   Neurological:  Negative for dizziness, headaches, light-headedness, numbness and weakness.   All other systems reviewed and are negative.             Objective:     Vitals reviewed.   Constitutional:       Appearance: Not in distress. Obese.   Eyes:      Pupils: Pupils are equal, round, and reactive to light.   HENT:      Nose: Nose normal.   Pulmonary:      Effort: Pulmonary effort is normal.      Breath sounds: Normal breath sounds.   Cardiovascular:      Normal rate. Regular rhythm.   Pulses:     Intact distal pulses.   Edema:     Peripheral edema present.  Abdominal:      Palpations: Abdomen is soft.   Musculoskeletal: Normal range of motion.      Cervical back: Normal range of motion and neck supple. Skin:     General: Skin is warm and dry.   Neurological:      General: No focal deficit present.      Mental Status: Alert and oriented to person, place and time.         Procedures      LAB RESULTS (LAST 7 DAYS)  Lab Review:   Echocardiogram   Results for orders placed during the hospital encounter of  06/16/24    Adult Transthoracic Echo Complete W/ Cont if Necessary Per Protocol    Interpretation Summary    Left ventricular systolic function is normal. Calculated left ventricular EF = 69%    Left ventricular diastolic function is consistent with (grade II w/high LAP) pseudonormalization.    Estimated right ventricular systolic pressure from tricuspid regurgitation is mildly elevated (35-45 mmHg).    Conclusion      Normal LV size and contractility EF of 65 to 70%  Grade 2 diastolic dysfunction/pseudonormalization pattern seen.  Normal RV size  Normal atrial size, pacer lead seen  Pulmonic valve is not well visualized.  Aortic valve, mitral valve, tricuspid valve appears structurally normal, moderate tricuspid regurgitation seen.  Calculated RV systolic pressure of 42 mmHg consistent with moderate pulmonary hypertension  No pericardial effusion seen.  Proximal aorta appears normal in size.      Cardiac Catheterization   Results for orders placed during the hospital encounter of 04/24/21    Cardiac Catheterization/Vascular Study    Narrative  Procedures:  #1 PTCA with stent placement to the RCA  #2 PTCA with stent placement to the left circumflex artery    Indication: Unstable angina    Comments:  Informed consent was obtained from the patient after explained risks and benefits.  Patient was brought to cardiac arrest laboratory and placed on table usual fashion.  Left groin was prepped in a sterile fashion.  2% lidocaine was used to administer local anesthesia to the left groin.  A total of 20 cc was used.  A 6 Congolese sheath was placed in the left femoral artery.  Then a 6 Congolese JR4 guide with sideholes was used and it could not engage the right coronary very well and hence a 6 Congolese AR-2 guide with sideholes was used and right coronary artery was engaged.  A 190 cm whisper wire was used in place the distal portion of the RCA.  Then a 2.5/12 mm balloon was used and inflated in the proximal and mid RCA at 8 to 10  kalyan for 10 seconds.  Thereafter the balloon was taken out.  Then a 3.5/18 mm Xience stent was used and placed in the mid RCA and inflated at 12 kalyan for 10 to 15 seconds.  Thereafter the balloon was taken out.  Reviewing angiogram showed no dissection or perforation.  Patient tolerated the procedure very well.  No complication noted.  Then a 6 Spanish XB guide was used and left coronary artery was engaged.  Multiple views were taken.  190 cm whisper wire was used in place the distal portion of the proximal artery.  Then a 2.5/12 mm balloon was used and inflated in the proximal and mid segments of the circumflex artery each at 8 kalyan for 8 to 10 seconds.  Thereafter the balloon was taken out.  Then a 2.5/12 mm Xience stent was used and placed in the mid circumflex artery and inflated at 10 to 12 kalyan for 10 to 15 seconds.  Thereafter the balloon was taken out.  Then another 2.5/12 mm Xience stent was used and placed across the lesion inflated territory most views for 10 to 15 seconds.  Thereafter the balloon was taken out.  Reviewing angiogram showed no dissection or perforation.  Patient tolerated the procedure very well.  No complication noted.      CBC        BMP        CMP         BNP        TROPONIN        CoAg  Results from last 7 days   Lab Units 06/19/25  1309   INR  3.90*       Creatinine Clearance  CrCl cannot be calculated (Patient's most recent lab result is older than the maximum 30 days allowed.).    ABG        Radiology  No radiology results for the last day          Assessment    Diagnoses and all orders for this visit:    1. Hospital discharge follow-up (Primary)    2. Bradycardia, sinus    3. Presence of cardiac pacemaker    4. Persistent atrial fibrillation    5. Long term (current) use of anticoagulants [Z79.01]    6. Coronary artery disease involving coronary bypass graft of native heart without angina pectoris    7. S/P CABG (coronary artery bypass graft)    8. Atherosclerosis of coronary artery  bypass graft of native heart without angina pectoris    9. S/P insertion of non-drug eluting coronary artery stent    10. Dyslipidemia              MDM    Hospital discharge follow-up  Chest pain  History of CAD  Status post CABG (2017)   Status post PCI (2021)  Troponin negative  EKG without evidence of ACS  Myoview study from June 2024 without evidence of ischemia, low risk  Cardiac catheterization 2021 revealing 3 out of 4 bypass graft occluded, at that time she underwent PTCA stent placement to RCA and left circumflex artery  Denies chest pain  Statin, beta-blocker, Imdur, warfarin    Atrial fibrillation  Symptomatic bradycardia  Status post permanent pacemaker (2016)  Warfarin for anticoagulation  INR 3.9 today    Metoprolol tartrate and diltiazem for rate control    Hypertension  Blood pressure 63/94, repeat 164/95  Reports whitecoat syndrome  Pressures well-controlled at home per patient  Imdur 30 mg, Lopressor 100 mg, diltiazem 120 mg    Dyslipidemia  Statin therapy    HFpEF  Echocardiogram from 2024 with LVEF of 65 to 70%, grade 2 diastolic dysfunction no significant valvular abnormalities  Beta-blocker  Aldactone    ENRIQUE  Previous machine sent back secondary to recall  Patient reports she had repeat study and no longer requires CPAP     CKD  Follows with nephrology  Diuretic therapy recently discontinued during hospitalization    Patient to be seen as needed or at next scheduled follow-up with device interrogation and cardiology    Patient's previous medical records, labs, and EKG were reviewed and discussed with the patient at today's visit.     Electronically signed by SHILA Moon, 06/19/25, 1:44 PM EDT.

## 2025-06-19 ENCOUNTER — OFFICE VISIT (OUTPATIENT)
Dept: CARDIOLOGY | Facility: CLINIC | Age: 89
End: 2025-06-19
Payer: MEDICARE

## 2025-06-19 ENCOUNTER — ANTICOAGULATION VISIT (OUTPATIENT)
Dept: CARDIOLOGY | Facility: CLINIC | Age: 89
End: 2025-06-19
Payer: MEDICARE

## 2025-06-19 VITALS
BODY MASS INDEX: 31.8 KG/M2 | OXYGEN SATURATION: 96 % | HEIGHT: 60 IN | WEIGHT: 162 LBS | DIASTOLIC BLOOD PRESSURE: 95 MMHG | SYSTOLIC BLOOD PRESSURE: 164 MMHG | HEART RATE: 80 BPM

## 2025-06-19 VITALS
OXYGEN SATURATION: 96 % | BODY MASS INDEX: 31.64 KG/M2 | SYSTOLIC BLOOD PRESSURE: 163 MMHG | WEIGHT: 162 LBS | DIASTOLIC BLOOD PRESSURE: 94 MMHG | HEART RATE: 80 BPM

## 2025-06-19 DIAGNOSIS — I25.810 CORONARY ARTERY DISEASE INVOLVING CORONARY BYPASS GRAFT OF NATIVE HEART WITHOUT ANGINA PECTORIS: ICD-10-CM

## 2025-06-19 DIAGNOSIS — R00.1 BRADYCARDIA, SINUS: ICD-10-CM

## 2025-06-19 DIAGNOSIS — Z79.01 LONG TERM (CURRENT) USE OF ANTICOAGULANTS: Primary | ICD-10-CM

## 2025-06-19 DIAGNOSIS — Z95.5 S/P INSERTION OF NON-DRUG ELUTING CORONARY ARTERY STENT: ICD-10-CM

## 2025-06-19 DIAGNOSIS — Z79.01 LONG TERM (CURRENT) USE OF ANTICOAGULANTS: ICD-10-CM

## 2025-06-19 DIAGNOSIS — Z95.0 PRESENCE OF CARDIAC PACEMAKER: ICD-10-CM

## 2025-06-19 DIAGNOSIS — Z95.1 S/P CABG (CORONARY ARTERY BYPASS GRAFT): ICD-10-CM

## 2025-06-19 DIAGNOSIS — E78.5 DYSLIPIDEMIA: ICD-10-CM

## 2025-06-19 DIAGNOSIS — Z09 HOSPITAL DISCHARGE FOLLOW-UP: Primary | ICD-10-CM

## 2025-06-19 DIAGNOSIS — I48.19 PERSISTENT ATRIAL FIBRILLATION: ICD-10-CM

## 2025-06-19 DIAGNOSIS — I25.810 ATHEROSCLEROSIS OF CORONARY ARTERY BYPASS GRAFT OF NATIVE HEART WITHOUT ANGINA PECTORIS: ICD-10-CM

## 2025-06-19 LAB — INR PPP: 3.9 (ref 0.9–1.1)

## 2025-06-19 PROCEDURE — 85610 PROTHROMBIN TIME: CPT | Performed by: INTERNAL MEDICINE

## 2025-06-19 PROCEDURE — 36416 COLLJ CAPILLARY BLOOD SPEC: CPT | Performed by: INTERNAL MEDICINE

## 2025-06-19 NOTE — PROGRESS NOTES
Patient's INR- 3.9, outside of therapeutic range. Hold doses 6/19 & 6/20 and then resume current dosage. Recheck in 1 month. dvLPN

## 2025-07-03 ENCOUNTER — TELEPHONE (OUTPATIENT)
Dept: CARDIOLOGY | Facility: CLINIC | Age: 89
End: 2025-07-03
Payer: MEDICARE

## 2025-07-03 ENCOUNTER — APPOINTMENT (OUTPATIENT)
Dept: GENERAL RADIOLOGY | Facility: HOSPITAL | Age: 89
End: 2025-07-03
Payer: MEDICARE

## 2025-07-03 ENCOUNTER — HOSPITAL ENCOUNTER (INPATIENT)
Facility: HOSPITAL | Age: 89
LOS: 1 days | Discharge: HOME | End: 2025-07-04
Attending: INTERNAL MEDICINE | Admitting: INTERNAL MEDICINE
Payer: MEDICARE

## 2025-07-03 DIAGNOSIS — R06.00 DYSPNEA, UNSPECIFIED TYPE: ICD-10-CM

## 2025-07-03 DIAGNOSIS — N18.31 STAGE 3A CHRONIC KIDNEY DISEASE: Chronic | ICD-10-CM

## 2025-07-03 DIAGNOSIS — R60.0 BILATERAL LOWER EXTREMITY EDEMA: ICD-10-CM

## 2025-07-03 DIAGNOSIS — I50.9 ACUTE ON CHRONIC CONGESTIVE HEART FAILURE, UNSPECIFIED HEART FAILURE TYPE: Primary | ICD-10-CM

## 2025-07-03 PROBLEM — J81.1 PULMONARY EDEMA: Status: ACTIVE | Noted: 2025-07-03

## 2025-07-03 LAB
ALBUMIN SERPL-MCNC: 4.2 G/DL (ref 3.5–5.2)
ALBUMIN/GLOB SERPL: 1.8 G/DL
ALP SERPL-CCNC: 118 U/L (ref 39–117)
ALT SERPL W P-5'-P-CCNC: 24 U/L (ref 1–33)
ANION GAP SERPL CALCULATED.3IONS-SCNC: 11.4 MMOL/L (ref 5–15)
AST SERPL-CCNC: 29 U/L (ref 1–32)
BASOPHILS # BLD AUTO: 0.02 10*3/MM3 (ref 0–0.2)
BASOPHILS NFR BLD AUTO: 0.3 % (ref 0–1.5)
BILIRUB SERPL-MCNC: 1.5 MG/DL (ref 0–1.2)
BUN SERPL-MCNC: 18.2 MG/DL (ref 8–23)
BUN/CREAT SERPL: 16.3 (ref 7–25)
CALCIUM SPEC-SCNC: 9.3 MG/DL (ref 8.6–10.5)
CHLORIDE SERPL-SCNC: 98 MMOL/L (ref 98–107)
CO2 SERPL-SCNC: 26.6 MMOL/L (ref 22–29)
CREAT SERPL-MCNC: 1.12 MG/DL (ref 0.57–1)
D DIMER PPP FEU-MCNC: 0.32 MCGFEU/ML (ref 0–0.88)
DEPRECATED RDW RBC AUTO: 48.3 FL (ref 37–54)
EGFRCR SERPLBLD CKD-EPI 2021: 47.4 ML/MIN/1.73
EOSINOPHIL # BLD AUTO: 0.22 10*3/MM3 (ref 0–0.4)
EOSINOPHIL NFR BLD AUTO: 3.6 % (ref 0.3–6.2)
ERYTHROCYTE [DISTWIDTH] IN BLOOD BY AUTOMATED COUNT: 14.5 % (ref 12.3–15.4)
GEN 5 1HR TROPONIN T REFLEX: 13 NG/L
GLOBULIN UR ELPH-MCNC: 2.3 GM/DL
GLUCOSE SERPL-MCNC: 103 MG/DL (ref 65–99)
HCT VFR BLD AUTO: 36.6 % (ref 34–46.6)
HGB BLD-MCNC: 11.8 G/DL (ref 12–15.9)
HOLD SPECIMEN: NORMAL
IMM GRANULOCYTES # BLD AUTO: 0.01 10*3/MM3 (ref 0–0.05)
IMM GRANULOCYTES NFR BLD AUTO: 0.2 % (ref 0–0.5)
INR PPP: 2.53 (ref 2–3)
LYMPHOCYTES # BLD AUTO: 1.63 10*3/MM3 (ref 0.7–3.1)
LYMPHOCYTES NFR BLD AUTO: 26.6 % (ref 19.6–45.3)
MAGNESIUM SERPL-MCNC: 2.4 MG/DL (ref 1.6–2.4)
MCH RBC QN AUTO: 29.3 PG (ref 26.6–33)
MCHC RBC AUTO-ENTMCNC: 32.2 G/DL (ref 31.5–35.7)
MCV RBC AUTO: 90.8 FL (ref 79–97)
MONOCYTES # BLD AUTO: 0.63 10*3/MM3 (ref 0.1–0.9)
MONOCYTES NFR BLD AUTO: 10.3 % (ref 5–12)
NEUTROPHILS NFR BLD AUTO: 3.62 10*3/MM3 (ref 1.7–7)
NEUTROPHILS NFR BLD AUTO: 59 % (ref 42.7–76)
NRBC BLD AUTO-RTO: 0 /100 WBC (ref 0–0.2)
NT-PROBNP SERPL-MCNC: 1411 PG/ML (ref 0–1800)
PLATELET # BLD AUTO: 152 10*3/MM3 (ref 140–450)
PMV BLD AUTO: 10.7 FL (ref 6–12)
POTASSIUM SERPL-SCNC: 4.4 MMOL/L (ref 3.5–5.2)
PROT SERPL-MCNC: 6.5 G/DL (ref 6–8.5)
PROTHROMBIN TIME: 27.6 SECONDS (ref 19.4–28.5)
RBC # BLD AUTO: 4.03 10*6/MM3 (ref 3.77–5.28)
SODIUM SERPL-SCNC: 136 MMOL/L (ref 136–145)
TROPONIN T % DELTA: -7
TROPONIN T NUMERIC DELTA: -1 NG/L
TROPONIN T SERPL HS-MCNC: 14 NG/L
WBC NRBC COR # BLD AUTO: 6.13 10*3/MM3 (ref 3.4–10.8)

## 2025-07-03 PROCEDURE — 83735 ASSAY OF MAGNESIUM: CPT

## 2025-07-03 PROCEDURE — 85610 PROTHROMBIN TIME: CPT

## 2025-07-03 PROCEDURE — 84484 ASSAY OF TROPONIN QUANT: CPT

## 2025-07-03 PROCEDURE — 36415 COLL VENOUS BLD VENIPUNCTURE: CPT

## 2025-07-03 PROCEDURE — 83880 ASSAY OF NATRIURETIC PEPTIDE: CPT

## 2025-07-03 PROCEDURE — 80053 COMPREHEN METABOLIC PANEL: CPT

## 2025-07-03 PROCEDURE — 99285 EMERGENCY DEPT VISIT HI MDM: CPT

## 2025-07-03 PROCEDURE — 85025 COMPLETE CBC W/AUTO DIFF WBC: CPT

## 2025-07-03 PROCEDURE — 25010000002 FUROSEMIDE PER 20 MG

## 2025-07-03 PROCEDURE — 83970 ASSAY OF PARATHORMONE: CPT | Performed by: INTERNAL MEDICINE

## 2025-07-03 PROCEDURE — 85379 FIBRIN DEGRADATION QUANT: CPT

## 2025-07-03 PROCEDURE — 93005 ELECTROCARDIOGRAM TRACING: CPT

## 2025-07-03 PROCEDURE — 71045 X-RAY EXAM CHEST 1 VIEW: CPT

## 2025-07-03 RX ORDER — NITROGLYCERIN 0.4 MG/1
0.4 TABLET SUBLINGUAL
Status: DISCONTINUED | OUTPATIENT
Start: 2025-07-03 | End: 2025-07-04 | Stop reason: HOSPADM

## 2025-07-03 RX ORDER — HYDRALAZINE HYDROCHLORIDE 20 MG/ML
10 INJECTION INTRAMUSCULAR; INTRAVENOUS EVERY 6 HOURS PRN
Status: DISCONTINUED | OUTPATIENT
Start: 2025-07-03 | End: 2025-07-04 | Stop reason: HOSPADM

## 2025-07-03 RX ORDER — POLYETHYLENE GLYCOL 3350 17 G/17G
17 POWDER, FOR SOLUTION ORAL DAILY PRN
Status: DISCONTINUED | OUTPATIENT
Start: 2025-07-03 | End: 2025-07-04 | Stop reason: HOSPADM

## 2025-07-03 RX ORDER — WARFARIN SODIUM 4 MG/1
4 TABLET ORAL
Status: DISCONTINUED | OUTPATIENT
Start: 2025-07-04 | End: 2025-07-03

## 2025-07-03 RX ORDER — ONDANSETRON 2 MG/ML
4 INJECTION INTRAMUSCULAR; INTRAVENOUS EVERY 6 HOURS PRN
Status: DISCONTINUED | OUTPATIENT
Start: 2025-07-03 | End: 2025-07-04 | Stop reason: HOSPADM

## 2025-07-03 RX ORDER — SODIUM CHLORIDE 0.9 % (FLUSH) 0.9 %
10 SYRINGE (ML) INJECTION EVERY 12 HOURS SCHEDULED
Status: DISCONTINUED | OUTPATIENT
Start: 2025-07-03 | End: 2025-07-04 | Stop reason: HOSPADM

## 2025-07-03 RX ORDER — WARFARIN SODIUM 4 MG/1
4 TABLET ORAL SEE ADMIN INSTRUCTIONS
COMMUNITY

## 2025-07-03 RX ORDER — FUROSEMIDE 10 MG/ML
80 INJECTION INTRAMUSCULAR; INTRAVENOUS ONCE
Status: COMPLETED | OUTPATIENT
Start: 2025-07-03 | End: 2025-07-03

## 2025-07-03 RX ORDER — ACETAMINOPHEN 160 MG/5ML
650 SOLUTION ORAL EVERY 4 HOURS PRN
Status: DISCONTINUED | OUTPATIENT
Start: 2025-07-03 | End: 2025-07-04 | Stop reason: HOSPADM

## 2025-07-03 RX ORDER — CLOBETASOL PROPIONATE 0.5 MG/G
1 CREAM TOPICAL DAILY PRN
COMMUNITY

## 2025-07-03 RX ORDER — WARFARIN SODIUM 4 MG/1
2 TABLET ORAL 2 TIMES WEEKLY
COMMUNITY

## 2025-07-03 RX ORDER — SODIUM CHLORIDE 0.9 % (FLUSH) 0.9 %
10 SYRINGE (ML) INJECTION AS NEEDED
Status: DISCONTINUED | OUTPATIENT
Start: 2025-07-03 | End: 2025-07-04 | Stop reason: HOSPADM

## 2025-07-03 RX ORDER — METOPROLOL TARTRATE 50 MG
100 TABLET ORAL EVERY 12 HOURS SCHEDULED
Status: DISCONTINUED | OUTPATIENT
Start: 2025-07-04 | End: 2025-07-04 | Stop reason: HOSPADM

## 2025-07-03 RX ORDER — DILTIAZEM HYDROCHLORIDE 120 MG/1
120 CAPSULE, COATED, EXTENDED RELEASE ORAL
Status: DISCONTINUED | OUTPATIENT
Start: 2025-07-04 | End: 2025-07-04 | Stop reason: HOSPADM

## 2025-07-03 RX ORDER — PANTOPRAZOLE SODIUM 40 MG/1
40 TABLET, DELAYED RELEASE ORAL
Status: DISCONTINUED | OUTPATIENT
Start: 2025-07-04 | End: 2025-07-04 | Stop reason: HOSPADM

## 2025-07-03 RX ORDER — ACETAMINOPHEN 650 MG/1
650 SUPPOSITORY RECTAL EVERY 4 HOURS PRN
Status: DISCONTINUED | OUTPATIENT
Start: 2025-07-03 | End: 2025-07-04 | Stop reason: HOSPADM

## 2025-07-03 RX ORDER — ONDANSETRON 4 MG/1
4 TABLET, ORALLY DISINTEGRATING ORAL EVERY 6 HOURS PRN
Status: DISCONTINUED | OUTPATIENT
Start: 2025-07-03 | End: 2025-07-04 | Stop reason: HOSPADM

## 2025-07-03 RX ORDER — SODIUM CHLORIDE 9 MG/ML
40 INJECTION, SOLUTION INTRAVENOUS AS NEEDED
Status: DISCONTINUED | OUTPATIENT
Start: 2025-07-03 | End: 2025-07-04 | Stop reason: HOSPADM

## 2025-07-03 RX ORDER — BISACODYL 5 MG/1
5 TABLET, DELAYED RELEASE ORAL DAILY PRN
Status: DISCONTINUED | OUTPATIENT
Start: 2025-07-03 | End: 2025-07-04 | Stop reason: HOSPADM

## 2025-07-03 RX ORDER — ACETAMINOPHEN 325 MG/1
650 TABLET ORAL EVERY 4 HOURS PRN
Status: DISCONTINUED | OUTPATIENT
Start: 2025-07-03 | End: 2025-07-04 | Stop reason: HOSPADM

## 2025-07-03 RX ORDER — BISACODYL 10 MG
10 SUPPOSITORY, RECTAL RECTAL DAILY PRN
Status: DISCONTINUED | OUTPATIENT
Start: 2025-07-03 | End: 2025-07-04 | Stop reason: HOSPADM

## 2025-07-03 RX ORDER — SPIRONOLACTONE 25 MG/1
12.5 TABLET ORAL DAILY
Status: DISCONTINUED | OUTPATIENT
Start: 2025-07-04 | End: 2025-07-03

## 2025-07-03 RX ORDER — ATORVASTATIN CALCIUM 10 MG/1
10 TABLET, FILM COATED ORAL NIGHTLY
Status: DISCONTINUED | OUTPATIENT
Start: 2025-07-04 | End: 2025-07-04 | Stop reason: HOSPADM

## 2025-07-03 RX ORDER — ISOSORBIDE MONONITRATE 30 MG/1
30 TABLET, EXTENDED RELEASE ORAL DAILY
Status: DISCONTINUED | OUTPATIENT
Start: 2025-07-04 | End: 2025-07-04 | Stop reason: HOSPADM

## 2025-07-03 RX ORDER — AMOXICILLIN 250 MG
2 CAPSULE ORAL 2 TIMES DAILY PRN
Status: DISCONTINUED | OUTPATIENT
Start: 2025-07-03 | End: 2025-07-04 | Stop reason: HOSPADM

## 2025-07-03 RX ADMIN — Medication 10 ML: at 16:25

## 2025-07-03 RX ADMIN — Medication 10 ML: at 21:39

## 2025-07-03 RX ADMIN — FUROSEMIDE 80 MG: 10 INJECTION, SOLUTION INTRAMUSCULAR; INTRAVENOUS at 14:13

## 2025-07-03 NOTE — Clinical Note
Level of Care: Telemetry [5]   Admitting Physician: JENSEN CONCEPCION [[id number]]   Attending Physician: JENSEN CONCEPCION [[id number]]

## 2025-07-03 NOTE — H&P
Patient Care Team:  Nava Yu MD as PCP - General  Ivan Martell MD as Consulting Physician (Cardiology)  Greer Shaikh MD as Consulting Physician (Nephrology)  Harshad Landin MD as Consulting Physician (Hematology and Oncology)    Chief complaint Bilateral lower extremity edema and dyspnea    Subjective     Patient is a 89 y.o. female who presents with  PMH of CAD S/P CABG, paroxysmal A-fib, status post pacemaker, hypertension, ENRIQUE,  hyperlipidemia who presented with complaints of worsening dyspnea and bilateral lower extremity swelling. In the ER, labs were unremarkable and cxr with pulmonary edema. On examination she has significant swelling in bilateral lower legs. She states that she always sleeps with HOB elevated. She has been taking her medication as prescribed, uses a walker, and family stay the night with her through the week. She denies chest pain, fever, chills, no constipation or difficulty urinating. Patient was seen by nephrology recently with some diuretics discontinued.     Review of Systems   Constitutional:  Positive for activity change.   HENT: Negative.     Respiratory:  Positive for cough and shortness of breath.    Cardiovascular:  Positive for leg swelling.   Genitourinary: Negative.    Musculoskeletal: Negative.    Neurological: Negative.    Psychiatric/Behavioral: Negative.            History  Past Medical History:   Diagnosis Date    Anxiety     Arthritis     Asthma     Atrial fibrillation     CHF (congestive heart failure)     CKD (chronic kidney disease) stage 3, GFR 30-59 ml/min 04/18/2021    Coronary artery disease     Dyslipidemia     Dyslipidemia 01/05/2015    GERD (gastroesophageal reflux disease)     History of bone density study 04/2015    Hyperlipidemia     Hypertension     Sleep apnea     Wears dentures      Past Surgical History:   Procedure Laterality Date    BLADDER REPAIR  1990    BREAST LUMPECTOMY  1974    BENIGN    CARDIAC CATHETERIZATION   05/25/2011    CARDIAC CATHETERIZATION N/A 04/21/2021    Procedure: Left Heart Cath and coronary angiogram;  Surgeon: Ivan Martell MD;  Location: [facility] CATH INVASIVE LOCATION;  Service: Cardiovascular;  Laterality: N/A;    CARDIAC CATHETERIZATION N/A 04/21/2021    Procedure: Left ventriculography;  Surgeon: Ivan Martell MD;  Location: [facility] CATH INVASIVE LOCATION;  Service: Cardiovascular;  Laterality: N/A;    CARDIAC CATHETERIZATION  04/21/2021    Procedure: Saphenous Vein Graft;  Surgeon: Ivan Martell MD;  Location: [facility] CATH INVASIVE LOCATION;  Service: Cardiovascular;;    CARDIAC CATHETERIZATION N/A 04/26/2021    Procedure: Percutaneous Coronary Intervention;  Surgeon: Ivan Martell MD;  Location: [facility] CATH INVASIVE LOCATION;  Service: Cardiovascular;  Laterality: N/A;    CARDIAC CATHETERIZATION N/A 04/26/2021    Procedure: Stent RAY coronary;  Surgeon: Ivan Martell MD;  Location: [facility] CATH INVASIVE LOCATION;  Service: Cardiovascular;  Laterality: N/A;    CARDIOVASCULAR STRESS TEST  05/04/2015    CHOLECYSTECTOMY  1990    CORONARY ARTERY BYPASS GRAFT  08/09/2017    X5  Dr Brandt    ENDOSCOPY N/A 06/30/2020    Procedure: ESOPHAGOGASTRODUODENOSCOPY with biopsy x 1 area and dilatation (15-18mm balloon) up to 18mm;  Surgeon: Quinton Tapia MD;  Location: [facility] ENDOSCOPY;  Service: Gastroenterology;  Laterality: N/A;  post op: gastritis, large hiatal hernia, esophageal dysmotility    ENDOSCOPY N/A 10/24/2024    Procedure: ESOPHAGOGASTRODUODENOSCOPY WITH GASTRIC BIOPSY, BALLOON DILATION (18MM-20MM) UP TO 19MM,BIOPSY OF ESOPHAGUS;  Surgeon: Quinton Tapia MD;  Location: [facility] ENDOSCOPY;  Service: Gastroenterology;  Laterality: N/A;  GASTRITIS, ESOPHAGITIS, HIATEL HERNIA    HYSTERECTOMY  1990    INSERT / REPLACE / REMOVE PACEMAKER      JOINT REPLACEMENT Right     knee     OTHER SURGICAL HISTORY  06/2017    BLOOD CLOT REMOVED FROM LEFT EAR    PACEMAKER IMPLANTATION  04/18/2016    DUAL CHAMBER ST [facility]      Family History   Problem Relation Age of Onset    Hypertension Mother     Heart disease Mother     Heart disease Sister         PACEMAKER    Hypertension Sister     Heart disease Brother     Heart disease Brother      Social History     Tobacco Use    Smoking status: Never     Passive exposure: Past    Smokeless tobacco: Never   Vaping Use    Vaping status: Never Used   Substance Use Topics    Alcohol use: Not Currently    Drug use: Never     (Not in a hospital admission)    Allergies:  Doxycycline, Celebrex [celecoxib], Contrast dye (echo or unknown ct/mr), Iodinated contrast media, Nsaids, Other, and Sulfa antibiotics    Objective     Vital Signs  Temp:  [98 °F (36.7 °C)] 98 °F (36.7 °C)  Heart Rate:  [60-62] 60  Resp:  [15-18] 18  BP: (140-152)/(67-84) 148/73       Physical Exam  Vitals reviewed.   Constitutional:       Appearance: She is not ill-appearing.   HENT:      Head: Normocephalic and atraumatic.      Right Ear: External ear normal.      Left Ear: External ear normal.      Nose: Nose normal.      Mouth/Throat:      Mouth: Mucous membranes are dry.   Cardiovascular:      Rate and Rhythm: Normal rate. Rhythm irregular.      Pulses: Normal pulses.      Heart sounds: Normal heart sounds.   Pulmonary:      Effort: Pulmonary effort is normal.      Breath sounds: Normal breath sounds.   Abdominal:      General: Bowel sounds are normal.      Palpations: Abdomen is soft.   Musculoskeletal:         General: Swelling present.      Cervical back: Normal range of motion.      Right lower leg: Edema present.      Left lower leg: Edema present.   Skin:     General: Skin is dry.      Coloration: Skin is pale.   Neurological:      Mental Status: She is alert and oriented to person, place, and time.   Psychiatric:         Behavior: Behavior normal.          Results Review:     Imaging Results (Last 24 Hours)       Procedure Component Value Units Date/Time    XR Chest 1 View [[id number]] Collected: 07/03/25 1320      Updated: 07/03/25 1323    Narrative:      XR CHEST 1 VW    Date of Exam: 7/3/2025 12:35 PM EDT    Indication: SOA, BLE swelling    Comparison: Chest radiograph 5/17/2025.    Findings:  Left chest pacemaker. Sternotomy. Enlarged cardiac silhouette, unchanged. Pulmonary vascular congestion with borderline diffuse interstitial thickening. No focal airspace consolidation. No pleural effusion. No pneumothorax. Osseous structures are   unchanged.      Impression:      Impression:  Cardiomegaly with pulmonary vascular congestion and borderline interstitial thickening, which could reflect mild pulmonary edema. No focal airspace consolidation.      Electronically Signed: Gerson Hayward MD    7/3/2025 1:21 PM EDT    Workstation ID: [id number]             Lab Results (last 24 hours)       Procedure Component Value Units Date/Time    High Sensitivity Troponin T 1Hr [[id number]] Collected: 07/03/25 1331    Specimen: Blood Updated: 07/03/25 1356     HS Troponin T 13 ng/L      Troponin T Numeric Delta -1 ng/L      Troponin T % Delta -7    Narrative:      High Sensitive Troponin T Reference Range:  <14.0 ng/L- Negative Female for AMI  <22.0 ng/L- Negative Male for AMI  >=14 - Abnormal Female indicating possible myocardial injury.  >=22 - Abnormal Male indicating possible myocardial injury.   Clinicians would have to utilize clinical acumen, EKG, Troponin, and serial changes to determine if it is an Acute Myocardial Infarction or myocardial injury due to an underlying chronic condition.         Comprehensive Metabolic Panel [[id number]]  (Abnormal) Collected: 07/03/25 1220    Specimen: Blood Updated: 07/03/25 1253     Glucose 103 mg/dL      BUN 18.2 mg/dL      Creatinine 1.12 mg/dL      Sodium 136 mmol/L      Potassium 4.4 mmol/L      Chloride 98 mmol/L      CO2 26.6 mmol/L      Calcium 9.3 mg/dL      Total Protein 6.5 g/dL      Albumin 4.2 g/dL      ALT (SGPT) 24 U/L      AST (SGOT) 29 U/L      Alkaline Phosphatase 118 U/L      Total  Bilirubin 1.5 mg/dL      Globulin 2.3 gm/dL      A/G Ratio 1.8 g/dL      BUN/Creatinine Ratio 16.3     Anion Gap 11.4 mmol/L      eGFR 47.4 mL/min/1.73     Narrative:      GFR Categories in Chronic Kidney Disease (CKD)              GFR Category          GFR (mL/min/1.73)    Interpretation  G1                    90 or greater        Normal or high (1)  G2                    60-89                Mild decrease (1)  G3a                   45-59                Mild to moderate decrease  G3b                   30-44                Moderate to severe decrease  G4                    15-29                Severe decrease  G5                    14 or less           Kidney failure    (1)In the absence of evidence of kidney disease, neither GFR category G1 or G2 fulfill the criteria for CKD.    eGFR calculation 2021 CKD-EPI creatinine equation, which does not include race as a factor    BNP [[id number]]  (Normal) Collected: 07/03/25 1220    Specimen: Blood Updated: 07/03/25 1253     proBNP 1,411.0 pg/mL     Narrative:      This assay is used as an aid in the diagnosis of individuals suspected of having heart failure. It can be used as an aid in the diagnosis of acute decompensated heart failure (ADHF) in patients presenting with signs and symptoms of ADHF to the emergency department (ED). In addition, NT-proBNP of <300 pg/mL indicates ADHF is not likely.    Age Range Result Interpretation  NT-proBNP Concentration (pg/mL:      <50             Positive            >450                   Gray                 300-450                    Negative             <300    50-75           Positive            >900                  Gray                300-900                  Negative            <300      >75             Positive            >1800                  Gray                300-1800                  Negative            <300    High Sensitivity Troponin T [[id number]]  (Abnormal) Collected: 07/03/25 1220    Specimen: Blood Updated: 07/03/25  "1253     HS Troponin T 14 ng/L     Narrative:      High Sensitive Troponin T Reference Range:  <14.0 ng/L- Negative Female for AMI  <22.0 ng/L- Negative Male for AMI  >=14 - Abnormal Female indicating possible myocardial injury.  >=22 - Abnormal Male indicating possible myocardial injury.   Clinicians would have to utilize clinical acumen, EKG, Troponin, and serial changes to determine if it is an Acute Myocardial Infarction or myocardial injury due to an underlying chronic condition.         Magnesium [[id number]]  (Normal) Collected: 07/03/25 1220    Specimen: Blood Updated: 07/03/25 1253     Magnesium 2.4 mg/dL     D-dimer, Quantitative [[id number]]  (Normal) Collected: 07/03/25 1220    Specimen: Blood Updated: 07/03/25 1239     D-Dimer, Quantitative 0.32 MCGFEU/mL     Narrative:      According to the assay 's published package insert, a normal (<0.50 MCGFEU/mL) D-dimer result in conjunction with a non-high clinical probability assessment, excludes deep vein thrombosis (DVT) and pulmonary embolism (PE) with high sensitivity.    D-dimer values increase with age and this can make VTE exclusion of an older population difficult. To address this, the [employer], based on best available evidence and recent guidelines, recommends that clinicians use age-adjusted D-dimer thresholds in patients greater than 50 years of age with: a) a low probability of PE who do not meet all Pulmonary Embolism Rule Out Criteria, or b) in those with intermediate probability of PE.   The formula for an age-adjusted D-dimer cut-off is \"age/100\".  For example, a 60 year old patient would have an age-adjusted cut-off of 0.60 MCGFEU/mL and an 80 year old 0.80 MCGFEU/mL.    Protime-INR [[id number]]  (Normal) Collected: 07/03/25 1220    Specimen: Blood Updated: 07/03/25 1239     Protime 27.6 Seconds      INR 2.53    Extra Tubes [[id number]] Collected: 07/03/25 1220    Specimen: Blood, Venous Line Updated: 07/03/25 1230 "    Narrative:      The following orders were created for panel order Extra Tubes.  Procedure                               Abnormality         Status                     ---------                               -----------         ------                     Gold Top - SST[[id number]]                                   Final result                 Please view results for these tests on the individual orders.    Gold Top - SST [[id number]] Collected: 07/03/25 1220    Specimen: Blood Updated: 07/03/25 1230     Extra Tube Hold for add-ons.     Comment: Auto resulted.       CBC & Differential [[id number]]  (Abnormal) Collected: 07/03/25 1220    Specimen: Blood Updated: 07/03/25 1229    Narrative:      The following orders were created for panel order CBC & Differential.  Procedure                               Abnormality         Status                     ---------                               -----------         ------                     CBC Auto Differential[[id number]]        Abnormal            Final result                 Please view results for these tests on the individual orders.    CBC Auto Differential [[id number]]  (Abnormal) Collected: 07/03/25 1220    Specimen: Blood Updated: 07/03/25 1229     WBC 6.13 10*3/mm3      RBC 4.03 10*6/mm3      Hemoglobin 11.8 g/dL      Hematocrit 36.6 %      MCV 90.8 fL      MCH 29.3 pg      MCHC 32.2 g/dL      RDW 14.5 %      RDW-SD 48.3 fl      MPV 10.7 fL      Platelets 152 10*3/mm3      Neutrophil % 59.0 %      Lymphocyte % 26.6 %      Monocyte % 10.3 %      Eosinophil % 3.6 %      Basophil % 0.3 %      Immature Grans % 0.2 %      Neutrophils, Absolute 3.62 10*3/mm3      Lymphocytes, Absolute 1.63 10*3/mm3      Monocytes, Absolute 0.63 10*3/mm3      Eosinophils, Absolute 0.22 10*3/mm3      Basophils, Absolute 0.02 10*3/mm3      Immature Grans, Absolute 0.01 10*3/mm3      nRBC 0.0 /100 WBC              I reviewed the patient's new clinical results.    Assessment & Plan      Bilateral lower extremity edema  Dyspnea  CKD stage 3  Generalized anxiety  Interstitial lung disease  Hyperlipidemia  ENRIQUE  Paroxsymal atrial fibrillation  Hypertension    Plan of care  Continue home medication for chronic conditions; consult nephrology for renal and diuresis. Hold other diuretic; received 80 lasix in ED. INR therapeutic for afib with rate controlled         DVT prophylaxis:warfarin  GI prophylaxis:pepcid    CODE STATUS:  Code status (Patient has no pulse and is not breathing):full  Medical Interventions (Patient has pulse or is breathing):full support  Level of Support Discussed with :patient    Admission Status:Inpatient    Expected length of Stay: 2 or more nights      I discussed the patient's findings and my recommendations with patient.     Trixie Akers, APRN  07/03/25  14:44 EDT

## 2025-07-03 NOTE — PLAN OF CARE
Problem: Adult Inpatient Plan of Care  Goal: Patient-Specific Goal (Individualized)  7/3/2025 1634 by Felisa Martin, RN  Outcome: Progressing  7/3/2025 1625 by Felisa Martin, RN  Outcome: Progressing   Goal Outcome Evaluation:

## 2025-07-03 NOTE — TELEPHONE ENCOUNTER
Pt's daughter Rosa Lazcano  [phone]    Pt was taken off water pill by kidney dr because of electrolytes. Has extreme swelling in lower extremities. Please advise.   
Spoke to Rosa (on HIPAA) and explained per patients visit on 6/19/25 all diuretic management will need to go through her kidney doctor. Rosa stated she was not sure which provider was managing it that but verbalized understanding of contacting that provider in regards to swelling. Rosa had no further questions or concerns at this time.   
1

## 2025-07-03 NOTE — ED PROVIDER NOTES
Subjective   History of Present Illness  Patient is an 89-year-old female with PMH of A-fib, CKD, CHF, HLD, HTN presenting to the ED for bilateral lower extremity swelling pain and dyspnea.  Patient states several days ago her family noticed worsening lower extremity swelling.  Family states overnight they wrapped her legs in Ace bandages, reports improvement in the swelling with continuing dyspnea.  Patient reports shortness of breath, worse when up walking around, has been using her albuterol at home with no improvement in symptoms.  She does report lower abdominal pain and diarrhea that lasted about 48 hours, stopped yesterday.  She also reports a dry cough that has been ongoing for several years.  She denies any fever, chills, nausea, vomiting, dysuria, chest pain.  Patient's family states she came off spironolactone 1 month ago, is on no other diuretics.        Review of Systems   Constitutional:  Negative for chills and fever.   Respiratory:  Positive for cough and shortness of breath.    Cardiovascular:  Negative for chest pain.   Gastrointestinal:  Negative for abdominal pain, constipation, diarrhea, nausea and vomiting.   Genitourinary:  Negative for dysuria.       Past Medical History:   Diagnosis Date    Anxiety     Arthritis     Asthma     Atrial fibrillation     CHF (congestive heart failure)     CKD (chronic kidney disease) stage 3, GFR 30-59 ml/min 04/18/2021    Coronary artery disease     Dyslipidemia     Dyslipidemia 01/05/2015    GERD (gastroesophageal reflux disease)     History of bone density study 04/2015    Hyperlipidemia     Hypertension     Sleep apnea     Wears dentures        Allergies   Allergen Reactions    Doxycycline Other (See Comments)     Chest pain    Celebrex [Celecoxib] Other (See Comments)     Chest pain    Contrast Dye (Echo Or Unknown Ct/Mr) Itching    Iodinated Contrast Media Itching    Nsaids Other (See Comments)     convulsion    Other Itching     Pt states some steroids  make her itch and hallucinate- she does not know the names   She said she is allergic to all arthritis medicine    Sulfa Antibiotics Nausea Only       Past Surgical History:   Procedure Laterality Date    BLADDER REPAIR  1990    BREAST LUMPECTOMY  1974    BENIGN    CARDIAC CATHETERIZATION  05/25/2011    CARDIAC CATHETERIZATION N/A 04/21/2021    Procedure: Left Heart Cath and coronary angiogram;  Surgeon: Ivan Martell MD;  Location: [facility] CATH INVASIVE LOCATION;  Service: Cardiovascular;  Laterality: N/A;    CARDIAC CATHETERIZATION N/A 04/21/2021    Procedure: Left ventriculography;  Surgeon: Ivan Martell MD;  Location: [facility] CATH INVASIVE LOCATION;  Service: Cardiovascular;  Laterality: N/A;    CARDIAC CATHETERIZATION  04/21/2021    Procedure: Saphenous Vein Graft;  Surgeon: Ivan Martell MD;  Location: [facility] CATH INVASIVE LOCATION;  Service: Cardiovascular;;    CARDIAC CATHETERIZATION N/A 04/26/2021    Procedure: Percutaneous Coronary Intervention;  Surgeon: Ivan Martell MD;  Location: [facility] CATH INVASIVE LOCATION;  Service: Cardiovascular;  Laterality: N/A;    CARDIAC CATHETERIZATION N/A 04/26/2021    Procedure: Stent RAY coronary;  Surgeon: Ivan Martell MD;  Location: [facility] CATH INVASIVE LOCATION;  Service: Cardiovascular;  Laterality: N/A;    CARDIOVASCULAR STRESS TEST  05/04/2015    CHOLECYSTECTOMY  1990    CORONARY ARTERY BYPASS GRAFT  08/09/2017    X5  Dr Brandt    ENDOSCOPY N/A 06/30/2020    Procedure: ESOPHAGOGASTRODUODENOSCOPY with biopsy x 1 area and dilatation (15-18mm balloon) up to 18mm;  Surgeon: Quinton Tapia MD;  Location: [facility] ENDOSCOPY;  Service: Gastroenterology;  Laterality: N/A;  post op: gastritis, large hiatal hernia, esophageal dysmotility    ENDOSCOPY N/A 10/24/2024    Procedure: ESOPHAGOGASTRODUODENOSCOPY WITH GASTRIC BIOPSY, BALLOON DILATION (18MM-20MM) UP TO 19MM,BIOPSY OF ESOPHAGUS;  Surgeon: Quinton Tapia MD;  Location: [facility] ENDOSCOPY;  Service: Gastroenterology;   Laterality: N/A;  GASTRITIS, ESOPHAGITIS, HIATEL HERNIA    HYSTERECTOMY  1990    INSERT / REPLACE / REMOVE PACEMAKER      JOINT REPLACEMENT Right     knee     OTHER SURGICAL HISTORY  06/2017    BLOOD CLOT REMOVED FROM LEFT EAR    PACEMAKER IMPLANTATION  04/18/2016    DUAL CHAMBER [facility]       Family History   Problem Relation Age of Onset    Hypertension Mother     Heart disease Mother     Heart disease Sister         PACEMAKER    Hypertension Sister     Heart disease Brother     Heart disease Brother        Social History     Socioeconomic History    Marital status:    Tobacco Use    Smoking status: Never     Passive exposure: Past    Smokeless tobacco: Never   Vaping Use    Vaping status: Never Used   Substance and Sexual Activity    Alcohol use: Not Currently    Drug use: Never    Sexual activity: Not Currently     Partners: Male     Birth control/protection: None, Hysterectomy           Objective   Physical Exam  Constitutional:       Appearance: Normal appearance.   HENT:      Head: Normocephalic and atraumatic.      Mouth/Throat:      Mouth: Mucous membranes are moist.   Eyes:      Extraocular Movements: Extraocular movements intact.   Cardiovascular:      Rate and Rhythm: Normal rate and regular rhythm.      Pulses: Normal pulses.      Heart sounds: Normal heart sounds.   Pulmonary:      Effort: Pulmonary effort is normal.      Breath sounds: Normal breath sounds.   Abdominal:      General: Abdomen is flat. Bowel sounds are normal.      Palpations: Abdomen is soft.      Tenderness: There is no abdominal tenderness.   Musculoskeletal:         General: Normal range of motion.      Cervical back: Normal range of motion.      Comments: Bilateral lower extremity pitting edema from the knees down.  Tenderness to palpation all along lower extremities bilaterally.  No visualized erythema or ecchymosis.  Capillary refill normal.  Pulses palpated bilaterally.  No loss of sensation or decreased range of motion.  "  Skin:     General: Skin is warm and dry.      Capillary Refill: Capillary refill takes less than 2 seconds.   Neurological:      General: No focal deficit present.      Mental Status: She is alert and oriented to person, place, and time.   Psychiatric:         Mood and Affect: Mood normal.         Behavior: Behavior normal.         Procedures           ED Course  ED Course as of 07/03/25 1408   Thu Jul 03, 2025   1404 Spoke with [employer] SHELL with [facility] who agreed to admit patient. [EC]      ED Course User Index  [EC] Vicki Whalen PA-C      /84 (BP Location: Left arm, Patient Position: Sitting)   Pulse 60   Temp 98 °F (36.7 °C) (Oral)   Resp 16   Ht 152.4 cm (60\")   Wt 75.3 kg (166 lb 0.1 oz)   SpO2 94%   BMI 32.42 kg/m²   Labs Reviewed   COMPREHENSIVE METABOLIC PANEL - Abnormal; Notable for the following components:       Result Value    Glucose 103 (*)     Creatinine 1.12 (*)     Alkaline Phosphatase 118 (*)     Total Bilirubin 1.5 (*)     eGFR 47.4 (*)     All other components within normal limits    Narrative:     GFR Categories in Chronic Kidney Disease (CKD)              GFR Category          GFR (mL/min/1.73)    Interpretation  G1                    90 or greater        Normal or high (1)  G2                    60-89                Mild decrease (1)  G3a                   45-59                Mild to moderate decrease  G3b                   30-44                Moderate to severe decrease  G4                    15-29                Severe decrease  G5                    14 or less           Kidney failure    (1)In the absence of evidence of kidney disease, neither GFR category G1 or G2 fulfill the criteria for CKD.    eGFR calculation 2021 CKD-EPI creatinine equation, which does not include race as a factor   TROPONIN - Abnormal; Notable for the following components:    HS Troponin T 14 (*)     All other components within normal limits    Narrative:     High Sensitive Troponin T Reference " Range:  <14.0 ng/L- Negative Female for AMI  <22.0 ng/L- Negative Male for AMI  >=14 - Abnormal Female indicating possible myocardial injury.  >=22 - Abnormal Male indicating possible myocardial injury.   Clinicians would have to utilize clinical acumen, EKG, Troponin, and serial changes to determine if it is an Acute Myocardial Infarction or myocardial injury due to an underlying chronic condition.        CBC WITH AUTO DIFFERENTIAL - Abnormal; Notable for the following components:    Hemoglobin 11.8 (*)     All other components within normal limits   BNP (IN-HOUSE) - Normal    Narrative:     This assay is used as an aid in the diagnosis of individuals suspected of having heart failure. It can be used as an aid in the diagnosis of acute decompensated heart failure (ADHF) in patients presenting with signs and symptoms of ADHF to the emergency department (ED). In addition, NT-proBNP of <300 pg/mL indicates ADHF is not likely.    Age Range Result Interpretation  NT-proBNP Concentration (pg/mL:      <50             Positive            >450                   Gray                 300-450                    Negative             <300    50-75           Positive            >900                  Gray                300-900                  Negative            <300      >75             Positive            >1800                  Gray                300-1800                  Negative            <300   D-DIMER, QUANTITATIVE - Normal    Narrative:     According to the assay 's published package insert, a normal (<0.50 MCGFEU/mL) D-dimer result in conjunction with a non-high clinical probability assessment, excludes deep vein thrombosis (DVT) and pulmonary embolism (PE) with high sensitivity.    D-dimer values increase with age and this can make VTE exclusion of an older population difficult. To address this, the [employer], based on best available evidence and recent guidelines, recommends that  "clinicians use age-adjusted D-dimer thresholds in patients greater than 50 years of age with: a) a low probability of PE who do not meet all Pulmonary Embolism Rule Out Criteria, or b) in those with intermediate probability of PE.   The formula for an age-adjusted D-dimer cut-off is \"age/100\".  For example, a 60 year old patient would have an age-adjusted cut-off of 0.60 MCGFEU/mL and an 80 year old 0.80 MCGFEU/mL.   MAGNESIUM - Normal   PROTIME-INR - Normal   HIGH SENSITIVITIY TROPONIN T 1HR    Narrative:     High Sensitive Troponin T Reference Range:  <14.0 ng/L- Negative Female for AMI  <22.0 ng/L- Negative Male for AMI  >=14 - Abnormal Female indicating possible myocardial injury.  >=22 - Abnormal Male indicating possible myocardial injury.   Clinicians would have to utilize clinical acumen, EKG, Troponin, and serial changes to determine if it is an Acute Myocardial Infarction or myocardial injury due to an underlying chronic condition.        CBC AND DIFFERENTIAL    Narrative:     The following orders were created for panel order CBC & Differential.  Procedure                               Abnormality         Status                     ---------                               -----------         ------                     CBC Auto Differential[[id number]]        Abnormal            Final result                 Please view results for these tests on the individual orders.   EXTRA TUBES    Narrative:     The following orders were created for panel order Extra Tubes.  Procedure                               Abnormality         Status                     ---------                               -----------         ------                     Gold Top - SST[[id number]]                                   Final result                 Please view results for these tests on the individual orders.   GOLD TOP - SST     Medications   furosemide (LASIX) injection 80 mg (has no administration in time range)     XR Chest 1 " View  Result Date: 7/3/2025  Impression: Cardiomegaly with pulmonary vascular congestion and borderline interstitial thickening, which could reflect mild pulmonary edema. No focal airspace consolidation. Electronically Signed: Gerson Hayward MD  7/3/2025 1:21 PM EDT  Workstation ID: [id number]                                                     Medical Decision Making  Chart review: 5/17/25 Dr. Donovan: Syl Herrera is an 89-year-old female who presented to [facility] ED on 5/17/2025 will discharge home with family on 5/20/2025.  Patient has a significant cardiac history and follows with Dr. Thomas.  She presented to ER with intermittent midsternal chest pain radiating to the bilateral arms underneath right breast associated with shortness of breath.  Cardiology was consulted.  She was ruled out for MI by EKG and enzymes.  Cardiology recommend continuing medical management.  Patient has not had chest pain since being in hospital.  INR goal is 2-2.5.  She is eager to discharge home with family.  She has remained hemodynamically stable.  She will follow-up with PCP in 1 to 2 weeks, as well as cardiology in 2 to 3 weeks     Patient presented to the ED for the above complaint.    Patient underwent the above exam and evaluation.    While in the ED patient was placed in a gown and IV was established.  EKG, chest x-ray, blood work was obtained to assess for arrhythmia, MI, electrolyte abnormality, dehydration, infection, fluid overload, blood clot.  Patient was given IV Lasix.  Upon reevaluation patient resting comfortably, vital stable on room air.  Discussed findings with patient and family at bedside.  Educated patient we will be admitting her for further evaluation and treatment.  Patient voiced understanding, agreeable dispo plan.  Spoke with Trixie GOFF with [facility] who agreed to admit patient.    EKG independently interpreted by Dr. Castano showing atrial paced rhythm, LAD, old anterior infarct, rate 60, MN interval 251,  QTc 469, compared to previous EKG 5/17/2025 showing sinus atrial rhythm, prolonged LA interval, LAD, anteroseptal infarct, rate 70.  Labs were independently interpreted by myself and deemed remarkable for the following: No leukocytosis, hemoglobin stable 11.8, creatinine close to baseline at 1.12, no electrolyte abnormalities, initial troponin 14, repeat troponin 13, BNP within normal limits, D-dimer within normal limits.  Chest x-ray independently interpreted by Dr. Castano and reviewed by myself showing: Cardiomegaly with pulmonary vascular congestion borderline interstitial thickening, no consolidations or pneumothorax.    Appropriate PPE was worn during each patient encounter.    Note Disclaimer: At [facility], we believe that sharing information builds trust and better relationships. You are receiving this note because you are receiving care at [facility] or recently visited. It is possible you will see health information before a provider has talked with you about it. This kind of information can be easy to misunderstand. To help you fully understand what it means for your health, we urge you to discuss this note with your provider.    Based on clinical findings anticipate this patient will require 2 midnight stay.    Discussed this patient with Dr. Castano who agrees with plan.      Problems Addressed:  Acute on chronic congestive heart failure, unspecified heart failure type: complicated acute illness or injury  Bilateral lower extremity edema: complicated acute illness or injury  Dyspnea, unspecified type: complicated acute illness or injury    Amount and/or Complexity of Data Reviewed  Labs: ordered.  Radiology: ordered.  ECG/medicine tests: ordered.    Risk  Prescription drug management.  Decision regarding hospitalization.        Final diagnoses:   Acute on chronic congestive heart failure, unspecified heart failure type   Bilateral lower extremity edema   Dyspnea, unspecified type       ED  Disposition  ED Disposition       ED Disposition   Decision to Admit    Condition   --    Comment   Level of Care: Telemetry [5]   Admitting Physician: JENSEN CONCEPCION [[id number]]   Attending Physician: JENSEN CONCEPCION [[id number]]                 No follow-up provider specified.       Medication List      No changes were made to your prescriptions during this visit.            Vicki Whalen PA-C  07/03/25 [id number]

## 2025-07-03 NOTE — ED NOTES
Nursing report ED to floor  Syl Herrera  89 y.o.  female    HPI:   Chief Complaint   Patient presents with    Foot Swelling       Admitting doctor:   Karly Donovan MD    Admitting diagnosis:   The primary encounter diagnosis was Acute on chronic congestive heart failure, unspecified heart failure type. Diagnoses of Bilateral lower extremity edema and Dyspnea, unspecified type were also pertinent to this visit.    Code status:   Current Code Status       Date Active Code Status Order ID Comments User Context       Prior            Allergies:   Doxycycline, Celebrex [celecoxib], Contrast dye (echo or unknown ct/mr), Iodinated contrast media, Nsaids, Other, and Sulfa antibiotics    Isolation:  No active isolations     Fall Risk:  Fall Risk Assessment was completed, and patient is at low risk for falls.   Predictive Model Details         10 (Low) Factor Value    Calculated 7/3/2025 14:23 Age 89    Risk of Fall Model Active Peripheral IV Present     Imaging order in this encounter Present     Magnesium 2.4 mg/dL     Total Bilirubin 1.5 mg/dL     Wu Scale not on file     Chloride 98 mmol/L     Creatinine 1.12 mg/dL     Diastolic BP 84     Number of Distinct Medication Classes administered 1     Respiratory Rate 16     ALT 24 U/L     Calcium 9.3 mg/dL     Albumin 4.2 g/dL     Potassium 4.4 mmol/L     Duration of Current Encounter 0.101 days         Weight:       07/03/25  1152   Weight: 75.3 kg (166 lb 0.1 oz)       Intake and Output  No intake or output data in the 24 hours ending 07/03/25 1427    Diet:        Most recent vitals:   Vitals:    07/03/25 1300 07/03/25 1315 07/03/25 1330 07/03/25 1345   BP:   151/81 151/84   BP Location:    Left arm   Patient Position:    Sitting   Pulse: 60 60 62 60   Resp:   15 16   Temp:       TempSrc:       SpO2: 93% 95% 96% 94%   Weight:       Height:           Active LDAs/IV Access:   Lines, Drains & Airways       Active LDAs       Name Placement date Placement time Site Days     Peripheral IV 07/03/25 1219 20 G Right Antecubital 07/03/25  1219  Antecubital  less than 1                    Skin Condition:   Skin Assessments (last day)       None             Labs (abnormal labs have a star):   Labs Reviewed   COMPREHENSIVE METABOLIC PANEL - Abnormal; Notable for the following components:       Result Value    Glucose 103 (*)     Creatinine 1.12 (*)     Alkaline Phosphatase 118 (*)     Total Bilirubin 1.5 (*)     eGFR 47.4 (*)     All other components within normal limits    Narrative:     GFR Categories in Chronic Kidney Disease (CKD)              GFR Category          GFR (mL/min/1.73)    Interpretation  G1                    90 or greater        Normal or high (1)  G2                    60-89                Mild decrease (1)  G3a                   45-59                Mild to moderate decrease  G3b                   30-44                Moderate to severe decrease  G4                    15-29                Severe decrease  G5                    14 or less           Kidney failure    (1)In the absence of evidence of kidney disease, neither GFR category G1 or G2 fulfill the criteria for CKD.    eGFR calculation 2021 CKD-EPI creatinine equation, which does not include race as a factor   TROPONIN - Abnormal; Notable for the following components:    HS Troponin T 14 (*)     All other components within normal limits    Narrative:     High Sensitive Troponin T Reference Range:  <14.0 ng/L- Negative Female for AMI  <22.0 ng/L- Negative Male for AMI  >=14 - Abnormal Female indicating possible myocardial injury.  >=22 - Abnormal Male indicating possible myocardial injury.   Clinicians would have to utilize clinical acumen, EKG, Troponin, and serial changes to determine if it is an Acute Myocardial Infarction or myocardial injury due to an underlying chronic condition.        CBC WITH AUTO DIFFERENTIAL - Abnormal; Notable for the following components:    Hemoglobin 11.8 (*)     All other components  "within normal limits   BNP (IN-HOUSE) - Normal    Narrative:     This assay is used as an aid in the diagnosis of individuals suspected of having heart failure. It can be used as an aid in the diagnosis of acute decompensated heart failure (ADHF) in patients presenting with signs and symptoms of ADHF to the emergency department (ED). In addition, NT-proBNP of <300 pg/mL indicates ADHF is not likely.    Age Range Result Interpretation  NT-proBNP Concentration (pg/mL:      <50             Positive            >450                   Gray                 300-450                    Negative             <300    50-75           Positive            >900                  Gray                300-900                  Negative            <300      >75             Positive            >1800                  Gray                300-1800                  Negative            <300   D-DIMER, QUANTITATIVE - Normal    Narrative:     According to the assay 's published package insert, a normal (<0.50 MCGFEU/mL) D-dimer result in conjunction with a non-high clinical probability assessment, excludes deep vein thrombosis (DVT) and pulmonary embolism (PE) with high sensitivity.    D-dimer values increase with age and this can make VTE exclusion of an older population difficult. To address this, the [employer], based on best available evidence and recent guidelines, recommends that clinicians use age-adjusted D-dimer thresholds in patients greater than 50 years of age with: a) a low probability of PE who do not meet all Pulmonary Embolism Rule Out Criteria, or b) in those with intermediate probability of PE.   The formula for an age-adjusted D-dimer cut-off is \"age/100\".  For example, a 60 year old patient would have an age-adjusted cut-off of 0.60 MCGFEU/mL and an 80 year old 0.80 MCGFEU/mL.   MAGNESIUM - Normal   PROTIME-INR - Normal   HIGH SENSITIVITIY TROPONIN T 1HR    Narrative:     High Sensitive Troponin " T Reference Range:  <14.0 ng/L- Negative Female for AMI  <22.0 ng/L- Negative Male for AMI  >=14 - Abnormal Female indicating possible myocardial injury.  >=22 - Abnormal Male indicating possible myocardial injury.   Clinicians would have to utilize clinical acumen, EKG, Troponin, and serial changes to determine if it is an Acute Myocardial Infarction or myocardial injury due to an underlying chronic condition.        CBC AND DIFFERENTIAL    Narrative:     The following orders were created for panel order CBC & Differential.  Procedure                               Abnormality         Status                     ---------                               -----------         ------                     CBC Auto Differential[[id number]]        Abnormal            Final result                 Please view results for these tests on the individual orders.   EXTRA TUBES    Narrative:     The following orders were created for panel order Extra Tubes.  Procedure                               Abnormality         Status                     ---------                               -----------         ------                     Gold Top - SST[[id number]]                                   Final result                 Please view results for these tests on the individual orders.   GOLD TOP - [facility]       LOC: Person, Place, Time, and Situation    Telemetry:  Telemetry    Cardiac Monitoring Ordered: yes    EKG:   ECG 12 Lead Dyspnea   Preliminary Result   HEART RATE=60  bpm   RR Ioywujdt=0862  ms   AR Vykayxpd=428  ms   P Horizontal Axis=  deg   P Front Axis=  deg   QRSD Interval=91  ms   QT Nsoyyjsm=354  ms   ZXiY=513  ms   QRS Axis=-31  deg   T Wave Axis=67  deg   - ABNORMAL ECG -   Atrial-paced rhythm   Left axis deviation   Anterior infarct, old   Date and Time of Study:2025-07-03 12:25:07          Medications Given in the ED:   Medications   furosemide (LASIX) injection 80 mg (80 mg Intravenous Given 7/3/25 2263)       Imaging  results:  XR Chest 1 View  Result Date: 7/3/2025  Impression: Cardiomegaly with pulmonary vascular congestion and borderline interstitial thickening, which could reflect mild pulmonary edema. No focal airspace consolidation. Electronically Signed: Gerson Hayward MD  7/3/2025 1:21 PM EDT  Workstation ID: [id number]      Social issues:   Social History     Socioeconomic History    Marital status:    Tobacco Use    Smoking status: Never     Passive exposure: Past    Smokeless tobacco: Never   Vaping Use    Vaping status: Never Used   Substance and Sexual Activity    Alcohol use: Not Currently    Drug use: Never    Sexual activity: Not Currently     Partners: Male     Birth control/protection: None, Hysterectomy       NIH Stroke Scale:  Interval: (not recorded)  1a. Level of Consciousness: (not recorded)  1b. LOC Questions: (not recorded)  1c. LOC Commands: (not recorded)  2. Best Gaze: (not recorded)  3. Visual: (not recorded)  4. Facial Palsy: (not recorded)  5a. Motor Arm, Left: (not recorded)  5b. Motor Arm, Right: (not recorded)  6a. Motor Leg, Left: (not recorded)  6b. Motor Leg, Right: (not recorded)  7. Limb Ataxia: (not recorded)  8. Sensory: (not recorded)  9. Best Language: (not recorded)  10. Dysarthria: (not recorded)  11. Extinction and Inattention (formerly Neglect): (not recorded)    Total (NIH Stroke Scale): (not recorded)     Additional notable assessment information:.     Nursing report ED to floor:  Mimi Padilla RN   07/03/25 14:27 EDT

## 2025-07-04 ENCOUNTER — APPOINTMENT (OUTPATIENT)
Dept: CARDIOLOGY | Facility: HOSPITAL | Age: 89
End: 2025-07-04
Payer: MEDICARE

## 2025-07-04 VITALS
OXYGEN SATURATION: 95 % | WEIGHT: 166.01 LBS | HEART RATE: 60 BPM | BODY MASS INDEX: 32.59 KG/M2 | RESPIRATION RATE: 15 BRPM | DIASTOLIC BLOOD PRESSURE: 87 MMHG | HEIGHT: 60 IN | TEMPERATURE: 97.6 F | SYSTOLIC BLOOD PRESSURE: 162 MMHG

## 2025-07-04 PROBLEM — I50.9 ACUTE ON CHRONIC CONGESTIVE HEART FAILURE: Status: ACTIVE | Noted: 2025-07-04

## 2025-07-04 LAB
ANION GAP SERPL CALCULATED.3IONS-SCNC: 10 MMOL/L (ref 5–15)
AORTIC DIMENSIONLESS INDEX: 0.48 (DI)
AV MEAN PRESS GRAD SYS DOP V1V2: 5 MMHG
AV VMAX SYS DOP: 157 CM/SEC
BH CV ECHO LEFT VENTRICLE GLOBAL LONGITUDINAL STRAIN: -13.5 %
BH CV ECHO MEAS - ACS: 1.7 CM
BH CV ECHO MEAS - AO MAX PG: 9.9 MMHG
BH CV ECHO MEAS - AO V2 VTI: 38.8 CM
BH CV ECHO MEAS - AVA(I,D): 1.37 CM2
BH CV ECHO MEAS - EDV(CUBED): 46.7 ML
BH CV ECHO MEAS - EDV(MOD-SP2): 54.3 ML
BH CV ECHO MEAS - EDV(MOD-SP4): 68.3 ML
BH CV ECHO MEAS - EF(MOD-SP2): 62.8 %
BH CV ECHO MEAS - EF(MOD-SP4): 65 %
BH CV ECHO MEAS - ESV(CUBED): 13.8 ML
BH CV ECHO MEAS - ESV(MOD-SP2): 20.2 ML
BH CV ECHO MEAS - ESV(MOD-SP4): 23.9 ML
BH CV ECHO MEAS - FS: 33.3 %
BH CV ECHO MEAS - IVS/LVPW: 1 CM
BH CV ECHO MEAS - IVSD: 1.3 CM
BH CV ECHO MEAS - LA DIMENSION: 3.1 CM
BH CV ECHO MEAS - LAT PEAK E' VEL: 9.4 CM/SEC
BH CV ECHO MEAS - LV DIASTOLIC VOL/BSA (35-75): 39.6 CM2
BH CV ECHO MEAS - LV MASS(C)D: 160.1 GRAMS
BH CV ECHO MEAS - LV MAX PG: 2.24 MMHG
BH CV ECHO MEAS - LV MEAN PG: 1 MMHG
BH CV ECHO MEAS - LV SYSTOLIC VOL/BSA (12-30): 13.9 CM2
BH CV ECHO MEAS - LV V1 MAX: 74.9 CM/SEC
BH CV ECHO MEAS - LV V1 VTI: 18.8 CM
BH CV ECHO MEAS - LVIDD: 3.6 CM
BH CV ECHO MEAS - LVIDS: 2.4 CM
BH CV ECHO MEAS - LVOT AREA: 2.8 CM2
BH CV ECHO MEAS - LVOT DIAM: 1.9 CM
BH CV ECHO MEAS - LVPWD: 1.3 CM
BH CV ECHO MEAS - MED PEAK E' VEL: 4.8 CM/SEC
BH CV ECHO MEAS - MR MAX PG: 47.3 MMHG
BH CV ECHO MEAS - MR MAX VEL: 344 CM/SEC
BH CV ECHO MEAS - MR MEAN PG: 33 MMHG
BH CV ECHO MEAS - MR MEAN VEL: 277 CM/SEC
BH CV ECHO MEAS - MR VTI: 123 CM
BH CV ECHO MEAS - MV A MAX VEL: 42.4 CM/SEC
BH CV ECHO MEAS - MV DEC SLOPE: 370 CM/SEC2
BH CV ECHO MEAS - MV DEC TIME: 0.21 SEC
BH CV ECHO MEAS - MV E MAX VEL: 110 CM/SEC
BH CV ECHO MEAS - MV E/A: 2.6
BH CV ECHO MEAS - MV MAX PG: 5.6 MMHG
BH CV ECHO MEAS - MV MEAN PG: 2 MMHG
BH CV ECHO MEAS - MV P1/2T: 100.5 MSEC
BH CV ECHO MEAS - MV V2 VTI: 35.2 CM
BH CV ECHO MEAS - MVA(P1/2T): 2.19 CM2
BH CV ECHO MEAS - MVA(VTI): 1.51 CM2
BH CV ECHO MEAS - PA ACC TIME: 0.13 SEC
BH CV ECHO MEAS - PA V2 MAX: 85 CM/SEC
BH CV ECHO MEAS - RAP SYSTOLE: 3 MMHG
BH CV ECHO MEAS - RV MAX PG: 2.02 MMHG
BH CV ECHO MEAS - RV V1 MAX: 71.1 CM/SEC
BH CV ECHO MEAS - RV V1 VTI: 15 CM
BH CV ECHO MEAS - RVSP: 55.1 MMHG
BH CV ECHO MEAS - SV(LVOT): 53.3 ML
BH CV ECHO MEAS - SV(MOD-SP2): 34.1 ML
BH CV ECHO MEAS - SV(MOD-SP4): 44.4 ML
BH CV ECHO MEAS - SVI(LVOT): 30.9 ML/M2
BH CV ECHO MEAS - SVI(MOD-SP2): 19.8 ML/M2
BH CV ECHO MEAS - SVI(MOD-SP4): 25.7 ML/M2
BH CV ECHO MEAS - TAPSE (>1.6): 1.56 CM
BH CV ECHO MEAS - TR MAX PG: 52.1 MMHG
BH CV ECHO MEAS - TR MAX VEL: 361 CM/SEC
BH CV ECHO MEASUREMENTS AVERAGE E/E' RATIO: 15.49
BH CV XLRA - RV BASE: 3.7 CM
BH CV XLRA - RV LENGTH: 7.5 CM
BH CV XLRA - RV MID: 2.8 CM
BH CV XLRA - TDI S': 9.7 CM/SEC
BILIRUB UR QL STRIP: NEGATIVE
BUN SERPL-MCNC: 18.6 MG/DL (ref 8–23)
BUN/CREAT SERPL: 15.2 (ref 7–25)
CALCIUM SPEC-SCNC: 9.6 MG/DL (ref 8.6–10.5)
CHLORIDE SERPL-SCNC: 98 MMOL/L (ref 98–107)
CK SERPL-CCNC: 45 U/L (ref 20–180)
CLARITY UR: CLEAR
CO2 SERPL-SCNC: 30 MMOL/L (ref 22–29)
COLOR UR: YELLOW
CREAT SERPL-MCNC: 1.22 MG/DL (ref 0.57–1)
EGFRCR SERPLBLD CKD-EPI 2021: 42.8 ML/MIN/1.73
FERRITIN SERPL-MCNC: 47.6 NG/ML (ref 13–150)
GLUCOSE SERPL-MCNC: 130 MG/DL (ref 65–99)
GLUCOSE UR STRIP-MCNC: NEGATIVE MG/DL
HGB UR QL STRIP.AUTO: NEGATIVE
INR PPP: 2.56 (ref 2–3)
IRON 24H UR-MRATE: 46 MCG/DL (ref 37–145)
IRON SATN MFR SERPL: 10 % (ref 20–50)
KETONES UR QL STRIP: NEGATIVE
LEFT ATRIUM VOLUME INDEX: 26.5 ML/M2
LEUKOCYTE ESTERASE UR QL STRIP.AUTO: NEGATIVE
LV EF 3D SEGMENTATION: 56 %
LV EF BIPLANE MOD: 63.7 %
NITRITE UR QL STRIP: NEGATIVE
PH UR STRIP.AUTO: 8 [PH] (ref 5–8)
POTASSIUM SERPL-SCNC: 3.6 MMOL/L (ref 3.5–5.2)
PROT UR QL STRIP: NEGATIVE
PROTHROMBIN TIME: 27.8 SECONDS (ref 19.4–28.5)
PTH-INTACT SERPL-MCNC: 66.9 PG/ML (ref 15–65)
SINUS: 2.9 CM
SODIUM SERPL-SCNC: 138 MMOL/L (ref 136–145)
SP GR UR STRIP: 1.01 (ref 1–1.03)
STJ: 2.1 CM
TIBC SERPL-MCNC: 456 MCG/DL (ref 298–536)
TRANSFERRIN SERPL-MCNC: 306 MG/DL (ref 200–360)
TSH SERPL DL<=0.05 MIU/L-ACNC: 1.29 UIU/ML (ref 0.27–4.2)
URATE SERPL-MCNC: 5.1 MG/DL (ref 2.4–5.7)
UROBILINOGEN UR QL STRIP: NORMAL

## 2025-07-04 PROCEDURE — 94799 UNLISTED PULMONARY SVC/PX: CPT

## 2025-07-04 PROCEDURE — 93306 TTE W/DOPPLER COMPLETE: CPT | Performed by: STUDENT IN AN ORGANIZED HEALTH CARE EDUCATION/TRAINING PROGRAM

## 2025-07-04 PROCEDURE — 80048 BASIC METABOLIC PNL TOTAL CA: CPT | Performed by: NURSE PRACTITIONER

## 2025-07-04 PROCEDURE — 93356 MYOCRD STRAIN IMG SPCKL TRCK: CPT

## 2025-07-04 PROCEDURE — 82550 ASSAY OF CK (CPK): CPT | Performed by: INTERNAL MEDICINE

## 2025-07-04 PROCEDURE — 82728 ASSAY OF FERRITIN: CPT | Performed by: INTERNAL MEDICINE

## 2025-07-04 PROCEDURE — 84466 ASSAY OF TRANSFERRIN: CPT | Performed by: INTERNAL MEDICINE

## 2025-07-04 PROCEDURE — 93306 TTE W/DOPPLER COMPLETE: CPT

## 2025-07-04 PROCEDURE — 81003 URINALYSIS AUTO W/O SCOPE: CPT | Performed by: INTERNAL MEDICINE

## 2025-07-04 PROCEDURE — 94640 AIRWAY INHALATION TREATMENT: CPT

## 2025-07-04 PROCEDURE — 84443 ASSAY THYROID STIM HORMONE: CPT | Performed by: INTERNAL MEDICINE

## 2025-07-04 PROCEDURE — 84550 ASSAY OF BLOOD/URIC ACID: CPT | Performed by: INTERNAL MEDICINE

## 2025-07-04 PROCEDURE — 83540 ASSAY OF IRON: CPT | Performed by: INTERNAL MEDICINE

## 2025-07-04 PROCEDURE — 25010000002 BUMETANIDE PER 0.5 MG: Performed by: INTERNAL MEDICINE

## 2025-07-04 PROCEDURE — 94761 N-INVAS EAR/PLS OXIMETRY MLT: CPT

## 2025-07-04 PROCEDURE — 94664 DEMO&/EVAL PT USE INHALER: CPT

## 2025-07-04 PROCEDURE — 93356 MYOCRD STRAIN IMG SPCKL TRCK: CPT | Performed by: STUDENT IN AN ORGANIZED HEALTH CARE EDUCATION/TRAINING PROGRAM

## 2025-07-04 PROCEDURE — 85610 PROTHROMBIN TIME: CPT

## 2025-07-04 RX ORDER — WARFARIN SODIUM 4 MG/1
4 TABLET ORAL
Status: DISCONTINUED | OUTPATIENT
Start: 2025-07-04 | End: 2025-07-04 | Stop reason: HOSPADM

## 2025-07-04 RX ORDER — POTASSIUM CHLORIDE 750 MG/1
10 TABLET, EXTENDED RELEASE ORAL DAILY
Qty: 90 TABLET | Refills: 1 | Status: SHIPPED | OUTPATIENT
Start: 2025-07-05 | End: 2025-07-04

## 2025-07-04 RX ORDER — WARFARIN SODIUM 4 MG/1
4 TABLET ORAL
Status: DISCONTINUED | OUTPATIENT
Start: 2025-07-08 | End: 2025-07-04

## 2025-07-04 RX ORDER — ALBUTEROL SULFATE 0.83 MG/ML
2.5 SOLUTION RESPIRATORY (INHALATION) EVERY 6 HOURS PRN
Status: DISCONTINUED | OUTPATIENT
Start: 2025-07-04 | End: 2025-07-04 | Stop reason: HOSPADM

## 2025-07-04 RX ORDER — WARFARIN SODIUM 2 MG/1
2 TABLET ORAL
Status: DISCONTINUED | OUTPATIENT
Start: 2025-07-08 | End: 2025-07-04 | Stop reason: HOSPADM

## 2025-07-04 RX ORDER — FUROSEMIDE 40 MG/1
40 TABLET ORAL DAILY
Qty: 90 TABLET | Refills: 1 | Status: SHIPPED | OUTPATIENT
Start: 2025-07-05

## 2025-07-04 RX ORDER — POTASSIUM CHLORIDE 750 MG/1
10 TABLET, EXTENDED RELEASE ORAL DAILY
Qty: 90 TABLET | Refills: 1 | Status: SHIPPED | OUTPATIENT
Start: 2025-07-05

## 2025-07-04 RX ORDER — WARFARIN SODIUM 4 MG/1
4 TABLET ORAL
Status: DISCONTINUED | OUTPATIENT
Start: 2025-07-04 | End: 2025-07-04

## 2025-07-04 RX ORDER — FUROSEMIDE 40 MG/1
40 TABLET ORAL DAILY
Qty: 90 TABLET | Refills: 1 | Status: SHIPPED | OUTPATIENT
Start: 2025-07-05 | End: 2025-07-04

## 2025-07-04 RX ORDER — BUMETANIDE 0.25 MG/ML
1 INJECTION, SOLUTION INTRAMUSCULAR; INTRAVENOUS EVERY 12 HOURS
Status: DISCONTINUED | OUTPATIENT
Start: 2025-07-04 | End: 2025-07-04

## 2025-07-04 RX ADMIN — DILTIAZEM HYDROCHLORIDE 120 MG: 120 CAPSULE, EXTENDED RELEASE ORAL at 07:51

## 2025-07-04 RX ADMIN — METOPROLOL TARTRATE 100 MG: 50 TABLET, FILM COATED ORAL at 00:20

## 2025-07-04 RX ADMIN — ALBUTEROL SULFATE 2.5 MG: 2.5 SOLUTION RESPIRATORY (INHALATION) at 09:34

## 2025-07-04 RX ADMIN — ACETAMINOPHEN 650 MG: 325 TABLET, FILM COATED ORAL at 00:20

## 2025-07-04 RX ADMIN — ISOSORBIDE MONONITRATE 30 MG: 30 TABLET, EXTENDED RELEASE ORAL at 07:51

## 2025-07-04 RX ADMIN — BUMETANIDE 1 MG: 0.25 INJECTION INTRAMUSCULAR; INTRAVENOUS at 08:01

## 2025-07-04 RX ADMIN — Medication 10 ML: at 07:52

## 2025-07-04 RX ADMIN — METOPROLOL TARTRATE 100 MG: 50 TABLET, FILM COATED ORAL at 07:51

## 2025-07-04 RX ADMIN — PANTOPRAZOLE SODIUM 40 MG: 40 TABLET, DELAYED RELEASE ORAL at 06:30

## 2025-07-04 RX ADMIN — ATORVASTATIN CALCIUM 10 MG: 10 TABLET ORAL at 00:20

## 2025-07-04 NOTE — PLAN OF CARE
Problem: Adult Inpatient Plan of Care  Goal: Absence of Hospital-Acquired Illness or Injury  Intervention: Identify and Manage Fall Risk  Recent Flowsheet Documentation  Taken 7/4/2025 0438 by Brayan Ramon RN  Safety Promotion/Fall Prevention:   safety round/check completed   room organization consistent   nonskid shoes/slippers when out of bed   fall prevention program maintained   assistive device/personal items within reach   clutter free environment maintained  Taken 7/4/2025 0200 by Brayan Ramon RN  Safety Promotion/Fall Prevention:   safety round/check completed   room organization consistent   nonskid shoes/slippers when out of bed   fall prevention program maintained   clutter free environment maintained   assistive device/personal items within reach  Taken 7/4/2025 0000 by Brayan Ramon RN  Safety Promotion/Fall Prevention:   safety round/check completed   room organization consistent   nonskid shoes/slippers when out of bed   fall prevention program maintained   clutter free environment maintained   assistive device/personal items within reach  Taken 7/3/2025 2200 by Brayan Ramon RN  Safety Promotion/Fall Prevention:   safety round/check completed   room organization consistent   nonskid shoes/slippers when out of bed   fall prevention program maintained   clutter free environment maintained   assistive device/personal items within reach  Taken 7/3/2025 2000 by Brayan Ramon RN  Safety Promotion/Fall Prevention:   safety round/check completed   room organization consistent   nonskid shoes/slippers when out of bed   fall prevention program maintained   assistive device/personal items within reach   clutter free environment maintained  Intervention: Prevent Skin Injury  Recent Flowsheet Documentation  Taken 7/4/2025 0438 by Brayan Ramon RN  Body Position: position changed independently  Taken 7/4/2025 0000 by Brayan Ramon RN  Body Position: position changed  independently  Taken 7/3/2025 2200 by Brayan Ramon RN  Body Position: position changed independently  Taken 7/3/2025 2000 by Brayan Ramon RN  Body Position: position changed independently  Intervention: Prevent Infection  Recent Flowsheet Documentation  Taken 7/4/2025 0438 by Brayan Ramon RN  Infection Prevention:   environmental surveillance performed   hand hygiene promoted   personal protective equipment utilized   rest/sleep promoted   single patient room provided  Taken 7/4/2025 0000 by Brayan Ramon RN  Infection Prevention:   environmental surveillance performed   hand hygiene promoted   personal protective equipment utilized   rest/sleep promoted   single patient room provided  Goal: Optimal Comfort and Wellbeing  Intervention: Provide Person-Centered Care  Recent Flowsheet Documentation  Taken 7/3/2025 2000 by Brayan Ramon RN  Trust Relationship/Rapport: care explained   Goal Outcome Evaluation:      Patient has been pleasant, calm, and cooperative with care.

## 2025-07-04 NOTE — PROGRESS NOTES
"Pharmacy dosing service  Anticoagulant  Warfarin     Subjective:    Syl Herrera is a 89 y.o.female being continued on warfarin for Atrial Fibrillation / Flutter.    INR Goal: 2 - 3  Home medication?: warfarin 4 mg PO daily, except warfarin 2 mg PO on TTh.   Bridge Therapy Present?:  No  Interacting Medications Evaluation (New/Present/Discontinued): cephalexin (ppx home med, not continued inpt),   Additional Contributing Factors: -      Assessment/Plan:    Patient unsure if she took dose today (7/3). INR therapeutic at this time, will not give a dose today. Orders entered to reflect home regimen as above. Daily PT/INR ordered.      Continue to monitor and adjust based on INR.         Date 7/3           INR 2.53           Dose hold             Diet Order   Procedures    Diet: Renal; Low Sodium (2-3g); Fluid Consistency: Thin (IDDSI 0)      Objective:  [Ht: 152.4 cm (60\"); Wt: 75.3 kg (166 lb 0.1 oz); BMI: Body mass index is 32.42 kg/m².]    Lab Results   Component Value Date    ALBUMIN 4.2 07/03/2025     Lab Results   Component Value Date    INR 2.53 07/03/2025    INR 3.90 (A) 06/19/2025    INR 2.01 05/20/2025    PROTIME 27.6 07/03/2025    PROTIME 22.9 05/20/2025    PROTIME 20.2 05/19/2025     Lab Results   Component Value Date    HGB 11.8 (L) 07/03/2025    HGB 13.3 05/20/2025    HGB 13.1 05/19/2025     Lab Results   Component Value Date    HCT 36.6 07/03/2025    HCT 39.9 05/20/2025    HCT 40.6 05/19/2025       Ava Weaver [facility]  07/04/25 00:18 EDT     "

## 2025-07-04 NOTE — CASE MANAGEMENT/SOCIAL WORK
Case Management Discharge Note      Final Note: Home                     Transportation Services  Transportation: Private Transportation  Private: Car    Final Discharge Disposition Code: 01 - home or self-care

## 2025-07-04 NOTE — CONSULTS
NEPHROLOGY CONSULTATION-----[facility]    [facility]  [phone]  Jesenia Shaikh MD    Patient Care Team:  Nava Yu MD as PCP - General  Ivan Martell MD as Consulting Physician (Cardiology)  Greer Shaikh MD as Consulting Physician (Nephrology)  Harshad Landin MD as Consulting Physician (Hematology and Oncology)    CC/REASON FOR CONSULTATION: RENAL FAILURE/ELEVATED SERUM CREATININE    PHYSICIAN REQUESTING CONSULTATION: Karly Donovan MD     History of Present Illness    HPI    Patient is a 89 y.o. WF, well known to me from the past, whom I was asked to see in consultation for evaluation and management of renal failure/elevated serum creatinine. Patient was admitted with bilateral LE edema and SOB.   Patient with known CRF/CKD. No NSAIDs or recent IV dye exposure. No known h/o hepatitis, TB, rheumatic fever, jaundice, SLE. Does bleed/bruise easily as she is chronically anticoagulated on Coumadin.  No urinary sx. No edema or fluid retention.  +Compliance with home meds. Was not  on diuretics prior to admission. Was not on ACE-I/ARB in the prior to admission. No herbal med use.    Review of Systems   Constitutional:  Positive for activity change, appetite change and fatigue. Negative for chills, diaphoresis, fever and unexpected weight change.   HENT:  Negative for congestion, dental problem, drooling, ear discharge, ear pain, facial swelling, hearing loss, mouth sores, nosebleeds, postnasal drip, rhinorrhea, sinus pressure, sinus pain, sneezing, sore throat, tinnitus, trouble swallowing and voice change.    Eyes:  Negative for photophobia, pain, discharge, redness, itching and visual disturbance.   Respiratory:  Negative for apnea, cough, choking, chest tightness, shortness of breath, wheezing and stridor.    Cardiovascular:  Positive for palpitations. Negative for chest pain and leg swelling.   Gastrointestinal:  Negative  for abdominal distention, abdominal pain, anal bleeding, blood in stool, constipation, diarrhea, nausea, rectal pain and vomiting.   Endocrine: Negative for cold intolerance, heat intolerance, polydipsia, polyphagia and polyuria.   Genitourinary:  Negative for decreased urine volume, difficulty urinating, dysuria, enuresis, flank pain, frequency, genital sores, hematuria and urgency.   Musculoskeletal:  Positive for arthralgias and back pain. Negative for gait problem, joint swelling, myalgias, neck pain and neck stiffness.   Skin:  Negative for color change, pallor, rash and wound.   Allergic/Immunologic: Negative for environmental allergies, food allergies and immunocompromised state.   Neurological:  Positive for weakness. Negative for dizziness, tremors, seizures, syncope, facial asymmetry, speech difficulty, light-headedness, numbness and headaches.   Hematological:  Negative for adenopathy. Bruises/bleeds easily.   Psychiatric/Behavioral:  Negative for agitation, behavioral problems, confusion, decreased concentration, dysphoric mood, hallucinations, self-injury, sleep disturbance and suicidal ideas. The patient is not nervous/anxious and is not hyperactive.           Past Medical History:   Diagnosis Date    Anxiety     Arthritis     Asthma     Atrial fibrillation     CHF (congestive heart failure)     CKD (chronic kidney disease) stage 3, GFR 30-59 ml/min 04/18/2021    Coronary artery disease     Dyslipidemia     Dyslipidemia 01/05/2015    GERD (gastroesophageal reflux disease)     History of bone density study 04/2015    Hyperlipidemia     Hypertension     Sleep apnea     Wears dentures        Past Surgical History:   Procedure Laterality Date    BLADDER REPAIR  1990    BREAST LUMPECTOMY  1974    BENIGN    CARDIAC CATHETERIZATION  05/25/2011    CARDIAC CATHETERIZATION N/A 04/21/2021    Procedure: Left Heart Cath and coronary angiogram;  Surgeon: Ivan Martell MD;  Location: [facility] CATH INVASIVE LOCATION;   Service: Cardiovascular;  Laterality: N/A;    CARDIAC CATHETERIZATION N/A 04/21/2021    Procedure: Left ventriculography;  Surgeon: Ivan Martell MD;  Location: [facility] CATH INVASIVE LOCATION;  Service: Cardiovascular;  Laterality: N/A;    CARDIAC CATHETERIZATION  04/21/2021    Procedure: Saphenous Vein Graft;  Surgeon: Ivan Martell MD;  Location: [facility] CATH INVASIVE LOCATION;  Service: Cardiovascular;;    CARDIAC CATHETERIZATION N/A 04/26/2021    Procedure: Percutaneous Coronary Intervention;  Surgeon: Ivan Martell MD;  Location: [facility] CATH INVASIVE LOCATION;  Service: Cardiovascular;  Laterality: N/A;    CARDIAC CATHETERIZATION N/A 04/26/2021    Procedure: Stent RAY coronary;  Surgeon: Ivan Martell MD;  Location: [facility] CATH INVASIVE LOCATION;  Service: Cardiovascular;  Laterality: N/A;    CARDIOVASCULAR STRESS TEST  05/04/2015    CHOLECYSTECTOMY  1990    CORONARY ARTERY BYPASS GRAFT  08/09/2017    X5  Dr Brandt    ENDOSCOPY N/A 06/30/2020    Procedure: ESOPHAGOGASTRODUODENOSCOPY with biopsy x 1 area and dilatation (15-18mm balloon) up to 18mm;  Surgeon: Quinton Tapia MD;  Location: [facility] ENDOSCOPY;  Service: Gastroenterology;  Laterality: N/A;  post op: gastritis, large hiatal hernia, esophageal dysmotility    ENDOSCOPY N/A 10/24/2024    Procedure: ESOPHAGOGASTRODUODENOSCOPY WITH GASTRIC BIOPSY, BALLOON DILATION (18MM-20MM) UP TO 19MM,BIOPSY OF ESOPHAGUS;  Surgeon: Quinton Tapia MD;  Location: [facility] ENDOSCOPY;  Service: Gastroenterology;  Laterality: N/A;  GASTRITIS, ESOPHAGITIS, HIATEL HERNIA    HYSTERECTOMY  1990    INSERT / REPLACE / REMOVE PACEMAKER      JOINT REPLACEMENT Right     knee     OTHER SURGICAL HISTORY  06/2017    BLOOD CLOT REMOVED FROM LEFT EAR    PACEMAKER IMPLANTATION  04/18/2016    DUAL CHAMBER ST ALESSIO       Family History   Problem Relation Age of Onset    Hypertension Mother     Heart disease Mother     Heart disease Sister         PACEMAKER    Hypertension Sister     Heart  disease Brother     Heart disease Brother        Social History     Tobacco Use    Smoking status: Never     Passive exposure: Past    Smokeless tobacco: Never   Vaping Use    Vaping status: Never Used   Substance Use Topics    Alcohol use: Not Currently    Drug use: Never       Home Meds:   Medications Prior to Admission   Medication Sig Dispense Refill Last Dose/Taking    atorvastatin (LIPITOR) 10 MG tablet Take 1 tablet by mouth Every Night.   7/2/2025    cephalexin (KEFLEX) 250 MG capsule Take 1 capsule by mouth 2 (Two) Times a Day.   7/2/2025    cetirizine (zyrTEC) 10 MG tablet Take 1 tablet by mouth Daily.   7/2/2025    Cholecalciferol (VITAMIN D-3 PO) Take 1 tablet by mouth Daily.   7/2/2025    dilTIAZem (TIAZAC) 120 MG 24 hr capsule TAKE 1 CAPSULE BY MOUTH DAILY 90 capsule 2 7/3/2025    HYDROcodone-acetaminophen (NORCO) 5-325 MG per tablet Take 1 tablet by mouth Every 6 (Six) Hours As Needed for Moderate Pain.   7/2/2025    isosorbide mononitrate (IMDUR) 30 MG 24 hr tablet TAKE 1 TABLET BY MOUTH EVERY MORNING 90 tablet 3 7/3/2025    magnesium oxide (MAG-OX) 400 MG tablet Take 1 tablet by mouth Daily.   7/2/2025    metoprolol tartrate (LOPRESSOR) 100 MG tablet TAKE 1 TABLET BY MOUTH TWICE DAILY 180 tablet 1 7/3/2025    Multiple Vitamins-Minerals (VITEYES AREDS FORMULA) capsule Take 1 capsule by mouth 2 (two) times a day.   7/2/2025    pantoprazole (PROTONIX) 40 MG EC tablet Take 1 tablet by mouth Daily.  5 7/2/2025    warfarin (COUMADIN) 4 MG tablet Take 1 tablet by mouth See Admin Instructions. On Monday, Wednesday, Friday, Saturday, and Sundays   7/2/2025    warfarin (COUMADIN) 4 MG tablet Take 0.5 tablets by mouth 2 (Two) Times a Week. On Tuesday and Thursday 7/1/2025    acetaminophen (TYLENOL) 500 MG tablet Take 1 tablet by mouth Every 6 (Six) Hours As Needed for Mild Pain or Moderate Pain. 30 tablet 0     albuterol sulfate  (90 Base) MCG/ACT inhaler Inhale 2 puffs Every 4 (Four) Hours As Needed  for Wheezing or Shortness of Air. 8 g 0     ALPRAZolam (XANAX) 0.5 MG tablet Take 1 tablet by mouth 2 (Two) Times a Day As Needed.  5     clobetasol prop emollient base (TEMOVATE-E) 0.05 % emollient cream Apply 1 Application topically Daily As Needed.       fluticasone (FLONASE) 50 MCG/ACT nasal spray Administer 1 spray into the nostril(s) as directed by provider As Needed.       ondansetron (ZOFRAN) 4 MG tablet Take 1 tablet by mouth Daily As Needed for Nausea or Vomiting.          Scheduled Meds:  atorvastatin, 10 mg, Oral, Nightly  dilTIAZem CD, 120 mg, Oral, Q24H  isosorbide mononitrate, 30 mg, Oral, Daily  metoprolol tartrate, 100 mg, Oral, Q12H  pantoprazole, 40 mg, Oral, Q AM  sodium chloride, 10 mL, Intravenous, Q12H  warfarin, 4 mg, Oral, Once per day on Sunday Monday Wednesday Friday Saturday   And  [START ON 7/8/2025] warfarin, 4 mg, Oral, Once per day on Tuesday Thursday        Continuous Infusions:  Pharmacy to dose warfarin,         PRN Meds:    acetaminophen **OR** acetaminophen **OR** acetaminophen    senna-docusate sodium **AND** polyethylene glycol **AND** bisacodyl **AND** bisacodyl    hydrALAZINE    nitroglycerin    ondansetron ODT **OR** ondansetron    Pharmacy to dose warfarin    sodium chloride    sodium chloride    Allergies:  Doxycycline, Celebrex [celecoxib], Contrast dye (echo or unknown ct/mr), Iodinated contrast media, Nsaids, Other, and Sulfa antibiotics    OBJECTIVE    Vital Signs  Temp:  [97.6 °F (36.4 °C)-98 °F (36.7 °C)] 97.8 °F (36.6 °C)  Heart Rate:  [60-75] 65  Resp:  [12-18] 17  BP: (120-157)/(67-99) 120/99    No intake/output data recorded.  I/O last 3 completed shifts:  In: 480 [P.O.:480]  Out: 2000 [Urine:2000]    Physical Exam:  General Appearance: alert, appears stated age and cooperative  Head: normocephalic, without obvious abnormality and atraumatic  Eyes: conjunctivae and sclerae normal and no icterus  Neck: supple and +MINIMAL JVD  Lungs: clear to auscultation and  "respirations regular  Heart: IRREG IRREG +ARIAN  Chest Wall: no abnormalities observed  Abdomen: normal bowel sounds and soft, nontender +OBESITY  Extremities: moves extremities well, no edema, no cyanosis  Skin: no bleeding, bruising or rash  Neurologic: Alert, and oriented. No focal deficits    Results Review:    I reviewed the patient's new clinical results.    WBC WBC   Date Value Ref Range Status   07/03/2025 6.13 3.40 - 10.80 10*3/mm3 Final      HGB Hemoglobin   Date Value Ref Range Status   07/03/2025 11.8 (L) 12.0 - 15.9 g/dL Final      HCT Hematocrit   Date Value Ref Range Status   07/03/2025 36.6 34.0 - 46.6 % Final      Platelets No results found for: \"LABPLAT\"   MCV MCV   Date Value Ref Range Status   07/03/2025 90.8 79.0 - 97.0 fL Final          Sodium Sodium   Date Value Ref Range Status   07/04/2025 138 136 - 145 mmol/L Final   07/03/2025 136 136 - 145 mmol/L Final      Potassium Potassium   Date Value Ref Range Status   07/04/2025 3.6 3.5 - 5.2 mmol/L Final   07/03/2025 4.4 3.5 - 5.2 mmol/L Final      Chloride Chloride   Date Value Ref Range Status   07/04/2025 98 98 - 107 mmol/L Final   07/03/2025 98 98 - 107 mmol/L Final      CO2 CO2   Date Value Ref Range Status   07/04/2025 30.0 (H) 22.0 - 29.0 mmol/L Final   07/03/2025 26.6 22.0 - 29.0 mmol/L Final      BUN BUN   Date Value Ref Range Status   07/04/2025 18.6 8.0 - 23.0 mg/dL Final   07/03/2025 18.2 8.0 - 23.0 mg/dL Final      Creatinine Creatinine   Date Value Ref Range Status   07/04/2025 1.22 (H) 0.57 - 1.00 mg/dL Final   07/03/2025 1.12 (H) 0.57 - 1.00 mg/dL Final      Calcium Calcium   Date Value Ref Range Status   07/04/2025 9.6 8.6 - 10.5 mg/dL Final   07/03/2025 9.3 8.6 - 10.5 mg/dL Final      PO4 No results found for: \"CAPO4\"   Albumin Albumin   Date Value Ref Range Status   07/03/2025 4.2 3.5 - 5.2 g/dL Final      Magnesium Magnesium   Date Value Ref Range Status   07/03/2025 2.4 1.6 - 2.4 mg/dL Final      Uric Acid No results found for: " "\"URICACID\"       Imaging Results (Last 72 Hours)       Procedure Component Value Units Date/Time    XR Chest 1 View [[id number]] Collected: 07/03/25 1320     Updated: 07/03/25 1323    Narrative:      XR CHEST 1 VW    Date of Exam: 7/3/2025 12:35 PM EDT    Indication: SOA, BLE swelling    Comparison: Chest radiograph 5/17/2025.    Findings:  Left chest pacemaker. Sternotomy. Enlarged cardiac silhouette, unchanged. Pulmonary vascular congestion with borderline diffuse interstitial thickening. No focal airspace consolidation. No pleural effusion. No pneumothorax. Osseous structures are   unchanged.      Impression:      Impression:  Cardiomegaly with pulmonary vascular congestion and borderline interstitial thickening, which could reflect mild pulmonary edema. No focal airspace consolidation.      Electronically Signed: Gerson Hayward MD    7/3/2025 1:21 PM EDT    Workstation ID: [id number]              Results for orders placed during the hospital encounter of 07/03/25    XR Chest 1 View 07/03/2025  1:21 PM    Narrative  XR CHEST 1 VW    Date of Exam: 7/3/2025 12:35 PM EDT    Indication: SOA, BLE swelling    Comparison: Chest radiograph 5/17/2025.    Findings:  Left chest pacemaker. Sternotomy. Enlarged cardiac silhouette, unchanged. Pulmonary vascular congestion with borderline diffuse interstitial thickening. No focal airspace consolidation. No pleural effusion. No pneumothorax. Osseous structures are  unchanged.    Impression  Impression:  Cardiomegaly with pulmonary vascular congestion and borderline interstitial thickening, which could reflect mild pulmonary edema. No focal airspace consolidation.      Electronically Signed: Gerson Hayward MD  7/3/2025 1:21 PM EDT  Workstation ID: [id number]      Results for orders placed during the hospital encounter of 05/17/25    XR Chest 1 View 05/17/2025  7:51 PM    Narrative  XR CHEST 1 VW    Date of Exam: 5/17/2025 7:28 PM EDT    Indication: Chest Pain Triage " Protocol    Comparison: Chest radiograph dated 2/8/2025    Findings:  There is a left chest wall pacemaker. There are postsurgical changes of prior CABG. There is a small hiatal hernia. The cardiomediastinal silhouette is within normal limits. There is central bronchial wall thickening within the bilateral lower lobes  which can be seen with bronchitis or reactive airway disease. There is no acute consolidation. There is no pleural effusion or pneumothorax. There are degenerative changes of the thoracic spine. Median sternotomy wires are present and intact.    Impression  Impression:  1.Central bronchial wall thickening within the bilateral lower lobes which can be seen with bronchitis or reactive airway disease. No acute consolidation.  2.Small hiatal hernia.        Electronically Signed: Efren Craig MD  5/17/2025 7:51 PM EDT  Workstation ID: [id number]      Results for orders placed during the hospital encounter of 02/08/25    XR Chest 2 View 02/08/2025  1:14 PM    Narrative  XR CHEST 2 VW    Date of Exam: 2/8/2025 12:30 PM EST    Indication: cough    Comparison: 6/16/2024    Findings:  Patient is again noted be status post median sternotomy. Left subclavian transvenous pacemaker is unchanged. Heart size and pulmonary vessels are within normal limits. Lungs are clear. No pleural effusion. No pneumothorax. There is a large hiatal hernia.  Bony structures are unremarkable.    Impression  Impression:    1. No acute cardiopulmonary disease.  2. Large hiatal hernia.      Electronically Signed: Romulo Aguayo MD  2/8/2025 1:14 PM EST  Workstation ID: [id number]        Results for orders placed during the hospital encounter of 05/07/21    Duplex Venous Upper Extremity - Left CAR 05/09/2021  7:13 PM    Interpretation Summary  · Acute left upper extremity superficial thrombophlebitis noted in the basilic (forearm) and median cubital.  · All other left sided vessels appear normal.      ASSESSMENT / PLAN      Pulmonary  edema    1. RENAL FAILURE-------Nonoliguric.   CRF/CKD STG 3A with current serum creatinine at baseline. Follow with ongoing diuretic exposure.  Check urine and serum studies and renal US and PVR to further characterize CKD. No NSAIDs or IV dye. Dose meds for CrCl 45-60 cc/min     2. MILD METABOLIC ALKALOSIS-----Secondary to diuretic exposure     3. HYPERLIPIDEMIA-------On Statin.  CK, TSH and LFTs ok     4. HTN WITH CKD-----BP okay. Avoid hypotension. No ACE-I/ARB/DRI     5. GERD------PPI. Counseled on risks of chronic PPI use with regards to CKD progression     6. ANEMIA------- Check SPEP and iron studies     7. CAD WITH H/O CABG-------per Cardiology.       8. VOLUME EXCESs-----Stable on current diuretic regimen     9. ATRIAL FIBRILLATION-------Rate controlled with Cardizem and Lopressor     10. OA/DJD-----No NSAIDs. Uric normal     11. SEASONAL ALLERGIES/ALLERGIC RHINITIS------On Zyrtec     12.  OBESITY/ENRIQUE        I discussed the patient's findings and my recommendations with patient    Will follow along closely. Thank you for allowing us to see this patient in renal consultation.    [facility]  [phone]  MD Jesenia Stover MD  07/04/25  07:45 EDT

## 2025-07-04 NOTE — DISCHARGE SUMMARY
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]   Coronary artery disease [I25.10]       Presenting Problem/History of Present Illness  Active Hospital Problems    Diagnosis  POA    **Pulmonary edema [J81.1]  Yes    Acute on chronic congestive heart failure [I50.9]  Yes    Obesity (BMI 30-39.9) [E66.9]  Yes    GERD without esophagitis [K21.9]  Yes    CKD (chronic kidney disease) stage 3, GFR 30-59 ml/min [N18.30]  Yes    Hypertension [I10]  Yes    Atrial fibrillation [I48.91]  Yes    Long term (current) use of anticoagulants [Z79.01] [Z79.01]  Not Applicable    Presence of cardiac pacemaker [Z95.0]  Yes    Coronary artery disease [I25.10]  Yes      Resolved Hospital Problems   No resolved problems to display.          Hospital Course  Patient is a 89 y.o. female with multiple medical problems listed above who presented with increasing lower extremity edema.  At her last hospitalization about 6 weeks ago her diuretics were discontinued due to concerns over worsening renal function.  On admission she was treated with IV diuretics with good results.  Her leg edema has resolved and her legs are now back at baseline.  Shortness of breath has resolved.  She will be restarted on a lower dose of furosemide than she was on previously.  She is hemodynamically stable and agreeable to discharge.  She has follow-up scheduled with her nephrologist next week.  She will see her PCP promptly.    Procedures Performed         Consults:   Consults       Date and Time Order Name Status Description    7/3/2025  2:43 PM Inpatient Nephrology Consult              Pertinent  Test Results:    Lab Results (most recent)       Procedure Component Value Units Date/Time    TSH [[id number]]  (Normal) Collected: 07/04/25 0037    Specimen: Blood from Arm, Left Updated: 07/04/25 0909     TSH 1.290 uIU/mL     CK [[id number]]  (Normal) Collected: 07/04/25 0037    Specimen: Blood from Arm, Left Updated: 07/04/25 0909     Creatine Kinase 45 U/L     Uric Acid [[id number]]  (Normal) Collected: 07/04/25 0037    Specimen: Blood from Arm, Left Updated: 07/04/25 0909     Uric Acid 5.1 mg/dL     Iron Profile w/o Ferritin [[id number]]  (Abnormal) Collected: 07/04/25 0037    Specimen: Blood from Arm, Left Updated: 07/04/25 0909     Iron 46 mcg/dL      Iron Saturation (TSAT) 10 %      Transferrin 306 mg/dL      TIBC 456 mcg/dL     Ferritin [[id number]]  (Normal) Collected: 07/04/25 0037    Specimen: Blood from Arm, Left Updated: 07/04/25 0909     Ferritin 47.60 ng/mL     Narrative:      Results may be falsely decreased if patient taking Biotin.      Urinalysis With Culture If Indicated - Urine, Clean Catch [[id number]]  (Normal) Collected: 07/04/25 0842    Specimen: Urine, Clean Catch Updated: 07/04/25 0900     Color, UA Yellow     Appearance, UA Clear     pH, UA 8.0     Specific Gravity, UA 1.008     Glucose, UA Negative     Ketones, UA Negative     Bilirubin, UA Negative     Blood, UA Negative     Protein, UA Negative     Leuk Esterase, UA Negative     Nitrite, UA Negative     Urobilinogen, UA 0.2 E.U./dL    Narrative:      In absence of clinical symptoms, the presence of pyuria, bacteria, and/or nitrites on the urinalysis result does not correlate with infection.  Urine microscopic not indicated.    PTH, Intact [[id number]]  (Abnormal) Collected: 07/03/25 1220    Specimen: Blood Updated: 07/04/25 0858     PTH, Intact 66.9 pg/mL     Narrative:      Results may be falsely decreased if patient taking Biotin.      Basic Metabolic Panel [[id number]]  (Abnormal) Collected: 07/04/25 0037    Specimen: Blood from Arm, Left  Updated: 07/04/25 0109     Glucose 130 mg/dL      BUN 18.6 mg/dL      Creatinine 1.22 mg/dL      Sodium 138 mmol/L      Potassium 3.6 mmol/L      Chloride 98 mmol/L      CO2 30.0 mmol/L      Calcium 9.6 mg/dL      BUN/Creatinine Ratio 15.2     Anion Gap 10.0 mmol/L      eGFR 42.8 mL/min/1.73     Narrative:      GFR Categories in Chronic Kidney Disease (CKD)              GFR Category          GFR (mL/min/1.73)    Interpretation  G1                    90 or greater        Normal or high (1)  G2                    60-89                Mild decrease (1)  G3a                   45-59                Mild to moderate decrease  G3b                   30-44                Moderate to severe decrease  G4                    15-29                Severe decrease  G5                    14 or less           Kidney failure    (1)In the absence of evidence of kidney disease, neither GFR category G1 or G2 fulfill the criteria for CKD.    eGFR calculation 2021 CKD-EPI creatinine equation, which does not include race as a factor    Protime-INR [[id number]]  (Normal) Collected: 07/04/25 0037    Specimen: Blood from Arm, Left Updated: 07/04/25 0055     Protime 27.8 Seconds      INR 2.56    High Sensitivity Troponin T 1Hr [[id number]] Collected: 07/03/25 1331    Specimen: Blood Updated: 07/03/25 1356     HS Troponin T 13 ng/L      Troponin T Numeric Delta -1 ng/L      Troponin T % Delta -7    Narrative:      High Sensitive Troponin T Reference Range:  <14.0 ng/L- Negative Female for AMI  <22.0 ng/L- Negative Male for AMI  >=14 - Abnormal Female indicating possible myocardial injury.  >=22 - Abnormal Male indicating possible myocardial injury.   Clinicians would have to utilize clinical acumen, EKG, Troponin, and serial changes to determine if it is an Acute Myocardial Infarction or myocardial injury due to an underlying chronic condition.         Comprehensive Metabolic Panel [[id number]]  (Abnormal) Collected: 07/03/25 1220    Specimen: Blood  Updated: 07/03/25 1253     Glucose 103 mg/dL      BUN 18.2 mg/dL      Creatinine 1.12 mg/dL      Sodium 136 mmol/L      Potassium 4.4 mmol/L      Chloride 98 mmol/L      CO2 26.6 mmol/L      Calcium 9.3 mg/dL      Total Protein 6.5 g/dL      Albumin 4.2 g/dL      ALT (SGPT) 24 U/L      AST (SGOT) 29 U/L      Alkaline Phosphatase 118 U/L      Total Bilirubin 1.5 mg/dL      Globulin 2.3 gm/dL      A/G Ratio 1.8 g/dL      BUN/Creatinine Ratio 16.3     Anion Gap 11.4 mmol/L      eGFR 47.4 mL/min/1.73     Narrative:      GFR Categories in Chronic Kidney Disease (CKD)              GFR Category          GFR (mL/min/1.73)    Interpretation  G1                    90 or greater        Normal or high (1)  G2                    60-89                Mild decrease (1)  G3a                   45-59                Mild to moderate decrease  G3b                   30-44                Moderate to severe decrease  G4                    15-29                Severe decrease  G5                    14 or less           Kidney failure    (1)In the absence of evidence of kidney disease, neither GFR category G1 or G2 fulfill the criteria for CKD.    eGFR calculation 2021 CKD-EPI creatinine equation, which does not include race as a factor    BNP [[id number]]  (Normal) Collected: 07/03/25 1220    Specimen: Blood Updated: 07/03/25 1253     proBNP 1,411.0 pg/mL     Narrative:      This assay is used as an aid in the diagnosis of individuals suspected of having heart failure. It can be used as an aid in the diagnosis of acute decompensated heart failure (ADHF) in patients presenting with signs and symptoms of ADHF to the emergency department (ED). In addition, NT-proBNP of <300 pg/mL indicates ADHF is not likely.    Age Range Result Interpretation  NT-proBNP Concentration (pg/mL:      <50             Positive            >450                   Gray                 300-450                    Negative             <300    50-75           Positive       "      >900                  Gray                300-900                  Negative            <300      >75             Positive            >1800                  Gray                300-1800                  Negative            <300    High Sensitivity Troponin T [[id number]]  (Abnormal) Collected: 07/03/25 1220    Specimen: Blood Updated: 07/03/25 1253     HS Troponin T 14 ng/L     Narrative:      High Sensitive Troponin T Reference Range:  <14.0 ng/L- Negative Female for AMI  <22.0 ng/L- Negative Male for AMI  >=14 - Abnormal Female indicating possible myocardial injury.  >=22 - Abnormal Male indicating possible myocardial injury.   Clinicians would have to utilize clinical acumen, EKG, Troponin, and serial changes to determine if it is an Acute Myocardial Infarction or myocardial injury due to an underlying chronic condition.         Magnesium [[id number]]  (Normal) Collected: 07/03/25 1220    Specimen: Blood Updated: 07/03/25 1253     Magnesium 2.4 mg/dL     D-dimer, Quantitative [[id number]]  (Normal) Collected: 07/03/25 1220    Specimen: Blood Updated: 07/03/25 1239     D-Dimer, Quantitative 0.32 MCGFEU/mL     Narrative:      According to the assay 's published package insert, a normal (<0.50 MCGFEU/mL) D-dimer result in conjunction with a non-high clinical probability assessment, excludes deep vein thrombosis (DVT) and pulmonary embolism (PE) with high sensitivity.    D-dimer values increase with age and this can make VTE exclusion of an older population difficult. To address this, the [employer], based on best available evidence and recent guidelines, recommends that clinicians use age-adjusted D-dimer thresholds in patients greater than 50 years of age with: a) a low probability of PE who do not meet all Pulmonary Embolism Rule Out Criteria, or b) in those with intermediate probability of PE.   The formula for an age-adjusted D-dimer cut-off is \"age/100\".  For example, a 60 year " old patient would have an age-adjusted cut-off of 0.60 MCGFEU/mL and an 80 year old 0.80 MCGFEU/mL.    Protime-INR [[id number]]  (Normal) Collected: 07/03/25 1220    Specimen: Blood Updated: 07/03/25 1239     Protime 27.6 Seconds      INR 2.53    Extra Tubes [[id number]] Collected: 07/03/25 1220    Specimen: Blood Updated: 07/03/25 1230    Narrative:      The following orders were created for panel order Extra Tubes.  Procedure                               Abnormality         Status                     ---------                               -----------         ------                     Gold Top - SST[[id number]]                                   Final result                 Please view results for these tests on the individual orders.    Gold Top - SST [[id number]] Collected: 07/03/25 1220    Specimen: Blood Updated: 07/03/25 1230     Extra Tube Hold for add-ons.     Comment: Auto resulted.       CBC & Differential [[id number]]  (Abnormal) Collected: 07/03/25 1220    Specimen: Blood Updated: 07/03/25 1229    Narrative:      The following orders were created for panel order CBC & Differential.  Procedure                               Abnormality         Status                     ---------                               -----------         ------                     CBC Auto Differential[[id number]]        Abnormal            Final result                 Please view results for these tests on the individual orders.    CBC Auto Differential [[id number]]  (Abnormal) Collected: 07/03/25 1220    Specimen: Blood Updated: 07/03/25 1229     WBC 6.13 10*3/mm3      RBC 4.03 10*6/mm3      Hemoglobin 11.8 g/dL      Hematocrit 36.6 %      MCV 90.8 fL      MCH 29.3 pg      MCHC 32.2 g/dL      RDW 14.5 %      RDW-SD 48.3 fl      MPV 10.7 fL      Platelets 152 10*3/mm3      Neutrophil % 59.0 %      Lymphocyte % 26.6 %      Monocyte % 10.3 %      Eosinophil % 3.6 %      Basophil % 0.3 %      Immature Grans % 0.2 %      Neutrophils,  Absolute 3.62 10*3/mm3      Lymphocytes, Absolute 1.63 10*3/mm3      Monocytes, Absolute 0.63 10*3/mm3      Eosinophils, Absolute 0.22 10*3/mm3      Basophils, Absolute 0.02 10*3/mm3      Immature Grans, Absolute 0.01 10*3/mm3      nRBC 0.0 /100 WBC              Results for orders placed during the hospital encounter of 06/16/24    Adult Transthoracic Echo Complete W/ Cont if Necessary Per Protocol 06/17/2024  7:14 PM    Interpretation Summary    Left ventricular systolic function is normal. Calculated left ventricular EF = 69%    Left ventricular diastolic function is consistent with (grade II w/high LAP) pseudonormalization.    Estimated right ventricular systolic pressure from tricuspid regurgitation is mildly elevated (35-45 mmHg).    Conclusion      Normal LV size and contractility EF of 65 to 70%  Grade 2 diastolic dysfunction/pseudonormalization pattern seen.  Normal RV size  Normal atrial size, pacer lead seen  Pulmonic valve is not well visualized.  Aortic valve, mitral valve, tricuspid valve appears structurally normal, moderate tricuspid regurgitation seen.  Calculated RV systolic pressure of 42 mmHg consistent with moderate pulmonary hypertension  No pericardial effusion seen.  Proximal aorta appears normal in size.              Condition on Discharge: Improved, stable    Vital Signs  Temp:  [97.6 °F (36.4 °C)-98 °F (36.7 °C)] 97.6 °F (36.4 °C)  Heart Rate:  [60-75] 60  Resp:  [12-18] 15  BP: (120-162)/(67-99) 162/87    Physical Exam:     General Appearance:    Alert, cooperative, in no acute distress   Head:    Normocephalic, without obvious abnormality, atraumatic   Eyes:            Lids and lashes normal, conjunctivae and sclerae normal, no   icterus, no pallor, corneas clear, PERRLA   Ears:    Ears appear intact with no abnormalities noted   Throat:   No oral lesions, no thrush, oral mucosa moist   Neck:   No adenopathy, supple, trachea midline, no thyromegaly, no   carotid bruit, no JVD   Lungs:      Clear to auscultation,respirations regular, even and                  unlabored    Heart:    Regular rhythm and normal rate, 2/6 systolic ejection murmur   Chest Wall:    No abnormalities observed   Abdomen:     Normal bowel sounds, no masses, no organomegaly, soft        non-tender, non-distended, no guarding, no rebound                tenderness   Extremities: Significant varicose veins, no edema   Pulses:   Pulses palpable and equal bilaterally   Skin:   No bleeding, bruising or rash   Lymph nodes:   No palpable adenopathy   Neurologic:   Cranial nerves 2 - 12 grossly intact, sensation intact, DTR       present and equal bilaterally       Discharge Disposition  Home or Self Care    Discharge Medications     Discharge Medications        New Medications        Instructions Start Date   furosemide 40 MG tablet  Commonly known as: Lasix   40 mg, Oral, Daily   Start Date: July 5, 2025     potassium chloride 10 MEQ CR tablet  Commonly known as: KLOR-CON M10   10 mEq, Oral, Daily   Start Date: July 5, 2025            Continue These Medications        Instructions Start Date   acetaminophen 500 MG tablet  Commonly known as: TYLENOL   500 mg, Oral, Every 6 Hours PRN      albuterol sulfate  (90 Base) MCG/ACT inhaler  Commonly known as: PROVENTIL HFA;VENTOLIN HFA;PROAIR HFA   2 puffs, Inhalation, Every 4 Hours PRN      ALPRAZolam 0.5 MG tablet  Commonly known as: XANAX   0.5 mg, Oral, 2 Times Daily PRN      atorvastatin 10 MG tablet  Commonly known as: LIPITOR   10 mg, Nightly      cetirizine 10 MG tablet  Commonly known as: zyrTEC   10 mg, Daily      clobetasol prop emollient base 0.05 % emollient cream  Commonly known as: TEMOVATE-E   1 Application, Apply externally, Daily PRN      dilTIAZem 120 MG 24 hr capsule  Commonly known as: TIAZAC   120 mg, Oral, Daily      fluticasone 50 MCG/ACT nasal spray  Commonly known as: FLONASE   1 spray, Nasal, As Needed      HYDROcodone-acetaminophen 5-325 MG per tablet  Commonly  known as: NORCO   1 tablet, Every 6 Hours PRN      isosorbide mononitrate 30 MG 24 hr tablet  Commonly known as: IMDUR   30 mg, Oral, Every Morning      magnesium oxide 400 MG tablet  Commonly known as: MAG-OX   400 mg, Daily      metoprolol tartrate 100 MG tablet  Commonly known as: LOPRESSOR   100 mg, Oral, Every 12 Hours Scheduled      ondansetron 4 MG tablet  Commonly known as: ZOFRAN   4 mg, Oral, Daily PRN      pantoprazole 40 MG EC tablet  Commonly known as: PROTONIX   Take 1 tablet by mouth Daily.      VITAMIN D-3 PO   1 tablet, Daily      Viteyes AREDS Formula capsule   1 capsule, 2 times daily      warfarin 4 MG tablet  Commonly known as: COUMADIN   4 mg, See Admin Instructions      warfarin 4 MG tablet  Commonly known as: COUMADIN   2 mg, 2 Times Weekly             Stop These Medications      cephalexin 250 MG capsule  Commonly known as: KEFLEX              Discharge Diet:     Activity at Discharge:     Follow-up Appointments  Future Appointments   Date Time Provider Department Center   7/18/2025  1:00 PM [facility]   9/10/2025  2:00 PM [facility]   9/10/2025  2:30 PM [facility]   9/10/2025  2:45 PM Ivan Martell MD [facility]     Additional Instructions for the Follow-ups that You Need to Schedule       Discharge Follow-up with PCP   As directed       Currently Documented PCP:    Nava Yu MD    PCP Phone Number:    [phone]     Follow Up Details: Call office Monday to request a hospital follow up appointment.        Discharge Follow-up with Specified Provider: Keep upcoming appointment with nephrologist.   As directed      To: Keep upcoming appointment with nephrologist.                Test Results Pending at Discharge  Pending Results       Procedure [Order ID] Specimen - Date/Time    Adult Transthoracic Echo Complete W/ Cont if Necessary Per Protocol - In  process [[id number]] Resulted: 07/04/25 0953     Updated: 07/04/25 1026    This result has not been signed. Information might be incomplete.      Protein Elec + Interp, Serum [[id number]]     Specimen: Blood     US Renal Bilateral [[id number]]              Karly Donovan MD  07/04/25  10:32 EDT    Time: Discharge 25 min

## 2025-07-05 ENCOUNTER — NURSE TRIAGE (OUTPATIENT)
Dept: CALL CENTER | Facility: HOSPITAL | Age: 89
End: 2025-07-05
Payer: MEDICARE

## 2025-07-05 LAB
QT INTERVAL: 469 MS
QTC INTERVAL: 469 MS

## 2025-07-05 NOTE — TELEPHONE ENCOUNTER
I have called the pharmacy twice and was put to  both times, left a detailed message that I need to know what is needed to get this filled today for the patient.  The original order went to [facility] yesterday and they were closed.  Sent to [facility] and patient states they cannot fill because it is in progress at [facility]. Will await pharmacy to call me so I can try to assist the patient.     While explaining to the caller what I can and cannot do, for example I can change pharmacy for her no problem.  I cannot tell her it is okay to go without her medication until Monday.  She needs to Call Dr Karly Donovan and request 2-3 pills to get her through the weekend, she asked how I explained she has an answering service.  I will keep working on reaching [facility] to find out what the true issue at hand is and if there is anything I can do.  Caller hung up on me.   Reason for Disposition   [1] Prescription not at pharmacy AND [2] was prescribed by PCP recently (Exception: Triager has access to EMR and prescription is recorded there. Go to Home Care and confirm for pharmacy.)    Additional Information   Negative: [1] Intentional drug overdose AND [2] suicidal thoughts or ideas   Negative: Drug overdose and triager unable to answer question   Negative: Caller requesting a renewal or refill of a medicine patient is currently taking   Negative: Caller requesting information unrelated to medicine   Negative: Caller requesting information about COVID-19 Vaccine   Negative: Caller requesting information about Emergency Contraception   Negative: Caller requesting information about Combined Birth Control Pills   Negative: Caller requesting information about Progestin Birth Control Pills   Negative: Caller requesting information about Post-Op pain or medicines   Negative: Caller requesting a prescription antibiotic (such as Penicillin) for Strep throat and has a positive culture result   Negative: Caller requesting a prescription anti-viral med  "(such as Tamiflu) and has influenza (flu) symptoms   Negative: Immunization reaction suspected   Negative: Rash while taking a medicine or within 3 days of stopping it   Negative: [1] Asthma and [2] having symptoms of asthma (cough, wheezing, etc.)   Negative: [1] Symptom of illness (e.g., headache, abdominal pain, earache, vomiting) AND [2] more than mild   Negative: Breastfeeding questions about mother's medicines and diet   Negative: MORE THAN A DOUBLE DOSE of a prescription or over-the-counter (OTC) drug   Negative: [1] DOUBLE DOSE (an extra dose or lesser amount) of prescription drug AND [2] any symptoms (e.g., dizziness, nausea, pain, sleepiness)   Negative: [1] DOUBLE DOSE (an extra dose or lesser amount) of over-the-counter (OTC) drug AND [2] any symptoms (e.g., dizziness, nausea, pain, sleepiness)   Negative: Took another person's prescription drug   Negative: [1] DOUBLE DOSE (an extra dose or lesser amount) of prescription drug AND [2] NO symptoms  (Exception: A double dose of antibiotics.)   Negative: Diabetes drug error or overdose (e.g., took wrong type of insulin or took extra dose)    Answer Assessment - Initial Assessment Questions  1. NAME of MEDICINE: \"What medicine(s) are you calling about?\"      Furosemide, potassium  2. QUESTION: \"What is your question?\" (e.g., double dose of medicine, side effect)      [facility] closed when discharged so sent to [facility].  They cannnot fill it because in progress at [facility].    3. PRESCRIBER: \"Who prescribed the medicine?\" Reason: if prescribed by specialist, call should be referred to that group.      Dr Karly Donovan  4. SYMPTOMS: \"Do you have any symptoms?\" If Yes, ask: \"What symptoms are you having?\"  \"How bad are the symptoms (e.g., mild, moderate, severe)      CHF  5. PREGNANCY:  \"Is there any chance that you are pregnant?\" \"When was your last menstrual period?\"      na    Protocols used: Medication Question Call-ADULT-AH    "

## 2025-07-07 ENCOUNTER — READMISSION MANAGEMENT (OUTPATIENT)
Dept: CALL CENTER | Facility: HOSPITAL | Age: 89
End: 2025-07-07
Payer: MEDICARE

## 2025-07-07 NOTE — OUTREACH NOTE
Prep Survey      Flowsheet Row Responses   [facility] facility patient discharged from? [place]   Is LACE score < 7 ? No   Eligibility Readm Mgmt   Discharge diagnosis Pulmonary edema,  Acute on chronic congestive heart failure   Does the patient have one of the following disease processes/diagnoses(primary or secondary)? CHF   Does the patient have Home health ordered? Yes   What is the Home health agency?  [facility] [place]   Prep survey completed? Yes            Nathalie MOTTA - Registered Nurse

## 2025-07-10 LAB
MDC_IDC_MSMT_BATTERY_REMAINING_LONGEVITY: 9 MO
MDC_IDC_MSMT_BATTERY_REMAINING_PERCENTAGE: 9 %
MDC_IDC_MSMT_BATTERY_RRT_TRIGGER: 2.6
MDC_IDC_MSMT_BATTERY_STATUS: NORMAL
MDC_IDC_MSMT_BATTERY_VOLTAGE: 2.81
MDC_IDC_MSMT_LEADCHNL_RA_DTM: NORMAL
MDC_IDC_MSMT_LEADCHNL_RA_IMPEDANCE_VALUE: 450
MDC_IDC_MSMT_LEADCHNL_RA_PACING_THRESHOLD_AMPLITUDE: 2
MDC_IDC_MSMT_LEADCHNL_RA_PACING_THRESHOLD_POLARITY: NORMAL
MDC_IDC_MSMT_LEADCHNL_RA_PACING_THRESHOLD_PULSEWIDTH: 0.4
MDC_IDC_MSMT_LEADCHNL_RA_SENSING_INTR_AMPL: 2.4
MDC_IDC_MSMT_LEADCHNL_RV_DTM: NORMAL
MDC_IDC_MSMT_LEADCHNL_RV_IMPEDANCE_VALUE: 640
MDC_IDC_MSMT_LEADCHNL_RV_PACING_THRESHOLD_AMPLITUDE: 0.5
MDC_IDC_MSMT_LEADCHNL_RV_PACING_THRESHOLD_POLARITY: NORMAL
MDC_IDC_MSMT_LEADCHNL_RV_PACING_THRESHOLD_PULSEWIDTH: 0.4
MDC_IDC_MSMT_LEADCHNL_RV_SENSING_INTR_AMPL: 8.7
MDC_IDC_PG_IMPLANT_DTM: NORMAL
MDC_IDC_PG_MFG: NORMAL
MDC_IDC_PG_MODEL: NORMAL
MDC_IDC_PG_SERIAL: NORMAL
MDC_IDC_PG_TYPE: NORMAL
MDC_IDC_SESS_DTM: NORMAL
MDC_IDC_SESS_TYPE: NORMAL
MDC_IDC_SET_BRADY_AT_MODE_SWITCH_RATE: 160
MDC_IDC_SET_BRADY_LOWRATE: 60
MDC_IDC_SET_BRADY_MAX_SENSOR_RATE: 115
MDC_IDC_SET_BRADY_MAX_TRACKING_RATE: 100
MDC_IDC_SET_BRADY_MODE: NORMAL
MDC_IDC_SET_BRADY_PAV_DELAY: 300
MDC_IDC_SET_BRADY_SAV_DELAY: 275
MDC_IDC_SET_LEADCHNL_RA_PACING_AMPLITUDE: 3
MDC_IDC_SET_LEADCHNL_RA_PACING_POLARITY: NORMAL
MDC_IDC_SET_LEADCHNL_RA_PACING_PULSEWIDTH: 0.4
MDC_IDC_SET_LEADCHNL_RA_SENSING_POLARITY: NORMAL
MDC_IDC_SET_LEADCHNL_RA_SENSING_SENSITIVITY: 0.3
MDC_IDC_SET_LEADCHNL_RV_PACING_AMPLITUDE: 0.75
MDC_IDC_SET_LEADCHNL_RV_PACING_POLARITY: NORMAL
MDC_IDC_SET_LEADCHNL_RV_PACING_PULSEWIDTH: 0.4
MDC_IDC_SET_LEADCHNL_RV_SENSING_POLARITY: NORMAL
MDC_IDC_SET_LEADCHNL_RV_SENSING_SENSITIVITY: 2
MDC_IDC_STAT_AT_BURDEN_PERCENT: 23
MDC_IDC_STAT_BRADY_RA_PERCENT_PACED: 34
MDC_IDC_STAT_BRADY_RV_PERCENT_PACED: 11

## 2025-07-11 ENCOUNTER — READMISSION MANAGEMENT (OUTPATIENT)
Dept: CALL CENTER | Facility: HOSPITAL | Age: 89
End: 2025-07-11
Payer: MEDICARE

## 2025-07-13 DIAGNOSIS — I48.19 PERSISTENT ATRIAL FIBRILLATION: ICD-10-CM

## 2025-07-14 RX ORDER — WARFARIN SODIUM 4 MG/1
TABLET ORAL
Qty: 80 TABLET | Refills: 0 | Status: SHIPPED | OUTPATIENT
Start: 2025-07-14

## 2025-07-14 NOTE — TELEPHONE ENCOUNTER
Rx Refill Note  Requested Prescriptions     Pending Prescriptions Disp Refills    warfarin (COUMADIN) 4 MG tablet [Pharmacy Med Name: WARFARIN SOD 4MG TABLETS] 80 tablet 0     Sig: Take 1 tab, by mouth, daily except 1/2 tab on Tues and Thurs or as directed.   Last INR 6/19/25   Last office visit with prescribing clinician: 2/26/2025   Last telemedicine visit with prescribing clinician: Visit date not found   Next office visit with prescribing clinician: 9/10/2025                         Would you like a call back once the refill request has been completed: [] Yes [] No    If the office needs to give you a call back, can they leave a voicemail: [] Yes [] No    Sade Ch RN  07/14/25, 10:34 EDT

## 2025-07-18 ENCOUNTER — ANTICOAGULATION VISIT (OUTPATIENT)
Dept: CARDIOLOGY | Facility: CLINIC | Age: 89
End: 2025-07-18
Payer: MEDICARE

## 2025-07-18 VITALS
SYSTOLIC BLOOD PRESSURE: 138 MMHG | HEART RATE: 64 BPM | WEIGHT: 160 LBS | BODY MASS INDEX: 31.25 KG/M2 | DIASTOLIC BLOOD PRESSURE: 64 MMHG

## 2025-07-18 DIAGNOSIS — I48.19 PERSISTENT ATRIAL FIBRILLATION: ICD-10-CM

## 2025-07-18 DIAGNOSIS — Z79.01 LONG TERM (CURRENT) USE OF ANTICOAGULANTS: Primary | ICD-10-CM

## 2025-07-18 LAB — INR PPP: 2.9 (ref 0.9–1.1)

## 2025-07-18 PROCEDURE — 85610 PROTHROMBIN TIME: CPT | Performed by: INTERNAL MEDICINE

## 2025-07-18 PROCEDURE — 36416 COLLJ CAPILLARY BLOOD SPEC: CPT | Performed by: INTERNAL MEDICINE

## 2025-07-22 ENCOUNTER — READMISSION MANAGEMENT (OUTPATIENT)
Dept: CALL CENTER | Facility: HOSPITAL | Age: 89
End: 2025-07-22
Payer: MEDICARE

## 2025-07-22 NOTE — OUTREACH NOTE
CHF Week 2 Survey      Flowsheet Row Responses   [facility] facility patient discharged from? [place]   Does the patient have one of the following disease processes/diagnoses(primary or secondary)? CHF   Week 2 attempt successful? No   Unsuccessful attempts Attempt 1   oke Marisa Ritter Registered Nurse

## 2025-08-12 ENCOUNTER — ANTICOAGULATION VISIT (OUTPATIENT)
Dept: CARDIOLOGY | Facility: CLINIC | Age: 89
End: 2025-08-12
Payer: MEDICARE

## 2025-08-12 VITALS
SYSTOLIC BLOOD PRESSURE: 131 MMHG | DIASTOLIC BLOOD PRESSURE: 65 MMHG | BODY MASS INDEX: 31.64 KG/M2 | HEART RATE: 65 BPM | WEIGHT: 162 LBS

## 2025-08-12 DIAGNOSIS — Z79.01 LONG TERM (CURRENT) USE OF ANTICOAGULANTS: Primary | ICD-10-CM

## 2025-08-12 DIAGNOSIS — I48.19 PERSISTENT ATRIAL FIBRILLATION: ICD-10-CM

## 2025-08-12 LAB — INR PPP: 4.6 (ref 2–3)

## 2025-08-12 PROCEDURE — 36416 COLLJ CAPILLARY BLOOD SPEC: CPT | Performed by: INTERNAL MEDICINE

## 2025-08-12 PROCEDURE — 85610 PROTHROMBIN TIME: CPT | Performed by: INTERNAL MEDICINE

## 2025-08-27 ENCOUNTER — TELEPHONE (OUTPATIENT)
Dept: CARDIOLOGY | Facility: CLINIC | Age: 89
End: 2025-08-27
Payer: MEDICARE

## (undated) DEVICE — DEV INFL COMPAK W/ACCESSPLUS IN4530

## (undated) DEVICE — TREK CORONARY DILATATION CATHETER 2.50 MM X 12 MM / RAPID-EXCHANGE: Brand: TREK

## (undated) DEVICE — GW PTFE EMERALD HEPCOAT FC J TIP STD .035 3MM 150CM

## (undated) DEVICE — BALN DIL ESOPH CRE CONTRL RADL 15/18MM 8CM BX5

## (undated) DEVICE — DEV INFL CRE STERIFLATE 60CC DISP

## (undated) DEVICE — BITEBLOCK ENDO W/STRAP 60F A/ LF DISP

## (undated) DEVICE — CONTRST ISOVUE300 61PCT 50ML

## (undated) DEVICE — ELECTRD DEFIB M/FUNC PROPADZ RADIOL 2PK

## (undated) DEVICE — PINNACLE INTRODUCER SHEATH: Brand: PINNACLE

## (undated) DEVICE — HI-TORQUE WHISPER MS GUIDE WIRE .014 STRAIGHT TIP 3.0 CM X 190 CM: Brand: HI-TORQUE WHISPER

## (undated) DEVICE — PAPR PRNT PK SONY W RIBN UPC55

## (undated) DEVICE — 6F .070 XB 3.5 100CM: Brand: VISTA BRITE TIP

## (undated) DEVICE — CATH DIAG IMPULSE FR4 6F 100CM

## (undated) DEVICE — PK TRY HEART CATH 50

## (undated) DEVICE — PK ENDO GI 50

## (undated) DEVICE — DEV INFL BALN BIG60 W/GAUGE 60ML

## (undated) DEVICE — GW DIAG EMERALD HEPCOAT MOVE JTIP STD .035 3MM 150CM

## (undated) DEVICE — CATH DIAG IMPULSE PIG .056 6F 110CM

## (undated) DEVICE — ESOPHAGEAL BALLOON DILATATION CATHETER: Brand: CRE FIXED WIRE

## (undated) DEVICE — GUIDE CATHETER: Brand: MACH1™

## (undated) DEVICE — CATH DIAG IMPULSE FL4 6F 100CM

## (undated) DEVICE — SINGLE-USE BIOPSY FORCEPS: Brand: RADIAL JAW 4

## (undated) DEVICE — TBG NAMIC PRESS MONTR A/ F/M 12IN